# Patient Record
Sex: FEMALE | Race: WHITE | Employment: UNEMPLOYED | URBAN - METROPOLITAN AREA
[De-identification: names, ages, dates, MRNs, and addresses within clinical notes are randomized per-mention and may not be internally consistent; named-entity substitution may affect disease eponyms.]

---

## 2017-01-24 DIAGNOSIS — E11.65 TYPE 2 DIABETES MELLITUS WITH HYPERGLYCEMIA, WITH LONG-TERM CURRENT USE OF INSULIN (HCC): Primary | ICD-10-CM

## 2017-01-24 DIAGNOSIS — Z79.4 TYPE 2 DIABETES MELLITUS WITH HYPERGLYCEMIA, WITH LONG-TERM CURRENT USE OF INSULIN (HCC): Primary | ICD-10-CM

## 2017-01-24 RX ORDER — INSULIN GLARGINE 100 [IU]/ML
INJECTION, SOLUTION SUBCUTANEOUS
Qty: 60 ML | Refills: 3 | Status: SHIPPED | OUTPATIENT
Start: 2017-01-24 | End: 2017-05-15 | Stop reason: SDUPTHER

## 2017-01-24 NOTE — TELEPHONE ENCOUNTER
Informed pt that levemir is no longer covered and a covered alternative has been sent to pharmacy. Asked pt to call with any questions.

## 2017-02-20 ENCOUNTER — HOSPITAL ENCOUNTER (OUTPATIENT)
Dept: MRI IMAGING | Age: 62
Discharge: HOME | End: 2017-02-20
Attending: ORTHOPAEDIC SURGERY

## 2017-02-20 DIAGNOSIS — M51.36 DDD (DEGENERATIVE DISC DISEASE), LUMBAR: ICD-10-CM

## 2017-03-29 ENCOUNTER — OFFICE VISIT (OUTPATIENT)
Dept: FAMILY MEDICINE CLINIC | Age: 62
End: 2017-03-29

## 2017-03-29 VITALS
OXYGEN SATURATION: 97 % | HEIGHT: 63 IN | HEART RATE: 77 BPM | TEMPERATURE: 98 F | BODY MASS INDEX: 35.61 KG/M2 | RESPIRATION RATE: 18 BRPM | WEIGHT: 201 LBS | SYSTOLIC BLOOD PRESSURE: 133 MMHG | DIASTOLIC BLOOD PRESSURE: 73 MMHG

## 2017-03-29 DIAGNOSIS — E11.8 DIABETES MELLITUS WITH COMPLICATION (HCC): Primary | ICD-10-CM

## 2017-03-29 DIAGNOSIS — E78.2 MIXED HYPERLIPIDEMIA: ICD-10-CM

## 2017-03-29 DIAGNOSIS — Q74.2 TOE ANOMALY: ICD-10-CM

## 2017-03-29 LAB — GLUCOSE POC: 279 MG/DL

## 2017-03-29 NOTE — MR AVS SNAPSHOT
Visit Information Date & Time Provider Department Dept. Phone Encounter #  
 3/29/2017  4:00 PM Rita Luque, Karl Hayden [phone] [id number] Upcoming Health Maintenance Date Due Hepatitis C Screening 1955 Pneumococcal 19-64 Medium Risk (1 of 1 - PPSV23) 1/16/1976 DTaP/Tdap/Td series (1 - Tdap) 1/16/1978 PAP AKA CERVICAL CYTOLOGY 1/16/1978 LIPID PANEL Q1 9/22/2015 FOOT EXAM Q1 12/8/2015 INFLUENZA AGE 9 TO ADULT 8/1/2016 BREAST CANCER SCRN MAMMOGRAM 12/11/2016 ZOSTER VACCINE AGE 60> 1/16/2017 HEMOGLOBIN A1C Q6M 2/9/2017 MICROALBUMIN Q1 8/9/2017 EYE EXAM RETINAL OR DILATED Q1 11/14/2017 COLONOSCOPY 7/15/2020 Allergies as of 3/29/2017  Review Complete On: 3/29/2017 By: Marlena Acevedo LPN Severity Noted Reaction Type Reactions Pcn [Penicillins]  08/21/2014    Hives Tolerates cephalexin Tanzeum [Albiglutide]  04/12/2016    Nausea Only Vioxx [Rofecoxib]  08/23/2016    Nausea Only Current Immunizations  Never Reviewed No immunizations on file. Not reviewed this visit You Were Diagnosed With   
  
 Codes Comments Diabetes mellitus with complication (HCC)    -  Primary ICD-10-CM: E11.8 ICD-9-CM: 250.90 Mixed hyperlipidemia     ICD-10-CM: E78.2 ICD-9-CM: 272.2 Vitals BP Pulse Temp Resp Height(growth percentile) Weight(growth percentile) 133/73 (BP 1 Location: Left arm, BP Patient Position: Sitting) 77 98 °F (36.7 °C) (Oral) 18 5' 3\" (1.6 m) 201 lb (91.2 kg) SpO2 BMI OB Status Smoking Status 97% 35.61 kg/m2 Menopause Current Every Day Smoker Vitals History BMI and BSA Data Body Mass Index Body Surface Area  
 35.61 kg/m 2 2.01 m 2 Preferred Pharmacy Pharmacy Name Phone [id number] [place], [address] AT [place] [phone] Your Updated Medication List  
  
   
 This list is accurate as of: 3/29/17  4:49 PM.  Always use your most recent med list.  
  
  
  
  
 albuterol 90 mcg/actuation inhaler Commonly known as:  PROVENTIL HFA, VENTOLIN HFA, PROAIR HFA Take 2 Puffs by inhalation every four (4) hours as needed for Wheezing. APIDRA SOLOSTAR 100 unit/mL pen Generic drug:  insulin glulisine Use with SSI Max units daily: 60  
  
 aspirin delayed-release 81 mg tablet Take 81 mg by mouth nightly. atorvastatin 20 mg tablet Commonly known as:  LIPITOR Take 1 Tab by mouth nightly. busPIRone 7.5 mg tablet Commonly known as:  BUSPAR Take 1 Tab by mouth two (2) times a day. cetirizine 10 mg tablet Commonly known as:  ZyrTEC Take 1 Tab by mouth every morning. docusate sodium 100 mg capsule Commonly known as:  STOOL SOFTENER  
TAKE 1 CAPSULE BY MOUTH TWO (2) TIMES A DAY. Indications: CONSTIPATION  
  
 gabapentin 300 mg capsule Commonly known as:  NEURONTIN Take 1 Cap by mouth two (2) times a day. hydroCHLOROthiazide 12.5 mg tablet Commonly known as:  HYDRODIURIL Take 1 Tab by mouth daily. insulin glargine 100 unit/mL (3 mL) pen Commonly known as:  LANTUS SOLOSTAR Inject 60 units in AM Stop Levemir Insulin Needles (Disposable) 32 gauge x 5/32\" Ndle Commonly known as:  Odilia Pen Needle Use to inject Levemir and Novolog  Dx Code: E11.65 Lancets Misc Commonly known as:  Leeann Lava Use when checking blood sugar  
  
 lisinopril 10 mg tablet Commonly known as:  Tim Paradise Take 1 Tab by mouth nightly. metoprolol tartrate 25 mg tablet Commonly known as:  LOPRESSOR Take 1 Tab by mouth two (2) times a day. mupirocin 2 % ointment Commonly known as:  [employer] Apply  to affected area daily. raNITIdine 150 mg tablet Commonly known as:  ZANTAC Take 1 Tab by mouth two (2) times daily as needed for Indigestion. SITagliptin 100 mg tablet Commonly known as:  Thirza Calkin Take 1 Tab by mouth every morning. Stop Tanzeum  
  
 venlafaxine- mg capsule Commonly known as:  EFFEXOR XR Take 1 Cap by mouth nightly. We Performed the Following AMB POC GLUCOSE, QUANTITATIVE, BLOOD [00195 CPT(R)] HEMOGLOBIN A1C WITH EAG [89862 CPT(R)] LIPID PANEL [14438 CPT(R)] METABOLIC PANEL, COMPREHENSIVE [43050 CPT(R)] MICROALBUMIN, UR, RAND W/ MICROALBUMIN/CREA RATIO [id number] CPT(R)] Introducing [facility]! Dear Opal Sheridan: 
Thank you for requesting a [facility] account. Our records indicate that you already have an active [facility] account. You can access your account anytime at [email or web address] Did you know that you can access your hospital and ER discharge instructions at any time in [facility]? You can also review all of your test results from your hospital stay or ER visit. Additional Information If you have questions, please visit the Frequently Asked Questions section of the [facility] website at [email or web address] Remember, [facility] is NOT to be used for urgent needs. For medical emergencies, dial 911. Now available from your iPhone and Android! Please provide this summary of care documentation to your next provider. Your primary care clinician is listed as Dayton Kenyan. If you have any questions after today's visit, please call [phone].

## 2017-03-30 LAB
ALBUMIN SERPL-MCNC: 3.6 G/DL (ref 3.6–4.8)
ALBUMIN/CREAT UR: 1265.8 MG/G CREAT (ref 0–30)
ALBUMIN/GLOB SERPL: 1.1 {RATIO} (ref 1.2–2.2)
ALP SERPL-CCNC: 105 IU/L (ref 39–117)
ALT SERPL-CCNC: 13 IU/L (ref 0–32)
AST SERPL-CCNC: 10 IU/L (ref 0–40)
BILIRUB SERPL-MCNC: 0.3 MG/DL (ref 0–1.2)
BUN SERPL-MCNC: 19 MG/DL (ref 8–27)
BUN/CREAT SERPL: 20 (ref 11–26)
CALCIUM SERPL-MCNC: 9.3 MG/DL (ref 8.7–10.3)
CHLORIDE SERPL-SCNC: 99 MMOL/L (ref 96–106)
CHOLEST SERPL-MCNC: 127 MG/DL (ref 100–199)
CO2 SERPL-SCNC: 24 MMOL/L (ref 18–29)
CREAT SERPL-MCNC: 0.95 MG/DL (ref 0.57–1)
CREAT UR-MCNC: 101.4 MG/DL
EST. AVERAGE GLUCOSE BLD GHB EST-MCNC: 246 MG/DL
GLOBULIN SER CALC-MCNC: 3.2 G/DL (ref 1.5–4.5)
GLUCOSE SERPL-MCNC: 243 MG/DL (ref 65–99)
HBA1C MFR BLD: 10.2 % (ref 4.8–5.6)
HDLC SERPL-MCNC: 37 MG/DL
INTERPRETATION, 910389: NORMAL
LDLC SERPL CALC-MCNC: 55 MG/DL (ref 0–99)
Lab: NORMAL
MICROALBUMIN UR-MCNC: 1283.5 UG/ML
POTASSIUM SERPL-SCNC: 4.7 MMOL/L (ref 3.5–5.2)
PROT SERPL-MCNC: 6.8 G/DL (ref 6–8.5)
SODIUM SERPL-SCNC: 138 MMOL/L (ref 134–144)
TRIGL SERPL-MCNC: 177 MG/DL (ref 0–149)
VLDLC SERPL CALC-MCNC: 35 MG/DL (ref 5–40)
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
## 2017-04-04 ENCOUNTER — TELEPHONE (OUTPATIENT)
Dept: FAMILY MEDICINE CLINIC | Age: 62
End: 2017-04-04

## 2017-04-04 NOTE — TELEPHONE ENCOUNTER
Pt notified . [facility] sure she has endocrine f/u. Whitney Valenzuela DM is out of control as her HgbA1c was 10.2

## 2017-04-25 ENCOUNTER — TELEPHONE (OUTPATIENT)
Dept: ENDOCRINOLOGY | Age: 62
End: 2017-04-25

## 2017-04-25 NOTE — TELEPHONE ENCOUNTER
Patient called and stated she saw her PCP at the end of March and A1C is higher at 10.2. Microalb/creat is also high. This week patient started experiencing a yellowish discharge which looks creamy and notices urine is bubbly when she uses bathroom. Patient stated only thing PCP did at appointment was recommend f/u with . Only new medication is Zrytec which started 3 days ago. Blood sugars are as follows for the past couple of days:  04/24   04/24 Bbed 350  04/25   She has a f/u appointment on July 05, 2017.

## 2017-04-25 NOTE — TELEPHONE ENCOUNTER
Her A1C has increased from 8.2 to 10.2 , need to adjuts meds , missed follow up appointment in Feb   Looks like she has urine infection and fu with PCP to make sure there is no infection -     She has to decrease starches   ,check sugars before each meal and  take insulin as recommended   Fax the glucose log in 2 weeks with food log , have to adjust the insulin     Brenda David - her diet is very bad and it is a challenge to control due to noncompliance

## 2017-04-25 NOTE — TELEPHONE ENCOUNTER
Spoke with patient and gave her 's recommendations of checking blood sugars before every meal, keeping a log of BS and diet and faxing information to office. Patient given fax number. Instructed patient to decrease carb intake. Also f/u with PCP concerning infection. Patient verbalized understanding. No further questions noted at this time.

## 2017-04-26 ENCOUNTER — OFFICE VISIT (OUTPATIENT)
Dept: FAMILY MEDICINE CLINIC | Age: 62
End: 2017-04-26

## 2017-04-26 VITALS
OXYGEN SATURATION: 97 % | TEMPERATURE: 97.8 F | HEIGHT: 63 IN | SYSTOLIC BLOOD PRESSURE: 141 MMHG | BODY MASS INDEX: 35.08 KG/M2 | HEART RATE: 76 BPM | RESPIRATION RATE: 16 BRPM | WEIGHT: 198 LBS | DIASTOLIC BLOOD PRESSURE: 71 MMHG

## 2017-04-26 DIAGNOSIS — R82.90 ABNORMAL URINALYSIS: ICD-10-CM

## 2017-04-26 DIAGNOSIS — Z12.39 BREAST CANCER SCREENING: ICD-10-CM

## 2017-04-26 DIAGNOSIS — E11.8 TYPE 2 DIABETES MELLITUS WITH COMPLICATION, WITH LONG-TERM CURRENT USE OF INSULIN (HCC): ICD-10-CM

## 2017-04-26 DIAGNOSIS — R82.90 CLOUDY URINE: ICD-10-CM

## 2017-04-26 DIAGNOSIS — B37.31 VAGINAL CANDIDIASIS: ICD-10-CM

## 2017-04-26 DIAGNOSIS — N89.8 VAGINAL DISCHARGE: Primary | ICD-10-CM

## 2017-04-26 DIAGNOSIS — Z79.4 TYPE 2 DIABETES MELLITUS WITH COMPLICATION, WITH LONG-TERM CURRENT USE OF INSULIN (HCC): ICD-10-CM

## 2017-04-26 LAB
BILIRUB UR QL STRIP: NORMAL
GLUCOSE UR-MCNC: NEGATIVE MG/DL
KETONES P FAST UR STRIP-MCNC: NORMAL MG/DL
PH UR STRIP: 5 [PH] (ref 4.6–8)
PROT UR QL STRIP: NORMAL MG/DL
SP GR UR STRIP: 1.02 (ref 1–1.03)
UA UROBILINOGEN AMB POC: NORMAL (ref 0.2–1)
URINALYSIS CLARITY POC: NORMAL
URINALYSIS COLOR POC: YELLOW
URINE BLOOD POC: NORMAL
URINE LEUKOCYTES POC: NORMAL
URINE NITRITES POC: NEGATIVE
WET MOUNT POCT, WMPOCT: NORMAL

## 2017-04-26 RX ORDER — FLUCONAZOLE 150 MG/1
150 TABLET ORAL DAILY
Qty: 1 TAB | Refills: 0 | Status: SHIPPED | OUTPATIENT
Start: 2017-04-26 | End: 2017-04-27

## 2017-04-26 NOTE — PROGRESS NOTES
Chief Complaint   Patient presents with    Vaginal Discharge     with cloudy urine     1. Have you been to the ER, urgent care clinic since your last visit? Hospitalized since your last visit? No    2. Have you seen or consulted any other health care providers outside of [facility] [place] since your last visit? Include any pap smears or colon screening.  No
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
## 2017-04-26 NOTE — MR AVS SNAPSHOT
Visit Information Date & Time Provider Department Dept. Phone Encounter #  
 4/26/2017  8:20 AM Sonny Mckenna MD [address] [place] [address] [phone] [id number] Follow-up Instructions Return if symptoms worsen or fail to improve. Your Appointments 7/11/2017 10:30 AM  
ROUTINE CARE with Austin Maki MD  
[facility] [address] [facility]) Appt Note: f/u . .DM. Canary Malady lws  
 [address] [facility] [address]  
[phone]  
  
   
 [address] [place] [id number] Upcoming Health Maintenance Date Due Hepatitis C Screening 1955 Pneumococcal 19-64 Medium Risk (1 of 1 - PPSV23) 1/16/1976 DTaP/Tdap/Td series (1 - Tdap) 1/16/1978 PAP AKA CERVICAL CYTOLOGY 1/16/1978 FOOT EXAM Q1 12/8/2015 INFLUENZA AGE 9 TO ADULT 8/1/2016 BREAST CANCER SCRN MAMMOGRAM 12/11/2016 ZOSTER VACCINE AGE 60> 1/16/2017 HEMOGLOBIN A1C Q6M 9/29/2017 MICROALBUMIN Q1 3/29/2018 LIPID PANEL Q1 3/29/2018 EYE EXAM RETINAL OR DILATED Q1 4/12/2018 COLONOSCOPY 7/15/2020 Allergies as of 4/26/2017  Review Complete On: 4/26/2017 By: Patrick Torres LPN Severity Noted Reaction Type Reactions Pcn [Penicillins]  08/21/2014    Hives Tolerates cephalexin Tanzeum [Albiglutide]  04/12/2016    Nausea Only Vioxx [Rofecoxib]  08/23/2016    Nausea Only Current Immunizations  Never Reviewed No immunizations on file. Not reviewed this visit You Were Diagnosed With   
  
 Codes Comments Vaginal discharge    -  Primary ICD-10-CM: N89.8 ICD-9-CM: 623.5 Vaginal candidiasis     ICD-10-CM: B37.3 ICD-9-CM: 112.1 Cloudy urine     ICD-10-CM: R82.90 ICD-9-CM: 791.9 Type 2 diabetes mellitus with complication, with long-term current use of insulin (HCC)     ICD-10-CM: E11.8, Z79.4 ICD-9-CM: 250.90, V58.67 Abnormal urinalysis     ICD-10-CM: R82.90 ICD-9-CM: 791.9 Vitals BP Pulse Temp Resp Height(growth percentile) Weight(growth percentile) 141/71 76 97.8 °F (36.6 °C) (Oral) 16 5' 3\" (1.6 m) 198 lb (89.8 kg) SpO2 BMI OB Status Smoking Status 97% 35.07 kg/m2 Menopause Current Every Day Smoker BMI and BSA Data Body Mass Index Body Surface Area 35.07 kg/m 2 2 m 2 Preferred Pharmacy Pharmacy Name Phone [facility]Y #[id number]- [place], [address] AT [facility] [phone] Your Updated Medication List  
  
   
This list is accurate as of: 4/26/17  9:11 AM.  Always use your most recent med list.  
  
  
  
  
 albuterol 90 mcg/actuation inhaler Commonly known as:  PROVENTIL HFA, VENTOLIN HFA, PROAIR HFA Take 2 Puffs by inhalation every four (4) hours as needed for Wheezing. APIDRA SOLOSTAR 100 unit/mL pen Generic drug:  insulin glulisine Use with SSI Max units daily: 60  
  
 aspirin delayed-release 81 mg tablet Take 81 mg by mouth nightly. atorvastatin 20 mg tablet Commonly known as:  LIPITOR Take 1 Tab by mouth nightly. busPIRone 7.5 mg tablet Commonly known as:  BUSPAR Take 1 Tab by mouth two (2) times a day. cetirizine 10 mg tablet Commonly known as:  ZyrTEC Take 1 Tab by mouth every morning. docusate sodium 100 mg capsule Commonly known as:  STOOL SOFTENER  
TAKE 1 CAPSULE BY MOUTH TWO (2) TIMES A DAY. Indications: CONSTIPATION  
  
 fluconazole 150 mg tablet Commonly known as:  DIFLUCAN Take 1 Tab by mouth daily for 1 day. FDA advises cautious prescribing of oral fluconazole in pregnancy. gabapentin 300 mg capsule Commonly known as:  NEURONTIN Take 1 Cap by mouth two (2) times a day. hydroCHLOROthiazide 12.5 mg tablet Commonly known as:  HYDRODIURIL Take 1 Tab by mouth daily. insulin glargine 100 unit/mL (3 mL) pen Commonly known as:  LANTUS SOLOSTAR Inject 60 units in AM Stop Levemir Insulin Needles (Disposable) 32 gauge x 5/32\" Ndle Commonly known as:  Odilia Pen Needle Use to inject Levemir and Novolog  Dx Code: E11.65 Lancets Misc Commonly known as:  Sarai [place] Use when checking blood sugar LEVEMIR 100 unit/mL injection Generic drug:  insulin detemir  
by SubCUTAneous route nightly. 66 units at bedtime  
  
 lisinopril 10 mg tablet Commonly known as:  Dulcie Mcburney Take 1 Tab by mouth nightly. metoprolol tartrate 25 mg tablet Commonly known as:  LOPRESSOR Take 1 Tab by mouth two (2) times a day. mupirocin 2 % ointment Commonly known as:  [facility] Apply  to affected area daily. raNITIdine 150 mg tablet Commonly known as:  ZANTAC Take 1 Tab by mouth two (2) times daily as needed for Indigestion. SITagliptin 100 mg tablet Commonly known as:  Donnamarie Nader Take 1 Tab by mouth every morning. Stop Tanzeum  
  
 venlafaxine- mg capsule Commonly known as:  EFFEXOR XR Take 1 Cap by mouth nightly. Prescriptions Sent to Pharmacy Refills  
 fluconazole (DIFLUCAN) 150 mg tablet 0 Sig: Take 1 Tab by mouth daily for 1 day. FDA advises cautious prescribing of oral fluconazole in pregnancy. Class: Normal  
 Pharmacy: [address] Ph #: [phone] Route: Oral  
  
We Performed the Following AMB POC SMEAR, STAIN & Titi Liner MOUNT [id number] CPT(R)] AMB POC URINALYSIS DIP STICK AUTO W/O MICRO [87247 CPT(R)] CULTURE, URINE [id number] CPT(R)] Follow-up Instructions Return if symptoms worsen or fail to improve. To-Do List   
 05/02/2017 10:30 AM  
  Appointment with [address] [facility] at [facility] RAD [address] [facility] ([phone]) 1. Please bring a list or a bag of your current medications to your appointment 2.  Please be sure to remove ALL hair clips, pins, extensions, etc., prior to arriving for your MRI procedure. 3. Bring any non [facility] films or CDs pertaining to the area being imaged with you on the day of appointment. 4. A written order with a valid diagnosis and Physicians  signature is required for all scheduled tests. 5. Check in at registration 30min before your appointment time unless you were instructed to do otherwise. Patient Instructions Vaginal Yeast Infection: Care Instructions Your Care Instructions A vaginal yeast infection is caused by too many yeast cells in the vagina. This is common in women of all ages. Itching, vaginal discharge and irritation, and other symptoms can bother you. But yeast infections don't often cause other health problems. Some medicines can increase your risk of getting a yeast infection. These include antibiotics, birth control pills, hormones, and steroids. You may also be more likely to get a yeast infection if you are pregnant, have diabetes, douche, or wear tight clothes. With treatment, most yeast infections get better in 2 to 3 days. Follow-up care is a key part of your treatment and safety. Be sure to make and go to all appointments, and call your doctor if you are having problems. It's also a good idea to know your test results and keep a list of the medicines you take. How can you care for yourself at home? · Take your medicines exactly as prescribed. Call your doctor if you think you are having a problem with your medicine. · Ask your doctor about over-the-counter (OTC) medicines for yeast infections. They may cost less than prescription medicines. If you use an OTC treatment, read and follow all instructions on the label. · Do not use tampons while using a vaginal cream or suppository. The tampons can absorb the medicine. Use pads instead. · Wear loose cotton clothing. Do not wear nylon or other fabric that holds body heat and moisture close to the skin. · Try sleeping without underwear. · Do not scratch. Relieve itching with a cold pack or a cool bath. · Do not wash your vaginal area more than once a day. Use plain water or a mild, unscented soap. Air-dry the vaginal area. · Change out of wet swimsuits after swimming. · Do not have sex until you have finished your treatment. · Do not douche. When should you call for help? Call your doctor now or seek immediate medical care if: 
· You have unexpected vaginal bleeding. · You have new or increased pain in your vagina or pelvis. Watch closely for changes in your health, and be sure to contact your doctor if: 
· You have a fever. · You are not getting better after 2 days. · Your symptoms come back after you finish your medicines. Where can you learn more? Go to http://lety-radha.info/. Enter X410 in the search box to learn more about \"Vaginal Yeast Infection: Care Instructions. \" Current as of: October 13, 2016 Content Version: 11.2 © 0535-4776 [employer]. Care instructions adapted under license by [employer] (which disclaims liability or warranty for this information). If you have questions about a medical condition or this instruction, always ask your healthcare professional. [place] [address] any warranty or liability for your use of this information. Fluconazole (By mouth) Fluconazole (bjuz-IOA-f-zole) Prevents and treats fungal infections. Brand Name(s): Diflucan There may be other brand names for this medicine. When This Medicine Should Not Be Used: This medicine is not right for everyone. Do not use it if you had an allergic reaction to fluconazole, or if you are pregnant. How to Use This Medicine:  
Liquid, Tablet · Your doctor will tell you how much medicine to use. Do not use more than directed. · Oral liquid: Shake well just before each use. Measure the oral liquid medicine with a marked measuring spoon, oral syringe, or medicine cup. · Take all of the medicine in your prescription to clear up your infection, even if you feel better after the first few doses. · Read and follow the patient instructions that come with this medicine. Talk to your doctor or pharmacist if you have any questions. · Missed dose: Take a dose as soon as you remember. If it is almost time for your next dose, wait until then and take a regular dose. Do not take extra medicine to make up for a missed dose. · Store the medicine in a closed container at room temperature, away from heat, moisture, and direct light. Store the oral liquid in the refrigerator or at room temperature and use it within 14 days. Do not freeze. Drugs and Foods to Avoid: Ask your doctor or pharmacist before using any other medicine, including over-the-counter medicines, vitamins, and herbal products. · Do not use this medicine together with astemizole, cisapride, erythromycin, pimozide, quinidine, or terfenadine. · Some foods and medicines can affect how fluconazole works. Tell your doctor if you are using cimetidine, midazolam, prednisone, rifabutin, rifampin, theophylline, tofacitinib, triazolam, vitamin A supplements, or voriconazole. Also tell your doctor if you are using any of the following: ¨ A blood thinner (such as warfarin) ¨ A diuretic or \"water pill\" (such as hydrochlorothiazide), or blood pressure medicine (such as amlodipine, felodipine, isradipine, losartan, nifedipine) ¨ Birth control pills ¨ Cancer medicine (cyclophosphamide, vinblastine, vincristine) ¨ Diabetes medicine that you take by mouth (glipizide, glyburide, tolbutamide) ¨ Medicine to lower cholesterol (atorvastatin, fluvastatin, simvastatin) ¨ Medicine to treat depression (amitriptyline, nortriptyline) ¨ Medicine to treat HIV/AIDS (saquinavir, zidovudine) ¨ Medicine to treat malaria (halofantrine) ¨ Medicine to treat seizures (carbamazepine, phenytoin) ¨ Medicine that weakens the immune system (cyclosporine, sirolimus, tacrolimus) ¨ Narcotic pain medicine (alfentanil, fentanyl, methadone) ¨ Pain or arthritis medicine (aspirin, celecoxib, diclofenac, ibuprofen, naproxen) Warnings While Using This Medicine: · It is not safe to take this medicine during pregnancy. It could harm an unborn baby. Tell your doctor right away if you become pregnant. · Tell your doctor if you are breastfeeding, or if you have kidney disease, liver disease, heart disease, heart rhythm problems, cancer, or HIV/AIDS. · This medicine may cause the following problems:  
¨ Liver problems ¨ Serious skin reactions ¨ Changes in heart rhythm, such as a condition called QT prolongation · This medicine may make you dizzy or drowsy. Do not drive or do anything that could be dangerous until you know how this medicine affects you. · Call your doctor if your symptoms do not improve or if they get worse. · Keep all medicine out of the reach of children. Never share your medicine with anyone. Possible Side Effects While Using This Medicine:  
Call your doctor right away if you notice any of these side effects: · Allergic reaction: Itching or hives, swelling in your face or hands, swelling or tingling in your mouth or throat, chest tightness, trouble breathing · Blistering, peeling, or red skin rash · Dark urine or pale stools, nausea, vomiting, loss of appetite, stomach pain, yellow skin or eyes · Fast, pounding, or uneven heartbeat · Unusual bleeding, bruising, or weakness If you notice these less serious side effects, talk with your doctor:  
· Headache · Mild nausea, vomiting, stomach pain, or diarrhea If you notice other side effects that you think are caused by this medicine, tell your doctor. Call your doctor for medical advice about side effects. You may report side effects to FDA at 0-655-FDA-5093 © 2017 [address] Information is for End User's use only and may not be sold, redistributed or otherwise used for commercial purposes. The above information is an  only. It is not intended as medical advice for individual conditions or treatments. Talk to your doctor, nurse or pharmacist before following any medical regimen to see if it is safe and effective for you. Introducing [facility]! Dear Osmin Hayes: 
Thank you for requesting a SmartKickz account. Our records indicate that you already have an active SmartKickz account. You can access your account anytime at [email or web address] Did you know that you can access your hospital and ER discharge instructions at any time in SmartKickz? You can also review all of your test results from your hospital stay or ER visit. Additional Information If you have questions, please visit the Frequently Asked Questions section of the SmartKickz website at [email or web address] Remember, SmartKickz is NOT to be used for urgent needs. For medical emergencies, dial 911. Now available from your iPhone and Android! Please provide this summary of care documentation to your next provider. Your primary care clinician is listed as Baldwin Hodgkins. If you have any questions after today's visit, please call [phone].

## 2017-04-26 NOTE — PATIENT INSTRUCTIONS
Vaginal Yeast Infection: Care Instructions  Your Care Instructions  A vaginal yeast infection is caused by too many yeast cells in the vagina. This is common in women of all ages. Itching, vaginal discharge and irritation, and other symptoms can bother you. But yeast infections don't often cause other health problems. Some medicines can increase your risk of getting a yeast infection. These include antibiotics, birth control pills, hormones, and steroids. You may also be more likely to get a yeast infection if you are pregnant, have diabetes, douche, or wear tight clothes. With treatment, most yeast infections get better in 2 to 3 days. Follow-up care is a key part of your treatment and safety. Be sure to make and go to all appointments, and call your doctor if you are having problems. It's also a good idea to know your test results and keep a list of the medicines you take. How can you care for yourself at home? · Take your medicines exactly as prescribed. Call your doctor if you think you are having a problem with your medicine. · Ask your doctor about over-the-counter (OTC) medicines for yeast infections. They may cost less than prescription medicines. If you use an OTC treatment, read and follow all instructions on the label. · Do not use tampons while using a vaginal cream or suppository. The tampons can absorb the medicine. Use pads instead. · Wear loose cotton clothing. Do not wear nylon or other fabric that holds body heat and moisture close to the skin. · Try sleeping without underwear. · Do not scratch. Relieve itching with a cold pack or a cool bath. · Do not wash your vaginal area more than once a day. Use plain water or a mild, unscented soap. Air-dry the vaginal area. · Change out of wet swimsuits after swimming. · Do not have sex until you have finished your treatment. · Do not douche. When should you call for help?   Call your doctor now or seek immediate medical care if:  · You have unexpected vaginal bleeding. · You have new or increased pain in your vagina or pelvis. Watch closely for changes in your health, and be sure to contact your doctor if:  · You have a fever. · You are not getting better after 2 days. · Your symptoms come back after you finish your medicines. Where can you learn more? Go to http://lety-radha.info/. Enter Z318 in the search box to learn more about \"Vaginal Yeast Infection: Care Instructions. \"  Current as of: October 13, 2016  Content Version: 11.2  © 3242-0005 [employer]. Care instructions adapted under license by [employer] (which disclaims liability or warranty for this information). If you have questions about a medical condition or this instruction, always ask your healthcare professional. [address] any warranty or liability for your use of this information. Fluconazole (By mouth)   Fluconazole (gxsw-STJ-o-zole)  Prevents and treats fungal infections. Brand Name(s): Diflucan   There may be other brand names for this medicine. When This Medicine Should Not Be Used: This medicine is not right for everyone. Do not use it if you had an allergic reaction to fluconazole, or if you are pregnant. How to Use This Medicine:   Liquid, Tablet  · Your doctor will tell you how much medicine to use. Do not use more than directed. · Oral liquid: Shake well just before each use. Measure the oral liquid medicine with a marked measuring spoon, oral syringe, or medicine cup. · Take all of the medicine in your prescription to clear up your infection, even if you feel better after the first few doses. · Read and follow the patient instructions that come with this medicine. Talk to your doctor or pharmacist if you have any questions. · Missed dose: Take a dose as soon as you remember. If it is almost time for your next dose, wait until then and take a regular dose.  Do not take extra medicine to make up for a missed dose. · Store the medicine in a closed container at room temperature, away from heat, moisture, and direct light. Store the oral liquid in the refrigerator or at room temperature and use it within 14 days. Do not freeze. Drugs and Foods to Avoid:   Ask your doctor or pharmacist before using any other medicine, including over-the-counter medicines, vitamins, and herbal products. · Do not use this medicine together with astemizole, cisapride, erythromycin, pimozide, quinidine, or terfenadine. · Some foods and medicines can affect how fluconazole works. Tell your doctor if you are using cimetidine, midazolam, prednisone, rifabutin, rifampin, theophylline, tofacitinib, triazolam, vitamin A supplements, or voriconazole. Also tell your doctor if you are using any of the following:   ¨ A blood thinner (such as warfarin)  ¨ A diuretic or \"water pill\" (such as hydrochlorothiazide), or blood pressure medicine (such as amlodipine, felodipine, isradipine, losartan, nifedipine)  ¨ Birth control pills  ¨ Cancer medicine (cyclophosphamide, vinblastine, vincristine)  ¨ Diabetes medicine that you take by mouth (glipizide, glyburide, tolbutamide)  ¨ Medicine to lower cholesterol (atorvastatin, fluvastatin, simvastatin)  ¨ Medicine to treat depression (amitriptyline, nortriptyline)  ¨ Medicine to treat HIV/AIDS (saquinavir, zidovudine)  ¨ Medicine to treat malaria (halofantrine)  ¨ Medicine to treat seizures (carbamazepine, phenytoin)  ¨ Medicine that weakens the immune system (cyclosporine, sirolimus, tacrolimus)  ¨ Narcotic pain medicine (alfentanil, fentanyl, methadone)  ¨ Pain or arthritis medicine (aspirin, celecoxib, diclofenac, ibuprofen, naproxen)  Warnings While Using This Medicine:   · It is not safe to take this medicine during pregnancy. It could harm an unborn baby. Tell your doctor right away if you become pregnant.   · Tell your doctor if you are breastfeeding, or if you have kidney disease, liver disease, heart disease, heart rhythm problems, cancer, or HIV/AIDS. · This medicine may cause the following problems:   ¨ Liver problems  ¨ Serious skin reactions  ¨ Changes in heart rhythm, such as a condition called QT prolongation  · This medicine may make you dizzy or drowsy. Do not drive or do anything that could be dangerous until you know how this medicine affects you. · Call your doctor if your symptoms do not improve or if they get worse. · Keep all medicine out of the reach of children. Never share your medicine with anyone. Possible Side Effects While Using This Medicine:   Call your doctor right away if you notice any of these side effects:  · Allergic reaction: Itching or hives, swelling in your face or hands, swelling or tingling in your mouth or throat, chest tightness, trouble breathing  · Blistering, peeling, or red skin rash  · Dark urine or pale stools, nausea, vomiting, loss of appetite, stomach pain, yellow skin or eyes  · Fast, pounding, or uneven heartbeat  · Unusual bleeding, bruising, or weakness  If you notice these less serious side effects, talk with your doctor:   · Headache  · Mild nausea, vomiting, stomach pain, or diarrhea  If you notice other side effects that you think are caused by this medicine, tell your doctor. Call your doctor for medical advice about side effects. You may report side effects to FDA at 1-078-FDA-9182  © 2017 [address]enc[address] Information is for End User's use only and may not be sold, redistributed or otherwise used for commercial purposes. The above information is an  only. It is not intended as medical advice for individual conditions or treatments. Talk to your doctor, nurse or pharmacist before following any medical regimen to see if it is safe and effective for you.

## 2017-04-27 LAB
BACTERIA UR CULT: NORMAL
SPECIMEN STATUS REPORT, ROLRST: NORMAL

## 2017-05-02 ENCOUNTER — HOSPITAL ENCOUNTER (OUTPATIENT)
Dept: MRI IMAGING | Age: 62
Discharge: HOME | End: 2017-05-02
Attending: ORTHOPAEDIC SURGERY
Payer: MEDICAID

## 2017-05-02 DIAGNOSIS — M51.36 DDD (DEGENERATIVE DISC DISEASE), LUMBAR: ICD-10-CM

## 2017-05-02 PROCEDURE — 72148 MRI LUMBAR SPINE W/O DYE: CPT

## 2017-05-04 ENCOUNTER — HOSPITAL ENCOUNTER (OUTPATIENT)
Dept: MAMMOGRAPHY | Age: 62
Discharge: HOME | End: 2017-05-04
Attending: FAMILY MEDICINE
Payer: MEDICAID

## 2017-05-04 DIAGNOSIS — Z12.39 BREAST CANCER SCREENING: ICD-10-CM

## 2017-05-04 PROCEDURE — 77063 BREAST TOMOSYNTHESIS BI: CPT

## 2017-05-05 NOTE — PROGRESS NOTES
BI-RADS 1: Negative. No mammographic evidence of malignancy. Next screening mammogram is recommended in one year. Will notify pt via WillCallt.

## 2017-05-15 DIAGNOSIS — E11.65 TYPE 2 DIABETES MELLITUS WITH HYPERGLYCEMIA, WITH LONG-TERM CURRENT USE OF INSULIN (HCC): ICD-10-CM

## 2017-05-15 DIAGNOSIS — Z79.4 TYPE 2 DIABETES MELLITUS WITH HYPERGLYCEMIA, WITH LONG-TERM CURRENT USE OF INSULIN (HCC): ICD-10-CM

## 2017-05-15 RX ORDER — INSULIN GLARGINE 100 [IU]/ML
INJECTION, SOLUTION SUBCUTANEOUS
Qty: 60 ML | Refills: 2 | Status: SHIPPED | OUTPATIENT
Start: 2017-05-15 | End: 2017-05-16 | Stop reason: SDUPTHER

## 2017-05-16 DIAGNOSIS — E11.65 TYPE 2 DIABETES MELLITUS WITH HYPERGLYCEMIA, WITH LONG-TERM CURRENT USE OF INSULIN (HCC): Primary | ICD-10-CM

## 2017-05-16 DIAGNOSIS — Z79.4 TYPE 2 DIABETES MELLITUS WITH HYPERGLYCEMIA, WITH LONG-TERM CURRENT USE OF INSULIN (HCC): Primary | ICD-10-CM

## 2017-05-16 RX ORDER — INSULIN GLARGINE 100 [IU]/ML
INJECTION, SOLUTION SUBCUTANEOUS
Qty: 30 ML | Refills: 2 | Status: SHIPPED | OUTPATIENT
Start: 2017-05-16 | End: 2017-10-13 | Stop reason: SDUPTHER

## 2017-05-17 RX ORDER — METOPROLOL TARTRATE 25 MG/1
TABLET, FILM COATED ORAL
Qty: 60 TAB | Refills: 6 | Status: SHIPPED | OUTPATIENT
Start: 2017-05-17 | End: 2017-12-20 | Stop reason: SDUPTHER

## 2017-05-19 DIAGNOSIS — F43.10 PTSD (POST-TRAUMATIC STRESS DISORDER): ICD-10-CM

## 2017-05-19 DIAGNOSIS — I10 ESSENTIAL HYPERTENSION WITH GOAL BLOOD PRESSURE LESS THAN 140/90: ICD-10-CM

## 2017-05-19 DIAGNOSIS — E11.8 DIABETES MELLITUS WITH COMPLICATION (HCC): ICD-10-CM

## 2017-05-19 RX ORDER — VENLAFAXINE HYDROCHLORIDE 150 MG/1
150 CAPSULE, EXTENDED RELEASE ORAL
Qty: 30 CAP | Refills: 0 | Status: SHIPPED | OUTPATIENT
Start: 2017-05-19 | End: 2017-06-20 | Stop reason: SDUPTHER

## 2017-06-16 RX ORDER — METOPROLOL TARTRATE 25 MG/1
TABLET, FILM COATED ORAL
Qty: 60 TAB | Refills: 6 | Status: SHIPPED | OUTPATIENT
Start: 2017-06-16 | End: 2017-07-06 | Stop reason: SDUPTHER

## 2017-06-20 ENCOUNTER — PATIENT MESSAGE (OUTPATIENT)
Dept: FAMILY MEDICINE CLINIC | Age: 62
End: 2017-06-20

## 2017-06-20 DIAGNOSIS — I10 ESSENTIAL HYPERTENSION WITH GOAL BLOOD PRESSURE LESS THAN 140/90: ICD-10-CM

## 2017-06-20 DIAGNOSIS — E11.8 DIABETES MELLITUS WITH COMPLICATION (HCC): ICD-10-CM

## 2017-06-20 DIAGNOSIS — F43.10 PTSD (POST-TRAUMATIC STRESS DISORDER): ICD-10-CM

## 2017-06-21 RX ORDER — VENLAFAXINE HYDROCHLORIDE 150 MG/1
150 CAPSULE, EXTENDED RELEASE ORAL
Qty: 30 CAP | Refills: 0 | Status: SHIPPED | OUTPATIENT
Start: 2017-06-21 | End: 2017-08-16 | Stop reason: SDUPTHER

## 2017-07-06 ENCOUNTER — OFFICE VISIT (OUTPATIENT)
Dept: FAMILY MEDICINE CLINIC | Age: 62
End: 2017-07-06

## 2017-07-06 VITALS
SYSTOLIC BLOOD PRESSURE: 137 MMHG | WEIGHT: 202 LBS | TEMPERATURE: 98.4 F | RESPIRATION RATE: 16 BRPM | DIASTOLIC BLOOD PRESSURE: 50 MMHG | BODY MASS INDEX: 35.79 KG/M2 | HEIGHT: 63 IN | HEART RATE: 69 BPM | OXYGEN SATURATION: 97 %

## 2017-07-06 DIAGNOSIS — E78.5 HYPERLIPIDEMIA, UNSPECIFIED HYPERLIPIDEMIA TYPE: ICD-10-CM

## 2017-07-06 DIAGNOSIS — R29.6 MULTIPLE FALLS: ICD-10-CM

## 2017-07-06 DIAGNOSIS — Z13.31 SCREENING FOR DEPRESSION: ICD-10-CM

## 2017-07-06 DIAGNOSIS — F32.A DEPRESSION, UNSPECIFIED DEPRESSION TYPE: ICD-10-CM

## 2017-07-06 DIAGNOSIS — S69.92XA WRIST INJURY, LEFT, INITIAL ENCOUNTER: ICD-10-CM

## 2017-07-06 DIAGNOSIS — I10 ESSENTIAL HYPERTENSION: ICD-10-CM

## 2017-07-06 DIAGNOSIS — Z00.00 WELL WOMAN EXAM (NO GYNECOLOGICAL EXAM): Primary | ICD-10-CM

## 2017-07-06 NOTE — PROGRESS NOTES
Chief Complaint   Patient presents with    Well Woman    Wrist Injury     fell 6/29/17 and caught self with left arm     1. Have you been to the ER, urgent care clinic since your last visit? Hospitalized since your last visit? No    2. Have you seen or consulted any other health care providers outside of the [address] [place] [address] since your last visit? Include any pap smears or colon screening.  No
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
## 2017-07-06 NOTE — PATIENT INSTRUCTIONS
Well Visit, Women 48 to 72: Care Instructions  Your Care Instructions  Physical exams can help you stay healthy. Your doctor has checked your overall health and may have suggested ways to take good care of yourself. He or she also may have recommended tests. At home, you can help prevent illness with healthy eating, regular exercise, and other steps. Follow-up care is a key part of your treatment and safety. Be sure to make and go to all appointments, and call your doctor if you are having problems. It's also a good idea to know your test results and keep a list of the medicines you take. How can you care for yourself at home? · Reach and stay at a healthy weight. This will lower your risk for many problems, such as obesity, diabetes, heart disease, and high blood pressure. · Get at least 30 minutes of exercise on most days of the week. Walking is a good choice. You also may want to do other activities, such as running, swimming, cycling, or playing tennis or team sports. · Do not smoke. Smoking can make health problems worse. If you need help quitting, talk to your doctor about stop-smoking programs and medicines. These can increase your chances of quitting for good. · Protect your skin from too much sun. When you're outdoors from 10 a.m. to 4 p.m., stay in the shade or cover up with clothing and a hat with a wide brim. Wear sunglasses that block UV rays. Even when it's cloudy, put broad-spectrum sunscreen (SPF 30 or higher) on any exposed skin. · See a dentist one or two times a year for checkups and to have your teeth cleaned. · Wear a seat belt in the car. · Limit alcohol to 1 drink a day. Too much alcohol can cause health problems. Follow your doctor's advice about when to have certain tests. These tests can spot problems early. · Cholesterol.  Your doctor will tell you how often to have this done based on your age, family history, or other things that can increase your risk for heart attack and stroke. · Blood pressure. Have your blood pressure checked during a routine doctor visit. Your doctor will tell you how often to check your blood pressure based on your age, your blood pressure results, and other factors. · Mammogram. Ask your doctor how often you should have a mammogram, which is an X-ray of your breasts. A mammogram can spot breast cancer before it can be felt and when it is easiest to treat. · Pap test and pelvic exam. Ask your doctor how often you should have a Pap test. You may not need to have a Pap test as often as you used to. · Vision. Have your eyes checked every year or two or as often as your doctor suggests. Some experts recommend that you have yearly exams for glaucoma and other age-related eye problems starting at age 48. · Hearing. Tell your doctor if you notice any change in your hearing. You can have tests to find out how well you hear. · Diabetes. Ask your doctor whether you should have tests for diabetes. · Colon cancer. You should begin tests for colon cancer at age 48. You may have one of several tests. Your doctor will tell you how often to have tests based on your age and risk. Risks include whether you already had a precancerous polyp removed from your colon or whether your parents, sisters and brothers, or children have had colon cancer. · Thyroid disease. Talk to your doctor about whether to have your thyroid checked as part of a regular physical exam. Women have an increased chance of a thyroid problem. · Osteoporosis. You should begin tests for bone density at age 72. If you are younger than 72, ask your doctor whether you have factors that may increase your risk for this disease. You may want to have this test before age 72. · Heart attack and stroke risk. At least every 4 to 6 years, you should have your risk for heart attack and stroke assessed.  Your doctor uses factors such as your age, blood pressure, cholesterol, and whether you smoke or have diabetes to show what your risk for a heart attack or stroke is over the next 10 years. When should you call for help? Watch closely for changes in your health, and be sure to contact your doctor if you have any problems or symptoms that concern you. Where can you learn more? Go to http://lety-radha.info/. Enter A424 in the search box to learn more about \"Well Visit, Women 50 to 72: Care Instructions. \"  Current as of: July 19, 2016  Content Version: 11.3  © 4601-4692 [employer]. Care instructions adapted under license by [employer] (which disclaims liability or warranty for this information). If you have questions about a medical condition or this instruction, always ask your healthcare professional. [address] any warranty or liability for your use of this information. Preventing Falls: Care Instructions  Your Care Instructions  Getting around your home safely can be a challenge if you have injuries or health problems that make it easy for you to fall. Loose rugs and furniture in walkways are among the dangers for many older people who have problems walking or who have poor eyesight. People who have conditions such as arthritis, osteoporosis, or dementia also have to be careful not to fall. You can make your home safer with a few simple measures. Follow-up care is a key part of your treatment and safety. Be sure to make and go to all appointments, and call your doctor if you are having problems. It's also a good idea to know your test results and keep a list of the medicines you take. How can you care for yourself at home? Taking care of yourself  · You may get dizzy if you do not drink enough water. To prevent dehydration, drink plenty of fluids, enough so that your urine is light yellow or clear like water. Choose water and other caffeine-free clear liquids.  If you have kidney, heart, or liver disease and have to limit fluids, talk with your doctor before you increase the amount of fluids you drink. · Exercise regularly to improve your strength, muscle tone, and balance. Walk if you can. Swimming may be a good choice if you cannot walk easily. · Have your vision and hearing checked each year or any time you notice a change. If you have trouble seeing and hearing, you might not be able to avoid objects and could lose your balance. · Know the side effects of the medicines you take. Ask your doctor or pharmacist whether the medicines you take can affect your balance. Sleeping pills or sedatives can affect your balance. · Limit the amount of alcohol you drink. Alcohol can impair your balance and other senses. · Ask your doctor whether calluses or corns on your feet need to be removed. If you wear loose-fitting shoes because of calluses or corns, you can lose your balance and fall. · Talk to your doctor if you have numbness in your feet. Preventing falls at home  · Remove raised doorway thresholds, throw rugs, and clutter. Repair loose carpet or raised areas in the floor. · Move furniture and electrical cords to keep them out of walking paths. · Use nonskid floor wax, and wipe up spills right away, especially on ceramic tile floors. · If you use a walker or cane, put rubber tips on it. If you use crutches, clean the bottoms of them regularly with an abrasive pad, such as steel wool. · Keep your house well lit, especially [facility], and outside walkways. Use night-lights in areas such as hallways and bathrooms. Add extra light switches or use remote switches (such as switches that go on or off when you clap your hands) to make it easier to turn lights on if you have to get up during the night. · Install sturdy handrails on stairways. · Move items in your cabinets so that the things you use a lot are on the lower shelves (about waist level). · Keep a cordless phone and a flashlight with new batteries by your bed.  If possible, put a phone in each of the main rooms of your house, or carry a cell phone in case you fall and cannot reach a phone. Or, you can wear a device around your neck or wrist. You push a button that sends a signal for help. · Wear low-heeled shoes that fit well and give your feet good support. Use footwear with nonskid soles. Check the heels and soles of your shoes for wear. Repair or replace worn heels or soles. · Do not wear socks without shoes on wood floors. · Walk on the grass when the sidewalks are slippery. If you live in an area that gets snow and ice in the winter, sprinkle salt on slippery steps and sidewalks. Preventing falls in the bath  · Install grab bars and nonskid mats inside and outside your shower or tub and near the toilet and sinks. · Use shower chairs and bath benches. · Use a hand-held shower head that will allow you to sit while showering. · Get into a tub or shower by putting the weaker leg in first. Get out of a tub or shower with your strong side first.  · Repair loose toilet seats and consider installing a raised toilet seat to make getting on and off the toilet easier. · Keep your bathroom door unlocked while you are in the shower. Where can you learn more? Go to http://lety-radha.info/. Enter 0476 [id number] 69 [id number] in the search box to learn more about \"Preventing Falls: Care Instructions. \"  Current as of: August 4, 2016  Content Version: 11.3  © 9789-3665 [employer]. Care instructions adapted under license by [employer] (which disclaims liability or warranty for this information). If you have questions about a medical condition or this instruction, always ask your healthcare professional. [place] [address] any warranty or liability for your use of this information.       [facility] at . [address]  [place] [address] [facility] [address] [facility]  Phone: [phone]  Fax: [phone]  Hours: [facility] Thursday 7:00 am  7:00 pm, Friday 7:00 am  1:30 pm      [facility] at [facility]  [address], [address]  Phone: [phone]  Fax: [phone]  Hours: Urmila Dumont Thursday 7:00 am  7:00 pm, Friday 7:00 am  2:00 pm

## 2017-07-06 NOTE — MR AVS SNAPSHOT
Visit Information Date & Time Provider Department Dept. Phone Encounter #  
 7/6/2017  3:20 PM Siva Estrada, [address] [facility] [phone] [id number] Follow-up Instructions Return in about 1 year (around 7/6/2018) for Annual Physicals (return sooner for pap). Your Appointments 7/11/2017 10:30 AM  
ROUTINE CARE with Meenu Lima MD  
[facility]ology [address] [facility]) Appt Note: f/u . .DM. Gae Notch lws  
 [address] [place] [address] [facility] [address]  
[phone]  
  
   
 [address] [place] [address] [address] [facility] [address] Upcoming Health Maintenance Date Due Hepatitis C Screening 1955 Pneumococcal 19-64 Medium Risk (1 of 1 - PPSV23) 1/16/1976 DTaP/Tdap/Td series (1 - Tdap) 1/16/1978 PAP AKA CERVICAL CYTOLOGY 1/16/1978 FOOT EXAM Q1 12/8/2015 ZOSTER VACCINE AGE 60> 1/16/2017 INFLUENZA AGE 9 TO ADULT 8/1/2017 HEMOGLOBIN A1C Q6M 9/29/2017 MICROALBUMIN Q1 3/29/2018 LIPID PANEL Q1 3/29/2018 EYE EXAM RETINAL OR DILATED Q1 4/12/2018 BREAST CANCER SCRN MAMMOGRAM 5/4/2019 COLONOSCOPY 7/15/2020 Allergies as of 7/6/2017  Review Complete On: 7/6/2017 By: Casandra Leiva LPN Severity Noted Reaction Type Reactions Pcn [Penicillins]  08/21/2014    Hives Tolerates cephalexin Tanzeum [Albiglutide]  04/12/2016    Nausea Only Vioxx [Rofecoxib]  08/23/2016    Nausea Only Current Immunizations  Never Reviewed No immunizations on file. Not reviewed this visit You Were Diagnosed With   
  
 Codes Comments Well woman exam (no gynecological exam)    -  Primary ICD-10-CM: Z00.00 ICD-9-CM: V70.0 Uncontrolled type 2 diabetes mellitus with complication, with long-term current use of insulin (HCC)     ICD-10-CM: E11.8, E11.65, Z79.4 ICD-9-CM: 250.82, V58.67 Essential hypertension     ICD-10-CM: I10 
ICD-9-CM: 401.9 Hyperlipidemia, unspecified hyperlipidemia type     ICD-10-CM: E78.5 ICD-9-CM: 272.4 BMI 35.0-35.9,adult     ICD-10-CM: H50.89 ICD-9-CM: V85.35 Wrist injury, left, initial encounter     ICD-10-CM: W10.73GS ICD-9-CM: 959.3 Multiple falls     ICD-10-CM: R29.6 ICD-9-CM: V15.88 Screening for depression     ICD-10-CM: Z13.89 ICD-9-CM: V79.0 Vitals BP Pulse Temp Resp Height(growth percentile) Weight(growth percentile) 137/50 69 98.4 °F (36.9 °C) (Oral) 16 5' 3\" (1.6 m) 202 lb (91.6 kg) SpO2 BMI OB Status Smoking Status 97% 35.78 kg/m2 Menopause Current Every Day Smoker BMI and BSA Data Body Mass Index Body Surface Area 35.78 kg/m 2 2.02 m 2 Preferred Pharmacy Pharmacy Name Phone [id number]- [place], [address] AT [facility] [phone] Your Updated Medication List  
  
   
This list is accurate as of: 7/6/17  5:34 PM.  Always use your most recent med list.  
  
  
  
  
 albuterol 90 mcg/actuation inhaler Commonly known as:  PROVENTIL HFA, VENTOLIN HFA, PROAIR HFA Take 2 Puffs by inhalation every four (4) hours as needed for Wheezing. APIDRA SOLOSTAR 100 unit/mL pen Generic drug:  insulin glulisine Use with SSI Max units daily: 60  
  
 aspirin delayed-release 81 mg tablet Take 81 mg by mouth nightly. atorvastatin 20 mg tablet Commonly known as:  LIPITOR Take 1 Tab by mouth nightly. busPIRone 7.5 mg tablet Commonly known as:  BUSPAR Take 1 Tab by mouth two (2) times a day. cetirizine 10 mg tablet Commonly known as:  ZyrTEC Take 1 Tab by mouth every morning. docusate sodium 100 mg capsule Commonly known as:  STOOL SOFTENER  
TAKE 1 CAPSULE BY MOUTH TWO (2) TIMES A DAY. Indications: CONSTIPATION  
  
 gabapentin 300 mg capsule Commonly known as:  NEURONTIN Take 1 Cap by mouth two (2) times a day. hydroCHLOROthiazide 12.5 mg tablet Commonly known as:  HYDRODIURIL Take 1 Tab by mouth daily. insulin glargine 100 unit/mL (3 mL) Inpn Commonly known asViola Grit INJECT 60 UNITS IN AM STOP LEVEMIR Insulin Needles (Disposable) 32 gauge x 5/32\" Ndle Commonly known as:  Odilia Pen Needle Use to inject Levemir and Novolog  Dx Code: E11.65 Lancets Misc Commonly known as:  Corbin Kanreema Use when checking blood sugar  
  
 lisinopril 10 mg tablet Commonly known as:  Aureliano Human Take 1 Tab by mouth nightly. metoprolol tartrate 25 mg tablet Commonly known as:  LOPRESSOR  
TAKE 1 TAB BY MOUTH TWO (2) TIMES A DAY. mupirocin 2 % ointment Commonly known as:  [facility] Apply  to affected area daily. raNITIdine 150 mg tablet Commonly known as:  ZANTAC Take 1 Tab by mouth two (2) times daily as needed for Indigestion. SITagliptin 100 mg tablet Commonly known as:  Gwendalyn Smiley Take 1 Tab by mouth every morning. Stop Tanzeum  
  
 venlafaxine- mg capsule Commonly known as:  EFFEXOR XR Take 1 Cap by mouth nightly. We Performed the Following BEHAV ASSMT W/SCORE & DOCD/STAND INSTRUMENT [id number] CPT(R)] REFERRAL TO PHYSICAL THERAPY [KWQ66 Custom] Comments:  
 Please evaluate patient for fall prevention. Follow-up Instructions Return in about 1 year (around 7/6/2018) for Annual Physicals (return sooner for pap). To-Do List   
 07/06/2017 Imaging:  XR WRIST LT AP/LAT/OBL MIN 3V Referral Information Referral ID Referred By Referred To  
  
 [id number] Augusto GEIGER Not Available Visits Status Start Date End Date 1 New Request 7/6/17 7/6/18 If your referral has a status of pending review or denied, additional information will be sent to support the outcome of this decision. Patient Instructions Well Visit, Women 48 to 72: Care Instructions Your Care Instructions Physical exams can help you stay healthy. Your doctor has checked your overall health and may have suggested ways to take good care of yourself. He or she also may have recommended tests. At home, you can help prevent illness with healthy eating, regular exercise, and other steps. Follow-up care is a key part of your treatment and safety. Be sure to make and go to all appointments, and call your doctor if you are having problems. It's also a good idea to know your test results and keep a list of the medicines you take. How can you care for yourself at home? · Reach and stay at a healthy weight. This will lower your risk for many problems, such as obesity, diabetes, heart disease, and high blood pressure. · Get at least 30 minutes of exercise on most days of the week. Walking is a good choice. You also may want to do other activities, such as running, swimming, cycling, or playing tennis or team sports. · Do not smoke. Smoking can make health problems worse. If you need help quitting, talk to your doctor about stop-smoking programs and medicines. These can increase your chances of quitting for good. · Protect your skin from too much sun. When you're outdoors from 10 a.m. to 4 p.m., stay in the shade or cover up with clothing and a hat with a wide brim. Wear sunglasses that block UV rays. Even when it's cloudy, put broad-spectrum sunscreen (SPF 30 or higher) on any exposed skin. · See a dentist one or two times a year for checkups and to have your teeth cleaned. · Wear a seat belt in the car. · Limit alcohol to 1 drink a day. Too much alcohol can cause health problems. Follow your doctor's advice about when to have certain tests. These tests can spot problems early. · Cholesterol. Your doctor will tell you how often to have this done based on your age, family history, or other things that can increase your risk for heart attack and stroke. · Blood pressure. Have your blood pressure checked during a routine doctor visit. Your doctor will tell you how often to check your blood pressure based on your age, your blood pressure results, and other factors. · Mammogram. Ask your doctor how often you should have a mammogram, which is an X-ray of your breasts. A mammogram can spot breast cancer before it can be felt and when it is easiest to treat. · Pap test and pelvic exam. Ask your doctor how often you should have a Pap test. You may not need to have a Pap test as often as you used to. · Vision. Have your eyes checked every year or two or as often as your doctor suggests. Some experts recommend that you have yearly exams for glaucoma and other age-related eye problems starting at age 48. · Hearing. Tell your doctor if you notice any change in your hearing. You can have tests to find out how well you hear. · Diabetes. Ask your doctor whether you should have tests for diabetes. · Colon cancer. You should begin tests for colon cancer at age 48. You may have one of several tests. Your doctor will tell you how often to have tests based on your age and risk. Risks include whether you already had a precancerous polyp removed from your colon or whether your parents, sisters and brothers, or children have had colon cancer. · Thyroid disease. Talk to your doctor about whether to have your thyroid checked as part of a regular physical exam. Women have an increased chance of a thyroid problem. · Osteoporosis. You should begin tests for bone density at age 72. If you are younger than 72, ask your doctor whether you have factors that may increase your risk for this disease. You may want to have this test before age 72. · Heart attack and stroke risk. At least every 4 to 6 years, you should have your risk for heart attack and stroke assessed.  Your doctor uses factors such as your age, blood pressure, cholesterol, and whether you smoke or have diabetes to show what your risk for a heart attack or stroke is over the next 10 years. When should you call for help? Watch closely for changes in your health, and be sure to contact your doctor if you have any problems or symptoms that concern you. Where can you learn more? Go to http://lety-radha.info/. Enter R411 in the search box to learn more about \"Well Visit, Women 50 to 72: Care Instructions. \" Current as of: July 19, 2016 Content Version: 11.3 © 9865-9665 [employer]. Care instructions adapted under license by [employer] (which disclaims liability or warranty for this information). If you have questions about a medical condition or this instruction, always ask your healthcare professional. [address] any warranty or liability for your use of this information. Preventing Falls: Care Instructions Your Care Instructions Getting around your home safely can be a challenge if you have injuries or health problems that make it easy for you to fall. Loose rugs and furniture in walkways are among the dangers for many older people who have problems walking or who have poor eyesight. People who have conditions such as arthritis, osteoporosis, or dementia also have to be careful not to fall. You can make your home safer with a few simple measures. Follow-up care is a key part of your treatment and safety. Be sure to make and go to all appointments, and call your doctor if you are having problems. It's also a good idea to know your test results and keep a list of the medicines you take. How can you care for yourself at home? Taking care of yourself · You may get dizzy if you do not drink enough water. To prevent dehydration, drink plenty of fluids, enough so that your urine is light yellow or clear like water.  Choose water and other caffeine-free clear liquids. If you have kidney, heart, or liver disease and have to limit fluids, talk with your doctor before you increase the amount of fluids you drink. · Exercise regularly to improve your strength, muscle tone, and balance. Walk if you can. Swimming may be a good choice if you cannot walk easily. · Have your vision and hearing checked each year or any time you notice a change. If you have trouble seeing and hearing, you might not be able to avoid objects and could lose your balance. · Know the side effects of the medicines you take. Ask your doctor or pharmacist whether the medicines you take can affect your balance. Sleeping pills or sedatives can affect your balance. · Limit the amount of alcohol you drink. Alcohol can impair your balance and other senses. · Ask your doctor whether calluses or corns on your feet need to be removed. If you wear loose-fitting shoes because of calluses or corns, you can lose your balance and fall. · Talk to your doctor if you have numbness in your feet. Preventing falls at home · Remove raised doorway thresholds, throw rugs, and clutter. Repair loose carpet or raised areas in the floor. · Move furniture and electrical cords to keep them out of walking paths. · Use nonskid floor wax, and wipe up spills right away, especially on ceramic tile floors. · If you use a walker or cane, put rubber tips on it. If you use crutches, clean the bottoms of them regularly with an abrasive pad, such as steel wool. · Keep your house well lit, especially [facility], and outside walkways. Use night-lights in areas such as hallways and bathrooms. Add extra light switches or use remote switches (such as switches that go on or off when you clap your hands) to make it easier to turn lights on if you have to get up during the night. · Install sturdy handrails on stairways. · Move items in your cabinets so that the things you use a lot are on the lower shelves (about waist level). · Keep a cordless phone and a flashlight with new batteries by your bed. If possible, put a phone in each of the main rooms of your house, or carry a cell phone in case you fall and cannot reach a phone. Or, you can wear a device around your neck or wrist. You push a button that sends a signal for help. · Wear low-heeled shoes that fit well and give your feet good support. Use footwear with nonskid soles. Check the heels and soles of your shoes for wear. Repair or replace worn heels or soles. · Do not wear socks without shoes on wood floors. · Walk on the grass when the sidewalks are slippery. If you live in an area that gets snow and ice in the winter, sprinkle salt on slippery steps and sidewalks. Preventing falls in the bath · Install grab bars and nonskid mats inside and outside your shower or tub and near the toilet and sinks. · Use shower chairs and bath benches. · Use a hand-held shower head that will allow you to sit while showering. · Get into a tub or shower by putting the weaker leg in first. Get out of a tub or shower with your strong side first. 
· Repair loose toilet seats and consider installing a raised toilet seat to make getting on and off the toilet easier. · Keep your bathroom door unlocked while you are in the shower. Where can you learn more? Go to http://lety-radha.info/. Enter 0476 [id number] 69 [id number] in the search box to learn more about \"Preventing Falls: Care Instructions. \" Current as of: August 4, 2016 Content Version: 11.3 © 9088-4174 "[employer]. Care instructions adapted under license by [employer] (which disclaims liability or warranty for this information). If you have questions about a medical condition or this instruction, always ask your healthcare professional. Alley Swain any warranty or liability for your use of this information. [facility] at . [address] [place], [address] [facility] Phone: [phone] Fax: [phone] Hours: Monday Thursday 7:00 am  7:00 pm, Friday 7:00 am  1:30 pm 
 
 
[facility] at [facility] [address] [place], [address] Phone: [phone] Fax: [phone] Hours: Monday Thursday 7:00 am  7:00 pm, Friday 7:00 am  2:00 pm 
 
 
 
  
Introducing [facility]! Dear Jacque Blair: 
Thank you for requesting a [employer] account. Our records indicate that you already have an active E-[employer] account. You can access your account anytime at [email or web address] Did you know that you can access your hospital and ER discharge instructions at any time in E-[employer]? You can also review all of your test results from your hospital stay or ER visit. Additional Information If you have questions, please visit the Frequently Asked Questions section of the E-Buy website at [email or web address] Remember, [employer]-Buy is NOT to be used for urgent needs. For medical emergencies, dial 911. Now available from your iPhone and Android! Please provide this summary of care documentation to your next provider. Your primary care clinician is listed as Emily Guerra. If you have any questions after today's visit, please call [phone].

## 2017-07-11 ENCOUNTER — OFFICE VISIT (OUTPATIENT)
Dept: ENDOCRINOLOGY | Age: 62
End: 2017-07-11

## 2017-07-11 VITALS
BODY MASS INDEX: 36.32 KG/M2 | OXYGEN SATURATION: 98 % | WEIGHT: 205 LBS | RESPIRATION RATE: 16 BRPM | DIASTOLIC BLOOD PRESSURE: 56 MMHG | HEART RATE: 66 BPM | SYSTOLIC BLOOD PRESSURE: 143 MMHG | TEMPERATURE: 97.3 F | HEIGHT: 63 IN

## 2017-07-11 DIAGNOSIS — E78.2 MIXED HYPERLIPIDEMIA: ICD-10-CM

## 2017-07-11 DIAGNOSIS — Z79.4 UNCONTROLLED TYPE 2 DIABETES MELLITUS WITH HYPERGLYCEMIA, WITH LONG-TERM CURRENT USE OF INSULIN (HCC): ICD-10-CM

## 2017-07-11 DIAGNOSIS — Z79.4 TYPE 2 DIABETES MELLITUS WITH HYPERGLYCEMIA, WITH LONG-TERM CURRENT USE OF INSULIN (HCC): Primary | ICD-10-CM

## 2017-07-11 DIAGNOSIS — E11.65 TYPE 2 DIABETES MELLITUS WITH HYPERGLYCEMIA, WITH LONG-TERM CURRENT USE OF INSULIN (HCC): Primary | ICD-10-CM

## 2017-07-11 DIAGNOSIS — E11.65 UNCONTROLLED TYPE 2 DIABETES MELLITUS WITH HYPERGLYCEMIA, WITH LONG-TERM CURRENT USE OF INSULIN (HCC): ICD-10-CM

## 2017-07-11 DIAGNOSIS — I10 ESSENTIAL HYPERTENSION: ICD-10-CM

## 2017-07-11 LAB
GLUCOSE POC: 267 MG/DL
HBA1C MFR BLD HPLC: 9.3 %

## 2017-07-11 RX ORDER — INSULIN GLULISINE 100 [IU]/ML
INJECTION, SOLUTION SUBCUTANEOUS
Qty: 30 ML | Refills: 11 | Status: SHIPPED | OUTPATIENT
Start: 2017-07-11 | End: 2017-10-13 | Stop reason: SDUPTHER
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
## 2017-07-11 NOTE — MR AVS SNAPSHOT
Visit Information Date & Time Provider Department Dept. Phone Encounter #  
 7/11/2017 10:30 AM Toño Yang MD [facility] Diabetes & Endocrinology [phone] [id number] Follow-up Instructions Return in about 3 months (around 10/11/2017). Upcoming Health Maintenance Date Due Hepatitis C Screening 1955 Pneumococcal 19-64 Medium Risk (1 of 1 - PPSV23) 1/16/1976 DTaP/Tdap/Td series (1 - Tdap) 1/16/1978 PAP AKA CERVICAL CYTOLOGY 1/16/1978 FOOT EXAM Q1 12/8/2015 ZOSTER VACCINE AGE 60> 1/16/2017 INFLUENZA AGE 9 TO ADULT 8/1/2017 HEMOGLOBIN A1C Q6M 9/29/2017 MICROALBUMIN Q1 3/29/2018 LIPID PANEL Q1 3/29/2018 EYE EXAM RETINAL OR DILATED Q1 4/12/2018 BREAST CANCER SCRN MAMMOGRAM 5/4/2019 COLONOSCOPY 7/15/2020 Allergies as of 7/11/2017  Review Complete On: 7/11/2017 By: Toño Yang MD  
  
 Severity Noted Reaction Type Reactions Pcn [Penicillins]  08/21/2014    Hives Tolerates cephalexin Tanzeum [Albiglutide]  04/12/2016    Nausea Only Vioxx [Rofecoxib]  08/23/2016    Nausea Only Current Immunizations  Never Reviewed No immunizations on file. Not reviewed this visit You Were Diagnosed With   
  
 Codes Comments Type 2 diabetes mellitus with hyperglycemia, with long-term current use of insulin (HCC)    -  Primary ICD-10-CM: E11.65, Z79.4 ICD-9-CM: 250.00, 790.29, V58.67 Essential hypertension     ICD-10-CM: I10 
ICD-9-CM: 401.9 Vitals BP Pulse Temp Resp Height(growth percentile) Weight(growth percentile) 143/56 (BP 1 Location: Right arm, BP Patient Position: Sitting) 66 97.3 °F (36.3 °C) (Oral) 16 5' 3\" (1.6 m) 205 lb (93 kg) SpO2 BMI OB Status Smoking Status 98% 36.31 kg/m2 Menopause Current Every Day Smoker Vitals History BMI and BSA Data Body Mass Index Body Surface Area  
 36.31 kg/m 2 2.03 m 2 Preferred Pharmacy Pharmacy Name Phone [id number] [address] [facility] [phone] Your Updated Medication List  
  
   
This list is accurate as of: 7/11/17 11:14 AM.  Always use your most recent med list.  
  
  
  
  
 albuterol 90 mcg/actuation inhaler Commonly known as:  PROVENTIL HFA, VENTOLIN HFA, PROAIR HFA Take 2 Puffs by inhalation every four (4) hours as needed for Wheezing. APIDRA SOLOSTAR 100 unit/mL pen Generic drug:  insulin glulisine Use with SSI Max units daily: 60  
  
 aspirin delayed-release 81 mg tablet Take 81 mg by mouth nightly. atorvastatin 20 mg tablet Commonly known as:  LIPITOR Take 1 Tab by mouth nightly. busPIRone 7.5 mg tablet Commonly known as:  BUSPAR Take 1 Tab by mouth two (2) times a day. cetirizine 10 mg tablet Commonly known as:  ZyrTEC Take 1 Tab by mouth every morning. docusate sodium 100 mg capsule Commonly known as:  STOOL SOFTENER  
TAKE 1 CAPSULE BY MOUTH TWO (2) TIMES A DAY. Indications: CONSTIPATION  
  
 gabapentin 300 mg capsule Commonly known as:  NEURONTIN Take 1 Cap by mouth two (2) times a day. hydroCHLOROthiazide 12.5 mg tablet Commonly known as:  HYDRODIURIL Take 1 Tab by mouth daily. insulin glargine 100 unit/mL (3 mL) Inpn Commonly known asGrayce Batch INJECT 60 UNITS IN AM STOP LEVEMIR Insulin Needles (Disposable) 32 gauge x 5/32\" Ndle Commonly known as:  Odilia Pen Needle Use to inject Levemir and Novolog  Dx Code: E11.65 Lancets Misc Commonly known as:  Anisha Pott Use when checking blood sugar  
  
 lisinopril 10 mg tablet Commonly known as:  Felix Little Take 1 Tab by mouth nightly. metoprolol tartrate 25 mg tablet Commonly known as:  LOPRESSOR  
TAKE 1 TAB BY MOUTH TWO (2) TIMES A DAY. mupirocin 2 % ointment Commonly known as:  [employer] Apply  to affected area daily. raNITIdine 150 mg tablet Commonly known as:  ZANTAC Take 1 Tab by mouth two (2) times daily as needed for Indigestion. SITagliptin 100 mg tablet Commonly known as:  Katarzyna Chough Take 1 Tab by mouth every morning. Stop Tanzeum  
  
 venlafaxine- mg capsule Commonly known as:  EFFEXOR XR Take 1 Cap by mouth nightly. We Performed the Following AMB POC GLUCOSE BLOOD, BY GLUCOSE MONITORING DEVICE [05341 CPT(R)] AMB POC HEMOGLOBIN A1C [12960 CPT(R)] Follow-up Instructions Return in about 3 months (around 10/11/2017). Patient Instructions Januvia in AM  
 
Basaglar/Lantus 60  units at night Apidra 8 - 12  units before dinner Check sugars twice a day Apidra fast acting for high sugars Blood sugar  Breakfast/Lunch/Dinner 130-200  Add  4  Units 201-250  Add  8 Units 251-300  Add  12 Units 301-350  Add 16  Units 351-400  Add 20  Units Introducing [facility]! Dear Kaya Diaz: 
Thank you for requesting a [employer] account. Our records indicate that you already have an active Bit[employer] account. You can access your account anytime at [email or web address] Did you know that you can access your hospital and ER discharge instructions at any time in Bitbar? You can also review all of your test results from your hospital stay or ER visit. Additional Information If you have questions, please visit the Frequently Asked Questions section of the Bitbar website at [email or web address] Remember, Bitbar is NOT to be used for urgent needs. For medical emergencies, dial 911. Now available from your iPhone and Android! Please provide this summary of care documentation to your next provider. Your primary care clinician is listed as Toan Bruno. If you have any questions after today's visit, please call [phone].

## 2017-07-11 NOTE — PROGRESS NOTES
Eye exam: within last year  Foot exam: within last year    Lab Results   Component Value Date/Time    Hemoglobin A1c 10.2 03/29/2017 04:37 PM    Hemoglobin A1c (POC) 8.6 09/22/2014 11:00 AM     Wt Readings from Last 3 Encounters:   07/11/17 205 lb (93 kg)   07/06/17 202 lb (91.6 kg)   04/26/17 198 lb (89.8 kg)     Temp Readings from Last 3 Encounters:   07/11/17 97.3 °F (36.3 °C) (Oral)   07/06/17 98.4 °F (36.9 °C) (Oral)   04/26/17 97.8 °F (36.6 °C) (Oral)     BP Readings from Last 3 Encounters:   07/11/17 143/56   07/06/17 137/50   04/26/17 141/71     Pulse Readings from Last 3 Encounters:   07/11/17 66   07/06/17 69   04/26/17 76

## 2017-07-24 PROBLEM — F32.A DEPRESSION: Status: ACTIVE | Noted: 2017-07-24

## 2017-08-21 DIAGNOSIS — I10 ESSENTIAL HYPERTENSION WITH GOAL BLOOD PRESSURE LESS THAN 140/90: ICD-10-CM

## 2017-08-21 DIAGNOSIS — F43.10 PTSD (POST-TRAUMATIC STRESS DISORDER): ICD-10-CM

## 2017-08-21 DIAGNOSIS — E11.8 DIABETES MELLITUS WITH COMPLICATION (HCC): ICD-10-CM

## 2017-08-22 ENCOUNTER — TELEPHONE (OUTPATIENT)
Dept: FAMILY MEDICINE CLINIC | Age: 62
End: 2017-08-22

## 2017-08-27 RX ORDER — PEN NEEDLE, DIABETIC 31 GX3/16"
NEEDLE, DISPOSABLE MISCELLANEOUS
Qty: 100 PEN NEEDLE | Refills: 11 | Status: SHIPPED | OUTPATIENT
Start: 2017-08-27 | End: 2017-12-10

## 2017-10-13 ENCOUNTER — OFFICE VISIT (OUTPATIENT)
Dept: ENDOCRINOLOGY | Age: 62
End: 2017-10-13

## 2017-10-13 VITALS
HEIGHT: 63 IN | TEMPERATURE: 96 F | SYSTOLIC BLOOD PRESSURE: 143 MMHG | WEIGHT: 206.9 LBS | BODY MASS INDEX: 36.66 KG/M2 | OXYGEN SATURATION: 98 % | DIASTOLIC BLOOD PRESSURE: 47 MMHG | HEART RATE: 59 BPM | RESPIRATION RATE: 16 BRPM

## 2017-10-13 DIAGNOSIS — E11.65 UNCONTROLLED TYPE 2 DIABETES MELLITUS WITH HYPERGLYCEMIA, WITH LONG-TERM CURRENT USE OF INSULIN (HCC): ICD-10-CM

## 2017-10-13 DIAGNOSIS — E11.65 TYPE 2 DIABETES MELLITUS WITH HYPERGLYCEMIA, WITH LONG-TERM CURRENT USE OF INSULIN (HCC): Primary | ICD-10-CM

## 2017-10-13 DIAGNOSIS — E11.8 DIABETES MELLITUS WITH COMPLICATION (HCC): ICD-10-CM

## 2017-10-13 DIAGNOSIS — E11.65 TYPE 2 DIABETES MELLITUS WITH HYPERGLYCEMIA, WITH LONG-TERM CURRENT USE OF INSULIN (HCC): ICD-10-CM

## 2017-10-13 DIAGNOSIS — Z79.4 TYPE 2 DIABETES MELLITUS WITH HYPERGLYCEMIA, WITH LONG-TERM CURRENT USE OF INSULIN (HCC): Primary | ICD-10-CM

## 2017-10-13 DIAGNOSIS — I10 ESSENTIAL HYPERTENSION: ICD-10-CM

## 2017-10-13 DIAGNOSIS — I10 ESSENTIAL HYPERTENSION WITH GOAL BLOOD PRESSURE LESS THAN 140/90: ICD-10-CM

## 2017-10-13 DIAGNOSIS — Z79.4 UNCONTROLLED TYPE 2 DIABETES MELLITUS WITH HYPERGLYCEMIA, WITH LONG-TERM CURRENT USE OF INSULIN (HCC): ICD-10-CM

## 2017-10-13 DIAGNOSIS — Z79.4 TYPE 2 DIABETES MELLITUS WITH HYPERGLYCEMIA, WITH LONG-TERM CURRENT USE OF INSULIN (HCC): ICD-10-CM

## 2017-10-13 DIAGNOSIS — F43.10 PTSD (POST-TRAUMATIC STRESS DISORDER): ICD-10-CM

## 2017-10-13 DIAGNOSIS — E78.2 MIXED HYPERLIPIDEMIA: ICD-10-CM

## 2017-10-13 RX ORDER — INSULIN GLULISINE 100 [IU]/ML
INJECTION, SOLUTION SUBCUTANEOUS
Qty: 30 ML | Refills: 11 | Status: SHIPPED | OUTPATIENT
Start: 2017-10-13 | End: 2017-12-10

## 2017-10-13 RX ORDER — RANITIDINE 150 MG/1
150 TABLET, FILM COATED ORAL
Qty: 60 TAB | Refills: 11 | Status: SHIPPED | OUTPATIENT
Start: 2017-10-13 | End: 2018-02-02 | Stop reason: SDUPTHER

## 2017-10-13 RX ORDER — INSULIN GLARGINE 100 [IU]/ML
INJECTION, SOLUTION SUBCUTANEOUS
Qty: 30 ML | Refills: 2 | Status: SHIPPED | OUTPATIENT
Start: 2017-10-13 | End: 2017-12-01 | Stop reason: ALTCHOICE

## 2017-10-13 RX ORDER — HYDROCHLOROTHIAZIDE 25 MG/1
25 TABLET ORAL DAILY
Qty: 30 TAB | Refills: 11 | Status: SHIPPED | OUTPATIENT
Start: 2017-10-13 | End: 2017-12-10

## 2017-10-13 NOTE — PATIENT INSTRUCTIONS
Januvia in AM     Basaglar/Lantus 45  units at night     Apidra 12  units before dinner     Check sugars twice a day          Apidra fast acting for high sugars    Blood sugar  Breakfast/Lunch/Dinner       130-200  Add  4  Units       201-250  Add  8 Units       251-300  Add  12 Units       301-350  Add 16  Units        351-400  Add 20  Units
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
## 2017-10-13 NOTE — PROGRESS NOTES
Teena Hampton is a 63 y.o. female here for   Chief Complaint   Patient presents with    Diabetes       Functional glucose monitor and record keeping system? - yes  Eye exam within last year? - Sept 2017  Foot exam within last year? - Sept 2017    1. Have you been to the ER, urgent care clinic since your last visit? Hospitalized since your last visit? -no    2. Have you seen or consulted any other health care providers outside of the [address] [place] [address] since your last visit? Include any pap smears or colon screening. -PCP    Lab Results   Component Value Date/Time    Hemoglobin A1c 10.2 03/29/2017 04:37 PM    Hemoglobin A1c (POC) 9.3 07/11/2017 10:44 AM       Wt Readings from Last 3 Encounters:   07/11/17 205 lb (93 kg)   07/06/17 202 lb (91.6 kg)   04/26/17 198 lb (89.8 kg)     Temp Readings from Last 3 Encounters:   07/11/17 97.3 °F (36.3 °C) (Oral)   07/06/17 98.4 °F (36.9 °C) (Oral)   04/26/17 97.8 °F (36.6 °C) (Oral)     BP Readings from Last 3 Encounters:   07/11/17 143/56   07/06/17 137/50   04/26/17 141/71     Pulse Readings from Last 3 Encounters:   07/11/17 66   07/06/17 69   04/26/17 76

## 2017-10-13 NOTE — MR AVS SNAPSHOT
Visit Information Date & Time Provider Department Dept. Phone Encounter #  
 10/13/2017 11:30 AM Gissell Mac MD [facility] [phone] [id number] Follow-up Instructions Return in about 3 months (around 1/13/2018). Upcoming Health Maintenance Date Due Hepatitis C Screening 1955 Pneumococcal 19-64 Medium Risk (1 of 1 - PPSV23) 1/16/1976 DTaP/Tdap/Td series (1 - Tdap) 1/16/1978 PAP AKA CERVICAL CYTOLOGY 1/16/1978 FOOT EXAM Q1 12/8/2015 ZOSTER VACCINE AGE 60> 11/16/2016 INFLUENZA AGE 9 TO ADULT 8/1/2017 HEMOGLOBIN A1C Q6M 1/11/2018 MICROALBUMIN Q1 3/29/2018 LIPID PANEL Q1 3/29/2018 EYE EXAM RETINAL OR DILATED Q1 4/12/2018 BREAST CANCER SCRN MAMMOGRAM 5/4/2019 COLONOSCOPY 7/15/2020 Allergies as of 10/13/2017  Review Complete On: 10/13/2017 By: Gissell Mac MD  
  
 Severity Noted Reaction Type Reactions Pcn [Penicillins]  08/21/2014    Hives Tolerates cephalexin Tanzeum [Albiglutide]  04/12/2016    Nausea Only Vioxx [Rofecoxib]  08/23/2016    Nausea Only Current Immunizations  Never Reviewed No immunizations on file. Not reviewed this visit You Were Diagnosed With   
  
 Codes Comments Type 2 diabetes mellitus with hyperglycemia, with long-term current use of insulin (HCC)    -  Primary ICD-10-CM: E11.65, Z79.4 ICD-9-CM: 250.00, 790.29, V58.67 Essential hypertension     ICD-10-CM: I10 
ICD-9-CM: 401.9 Mixed hyperlipidemia     ICD-10-CM: E78.2 ICD-9-CM: 272.2 Vitals BP Pulse Temp Resp Height(growth percentile) Weight(growth percentile) 143/47 (BP 1 Location: Right arm, BP Patient Position: Sitting) (!) 59 96 °F (35.6 °C) (Oral) 16 5' 3\" (1.6 m) 206 lb 14.4 oz (93.8 kg) SpO2 BMI OB Status Smoking Status 98% 36.65 kg/m2 Menopause Current Every Day Smoker Vitals History BMI and BSA Data Body Mass Index Body Surface Area 36.65 kg/m 2 2.04 m 2 Preferred Pharmacy Pharmacy Name Phone [facility] [id number] [facility] [place] [phone] Your Updated Medication List  
  
   
This list is accurate as of: 10/13/17 12:19 PM.  Always use your most recent med list.  
  
  
  
  
 albuterol 90 mcg/actuation inhaler Commonly known as:  PROVENTIL HFA, VENTOLIN HFA, PROAIR HFA Take 2 Puffs by inhalation every four (4) hours as needed for Wheezing. APIDRA SOLOSTAR 100 unit/mL pen Generic drug:  insulin glulisine Use with SSI Max units daily: 72  
  
 aspirin delayed-release 81 mg tablet Take 81 mg by mouth nightly. atorvastatin 20 mg tablet Commonly known as:  LIPITOR Take 1 Tab by mouth nightly. busPIRone 7.5 mg tablet Commonly known as:  BUSPAR Take 1 Tab by mouth two (2) times a day. cetirizine 10 mg tablet Commonly known as:  ZyrTEC Take 1 Tab by mouth every morning. docusate sodium 100 mg capsule Commonly known as:  STOOL SOFTENER  
TAKE 1 CAPSULE BY MOUTH TWO (2) TIMES A DAY. Indications: CONSTIPATION  
  
 gabapentin 300 mg capsule Commonly known as:  NEURONTIN Take 1 Cap by mouth two (2) times a day. hydroCHLOROthiazide 12.5 mg tablet Commonly known as:  HYDRODIURIL Take 1 Tab by mouth daily. insulin glargine 100 unit/mL (3 mL) Inpn Commonly known asBenson Hoe INJECT 60 UNITS IN AM STOP LEVEMIR Insulin Needles (Disposable) 32 gauge x 5/32\" Ndle Commonly known as:  Odilia Pen Needle Use to inject Levemir and Novolog  Dx Code: E11.65 Lancets Misc Commonly known as:  Darus Ni Use when checking blood sugar  
  
 lisinopril 10 mg tablet Commonly known as:  Ora Bee Take 1 Tab by mouth nightly. metoprolol tartrate 25 mg tablet Commonly known as:  LOPRESSOR  
TAKE 1 TAB BY MOUTH TWO (2) TIMES A DAY. mupirocin 2 % ointment Commonly known as:  [facility] Apply  to affected area daily. raNITIdine 150 mg tablet Commonly known as:  ZANTAC Take 1 Tab by mouth two (2) times daily as needed for Indigestion. SITagliptin 100 mg tablet Commonly known as:  Thor Cherise Take 1 Tab by mouth every morning. Stop Tanzeum  
  
 venlafaxine- mg capsule Commonly known as:  EFFEXOR-XR  
TAKE ONE CAPSULE BY MOUTH NIGHTLY Follow-up Instructions Return in about 3 months (around 1/13/2018). To-Do List   
 01/01/2018 Lab:  HEMOGLOBIN A1C WITH EAG   
  
 01/01/2018 Lab:  LIPID PANEL   
  
 01/01/2018 Lab:  METABOLIC PANEL, COMPREHENSIVE   
  
 01/01/2018 Lab:  MICROALBUMIN, UR, RAND W/ MICROALBUMIN/CREA RATIO Patient Instructions Januvia in AM  
 
Basaglar/Lantus 45  units at night Apidra 12  units before dinner Check sugars twice a day Apidra fast acting for high sugars Blood sugar  Breakfast/Lunch/Dinner 130-200  Add  4  Units 201-250  Add  8 Units 251-300  Add  12 Units 301-350  Add 16  Units 351-400  Add 20  Units Introducing [facility]! Dear Anayeli Lieberman: 
Thank you for requesting a [email or web address] account. Our records indicate that you already have an active [email or web address] account. You can access your account anytime at [email or web address] Did you know that you can access your hospital and ER discharge instructions at any time in [email or web address]? You can also review all of your test results from your hospital stay or ER visit. Additional Information If you have questions, please visit the Frequently Asked Questions section of the [email or web address] website at [email or web address] Remember, [email or web address] is NOT to be used for urgent needs. For medical emergencies, dial 911. Now available from your iPhone and Android! Please provide this summary of care documentation to your next provider. Your primary care clinician is listed as Nicholas Velazquez. If you have any questions after today's visit, please call [phone].

## 2017-10-19 DIAGNOSIS — I10 ESSENTIAL HYPERTENSION WITH GOAL BLOOD PRESSURE LESS THAN 140/90: ICD-10-CM

## 2017-10-19 DIAGNOSIS — E11.8 DIABETES MELLITUS WITH COMPLICATION (HCC): ICD-10-CM

## 2017-10-19 DIAGNOSIS — F41.9 ANXIETY: ICD-10-CM

## 2017-10-19 DIAGNOSIS — F43.10 PTSD (POST-TRAUMATIC STRESS DISORDER): Primary | ICD-10-CM

## 2017-10-19 RX ORDER — BUSPIRONE HYDROCHLORIDE 7.5 MG/1
7.5 TABLET ORAL 2 TIMES DAILY
Qty: 60 TAB | Refills: 11 | OUTPATIENT
Start: 2017-10-19

## 2017-10-19 NOTE — TELEPHONE ENCOUNTER
From: Brionna Costa  To: Sherren Fischer, MD  Sent: 10/19/2017 9:15 AM EDT  Subject: Medication Renewal Request    Original authorizing provider: Sherren Fischer, MD Carmencita Rung would like a refill of the following medications:  busPIRone (BUSPAR) 7.5 mg tablet [Denisse Lr MD]    Preferred pharmacy: [facility]/PHARMACY #[id number] - [address]  AT [facility]    Comment:

## 2017-10-22 DIAGNOSIS — F43.10 PTSD (POST-TRAUMATIC STRESS DISORDER): ICD-10-CM

## 2017-10-22 RX ORDER — BUSPIRONE HYDROCHLORIDE 7.5 MG/1
7.5 TABLET ORAL 2 TIMES DAILY
Qty: 60 TAB | Refills: 5 | Status: SHIPPED | OUTPATIENT
Start: 2017-10-22 | End: 2017-12-10

## 2017-11-26 DIAGNOSIS — Z79.4 TYPE 2 DIABETES MELLITUS WITH HYPERGLYCEMIA, WITH LONG-TERM CURRENT USE OF INSULIN (HCC): ICD-10-CM

## 2017-11-26 DIAGNOSIS — E11.65 TYPE 2 DIABETES MELLITUS WITH HYPERGLYCEMIA, WITH LONG-TERM CURRENT USE OF INSULIN (HCC): ICD-10-CM

## 2017-11-26 DIAGNOSIS — E11.65 TYPE 2 DIABETES MELLITUS WITH HYPERGLYCEMIA, WITH LONG-TERM CURRENT USE OF INSULIN (HCC): Primary | ICD-10-CM

## 2017-11-26 DIAGNOSIS — I10 ESSENTIAL HYPERTENSION WITH GOAL BLOOD PRESSURE LESS THAN 140/90: ICD-10-CM

## 2017-11-26 DIAGNOSIS — E11.8 DIABETES MELLITUS WITH COMPLICATION (HCC): ICD-10-CM

## 2017-11-26 DIAGNOSIS — Z79.4 TYPE 2 DIABETES MELLITUS WITH HYPERGLYCEMIA, WITH LONG-TERM CURRENT USE OF INSULIN (HCC): Primary | ICD-10-CM

## 2017-11-26 DIAGNOSIS — F43.10 PTSD (POST-TRAUMATIC STRESS DISORDER): ICD-10-CM

## 2017-11-26 RX ORDER — BUSPIRONE HYDROCHLORIDE 7.5 MG/1
TABLET ORAL
Qty: 60 TAB | Refills: 8 | OUTPATIENT
Start: 2017-11-26

## 2017-11-26 RX ORDER — SITAGLIPTIN 100 MG/1
TABLET, FILM COATED ORAL
Qty: 30 TAB | Refills: 9 | OUTPATIENT
Start: 2017-11-26

## 2017-12-01 DIAGNOSIS — Z79.4 TYPE 2 DIABETES MELLITUS WITH HYPERGLYCEMIA, WITH LONG-TERM CURRENT USE OF INSULIN (HCC): ICD-10-CM

## 2017-12-01 DIAGNOSIS — E11.65 TYPE 2 DIABETES MELLITUS WITH HYPERGLYCEMIA, WITH LONG-TERM CURRENT USE OF INSULIN (HCC): ICD-10-CM

## 2017-12-01 RX ORDER — INSULIN GLARGINE 100 [IU]/ML
45 INJECTION, SOLUTION SUBCUTANEOUS
Qty: 15 ML | Refills: 11 | OUTPATIENT
Start: 2017-12-01

## 2017-12-01 RX ORDER — INSULIN GLARGINE 100 [IU]/ML
INJECTION, SOLUTION SUBCUTANEOUS
Qty: 45 ML | Refills: 11 | Status: SHIPPED | OUTPATIENT
Start: 2017-12-01 | End: 2017-12-10

## 2017-12-04 ENCOUNTER — TELEPHONE (OUTPATIENT)
Dept: ENDOCRINOLOGY | Age: 62
End: 2017-12-04

## 2017-12-04 NOTE — TELEPHONE ENCOUNTER
Informed pt that Lantus Solostar was sent to pharmacy on 12/01/2017 and the pharmacy was supposed to contact pt letting her know lantus is preferred. Pt states she will call pharmacy.

## 2017-12-05 DIAGNOSIS — E11.65 TYPE 2 DIABETES MELLITUS WITH HYPERGLYCEMIA, WITH LONG-TERM CURRENT USE OF INSULIN (HCC): ICD-10-CM

## 2017-12-05 DIAGNOSIS — Z79.4 TYPE 2 DIABETES MELLITUS WITH HYPERGLYCEMIA, WITH LONG-TERM CURRENT USE OF INSULIN (HCC): ICD-10-CM

## 2017-12-05 RX ORDER — INSULIN GLARGINE 100 [IU]/ML
INJECTION, SOLUTION SUBCUTANEOUS
Qty: 30 ML | Refills: 6 | Status: SHIPPED | OUTPATIENT
Start: 2017-12-05 | End: 2017-12-10

## 2017-12-10 ENCOUNTER — HOSPITAL ENCOUNTER (OUTPATIENT)
Age: 62
Setting detail: OBSERVATION
Discharge: HOME | End: 2017-12-13
Attending: EMERGENCY MEDICINE | Admitting: SURGERY
Payer: MEDICAID

## 2017-12-10 ENCOUNTER — APPOINTMENT (OUTPATIENT)
Dept: CT IMAGING | Age: 62
End: 2017-12-10
Attending: EMERGENCY MEDICINE
Payer: MEDICAID

## 2017-12-10 ENCOUNTER — APPOINTMENT (OUTPATIENT)
Dept: GENERAL RADIOLOGY | Age: 62
End: 2017-12-10
Attending: EMERGENCY MEDICINE
Payer: MEDICAID

## 2017-12-10 DIAGNOSIS — N17.9 AKI (ACUTE KIDNEY INJURY) (HCC): ICD-10-CM

## 2017-12-10 DIAGNOSIS — K35.20 ACUTE APPENDICITIS WITH GENERALIZED PERITONITIS: Primary | ICD-10-CM

## 2017-12-10 DIAGNOSIS — R73.9 HYPERGLYCEMIA: ICD-10-CM

## 2017-12-10 DIAGNOSIS — K57.12 DIVERTICULITIS OF SMALL INTESTINE WITHOUT PERFORATION OR ABSCESS WITHOUT BLEEDING: ICD-10-CM

## 2017-12-10 PROBLEM — K57.92 DIVERTICULITIS: Status: ACTIVE | Noted: 2017-12-10

## 2017-12-10 LAB
ALBUMIN SERPL-MCNC: 2.7 G/DL (ref 3.5–5)
ALBUMIN/GLOB SERPL: 0.7 {RATIO} (ref 1.1–2.2)
ALP SERPL-CCNC: 90 U/L (ref 45–117)
ALT SERPL-CCNC: 22 U/L (ref 12–78)
AMYLASE SERPL-CCNC: 19 U/L (ref 25–115)
ANION GAP SERPL CALC-SCNC: 11 MMOL/L (ref 5–15)
APPEARANCE UR: ABNORMAL
AST SERPL-CCNC: 13 U/L (ref 15–37)
BACTERIA URNS QL MICRO: ABNORMAL /HPF
BASOPHILS # BLD: 0 K/UL (ref 0–0.1)
BASOPHILS NFR BLD: 0 % (ref 0–1)
BILIRUB SERPL-MCNC: 0.6 MG/DL (ref 0.2–1)
BILIRUB UR QL CFM: NEGATIVE
BUN SERPL-MCNC: 38 MG/DL (ref 6–20)
BUN/CREAT SERPL: 21 (ref 12–20)
CALCIUM SERPL-MCNC: 8.4 MG/DL (ref 8.5–10.1)
CHLORIDE SERPL-SCNC: 103 MMOL/L (ref 97–108)
CO2 SERPL-SCNC: 23 MMOL/L (ref 21–32)
COLOR UR: ABNORMAL
CREAT SERPL-MCNC: 1.84 MG/DL (ref 0.55–1.02)
EOSINOPHIL # BLD: 0 K/UL (ref 0–0.4)
EOSINOPHIL NFR BLD: 0 % (ref 0–7)
EPITH CASTS URNS QL MICRO: ABNORMAL /LPF
ERYTHROCYTE [DISTWIDTH] IN BLOOD BY AUTOMATED COUNT: 12.5 % (ref 11.5–14.5)
GLOBULIN SER CALC-MCNC: 4.1 G/DL (ref 2–4)
GLUCOSE SERPL-MCNC: 351 MG/DL (ref 65–100)
GLUCOSE UR STRIP.AUTO-MCNC: 500 MG/DL
HCT VFR BLD AUTO: 42.1 % (ref 35–47)
HGB BLD-MCNC: 14.4 G/DL (ref 11.5–16)
HGB UR QL STRIP: NEGATIVE
KETONES UR QL STRIP.AUTO: NEGATIVE MG/DL
LEUKOCYTE ESTERASE UR QL STRIP.AUTO: NEGATIVE
LIPASE SERPL-CCNC: 63 U/L (ref 73–393)
LYMPHOCYTES # BLD: 1.2 K/UL (ref 0.8–3.5)
LYMPHOCYTES NFR BLD: 6 % (ref 12–49)
MCH RBC QN AUTO: 31.9 PG (ref 26–34)
MCHC RBC AUTO-ENTMCNC: 34.2 G/DL (ref 30–36.5)
MCV RBC AUTO: 93.3 FL (ref 80–99)
MONOCYTES # BLD: 1.1 K/UL (ref 0–1)
MONOCYTES NFR BLD: 5 % (ref 5–13)
NEUTS SEG # BLD: 17.3 K/UL (ref 1.8–8)
NEUTS SEG NFR BLD: 89 % (ref 32–75)
NITRITE UR QL STRIP.AUTO: NEGATIVE
PH UR STRIP: 5 [PH] (ref 5–8)
PLATELET # BLD AUTO: 196 K/UL (ref 150–400)
POTASSIUM SERPL-SCNC: 4.1 MMOL/L (ref 3.5–5.1)
PROT SERPL-MCNC: 6.8 G/DL (ref 6.4–8.2)
PROT UR STRIP-MCNC: 30 MG/DL
RBC # BLD AUTO: 4.51 M/UL (ref 3.8–5.2)
RBC #/AREA URNS HPF: ABNORMAL /HPF (ref 0–5)
SODIUM SERPL-SCNC: 137 MMOL/L (ref 136–145)
SP GR UR REFRACTOMETRY: 1.02 (ref 1–1.03)
UA: UC IF INDICATED,UAUC: ABNORMAL
UROBILINOGEN UR QL STRIP.AUTO: 1 EU/DL (ref 0.2–1)
WBC # BLD AUTO: 19.6 K/UL (ref 3.6–11)
WBC URNS QL MICRO: ABNORMAL /HPF (ref 0–4)

## 2017-12-10 PROCEDURE — 83690 ASSAY OF LIPASE: CPT | Performed by: EMERGENCY MEDICINE

## 2017-12-10 PROCEDURE — 74011250636 HC RX REV CODE- 250/636: Performed by: EMERGENCY MEDICINE

## 2017-12-10 PROCEDURE — 99218 HC RM OBSERVATION: CPT

## 2017-12-10 PROCEDURE — 93005 ELECTROCARDIOGRAM TRACING: CPT

## 2017-12-10 PROCEDURE — 96361 HYDRATE IV INFUSION ADD-ON: CPT

## 2017-12-10 PROCEDURE — 36415 COLL VENOUS BLD VENIPUNCTURE: CPT | Performed by: EMERGENCY MEDICINE

## 2017-12-10 PROCEDURE — 82150 ASSAY OF AMYLASE: CPT | Performed by: EMERGENCY MEDICINE

## 2017-12-10 PROCEDURE — 82010 KETONE BODYS QUAN: CPT

## 2017-12-10 PROCEDURE — 74176 CT ABD & PELVIS W/O CONTRAST: CPT

## 2017-12-10 PROCEDURE — 85025 COMPLETE CBC W/AUTO DIFF WBC: CPT | Performed by: EMERGENCY MEDICINE

## 2017-12-10 PROCEDURE — 96375 TX/PRO/DX INJ NEW DRUG ADDON: CPT

## 2017-12-10 PROCEDURE — 99285 EMERGENCY DEPT VISIT HI MDM: CPT

## 2017-12-10 PROCEDURE — 80053 COMPREHEN METABOLIC PANEL: CPT | Performed by: EMERGENCY MEDICINE

## 2017-12-10 PROCEDURE — 96367 TX/PROPH/DG ADDL SEQ IV INF: CPT

## 2017-12-10 PROCEDURE — 65270000029 HC RM PRIVATE

## 2017-12-10 PROCEDURE — 81001 URINALYSIS AUTO W/SCOPE: CPT | Performed by: EMERGENCY MEDICINE

## 2017-12-10 PROCEDURE — 71010 XR CHEST PORT: CPT

## 2017-12-10 PROCEDURE — 74011000250 HC RX REV CODE- 250: Performed by: EMERGENCY MEDICINE

## 2017-12-10 PROCEDURE — 74011250636 HC RX REV CODE- 250/636: Performed by: SURGERY

## 2017-12-10 PROCEDURE — 83036 HEMOGLOBIN GLYCOSYLATED A1C: CPT

## 2017-12-10 PROCEDURE — 96374 THER/PROPH/DIAG INJ IV PUSH: CPT

## 2017-12-10 PROCEDURE — 96365 THER/PROPH/DIAG IV INF INIT: CPT

## 2017-12-10 PROCEDURE — 74011636637 HC RX REV CODE- 636/637: Performed by: EMERGENCY MEDICINE

## 2017-12-10 PROCEDURE — 87086 URINE CULTURE/COLONY COUNT: CPT | Performed by: EMERGENCY MEDICINE

## 2017-12-10 RX ORDER — HYDROMORPHONE HYDROCHLORIDE 1 MG/ML
1 INJECTION, SOLUTION INTRAMUSCULAR; INTRAVENOUS; SUBCUTANEOUS
Status: DISCONTINUED | OUTPATIENT
Start: 2017-12-10 | End: 2017-12-12

## 2017-12-10 RX ORDER — BUSPIRONE HYDROCHLORIDE 7.5 MG/1
7.5 TABLET ORAL
COMMUNITY
End: 2017-12-18 | Stop reason: SDUPTHER

## 2017-12-10 RX ORDER — KETOROLAC TROMETHAMINE 30 MG/ML
30 INJECTION, SOLUTION INTRAMUSCULAR; INTRAVENOUS
Status: COMPLETED | OUTPATIENT
Start: 2017-12-10 | End: 2017-12-10

## 2017-12-10 RX ORDER — NALOXONE HYDROCHLORIDE 0.4 MG/ML
0.4 INJECTION, SOLUTION INTRAMUSCULAR; INTRAVENOUS; SUBCUTANEOUS AS NEEDED
Status: DISCONTINUED | OUTPATIENT
Start: 2017-12-10 | End: 2017-12-13 | Stop reason: HOSPADM

## 2017-12-10 RX ORDER — HYDROMORPHONE HYDROCHLORIDE 1 MG/ML
1 INJECTION, SOLUTION INTRAMUSCULAR; INTRAVENOUS; SUBCUTANEOUS
Status: COMPLETED | OUTPATIENT
Start: 2017-12-10 | End: 2017-12-10

## 2017-12-10 RX ORDER — BUSPIRONE HYDROCHLORIDE 7.5 MG/1
7.5 TABLET ORAL
COMMUNITY
End: 2018-11-30 | Stop reason: SDUPTHER

## 2017-12-10 RX ORDER — LEVOFLOXACIN 5 MG/ML
500 INJECTION, SOLUTION INTRAVENOUS
Status: DISCONTINUED | OUTPATIENT
Start: 2017-12-10 | End: 2017-12-11 | Stop reason: SDUPTHER

## 2017-12-10 RX ORDER — SODIUM CHLORIDE 0.9 % (FLUSH) 0.9 %
5-10 SYRINGE (ML) INJECTION AS NEEDED
Status: DISCONTINUED | OUTPATIENT
Start: 2017-12-10 | End: 2017-12-13 | Stop reason: HOSPADM

## 2017-12-10 RX ORDER — ACETAMINOPHEN 325 MG/1
650 TABLET ORAL
Status: DISCONTINUED | OUTPATIENT
Start: 2017-12-10 | End: 2017-12-13 | Stop reason: HOSPADM

## 2017-12-10 RX ORDER — HYDROCHLOROTHIAZIDE 25 MG/1
25 TABLET ORAL
COMMUNITY
End: 2019-01-14 | Stop reason: SDUPTHER

## 2017-12-10 RX ORDER — LEVOFLOXACIN 5 MG/ML
500 INJECTION, SOLUTION INTRAVENOUS ONCE
Status: COMPLETED | OUTPATIENT
Start: 2017-12-10 | End: 2017-12-11

## 2017-12-10 RX ORDER — DOCUSATE SODIUM 100 MG/1
100 CAPSULE, LIQUID FILLED ORAL
COMMUNITY
End: 2020-02-13

## 2017-12-10 RX ORDER — MUPIROCIN 20 MG/G
OINTMENT TOPICAL
COMMUNITY
End: 2020-02-13

## 2017-12-10 RX ORDER — INSULIN GLARGINE 100 [IU]/ML
50 INJECTION, SOLUTION SUBCUTANEOUS DAILY
COMMUNITY
End: 2018-02-02 | Stop reason: SDUPTHER

## 2017-12-10 RX ORDER — HYDROCODONE BITARTRATE AND ACETAMINOPHEN 5; 325 MG/1; MG/1
1 TABLET ORAL
Status: DISCONTINUED | OUTPATIENT
Start: 2017-12-10 | End: 2017-12-13 | Stop reason: HOSPADM

## 2017-12-10 RX ORDER — POTASSIUM CHLORIDE AND SODIUM CHLORIDE 450; 150 MG/100ML; MG/100ML
INJECTION, SOLUTION INTRAVENOUS CONTINUOUS
Status: DISCONTINUED | OUTPATIENT
Start: 2017-12-10 | End: 2017-12-12

## 2017-12-10 RX ORDER — METRONIDAZOLE 500 MG/100ML
500 INJECTION, SOLUTION INTRAVENOUS
Status: COMPLETED | OUTPATIENT
Start: 2017-12-10 | End: 2017-12-10

## 2017-12-10 RX ORDER — SODIUM CHLORIDE 0.9 % (FLUSH) 0.9 %
5-10 SYRINGE (ML) INJECTION EVERY 8 HOURS
Status: DISCONTINUED | OUTPATIENT
Start: 2017-12-10 | End: 2017-12-13 | Stop reason: HOSPADM

## 2017-12-10 RX ORDER — ONDANSETRON 2 MG/ML
8 INJECTION INTRAMUSCULAR; INTRAVENOUS
Status: COMPLETED | OUTPATIENT
Start: 2017-12-10 | End: 2017-12-10

## 2017-12-10 RX ORDER — MAGNESIUM SULFATE 100 %
4 CRYSTALS MISCELLANEOUS AS NEEDED
Status: DISCONTINUED | OUTPATIENT
Start: 2017-12-10 | End: 2017-12-13 | Stop reason: HOSPADM

## 2017-12-10 RX ORDER — METRONIDAZOLE 500 MG/100ML
500 INJECTION, SOLUTION INTRAVENOUS ONCE
Status: DISCONTINUED | OUTPATIENT
Start: 2017-12-10 | End: 2017-12-11 | Stop reason: SDUPTHER

## 2017-12-10 RX ORDER — INSULIN LISPRO 100 [IU]/ML
INJECTION, SOLUTION INTRAVENOUS; SUBCUTANEOUS
Status: DISCONTINUED | OUTPATIENT
Start: 2017-12-11 | End: 2017-12-11

## 2017-12-10 RX ORDER — DEXTROSE 50 % IN WATER (D50W) INTRAVENOUS SYRINGE
12.5-25 AS NEEDED
Status: DISCONTINUED | OUTPATIENT
Start: 2017-12-10 | End: 2017-12-13 | Stop reason: HOSPADM

## 2017-12-10 RX ADMIN — LEVOFLOXACIN 500 MG: 500 INJECTION, SOLUTION INTRAVENOUS at 23:43

## 2017-12-10 RX ADMIN — HUMAN INSULIN 10 UNITS: 100 INJECTION, SOLUTION SUBCUTANEOUS at 22:01

## 2017-12-10 RX ADMIN — KETOROLAC TROMETHAMINE 30 MG: 30 INJECTION, SOLUTION INTRAMUSCULAR at 20:03

## 2017-12-10 RX ADMIN — METRONIDAZOLE 500 MG: 500 INJECTION, SOLUTION INTRAVENOUS at 22:08

## 2017-12-10 RX ADMIN — ONDANSETRON 8 MG: 2 INJECTION INTRAMUSCULAR; INTRAVENOUS at 20:02

## 2017-12-10 RX ADMIN — HYDROMORPHONE HYDROCHLORIDE 1 MG: 1 INJECTION, SOLUTION INTRAMUSCULAR; INTRAVENOUS; SUBCUTANEOUS at 20:04

## 2017-12-10 RX ADMIN — SODIUM CHLORIDE 1000 ML: 900 INJECTION, SOLUTION INTRAVENOUS at 20:05

## 2017-12-10 RX ADMIN — Medication 10 ML: at 23:48

## 2017-12-10 RX ADMIN — SODIUM CHLORIDE AND POTASSIUM CHLORIDE: 4.5; 1.49 INJECTION, SOLUTION INTRAVENOUS at 23:36

## 2017-12-10 NOTE — IP AVS SNAPSHOT
[address] [facility] 
 
 
 [address] [address] [phone] Patient: Jamie Barahona MRN: [id number] BIQ:[birth year] My Medications TAKE these medications as instructed Instructions Each Dose to Equal  
 Morning Noon Evening Bedtime HYDROcodone-acetaminophen 5-325 mg per tablet Commonly known as:  Lenon Gauze Your last dose was: Your next dose is: Take 1 Tab by mouth every four (4) hours as needed for Pain. Max Daily Amount: 6 Tabs. 1 Tab * levoFLOXacin 500 mg tablet Commonly known as:  Alivia Climes Your last dose was: Your next dose is: Take 1 Tab by mouth daily. 500 mg  
    
   
   
   
  
 * levoFLOXacin 500 mg tablet Commonly known as:  Alivia Climes Your last dose was: Your next dose is: Take 1 Tab by mouth daily. 500 mg  
    
   
   
   
  
 * metroNIDAZOLE 500 mg tablet Commonly known as:  FLAGYL Your last dose was: Your next dose is: Take 1 Tab by mouth three (3) times daily. 500 mg  
    
   
   
   
  
 * metroNIDAZOLE 500 mg tablet Commonly known as:  FLAGYL Your last dose was: Your next dose is: Take 1 Tab by mouth three (3) times daily. 500 mg * Notice: This list has 4 medication(s) that are the same as other medications prescribed for you. Read the directions carefully, and ask your doctor or other care provider to review them with you. ASK your physician about these medications Instructions Each Dose to Equal  
 Morning Noon Evening Bedtime  
 albuterol 90 mcg/actuation inhaler Commonly known as:  PROVENTIL HFA, VENTOLIN HFA, PROAIR HFA Your last dose was: Your next dose is: Take 2 Puffs by inhalation every four (4) hours as needed for Wheezing. 2 Puff APIDRA 100 unit/mL injection Generic drug:  insulin glulisine Your last dose was: Your next dose is:    
   
   
 by SubCUTAneous route Before breakfast, lunch, and dinner. Sliding scale  
     
   
   
   
  
 aspirin delayed-release 81 mg tablet Your last dose was: Your next dose is: Take 81 mg by mouth nightly. 81 mg  
    
   
   
   
  
 atorvastatin 20 mg tablet Commonly known as:  LIPITOR Your last dose was: Your next dose is: Take 1 Tab by mouth nightly. 20 mg  
    
   
   
   
  
 BACTROBAN 2 % ointment Generic drug:  mupirocin Your last dose was: Your next dose is:    
   
   
 Apply  to affected area daily as needed (skin infection). * busPIRone 7.5 mg tablet Commonly known as:  BUSPAR Your last dose was: Your next dose is: Take 7.5 mg by mouth nightly. 7.5 mg  
    
   
   
   
  
 * busPIRone 7.5 mg tablet Commonly known as:  BUSPAR Your last dose was: Your next dose is: Take 7.5 mg by mouth daily as needed (anxiety). 7.5 mg  
    
   
   
   
  
 cetirizine 10 mg tablet Commonly known as:  ZyrTEC Your last dose was: Your next dose is: Take 1 Tab by mouth every morning. 10 mg  
    
   
   
   
  
 docusate sodium 100 mg capsule Commonly known as:  Graham Barefoot Your last dose was: Your next dose is: Take 100 mg by mouth two (2) times daily as needed for Constipation. 100 mg  
    
   
   
   
  
 gabapentin 300 mg capsule Commonly known as:  NEURONTIN Your last dose was: Your next dose is: Take 1 Cap by mouth two (2) times a day. 300 mg  
    
   
   
   
  
 hydroCHLOROthiazide 25 mg tablet Commonly known as:  HYDRODIURIL Your last dose was: Your next dose is: Take 25 mg by mouth nightly.   
 25 mg  
    
 insulin glargine 100 unit/mL (3 mL) Inpn Commonly known as:  Fito Jewish Your last dose was: Your next dose is:    
   
   
 50 Units by SubCUTAneous route daily. 50 Units JANUVIA 100 mg tablet Generic drug:  SITagliptin Your last dose was: Your next dose is: Take 100 mg by mouth nightly. 100 mg  
    
   
   
   
  
 lisinopril 10 mg tablet Commonly known as:  Yaima Loudon Your last dose was: Your next dose is: Take 1 Tab by mouth nightly. 10 mg  
    
   
   
   
  
 metoprolol tartrate 25 mg tablet Commonly known as:  LOPRESSOR Your last dose was: Your next dose is: TAKE 1 TAB BY MOUTH TWO (2) TIMES A DAY. raNITIdine 150 mg tablet Commonly known as:  ZANTAC Your last dose was: Your next dose is: Take 1 Tab by mouth two (2) times daily as needed for Indigestion. 150 mg  
    
   
   
   
  
 venlafaxine- mg capsule Commonly known as:  EFFEXOR-XR Your last dose was: Your next dose is: TAKE ONE CAPSULE BY MOUTH NIGHTLY * Notice: This list has 2 medication(s) that are the same as other medications prescribed for you. Read the directions carefully, and ask your doctor or other care provider to review them with you. Where to Get Your Medications These medications were sent to [facility], [address] [place] [address] Hours:  24-hours Phone:  [phone]  
  metroNIDAZOLE 500 mg tablet Information on where to get these meds will be given to you by the nurse or doctor. ! Ask your nurse or doctor about these medications HYDROcodone-acetaminophen 5-325 mg per tablet  
 levoFLOXacin 500 mg tablet  
 metroNIDAZOLE 500 mg tablet

## 2017-12-10 NOTE — IP AVS SNAPSHOT
[address] [facility] 104 100[address] [facility] 
[phone] Patient: Felicitas Bloch MRN: [id number] K:2/81/0221 About your hospitalization You were admitted on:  December 10, 2017 You last received care in the:  [facility] 4M POST SURG ORT 1 You were discharged on:  December 13, 2017 Why you were hospitalized Your primary diagnosis was:  Not on File Your diagnoses also included:  Diverticulitis Things You Need To Do (next 8 weeks) Monday Dec 18, 2017 TRANSITIONAL CARE MANAGEMENT with Jonnie Yañez MD at  2:45 PM  
Where:  [facility]) Friday Feb 02, 2018 ROUTINE CARE with Noemy Mcbride MD at  1:45 PM  
Where:  [facility]) Discharge Orders None A check sabrina indicates which time of day the medication should be taken. My Medications TAKE these medications as instructed Instructions Each Dose to Equal  
 Morning Noon Evening Bedtime HYDROcodone-acetaminophen 5-325 mg per tablet Commonly known as:  Katiuska Deutsch Your last dose was: Your next dose is: Take 1 Tab by mouth every four (4) hours as needed for Pain. Max Daily Amount: 6 Tabs. 1 Tab * levoFLOXacin 500 mg tablet Commonly known as:  Lonnell Claude Your last dose was: Your next dose is: Take 1 Tab by mouth daily. 500 mg  
    
   
   
   
  
 * levoFLOXacin 500 mg tablet Commonly known as:  Lonnell Claude Your last dose was: Your next dose is: Take 1 Tab by mouth daily. 500 mg  
    
   
   
   
  
 * metroNIDAZOLE 500 mg tablet Commonly known as:  FLAGYL Your last dose was: Your next dose is: Take 1 Tab by mouth three (3) times daily. 500 mg  
    
   
   
   
  
 * metroNIDAZOLE 500 mg tablet Commonly known as:  FLAGYL Your last dose was: Your next dose is: Take 1 Tab by mouth three (3) times daily. 500 mg * Notice: This list has 4 medication(s) that are the same as other medications prescribed for you. Read the directions carefully, and ask your doctor or other care provider to review them with you. ASK your physician about these medications Instructions Each Dose to Equal  
 Morning Noon Evening Bedtime  
 albuterol 90 mcg/actuation inhaler Commonly known as:  PROVENTIL HFA, VENTOLIN HFA, PROAIR HFA Your last dose was: Your next dose is: Take 2 Puffs by inhalation every four (4) hours as needed for Wheezing. 2 Puff APIDRA 100 unit/mL injection Generic drug:  insulin glulisine Your last dose was: Your next dose is:    
   
   
 by SubCUTAneous route Before breakfast, lunch, and dinner. Sliding scale  
     
   
   
   
  
 aspirin delayed-release 81 mg tablet Your last dose was: Your next dose is: Take 81 mg by mouth nightly. 81 mg  
    
   
   
   
  
 atorvastatin 20 mg tablet Commonly known as:  LIPITOR Your last dose was: Your next dose is: Take 1 Tab by mouth nightly. 20 mg  
    
   
   
   
  
 BACTROBAN 2 % ointment Generic drug:  mupirocin Your last dose was: Your next dose is:    
   
   
 Apply  to affected area daily as needed (skin infection). * busPIRone 7.5 mg tablet Commonly known as:  BUSPAR Your last dose was: Your next dose is: Take 7.5 mg by mouth nightly. 7.5 mg  
    
   
   
   
  
 * busPIRone 7.5 mg tablet Commonly known as:  BUSPAR Your last dose was: Your next dose is: Take 7.5 mg by mouth daily as needed (anxiety). 7.5 mg  
    
   
   
   
  
 cetirizine 10 mg tablet Commonly known as:  ZyrTEC Your last dose was: Your next dose is: Take 1 Tab by mouth every morning. 10 mg  
    
   
   
   
  
 docusate sodium 100 mg capsule Commonly known as:  Annice Macy Your last dose was: Your next dose is: Take 100 mg by mouth two (2) times daily as needed for Constipation. 100 mg  
    
   
   
   
  
 gabapentin 300 mg capsule Commonly known as:  NEURONTIN Your last dose was: Your next dose is: Take 1 Cap by mouth two (2) times a day. 300 mg  
    
   
   
   
  
 hydroCHLOROthiazide 25 mg tablet Commonly known as:  HYDRODIURIL Your last dose was: Your next dose is: Take 25 mg by mouth nightly. 25 mg  
    
   
   
   
  
 insulin glargine 100 unit/mL (3 mL) Inpn Commonly known as:  Tonio Pill Your last dose was: Your next dose is:    
   
   
 50 Units by SubCUTAneous route daily. 50 Units JANUVIA 100 mg tablet Generic drug:  SITagliptin Your last dose was: Your next dose is: Take 100 mg by mouth nightly. 100 mg  
    
   
   
   
  
 lisinopril 10 mg tablet Commonly known as:  Emerald Escort Your last dose was: Your next dose is: Take 1 Tab by mouth nightly. 10 mg  
    
   
   
   
  
 metoprolol tartrate 25 mg tablet Commonly known as:  LOPRESSOR Your last dose was: Your next dose is: TAKE 1 TAB BY MOUTH TWO (2) TIMES A DAY. raNITIdine 150 mg tablet Commonly known as:  ZANTAC Your last dose was: Your next dose is: Take 1 Tab by mouth two (2) times daily as needed for Indigestion. 150 mg  
    
   
   
   
  
 venlafaxine- mg capsule Commonly known as:  EFFEXOR-XR Your last dose was: Your next dose is: TAKE ONE CAPSULE BY MOUTH NIGHTLY * Notice: This list has 2 medication(s) that are the same as other medications prescribed for you. Read the directions carefully, and ask your doctor or other care provider to review them with you. Where to Get Your Medications These medications were sent to [address] [place] [address], Roney Freeman [id number] Hours:  24-hours Phone:  [phone]  
  metroNIDAZOLE 500 mg tablet Information on where to get these meds will be given to you by the nurse or doctor. ! Ask your nurse or doctor about these medications HYDROcodone-acetaminophen 5-325 mg per tablet  
 levoFLOXacin 500 mg tablet  
 metroNIDAZOLE 500 mg tablet Discharge Instructions Patient Discharge Instructions Natalie River / [id number] : 1955 Admitted 12/10/2017 Discharged: 2017 Take Home Medications · It is important that you take the medication exactly as they are prescribed. · Keep your medication in the bottles provided by the pharmacist and keep a list of the medication names, dosages, and times to be taken in your wallet. · Do not take other medications without consulting your doctor. What to do at [facility] Recommended diet: Diabetic Diet Recommended activity: Activity as tolerated If you experience any of the following symptoms severe pain or high fever, please follow up with Davie Hodge MD 
. Follow-up with Davie Hodge MD 
 in 10 day Information obtained by : 
I understand that if any problems occur once I am at home I am to contact my physician. I understand and acknowledge receipt of the instructions indicated above.   
 
                                                                                                                                     
Physician's or R.N.'s Signature Date/Time Patient or Representative Signature                                                          Date/Time [employer] Announcement We are excited to announce that we are making your provider's discharge notes available to you in [employer]. You will see these notes when they are completed and signed by the physician that discharged you from your recent hospital stay. If you have any questions or concerns about any information you see in [employer], please call the Health Information Department where you were seen or reach out to your Primary Care Provider for more information about your plan of care. Introducing [facility] & HEALTH SERVICES! Dear Jamie Carrasquillo: 
Thank you for requesting a [employer] account. Our records indicate that you already have an active [employer] account. You can access your account anytime at [email or web address] Did you know that you can access your hospital and ER discharge instructions at any time in [employer]? You can also review all of your test results from your hospital stay or ER visit. Additional Information If you have questions, please visit the Frequently Asked Questions section of the [employer] website at [email or web address] Remember, [employer] is NOT to be used for urgent needs. For medical emergencies, dial 911. Now available from your iPhone and Android! Providers Seen During Your Hospitalization Provider Specialty Primary office phone Scott Roy MD Emergency Medicine [phone] Megan Hummel MD General Surgery [phone] Your Primary Care Physician (PCP) Primary Care Physician Office Phone Office Fax Joseline Lucas [phone] You are allergic to the following Allergen Reactions Keflex (Cephalexin) Hives Pcn (Penicillins) Hives Tolerates cephalexin Tanzeum (Albiglutide) Nausea Only Vioxx (Rofecoxib) Nausea Only Recent Documentation Height Weight BMI OB Status Smoking Status 1.6 m 93.9 kg 36.67 kg/m2 Menopause Current Every Day Smoker Emergency Contacts Name Discharge Info Relation Home Work Mobile [address], CAREGIVER [3] Daughter [21] [phone] Pain,Cedrick DISCHARGE CAREGIVER [3] Other Relative [6] [phone] Patient Belongings The following personal items are in your possession at time of discharge: 
  Dental Appliances: At bedside  Visual Aid: At bedside, Glasses      Home Medications: None   Jewelry: Bracelet, Ring (ring at bedside. bracelet sent hiome)  Clothing: Reita Bream, At bedside    Other Valuables: Cell Phone Please provide this summary of care documentation to your next provider. Signatures-by signing, you are acknowledging that this After Visit Summary has been reviewed with you and you have received a copy. Patient Signature:  ____________________________________________________________ Date:  ____________________________________________________________  
  
Alisha Frey Provider Signature:  ____________________________________________________________ Date:  ____________________________________________________________

## 2017-12-11 LAB
ANION GAP SERPL CALC-SCNC: 10 MMOL/L (ref 5–15)
ANION GAP SERPL CALC-SCNC: 6 MMOL/L (ref 5–15)
ATRIAL RATE: 79 BPM
ATRIAL RATE: 85 BPM
B-OH-BUTYR SERPL-SCNC: 0.46 MMOL/L
BASOPHILS # BLD: 0 K/UL (ref 0–0.1)
BASOPHILS NFR BLD: 0 % (ref 0–1)
BUN SERPL-MCNC: 42 MG/DL (ref 6–20)
BUN SERPL-MCNC: 43 MG/DL (ref 6–20)
BUN/CREAT SERPL: 21 (ref 12–20)
BUN/CREAT SERPL: 22 (ref 12–20)
CALCIUM SERPL-MCNC: 7.9 MG/DL (ref 8.5–10.1)
CALCIUM SERPL-MCNC: 8.3 MG/DL (ref 8.5–10.1)
CALCULATED P AXIS, ECG09: 56 DEGREES
CALCULATED P AXIS, ECG09: 58 DEGREES
CALCULATED R AXIS, ECG10: -23 DEGREES
CALCULATED R AXIS, ECG10: -23 DEGREES
CALCULATED T AXIS, ECG11: 14 DEGREES
CALCULATED T AXIS, ECG11: 45 DEGREES
CHLORIDE SERPL-SCNC: 104 MMOL/L (ref 97–108)
CHLORIDE SERPL-SCNC: 104 MMOL/L (ref 97–108)
CO2 SERPL-SCNC: 24 MMOL/L (ref 21–32)
CO2 SERPL-SCNC: 26 MMOL/L (ref 21–32)
CREAT SERPL-MCNC: 1.94 MG/DL (ref 0.55–1.02)
CREAT SERPL-MCNC: 2.09 MG/DL (ref 0.55–1.02)
DIAGNOSIS, 93000: NORMAL
DIAGNOSIS, 93000: NORMAL
EOSINOPHIL # BLD: 0 K/UL (ref 0–0.4)
EOSINOPHIL NFR BLD: 0 % (ref 0–7)
ERYTHROCYTE [DISTWIDTH] IN BLOOD BY AUTOMATED COUNT: 12.4 % (ref 11.5–14.5)
EST. AVERAGE GLUCOSE BLD GHB EST-MCNC: 223 MG/DL
GLUCOSE BLD STRIP.AUTO-MCNC: 229 MG/DL (ref 65–100)
GLUCOSE BLD STRIP.AUTO-MCNC: 271 MG/DL (ref 65–100)
GLUCOSE BLD STRIP.AUTO-MCNC: 281 MG/DL (ref 65–100)
GLUCOSE SERPL-MCNC: 246 MG/DL (ref 65–100)
GLUCOSE SERPL-MCNC: 306 MG/DL (ref 65–100)
HBA1C MFR BLD: 9.4 % (ref 4.2–6.3)
HCT VFR BLD AUTO: 37.4 % (ref 35–47)
HGB BLD-MCNC: 12.4 G/DL (ref 11.5–16)
LACTATE SERPL-SCNC: 1.3 MMOL/L (ref 0.4–2)
LYMPHOCYTES # BLD: 1.1 K/UL (ref 0.8–3.5)
LYMPHOCYTES NFR BLD: 6 % (ref 12–49)
MAGNESIUM SERPL-MCNC: 1.7 MG/DL (ref 1.6–2.4)
MCH RBC QN AUTO: 31.2 PG (ref 26–34)
MCHC RBC AUTO-ENTMCNC: 33.2 G/DL (ref 30–36.5)
MCV RBC AUTO: 94.2 FL (ref 80–99)
MONOCYTES # BLD: 1 K/UL (ref 0–1)
MONOCYTES NFR BLD: 5 % (ref 5–13)
NEUTS SEG # BLD: 16.5 K/UL (ref 1.8–8)
NEUTS SEG NFR BLD: 89 % (ref 32–75)
P-R INTERVAL, ECG05: 154 MS
P-R INTERVAL, ECG05: 160 MS
PHOSPHATE SERPL-MCNC: 4.5 MG/DL (ref 2.6–4.7)
PLATELET # BLD AUTO: 170 K/UL (ref 150–400)
POTASSIUM SERPL-SCNC: 4.5 MMOL/L (ref 3.5–5.1)
POTASSIUM SERPL-SCNC: 4.7 MMOL/L (ref 3.5–5.1)
Q-T INTERVAL, ECG07: 364 MS
Q-T INTERVAL, ECG07: 402 MS
QRS DURATION, ECG06: 86 MS
QRS DURATION, ECG06: 90 MS
QTC CALCULATION (BEZET), ECG08: 433 MS
QTC CALCULATION (BEZET), ECG08: 460 MS
RBC # BLD AUTO: 3.97 M/UL (ref 3.8–5.2)
SERVICE CMNT-IMP: ABNORMAL
SODIUM SERPL-SCNC: 136 MMOL/L (ref 136–145)
SODIUM SERPL-SCNC: 138 MMOL/L (ref 136–145)
VENTRICULAR RATE, ECG03: 79 BPM
VENTRICULAR RATE, ECG03: 85 BPM
WBC # BLD AUTO: 18.6 K/UL (ref 3.6–11)

## 2017-12-11 PROCEDURE — 74011636637 HC RX REV CODE- 636/637: Performed by: FAMILY MEDICINE

## 2017-12-11 PROCEDURE — 80048 BASIC METABOLIC PNL TOTAL CA: CPT | Performed by: SURGERY

## 2017-12-11 PROCEDURE — 82962 GLUCOSE BLOOD TEST: CPT

## 2017-12-11 PROCEDURE — 74011250636 HC RX REV CODE- 250/636: Performed by: SURGERY

## 2017-12-11 PROCEDURE — 74011250636 HC RX REV CODE- 250/636: Performed by: FAMILY MEDICINE

## 2017-12-11 PROCEDURE — 77010033678 HC OXYGEN DAILY

## 2017-12-11 PROCEDURE — 84100 ASSAY OF PHOSPHORUS: CPT | Performed by: SURGERY

## 2017-12-11 PROCEDURE — 65270000029 HC RM PRIVATE

## 2017-12-11 PROCEDURE — 74011000250 HC RX REV CODE- 250: Performed by: FAMILY MEDICINE

## 2017-12-11 PROCEDURE — 99218 HC RM OBSERVATION: CPT

## 2017-12-11 PROCEDURE — 80048 BASIC METABOLIC PNL TOTAL CA: CPT

## 2017-12-11 PROCEDURE — 96372 THER/PROPH/DIAG INJ SC/IM: CPT

## 2017-12-11 PROCEDURE — 96361 HYDRATE IV INFUSION ADD-ON: CPT

## 2017-12-11 PROCEDURE — 74011250636 HC RX REV CODE- 250/636: Performed by: PHYSICIAN ASSISTANT

## 2017-12-11 PROCEDURE — 85025 COMPLETE CBC W/AUTO DIFF WBC: CPT | Performed by: SURGERY

## 2017-12-11 PROCEDURE — 83735 ASSAY OF MAGNESIUM: CPT | Performed by: SURGERY

## 2017-12-11 PROCEDURE — 83605 ASSAY OF LACTIC ACID: CPT

## 2017-12-11 PROCEDURE — 96366 THER/PROPH/DIAG IV INF ADDON: CPT

## 2017-12-11 PROCEDURE — 96376 TX/PRO/DX INJ SAME DRUG ADON: CPT

## 2017-12-11 PROCEDURE — 36415 COLL VENOUS BLD VENIPUNCTURE: CPT | Performed by: SURGERY

## 2017-12-11 PROCEDURE — 74011250637 HC RX REV CODE- 250/637: Performed by: SURGERY

## 2017-12-11 RX ORDER — LEVOFLOXACIN 5 MG/ML
250 INJECTION, SOLUTION INTRAVENOUS ONCE
Status: COMPLETED | OUTPATIENT
Start: 2017-12-11 | End: 2017-12-11

## 2017-12-11 RX ORDER — ONDANSETRON 2 MG/ML
4 INJECTION INTRAMUSCULAR; INTRAVENOUS
Status: DISCONTINUED | OUTPATIENT
Start: 2017-12-11 | End: 2017-12-13 | Stop reason: HOSPADM

## 2017-12-11 RX ORDER — INSULIN GLARGINE 100 [IU]/ML
25 INJECTION, SOLUTION SUBCUTANEOUS DAILY
Status: DISCONTINUED | OUTPATIENT
Start: 2017-12-11 | End: 2017-12-13 | Stop reason: HOSPADM

## 2017-12-11 RX ORDER — HEPARIN SODIUM 5000 [USP'U]/ML
5000 INJECTION, SOLUTION INTRAVENOUS; SUBCUTANEOUS EVERY 8 HOURS
Status: DISCONTINUED | OUTPATIENT
Start: 2017-12-11 | End: 2017-12-13 | Stop reason: HOSPADM

## 2017-12-11 RX ORDER — METRONIDAZOLE 500 MG/100ML
500 INJECTION, SOLUTION INTRAVENOUS EVERY 8 HOURS
Status: DISCONTINUED | OUTPATIENT
Start: 2017-12-11 | End: 2017-12-11

## 2017-12-11 RX ORDER — METRONIDAZOLE 500 MG/100ML
500 INJECTION, SOLUTION INTRAVENOUS EVERY 12 HOURS
Status: DISCONTINUED | OUTPATIENT
Start: 2017-12-11 | End: 2017-12-13 | Stop reason: HOSPADM

## 2017-12-11 RX ORDER — INSULIN LISPRO 100 [IU]/ML
INJECTION, SOLUTION INTRAVENOUS; SUBCUTANEOUS EVERY 6 HOURS
Status: DISCONTINUED | OUTPATIENT
Start: 2017-12-11 | End: 2017-12-13

## 2017-12-11 RX ORDER — LEVOFLOXACIN 5 MG/ML
750 INJECTION, SOLUTION INTRAVENOUS EVERY 24 HOURS
Status: DISCONTINUED | OUTPATIENT
Start: 2017-12-11 | End: 2017-12-11

## 2017-12-11 RX ORDER — LEVOFLOXACIN 5 MG/ML
750 INJECTION, SOLUTION INTRAVENOUS
Status: DISCONTINUED | OUTPATIENT
Start: 2017-12-12 | End: 2017-12-13 | Stop reason: HOSPADM

## 2017-12-11 RX ADMIN — LEVOFLOXACIN 250 MG: 5 INJECTION, SOLUTION INTRAVENOUS at 08:02

## 2017-12-11 RX ADMIN — ONDANSETRON 4 MG: 2 INJECTION INTRAMUSCULAR; INTRAVENOUS at 10:22

## 2017-12-11 RX ADMIN — HYDROMORPHONE HYDROCHLORIDE 1 MG: 1 INJECTION, SOLUTION INTRAMUSCULAR; INTRAVENOUS; SUBCUTANEOUS at 03:35

## 2017-12-11 RX ADMIN — HYDROCODONE BITARTRATE AND ACETAMINOPHEN 1 TABLET: 5; 325 TABLET ORAL at 20:45

## 2017-12-11 RX ADMIN — SODIUM CHLORIDE AND POTASSIUM CHLORIDE: 4.5; 1.49 INJECTION, SOLUTION INTRAVENOUS at 21:49

## 2017-12-11 RX ADMIN — HEPARIN SODIUM 5000 UNITS: 5000 INJECTION, SOLUTION INTRAVENOUS; SUBCUTANEOUS at 21:48

## 2017-12-11 RX ADMIN — SODIUM CHLORIDE AND POTASSIUM CHLORIDE: 4.5; 1.49 INJECTION, SOLUTION INTRAVENOUS at 14:05

## 2017-12-11 RX ADMIN — HYDROCODONE BITARTRATE AND ACETAMINOPHEN 1 TABLET: 5; 325 TABLET ORAL at 16:33

## 2017-12-11 RX ADMIN — Medication 10 ML: at 06:22

## 2017-12-11 RX ADMIN — HYDROMORPHONE HYDROCHLORIDE 1 MG: 1 INJECTION, SOLUTION INTRAMUSCULAR; INTRAVENOUS; SUBCUTANEOUS at 18:22

## 2017-12-11 RX ADMIN — INSULIN LISPRO 2 UNITS: 100 INJECTION, SOLUTION INTRAVENOUS; SUBCUTANEOUS at 18:00

## 2017-12-11 RX ADMIN — INSULIN LISPRO 3 UNITS: 100 INJECTION, SOLUTION INTRAVENOUS; SUBCUTANEOUS at 11:56

## 2017-12-11 RX ADMIN — METRONIDAZOLE 500 MG: 500 INJECTION, SOLUTION INTRAVENOUS at 21:48

## 2017-12-11 RX ADMIN — SODIUM CHLORIDE 1000 ML: 900 INJECTION, SOLUTION INTRAVENOUS at 06:20

## 2017-12-11 RX ADMIN — HYDROCODONE BITARTRATE AND ACETAMINOPHEN 1 TABLET: 5; 325 TABLET ORAL at 08:02

## 2017-12-11 RX ADMIN — HEPARIN SODIUM 5000 UNITS: 5000 INJECTION, SOLUTION INTRAVENOUS; SUBCUTANEOUS at 14:05

## 2017-12-11 RX ADMIN — Medication 10 ML: at 14:26

## 2017-12-11 RX ADMIN — INSULIN LISPRO 3 UNITS: 100 INJECTION, SOLUTION INTRAVENOUS; SUBCUTANEOUS at 06:18

## 2017-12-11 RX ADMIN — Medication 10 ML: at 21:53

## 2017-12-11 RX ADMIN — HYDROCODONE BITARTRATE AND ACETAMINOPHEN 1 TABLET: 5; 325 TABLET ORAL at 11:42

## 2017-12-11 RX ADMIN — METRONIDAZOLE 500 MG: 500 INJECTION, SOLUTION INTRAVENOUS at 10:22

## 2017-12-11 NOTE — ED NOTES
Bedside and Verbal shift change report given to Scot  (oncoming nurse) by Argentina Mehta  (offgoing nurse). Report included the following information SBAR, Kardex, ED Summary, Procedure Summary, MAR, Recent Results and Med Rec Status.

## 2017-12-11 NOTE — PROGRESS NOTES
Primary Nurse Agustin Duran RN and Ines Stacy RN performed a dual skin assessment on this patient No impairment noted.  Right great and 2nd toe amputation
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
## 2017-12-11 NOTE — PROGRESS NOTES
12/11/2017 7:58 AM Met with pt. Charted address and phone number confirmed. Pt lives with her daughter and son in law in [facility]. Pt uses a cane to assist with ambulation. Pt was independent with adls and driving prior to admission. Has rx coverage and fills her scripts at [facility]. Pt's daughter or son in law will transport pt home. No discharge needs identified at this time. CM will follow. SUELLEN Blackburn   Care Management Interventions  PCP Verified by CM:  Yes Enriqueta Kwan- nurse navigator notified of pt's admission)  Current Support Network: [place]
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
## 2017-12-11 NOTE — CONSULTS
[facility]   Follow-up Consult Note    Date: 12/11/2017    Assessment/Plan:   Steffany Mullins is a 63 y.o. female with known hypertension, DMT2, depression and anxiety who is hospitalized for acute diverticulitis/ appendicitis. 24 hours events: No acute events.        1. Acute diverticulitis/ appendicitis. -Management per primary team, medical management for now with antibiotics, IVF and pain management.     2. Diabetes mellitus. Insulin dependent. Not well controlled. Last HgA1C (3/2017) 10.2. S/p 10 units of Regular insulin yesterday.    -Will start Lantus 25 units in am, will not start at higher dose as the pt is NPO.   -SSI with POC BG checks Q6 h while NPO  -WIll monitor the BG during the day and considering increasing the dose tomorrow am.  -Follow up on HgA1C     3. JANEE. Creatinine on admission 1.8-->1.9 this morning ( BL 0.9-1.0). Suspect from the poor PO intake.   -Continue IVF  -Recheck BMP in the afternoon, if Cr remains elevated consider urine lytes testing      4. Hypertension. BP is stable, on the lower side. On home Metorolol 25mg, HCTZ 25 mg, Lisinopril 10 mg.  -Continue to hold antihypertensives for now   -Will monitor BP and readjust medications as needed     5. Hyperlipidemia. Last lipid panel ( 3/2017) , , HDL 37. LDl 55. On home Lipitor.  -Continue to hold Lipitor for now, can restart when tolerates PO     6. Depression. Stable. On Effexor and Buspar.   -Continue to hold Buspar and Effexor while NPO, will restart when tolerates PO        FEN/GI - NPO per primary team.  Activity - Ambulate as tolerated  DVT prophylaxis - Per primary team  GI prophylaxis - Not indicated at this time  Disposition - Plan to d/c to Per primary team.  Code Status - Full, discussed with patient / caregivers. CODE STATUS:  FULL    Thank you very much for allowing us to participate in the care of this pleasant patient.   The family medicine service will continue to follow the patient's medical progress along with you. Please do not hesitate to page with any questions or to discuss the case (pager# [phone]). Patient to be discussed  with Dr. Adelia Howard ( the attending physician)      Marisela aCmargo MD  Family Medicine Resident, PGY-2         CC: \" I am feeling a little better but the belly is still sore\"    Subjective  No acute events overnight. Patient resting comfortable in the bed. Denies chills, headaches, chest pain, shortness of breath, palpitations,  nausea and vomiting, and LE edema.   No bowel movements in the last       Inpatient Medications  Current Facility-Administered Medications   Medication Dose Route Frequency    insulin lispro (HUMALOG) injection   SubCUTAneous Q6H    metroNIDAZOLE (FLAGYL) IVPB premix 500 mg  500 mg IntraVENous Q12H    levoFLOXacin (LEVAQUIN) 250 mg in D5W IVPB  250 mg IntraVENous ONCE    [START ON 12/12/2017] levoFLOXacin (LEVAQUIN) 750 mg in D5W IVPB  750 mg IntraVENous Q48H    sodium chloride 0.9 % bolus infusion 1,000 mL  1,000 mL IntraVENous ONCE    insulin glargine (LANTUS) injection 25 Units  25 Units SubCUTAneous DAILY    sodium chloride (NS) flush 5-10 mL  5-10 mL IntraVENous Q8H    sodium chloride (NS) flush 5-10 mL  5-10 mL IntraVENous PRN    acetaminophen (TYLENOL) tablet 650 mg  650 mg Oral Q4H PRN    HYDROcodone-acetaminophen (NORCO) 5-325 mg per tablet 1 Tab  1 Tab Oral Q4H PRN    HYDROmorphone (PF) (DILAUDID) injection 1 mg  1 mg IntraVENous Q4H PRN    naloxone (NARCAN) injection 0.4 mg  0.4 mg IntraVENous PRN    0.45% sodium chloride with KCl 20 mEq/L infusion   IntraVENous CONTINUOUS    glucose chewable tablet 16 g  4 Tab Oral PRN    dextrose (D50W) injection syrg 12.5-25 g  12.5-25 g IntraVENous PRN    glucagon (GLUCAGEN) injection 1 mg  1 mg IntraMUSCular PRN         Allergies  Allergies   Allergen Reactions    Keflex [Cephalexin] Hives    Pcn [Penicillins] Hives     Tolerates cephalexin    Tanzeum [Albiglutide] Nausea Only    Vioxx [Rofecoxib] Nausea Only         Objective  Vitals:  Patient Vitals for the past 8 hrs:   Temp Pulse Resp BP SpO2   12/11/17 0303 98.2 °F (36.8 °C) 69 17 108/51 96 %   12/10/17 2323 97.9 °F (36.6 °C) 70 16 119/56 96 %   12/10/17 2304 - 69 12 (!) 113/37 92 %   12/10/17 2200 - 71 13 115/47 95 %         I/O:  No intake or output data in the 24 hours ending 12/11/17 0558  Last shift:       Last 3 shifts:         Physical Exam:  General: No acute distress. Alert. Cooperative. Head: Normocephalic. Atraumatic. Eyes:  Conjunctiva pink. Sclera white. PERRL. Throat: Mucosa pink. Moist mucous membranes. No tonsillar exudates or erythema. Palate movement equal bilaterally. Respiratory: CTAB. No w/r/r/c.   Cardiovascular: RRR. Normal S1,S2. No m/r/g. Pulses 2+ throughout. GI: + hypoactive bowel sounds. Mildly tender in the lower quadrants bilaterally. No rebound tenderness or guarding. Nondistended   Extremities: No edema. No palpable cord. No tenderness. Laboratory Data  Recent Results (from the past 12 hour(s))   CBC WITH AUTOMATED DIFF    Collection Time: 12/10/17  7:45 PM   Result Value Ref Range    WBC 19.6 (H) 3.6 - 11.0 K/uL    RBC 4.51 3.80 - 5.20 M/uL    HGB 14.4 11.5 - 16.0 g/dL    HCT 42.1 35.0 - 47.0 %    MCV 93.3 80.0 - 99.0 FL    MCH 31.9 26.0 - 34.0 PG    MCHC 34.2 30.0 - 36.5 g/dL    RDW 12.5 11.5 - 14.5 %    PLATELET 370 325 - 241 K/uL    NEUTROPHILS 89 (H) 32 - 75 %    LYMPHOCYTES 6 (L) 12 - 49 %    MONOCYTES 5 5 - 13 %    EOSINOPHILS 0 0 - 7 %    BASOPHILS 0 0 - 1 %    ABS. NEUTROPHILS 17.3 (H) 1.8 - 8.0 K/UL    ABS. LYMPHOCYTES 1.2 0.8 - 3.5 K/UL    ABS. MONOCYTES 1.1 (H) 0.0 - 1.0 K/UL    ABS. EOSINOPHILS 0.0 0.0 - 0.4 K/UL    ABS.  BASOPHILS 0.0 0.0 - 0.1 K/UL   URINALYSIS W/ REFLEX CULTURE    Collection Time: 12/10/17  8:01 PM   Result Value Ref Range    Color DARK YELLOW      Appearance CLOUDY (A) CLEAR      Specific gravity 1.021 1.003 - 1.030      pH (UA) 5.0 5.0 - 8.0      Protein 30 (A) NEG mg/dL    Glucose 500 (A) NEG mg/dL    Ketone NEGATIVE  NEG mg/dL    Blood NEGATIVE  NEG      Urobilinogen 1.0 0.2 - 1.0 EU/dL    Nitrites NEGATIVE  NEG      Leukocyte Esterase NEGATIVE  NEG      WBC 5-10 0 - 4 /hpf    RBC 0-5 0 - 5 /hpf    Epithelial cells MANY (A) FEW /lpf    Bacteria 4+ (A) NEG /hpf    UA:UC IF INDICATED URINE CULTURE ORDERED (A) CNI     BILIRUBIN, CONFIRM    Collection Time: 12/10/17  8:01 PM   Result Value Ref Range    Bilirubin UA, confirm NEGATIVE  NEG     EKG, 12 LEAD, INITIAL    Collection Time: 12/10/17  8:09 PM   Result Value Ref Range    Ventricular Rate 85 BPM    Atrial Rate 85 BPM    P-R Interval 154 ms    QRS Duration 86 ms    Q-T Interval 364 ms    QTC Calculation (Bezet) 433 ms    Calculated P Axis 56 degrees    Calculated R Axis -23 degrees    Calculated T Axis 45 degrees    Diagnosis       Normal sinus rhythm  Nonspecific ST abnormality  Abnormal ECG  When compared with ECG of 05-SEP-2014 13:13,  No significant change was found     AMYLASE    Collection Time: 12/10/17  8:39 PM   Result Value Ref Range    Amylase 19 (L) 25 - 115 U/L   LIPASE    Collection Time: 12/10/17  8:39 PM   Result Value Ref Range    Lipase 63 (L) 73 - 824 U/L   METABOLIC PANEL, COMPREHENSIVE    Collection Time: 12/10/17  8:39 PM   Result Value Ref Range    Sodium 137 136 - 145 mmol/L    Potassium 4.1 3.5 - 5.1 mmol/L    Chloride 103 97 - 108 mmol/L    CO2 23 21 - 32 mmol/L    Anion gap 11 5 - 15 mmol/L    Glucose 351 (H) 65 - 100 mg/dL    BUN 38 (H) 6 - 20 MG/DL    Creatinine 1.84 (H) 0.55 - 1.02 MG/DL    BUN/Creatinine ratio 21 (H) 12 - 20      GFR est AA 34 (L) >60 ml/min/1.73m2    GFR est non-AA 28 (L) >60 ml/min/1.73m2    Calcium 8.4 (L) 8.5 - 10.1 MG/DL    Bilirubin, total 0.6 0.2 - 1.0 MG/DL    ALT (SGPT) 22 12 - 78 U/L    AST (SGOT) 13 (L) 15 - 37 U/L    Alk.  phosphatase 90 45 - 117 U/L    Protein, total 6.8 6.4 - 8.2 g/dL    Albumin 2.7 (L) 3.5 - 5.0 g/dL    Globulin 4.1 (H) 2.0 - 4.0 g/dL    A-G Ratio 0.7 (L) 1.1 - 2.2     CBC WITH AUTOMATED DIFF    Collection Time: 12/11/17  3:44 AM   Result Value Ref Range    WBC 18.6 (H) 3.6 - 11.0 K/uL    RBC 3.97 3.80 - 5.20 M/uL    HGB 12.4 11.5 - 16.0 g/dL    HCT 37.4 35.0 - 47.0 %    MCV 94.2 80.0 - 99.0 FL    MCH 31.2 26.0 - 34.0 PG    MCHC 33.2 30.0 - 36.5 g/dL    RDW 12.4 11.5 - 14.5 %    PLATELET 667 639 - 344 K/uL    NEUTROPHILS 89 (H) 32 - 75 %    LYMPHOCYTES 6 (L) 12 - 49 %    MONOCYTES 5 5 - 13 %    EOSINOPHILS 0 0 - 7 %    BASOPHILS 0 0 - 1 %    ABS. NEUTROPHILS 16.5 (H) 1.8 - 8.0 K/UL    ABS. LYMPHOCYTES 1.1 0.8 - 3.5 K/UL    ABS. MONOCYTES 1.0 0.0 - 1.0 K/UL    ABS. EOSINOPHILS 0.0 0.0 - 0.4 K/UL    ABS.  BASOPHILS 0.0 0.0 - 0.1 K/UL   METABOLIC PANEL, BASIC    Collection Time: 12/11/17  3:44 AM   Result Value Ref Range    Sodium 138 136 - 145 mmol/L    Potassium 4.7 3.5 - 5.1 mmol/L    Chloride 104 97 - 108 mmol/L    CO2 24 21 - 32 mmol/L    Anion gap 10 5 - 15 mmol/L    Glucose 306 (H) 65 - 100 mg/dL    BUN 42 (H) 6 - 20 MG/DL    Creatinine 1.94 (H) 0.55 - 1.02 MG/DL    BUN/Creatinine ratio 22 (H) 12 - 20      GFR est AA 32 (L) >60 ml/min/1.73m2    GFR est non-AA 26 (L) >60 ml/min/1.73m2    Calcium 8.3 (L) 8.5 - 10.1 MG/DL   MAGNESIUM    Collection Time: 12/11/17  3:44 AM   Result Value Ref Range    Magnesium 1.7 1.6 - 2.4 mg/dL   PHOSPHORUS    Collection Time: 12/11/17  3:44 AM   Result Value Ref Range    Phosphorus 4.5 2.6 - 4.7 MG/DL   GLUCOSE, POC    Collection Time: 12/11/17  5:47 AM   Result Value Ref Range    Glucose (POC) 281 (H) 65 - 100 mg/dL    Performed by Keyur Adams (GEORGE)            [facility] Problems  Date Reviewed: 10/13/2017          Codes Class Noted POA    Diverticulitis ICD-10-CM: G26.21  ICD-9-CM: 562.11  12/10/2017 Unknown            [address] Medicine Residency Attending Addendum:  I saw and evaluated the patient, performing the key elements of the service. I discussed the findings, assessment and plan with the resident and agree with the resident's findings and plan as documented in the resident's note.     The patient was seen on 12/12/17    Vitals:    12/12/17 2204 12/13/17 0310 12/13/17 0706 12/13/17 1144   BP: 152/65 132/58 179/75 171/72   Pulse: 83 77 94 75   Resp: 16 16 16 16   Temp: 98 °F (36.7 °C) 98.2 °F (36.8 °C) 98.3 °F (36.8 °C) 98 °F (36.7 °C)   SpO2: 90% 92% 92% 93%   Weight:       Height:         NAD      Assessment/Plan:   Diverticulitis  DM-poorly controlled-follow A1C  insulin    Hospital Problems  Date Reviewed: 12/20/2017          Codes Class Noted POA    Diverticulitis ICD-10-CM: U98.50  ICD-9-CM: 562.11  12/10/2017 Unknown

## 2017-12-11 NOTE — PROGRESS NOTES
Pharmacy changed dosing of Metronidazole to 500 mg IV q12H, per automatic adjustment policy. Levaquin was changed to 750 mg IV q48H, for CrCl of 34 ml/min, per renal adjustment protocol. Levaquin 500 mg given earlier in ED, so 250 mg now dose was ordered to make 750 mg total dose today.

## 2017-12-11 NOTE — PROGRESS NOTES
[facility]: MED RECONCILIATION    Comments/Recommendations:   Patient is awake, alert, and knowledgeable about home medications. Pharmacist reviewed prescription refill history available with Rx Query. Verifies allergies  Blood glucose is monitored at home. At baseline the patient is usually 140 to 210. This morning the patient's blood glucose was 460. The patient has not taken her fast acting insulin since last week as she has been feeling unwell. The patient reports she is rarely hypoglycemic on her current regimen and is symptomatic when she has low blood sugars. Blood pressure is monitored at home ant he patient is usually 120/60 on her current regimen  Reports compliance to prescribed regimen    Medications added:     · None    Medications removed:    · Lantus    Medications adjusted:    · Apidra changed to sliding scale with meals  · Insulin glargine changed to 50 units in the morning  · Buspirone changed to 7.5 mg HS and 7.5 mg daily  · Mupirocin changed to daily prn    Allergies: Keflex [cephalexin]; Pcn [penicillins]; Tanzeum [albiglutide]; and Vioxx [rofecoxib]    Prior to Admission Medications:     Prior to Admission Medications   Prescriptions Last Dose Informant Patient Reported? Taking? SITagliptin (JANUVIA) 100 mg tablet 12/9/2017 at Unknown time  Yes Yes   Sig: Take 100 mg by mouth nightly. albuterol (PROVENTIL HFA, VENTOLIN HFA, PROAIR HFA) 90 mcg/actuation inhaler   No Yes   Sig: Take 2 Puffs by inhalation every four (4) hours as needed for Wheezing. aspirin delayed-release 81 mg tablet 12/9/2017 at Unknown time Self Yes Yes   Sig: Take 81 mg by mouth nightly. atorvastatin (LIPITOR) 20 mg tablet 12/9/2017 at Unknown time  No Yes   Sig: Take 1 Tab by mouth nightly. busPIRone (BUSPAR) 7.5 mg tablet 12/9/2017 at Unknown time  Yes Yes   Sig: Take 7.5 mg by mouth nightly. busPIRone (BUSPAR) 7.5 mg tablet   Yes Yes   Sig: Take 7.5 mg by mouth daily as needed (anxiety).    cetirizine (ZYRTEC) 10 mg tablet 12/10/2017 at Unknown time  No Yes   Sig: Take 1 Tab by mouth every morning. docusate sodium (COLACE) 100 mg capsule   Yes Yes   Sig: Take 100 mg by mouth two (2) times daily as needed for Constipation. gabapentin (NEURONTIN) 300 mg capsule 2017 at Unknown time  No Yes   Sig: Take 1 Cap by mouth two (2) times a day. hydroCHLOROthiazide (HYDRODIURIL) 25 mg tablet 2017 at Unknown time  Yes Yes   Sig: Take 25 mg by mouth nightly. insulin glargine (LANTUS,BASAGLAR) 100 unit/mL (3 mL) inpn 12/10/2017 at am  Yes Yes   Si Units by SubCUTAneous route daily. insulin glulisine (APIDRA) 100 unit/mL injection 12/3/2017 at Unknown time  Yes Yes   Sig: by SubCUTAneous route Before breakfast, lunch, and dinner. Sliding scale   lisinopril (PRINIVIL, ZESTRIL) 10 mg tablet 2017 at Unknown time  No Yes   Sig: Take 1 Tab by mouth nightly. metoprolol tartrate (LOPRESSOR) 25 mg tablet 2017 at Unknown time  No Yes   Sig: TAKE 1 TAB BY MOUTH TWO (2) TIMES A DAY. mupirocin (BACTROBAN) 2 % ointment   Yes Yes   Sig: Apply  to affected area daily as needed (skin infection). raNITIdine (ZANTAC) 150 mg tablet   No Yes   Sig: Take 1 Tab by mouth two (2) times daily as needed for Indigestion.    venlafaxine-SR [facility] 150 mg capsule 2017 at Unknown time  No Yes   Sig: TAKE ONE CAPSULE BY MOUTH NIGHTLY      Facility-Administered Medications: None     Thank you,    Ponce Gallegos, PharmD, BCPS

## 2017-12-12 LAB
ALBUMIN SERPL-MCNC: 2 G/DL (ref 3.5–5)
ALBUMIN/GLOB SERPL: 0.5 {RATIO} (ref 1.1–2.2)
ALP SERPL-CCNC: 81 U/L (ref 45–117)
ALT SERPL-CCNC: 19 U/L (ref 12–78)
ANION GAP SERPL CALC-SCNC: 8 MMOL/L (ref 5–15)
AST SERPL-CCNC: 15 U/L (ref 15–37)
BACTERIA SPEC CULT: NORMAL
BASOPHILS # BLD: 0 K/UL (ref 0–0.1)
BASOPHILS NFR BLD: 0 % (ref 0–1)
BILIRUB SERPL-MCNC: 0.3 MG/DL (ref 0.2–1)
BUN SERPL-MCNC: 47 MG/DL (ref 6–20)
BUN/CREAT SERPL: 23 (ref 12–20)
CALCIUM SERPL-MCNC: 8.1 MG/DL (ref 8.5–10.1)
CC UR VC: NORMAL
CHLORIDE SERPL-SCNC: 104 MMOL/L (ref 97–108)
CO2 SERPL-SCNC: 22 MMOL/L (ref 21–32)
CREAT SERPL-MCNC: 2.03 MG/DL (ref 0.55–1.02)
EOSINOPHIL # BLD: 0 K/UL (ref 0–0.4)
EOSINOPHIL NFR BLD: 0 % (ref 0–7)
ERYTHROCYTE [DISTWIDTH] IN BLOOD BY AUTOMATED COUNT: 12.5 % (ref 11.5–14.5)
GLOBULIN SER CALC-MCNC: 3.7 G/DL (ref 2–4)
GLUCOSE BLD STRIP.AUTO-MCNC: 209 MG/DL (ref 65–100)
GLUCOSE BLD STRIP.AUTO-MCNC: 234 MG/DL (ref 65–100)
GLUCOSE BLD STRIP.AUTO-MCNC: 272 MG/DL (ref 65–100)
GLUCOSE BLD STRIP.AUTO-MCNC: 278 MG/DL (ref 65–100)
GLUCOSE SERPL-MCNC: 248 MG/DL (ref 65–100)
HCT VFR BLD AUTO: 33.5 % (ref 35–47)
HGB BLD-MCNC: 10.8 G/DL (ref 11.5–16)
LYMPHOCYTES # BLD: 1.5 K/UL (ref 0.8–3.5)
LYMPHOCYTES NFR BLD: 12 % (ref 12–49)
MCH RBC QN AUTO: 30.4 PG (ref 26–34)
MCHC RBC AUTO-ENTMCNC: 32.2 G/DL (ref 30–36.5)
MCV RBC AUTO: 94.4 FL (ref 80–99)
MONOCYTES # BLD: 1 K/UL (ref 0–1)
MONOCYTES NFR BLD: 8 % (ref 5–13)
NEUTS SEG # BLD: 10.3 K/UL (ref 1.8–8)
NEUTS SEG NFR BLD: 80 % (ref 32–75)
PLATELET # BLD AUTO: 199 K/UL (ref 150–400)
POTASSIUM SERPL-SCNC: 5.1 MMOL/L (ref 3.5–5.1)
PROT SERPL-MCNC: 5.7 G/DL (ref 6.4–8.2)
RBC # BLD AUTO: 3.55 M/UL (ref 3.8–5.2)
SERVICE CMNT-IMP: ABNORMAL
SERVICE CMNT-IMP: NORMAL
SODIUM SERPL-SCNC: 134 MMOL/L (ref 136–145)
WBC # BLD AUTO: 12.8 K/UL (ref 3.6–11)

## 2017-12-12 PROCEDURE — 74011250636 HC RX REV CODE- 250/636: Performed by: SURGERY

## 2017-12-12 PROCEDURE — 74011250637 HC RX REV CODE- 250/637: Performed by: SURGERY

## 2017-12-12 PROCEDURE — 36415 COLL VENOUS BLD VENIPUNCTURE: CPT

## 2017-12-12 PROCEDURE — 74011250636 HC RX REV CODE- 250/636: Performed by: PHYSICIAN ASSISTANT

## 2017-12-12 PROCEDURE — 96361 HYDRATE IV INFUSION ADD-ON: CPT

## 2017-12-12 PROCEDURE — 65270000029 HC RM PRIVATE

## 2017-12-12 PROCEDURE — 74011636637 HC RX REV CODE- 636/637: Performed by: FAMILY MEDICINE

## 2017-12-12 PROCEDURE — 74011000250 HC RX REV CODE- 250: Performed by: FAMILY MEDICINE

## 2017-12-12 PROCEDURE — 74011250636 HC RX REV CODE- 250/636: Performed by: FAMILY MEDICINE

## 2017-12-12 PROCEDURE — 96366 THER/PROPH/DIAG IV INF ADDON: CPT

## 2017-12-12 PROCEDURE — 80053 COMPREHEN METABOLIC PANEL: CPT

## 2017-12-12 PROCEDURE — 77010033678 HC OXYGEN DAILY

## 2017-12-12 PROCEDURE — 96376 TX/PRO/DX INJ SAME DRUG ADON: CPT

## 2017-12-12 PROCEDURE — 96372 THER/PROPH/DIAG INJ SC/IM: CPT

## 2017-12-12 PROCEDURE — 85025 COMPLETE CBC W/AUTO DIFF WBC: CPT

## 2017-12-12 PROCEDURE — 82962 GLUCOSE BLOOD TEST: CPT

## 2017-12-12 PROCEDURE — 99218 HC RM OBSERVATION: CPT

## 2017-12-12 RX ORDER — SODIUM CHLORIDE 9 MG/ML
100 INJECTION, SOLUTION INTRAVENOUS CONTINUOUS
Status: DISCONTINUED | OUTPATIENT
Start: 2017-12-12 | End: 2017-12-13 | Stop reason: HOSPADM

## 2017-12-12 RX ADMIN — METRONIDAZOLE 500 MG: 500 INJECTION, SOLUTION INTRAVENOUS at 22:26

## 2017-12-12 RX ADMIN — HEPARIN SODIUM 5000 UNITS: 5000 INJECTION, SOLUTION INTRAVENOUS; SUBCUTANEOUS at 22:26

## 2017-12-12 RX ADMIN — HYDROCODONE BITARTRATE AND ACETAMINOPHEN 1 TABLET: 5; 325 TABLET ORAL at 17:32

## 2017-12-12 RX ADMIN — HEPARIN SODIUM 5000 UNITS: 5000 INJECTION, SOLUTION INTRAVENOUS; SUBCUTANEOUS at 05:41

## 2017-12-12 RX ADMIN — HYDROCODONE BITARTRATE AND ACETAMINOPHEN 1 TABLET: 5; 325 TABLET ORAL at 12:42

## 2017-12-12 RX ADMIN — INSULIN GLARGINE 25 UNITS: 100 INJECTION, SOLUTION SUBCUTANEOUS at 10:09

## 2017-12-12 RX ADMIN — HYDROMORPHONE HYDROCHLORIDE 1 MG: 1 INJECTION, SOLUTION INTRAMUSCULAR; INTRAVENOUS; SUBCUTANEOUS at 06:40

## 2017-12-12 RX ADMIN — METRONIDAZOLE 500 MG: 500 INJECTION, SOLUTION INTRAVENOUS at 12:41

## 2017-12-12 RX ADMIN — Medication 10 ML: at 05:42

## 2017-12-12 RX ADMIN — HYDROCODONE BITARTRATE AND ACETAMINOPHEN 1 TABLET: 5; 325 TABLET ORAL at 22:33

## 2017-12-12 RX ADMIN — HEPARIN SODIUM 5000 UNITS: 5000 INJECTION, SOLUTION INTRAVENOUS; SUBCUTANEOUS at 17:32

## 2017-12-12 RX ADMIN — HYDROCODONE BITARTRATE AND ACETAMINOPHEN 1 TABLET: 5; 325 TABLET ORAL at 07:29

## 2017-12-12 RX ADMIN — INSULIN LISPRO 3 UNITS: 100 INJECTION, SOLUTION INTRAVENOUS; SUBCUTANEOUS at 00:13

## 2017-12-12 RX ADMIN — Medication 10 ML: at 17:33

## 2017-12-12 RX ADMIN — LEVOFLOXACIN 750 MG: 5 INJECTION, SOLUTION INTRAVENOUS at 20:18

## 2017-12-12 RX ADMIN — Medication 10 ML: at 22:26

## 2017-12-12 RX ADMIN — INSULIN LISPRO 2 UNITS: 100 INJECTION, SOLUTION INTRAVENOUS; SUBCUTANEOUS at 12:41

## 2017-12-12 RX ADMIN — SODIUM CHLORIDE 100 ML/HR: 900 INJECTION, SOLUTION INTRAVENOUS at 12:41

## 2017-12-12 RX ADMIN — INSULIN LISPRO 2 UNITS: 100 INJECTION, SOLUTION INTRAVENOUS; SUBCUTANEOUS at 05:49

## 2017-12-12 NOTE — PROGRESS NOTES
[facility]   Follow-up Consult Note    Date: 12/12/2017    Assessment/Plan:   Kierra Diggs is a 63 y.o. female with known hypertension, DMT2, depression and anxiety who is hospitalized for acute diverticulitis/ appendicitis. 24 hours events: No acute events.    - Patient now on clear liquid diet.       1. Acute diverticulitis/ appendicitis. -Management per primary team, medical management for now with antibiotics, IVF and pain management. - Continue levaquin 750mg q48h and flaygl 500mg BID, currently day 3      2. Diabetes mellitus. Insulin dependent. Not well controlled. A1C 9.4 on 12/10.    - Lantus 25 units in am (half home dose) - patient refused yesterday morning. Received 8 units of correction yesterday.   - SSI with POC BG checks ACHS  -WIll monitor the BG during the day and considering increasing the dose tomorrow am.      3. JANEE. Creatinine on admission 1.8-->1.9 this morning ( BL 0.9-1.0). Suspect from the poor PO intake. Cr yesterday afternoon 2.09, this morning Cr 2.03.   -Continue IVF  -Consider testing urine lytes if Cr does not continue to improve  - UCx 12/10 pending      4. Hypertension. BP is stable, on the lower side. On home Metorolol 25mg, HCTZ 25 mg, Lisinopril 10 mg.  -Continue to hold antihypertensives for now   -Will monitor BP and readjust medications as needed      5. Hyperlipidemia. Last lipid panel ( 3/2017) , , HDL 37. LDl 55. On home Lipitor.  -Continue to hold Lipitor for now, can restart when tolerates PO      6. Depression. Stable. On Effexor and Buspar.   -Continue to hold Buspar and Effexor while NPO, will restart when tolerates PO    7. Hyperkalemia - K 5.1 today. - Consider changing IVF from 1/2NS w Kcl 20mEq to one without potassium as patient is now hyperkalemic.         FEN/GI - Per primary team. Clear liquid diet. Activity - Ambulate as tolerated  DVT prophylaxis - Per primary team. Heparin. GI prophylaxis - Not indicated at this time  Disposition - Plan to d/c to Per primary team.  Code Status - Full, discussed with patient / caregivers.        CODE STATUS:  FULL    Thank you very much for allowing us to participate in the care of this pleasant patient. The family medicine service will continue to follow the patient's medical progress along with you. Please do not hesitate to page with any questions or to discuss the case ([phone])      Patient discussed with Dr. Arnav Napoles MD  Family Medicine Resident         Subjective  No acute events overnight. Patient states she feels \"like a pincushion\". States she has no abdominal pain at rest, is 8/10 with movement. Denies any CP, SOB. Had N/V yesterday but none at this time. Denies and fever or chills. Denies dysuria. No other complaints at this time.      Inpatient Medications  Current Facility-Administered Medications   Medication Dose Route Frequency    insulin lispro (HUMALOG) injection   SubCUTAneous Q6H    metroNIDAZOLE (FLAGYL) IVPB premix 500 mg  500 mg IntraVENous Q12H    levoFLOXacin (LEVAQUIN) 750 mg in D5W IVPB  750 mg IntraVENous Q48H    insulin glargine (LANTUS) injection 25 Units  25 Units SubCUTAneous DAILY    ondansetron (ZOFRAN) injection 4 mg  4 mg IntraVENous Q4H PRN    heparin (porcine) injection 5,000 Units  5,000 Units SubCUTAneous Q8H    sodium chloride (NS) flush 5-10 mL  5-10 mL IntraVENous Q8H    sodium chloride (NS) flush 5-10 mL  5-10 mL IntraVENous PRN    acetaminophen (TYLENOL) tablet 650 mg  650 mg Oral Q4H PRN    HYDROcodone-acetaminophen (NORCO) 5-325 mg per tablet 1 Tab  1 Tab Oral Q4H PRN    HYDROmorphone (PF) (DILAUDID) injection 1 mg  1 mg IntraVENous Q4H PRN    naloxone (NARCAN) injection 0.4 mg  0.4 mg IntraVENous PRN    0.45% sodium chloride with KCl 20 mEq/L infusion   IntraVENous CONTINUOUS    glucose chewable tablet 16 g  4 Tab Oral PRN    dextrose (D50W) injection syrg 12.5-25 g  12.5-25 g IntraVENous PRN    glucagon (GLUCAGEN) injection 1 mg  1 mg IntraMUSCular PRN         Allergies  Allergies   Allergen Reactions    Keflex [Cephalexin] Hives    Pcn [Penicillins] Hives     Tolerates cephalexin    Tanzeum [Albiglutide] Nausea Only    Vioxx [Rofecoxib] Nausea Only         Objective  Vitals:  Patient Vitals for the past 8 hrs:   Temp Pulse Resp BP SpO2   12/12/17 0714 98.5 °F (36.9 °C) 85 16 117/53 (!) 87 %   12/12/17 0314 98.4 °F (36.9 °C) 77 15 116/58 (!) 89 %         I/O:    Intake/Output Summary (Last 24 hours) at 12/12/17 0848  Last data filed at 12/11/17 1715   Gross per 24 hour   Intake              700 ml   Output                0 ml   Net              700 ml     Last shift:       Last 3 shifts:    12/10 1901 - 12/12 0700  In: 700 [P.O.:700]  Out: -     Physical Exam:  General: No acute distress. Alert. Cooperative. Head: Normocephalic. Atraumatic. Eyes:  Conjunctiva pink. Sclera white. PERRL. Nose:  Septum midline. Mucosa pink. No drainage. Throat: Mucosa pink. Moist mucous membranes. No tonsillar exudates or erythema. Palate movement equal bilaterally. Respiratory: Mild intermittent expiratory wheezing throughout. Cardiovascular: RRR. Normal S1,S2. No m/r/g. Pulses 2+ throughout. GI: + bowel sounds. Tender to palpation in RLQ and epigastric area. No rebound, no guarding. Extremities: No edema. No palpable cord. No tenderness.      Laboratory Data  Recent Results (from the past 12 hour(s))   GLUCOSE, POC    Collection Time: 12/12/17 12:07 AM   Result Value Ref Range    Glucose (POC) 278 (H) 65 - 100 mg/dL    Performed by Ramón Jorge    CBC WITH AUTOMATED DIFF    Collection Time: 12/12/17  3:15 AM   Result Value Ref Range    WBC 12.8 (H) 3.6 - 11.0 K/uL    RBC 3.55 (L) 3.80 - 5.20 M/uL    HGB 10.8 (L) 11.5 - 16.0 g/dL    HCT 33.5 (L) 35.0 - 47.0 %    MCV 94.4 80.0 - 99.0 FL    MCH 30.4 26.0 - 34.0 PG    MCHC 32.2 30.0 - 36.5 g/dL    RDW 12.5 11.5 - 14.5 %    PLATELET 649 836 - 598 K/uL    NEUTROPHILS 80 (H) 32 - 75 %    LYMPHOCYTES 12 12 - 49 %    MONOCYTES 8 5 - 13 %    EOSINOPHILS 0 0 - 7 %    BASOPHILS 0 0 - 1 %    ABS. NEUTROPHILS 10.3 (H) 1.8 - 8.0 K/UL    ABS. LYMPHOCYTES 1.5 0.8 - 3.5 K/UL    ABS. MONOCYTES 1.0 0.0 - 1.0 K/UL    ABS. EOSINOPHILS 0.0 0.0 - 0.4 K/UL    ABS. BASOPHILS 0.0 0.0 - 0.1 K/UL   METABOLIC PANEL, COMPREHENSIVE    Collection Time: 12/12/17  3:15 AM   Result Value Ref Range    Sodium 134 (L) 136 - 145 mmol/L    Potassium 5.1 3.5 - 5.1 mmol/L    Chloride 104 97 - 108 mmol/L    CO2 22 21 - 32 mmol/L    Anion gap 8 5 - 15 mmol/L    Glucose 248 (H) 65 - 100 mg/dL    BUN 47 (H) 6 - 20 MG/DL    Creatinine 2.03 (H) 0.55 - 1.02 MG/DL    BUN/Creatinine ratio 23 (H) 12 - 20      GFR est AA 30 (L) >60 ml/min/1.73m2    GFR est non-AA 25 (L) >60 ml/min/1.73m2    Calcium 8.1 (L) 8.5 - 10.1 MG/DL    Bilirubin, total 0.3 0.2 - 1.0 MG/DL    ALT (SGPT) 19 12 - 78 U/L    AST (SGOT) 15 15 - 37 U/L    Alk. phosphatase 81 45 - 117 U/L    Protein, total 5.7 (L) 6.4 - 8.2 g/dL    Albumin 2.0 (L) 3.5 - 5.0 g/dL    Globulin 3.7 2.0 - 4.0 g/dL    A-G Ratio 0.5 (L) 1.1 - 2.2     GLUCOSE, POC    Collection Time: 12/12/17  5:40 AM   Result Value Ref Range    Glucose (POC) 209 (H) 65 - 100 mg/dL    Performed by [facility] Adalberto  Date Reviewed: 10/13/2017          Codes Class Noted POA    Diverticulitis ICD-10-CM: Y28.53  ICD-9-CM: 562.11  12/10/2017 Unknown            [address] Medicine Residency Attending Addendum:  I saw and evaluated the patient, performing the key elements of the service. I discussed the findings, assessment and plan with the resident and agree with the resident's findings and plan as documented in the resident's note.     The patient was seen on 12/12/17    Vitals:    12/12/17 2204 12/13/17 0310 12/13/17 0706 12/13/17 1144   BP: 152/65 132/58 179/75 171/72   Pulse: 83 77 94 75   Resp: 16 16 16 16   Temp: 98 °F (36.7 °C) 98.2 °F (36.8 °C) 98.3 °F (36.8 °C) 98 °F (36.7 °C)   SpO2: 90% 92% 92% 93%   Weight:       Height:         Abdomen-soft, nttp      Assessment/Plan:   Diverticulitis/DM-stable  Continue current care  Await surgery input    Hospital Problems  Date Reviewed: 12/20/2017          Codes Class Noted POA    Diverticulitis ICD-10-CM: W33.62  ICD-9-CM: 562.11  12/10/2017 Unknown

## 2017-12-12 NOTE — PROGRESS NOTES
General Surgery Daily Progress Note    Patient: Teena Hampton MRN: [id number]  SSN: [id number]    YOB: 1955  Age: 63 y.o.   Sex: female      Admit Date: 12/10/2017    Subjective:   Pain improved, + flatus, no BM, tolerating clear liquids    Current Facility-Administered Medications   Medication Dose Route Frequency    insulin lispro (HUMALOG) injection   SubCUTAneous Q6H    metroNIDAZOLE (FLAGYL) IVPB premix 500 mg  500 mg IntraVENous Q12H    levoFLOXacin (LEVAQUIN) 750 mg in D5W IVPB  750 mg IntraVENous Q48H    insulin glargine (LANTUS) injection 25 Units  25 Units SubCUTAneous DAILY    ondansetron (ZOFRAN) injection 4 mg  4 mg IntraVENous Q4H PRN    heparin (porcine) injection 5,000 Units  5,000 Units SubCUTAneous Q8H    sodium chloride (NS) flush 5-10 mL  5-10 mL IntraVENous Q8H    sodium chloride (NS) flush 5-10 mL  5-10 mL IntraVENous PRN    acetaminophen (TYLENOL) tablet 650 mg  650 mg Oral Q4H PRN    HYDROcodone-acetaminophen (NORCO) 5-325 mg per tablet 1 Tab  1 Tab Oral Q4H PRN    naloxone (NARCAN) injection 0.4 mg  0.4 mg IntraVENous PRN    0.45% sodium chloride with KCl 20 mEq/L infusion   IntraVENous CONTINUOUS    glucose chewable tablet 16 g  4 Tab Oral PRN    dextrose (D50W) injection syrg 12.5-25 g  12.5-25 g IntraVENous PRN    glucagon (GLUCAGEN) injection 1 mg  1 mg IntraMUSCular PRN        Objective:   12/12 0701 - 12/12 1900  In: 300 [P.O.:300]  Out: -   12/10 1901 - 12/12 0700  In: 700 [P.O.:700]  Out: -   Patient Vitals for the past 8 hrs:   BP Temp Pulse Resp SpO2   12/12/17 0714 117/53 98.5 °F (36.9 °C) 85 16 (!) 87 %   12/12/17 0314 116/58 98.4 °F (36.9 °C) 77 15 (!) 89 %       Physical Exam:  General: Alert, cooperative, NAD  Lungs: Unlabored  Heart:  Regular rate and  rhythm  Abdomen: Soft, non-tender, non-distended  Extremities: Warm, moves all, no edema  Skin:  Warm and dry, no rash    Labs: Recent Labs      12/12/17   0315   WBC  12.8*   HGB  10.8*   HCT 33.5*   PLT  199     Recent Labs      12/12/17   0315   12/11/17   0344   NA  134*   < >  138   K  5.1   < >  4.7   CL  104   < >  104   CO2  22   < >  24   GLU  248*   < >  306*   BUN  47*   < >  42*   CREA  2.03*   < >  1.94*   CA  8.1*   < >  8.3*   MG   --    --   1.7   PHOS   --    --   4.5   ALB  2.0*   --    --    TBILI  0.3   --    --    SGOT  15   --    --    ALT  19   --    --     < > = values in this interval not displayed. Assessment / Plan:   · Abdominal pain and RLQ inflammation- acute appendicitis vs sigmoid diverticulitis  · Improving with conservative management, advance to full liquids  · JANEE- Cr continues to rise. Not receiving nephrotoxic agents. Continue IVF, strict I/O. Nephrology consult if this continues to rise  · May consider re-imaging in 1-2 days but IV contrast would be preferable.    · OOB and ambulate

## 2017-12-12 NOTE — DIABETES MGMT
Progress Note     Chart reviewed for elevated blood glucose ( > 180 mg/dL x 2 in the past 24 hours) . Recommendations/ Comments: Lantus 25 units (1/2 home dose) was ordered 12/11/2017 - dose refused. Received 25 units this morning. If appropriate, please consider intensifying scale from high sensitivity (starting at >199) to normal sensitivity (starting at >139). Fasting glucose today: 209 mg/dL (per am POCT Glucose). Required 13 units of correction in the last 24 hours, though correction started when glucose was higher than 200. Inpatient medications for glucose management:  1. Correction Scale: Lispro (Humalog) High Sensitivity scale (thin, ESRD) to cover for glucose > 199 mg/dL     2. Lantus 25 units daily     POC Glucose last 24hrs:   Lab Results   Component Value Date/Time     (H) 12/12/2017 03:15 AM     (H) 12/11/2017 01:58 PM    GLUCPOC 209 (H) 12/12/2017 05:40 AM    GLUCPOC 278 (H) 12/12/2017 12:07 AM    GLUCPOC 229 (H) 12/11/2017 05:24 PM        Estimated Creatinine Clearance: 32.1 mL/min (based on Cr of 2.03). Diet order:   Active Orders   Diet    DIET CLEAR LIQUID No Conc. Sweets      PO intake: Patient Vitals for the past 72 hrs:   % Diet Eaten   12/12/17 0900 100 %   12/11/17 1715 80 %   12/11/17 1344 80 %       History of Diabetes: Eriberto Cool is a 63 y.o. female with a past medical history significant for DM per  ANJELICA Matson's H&P dated 12/11/2017. Prior to admission medications for glucose management: per past medical records  -Insulin glulisine (Apidra) - sliding scale before meals   -Lantus 50 units daily   -Januvia 100mg nightly     A1C: outside of target range   Lab Results   Component Value Date/Time    Hemoglobin A1c 9.4 12/10/2017 07:45 PM    Hemoglobin A1c 10.2 03/29/2017 04:37 PM       Reference range*:  Increased risk for diabetes: 5.7 - 6.4%  Diabetes: >6.4%  Glycemic control for adults with diabetes: <7.0 %    *MARQUISE CAO (2014). Diagnosis and classification of diabetes mellitus. Diabetes care, 37, S81. Thank you. Cherelle Olsen. Sri HAGAN, RN, BSN, [address]   [phone]    -For most hospitalized persons with hyperglycemia in the intensive care unit (ICU), a glucose range of 140 to 180 mg/dL is recommended, provided this target can be safely achieved. *  - For general medicine and surgery patients in non-ICU settings, a premeal glucose target <140 mg/dL and a random blood glucose <180 mg/dL are recommended. *    LACHELLE Alcazar, Zaira Zurita., Lamar Dancer., ... & Kylie Koch ([id number]). AMERICAN ASSOCIATION OF CLINICAL ENDOCRINOLOGISTS AND AMERICAN COLLEGE OF ENDOCRINOLOGY-CLINICAL PRACTICE GUIDELINES FOR DEVELOPING A DIABETES MELLITUS COMPREHENSIVE CARE PLAN-2015-EXECUTIVE SUMMARY: Complete guidelines are available at https://www. aace. com/publications/guidelines. Endocrine Practice, 21(4), [id number].

## 2017-12-13 VITALS
HEIGHT: 63 IN | RESPIRATION RATE: 16 BRPM | BODY MASS INDEX: 36.68 KG/M2 | TEMPERATURE: 98 F | OXYGEN SATURATION: 93 % | SYSTOLIC BLOOD PRESSURE: 171 MMHG | DIASTOLIC BLOOD PRESSURE: 72 MMHG | WEIGHT: 207 LBS | HEART RATE: 75 BPM

## 2017-12-13 LAB
ALBUMIN SERPL-MCNC: 2.1 G/DL (ref 3.5–5)
ALBUMIN/GLOB SERPL: 0.5 {RATIO} (ref 1.1–2.2)
ALP SERPL-CCNC: 86 U/L (ref 45–117)
ALT SERPL-CCNC: 23 U/L (ref 12–78)
ANION GAP SERPL CALC-SCNC: 11 MMOL/L (ref 5–15)
AST SERPL-CCNC: 11 U/L (ref 15–37)
BASOPHILS # BLD: 0 K/UL (ref 0–0.1)
BASOPHILS NFR BLD: 0 % (ref 0–1)
BILIRUB SERPL-MCNC: 0.3 MG/DL (ref 0.2–1)
BUN SERPL-MCNC: 38 MG/DL (ref 6–20)
BUN/CREAT SERPL: 29 (ref 12–20)
CALCIUM SERPL-MCNC: 8.3 MG/DL (ref 8.5–10.1)
CHLORIDE SERPL-SCNC: 105 MMOL/L (ref 97–108)
CO2 SERPL-SCNC: 20 MMOL/L (ref 21–32)
CREAT SERPL-MCNC: 1.31 MG/DL (ref 0.55–1.02)
DIFFERENTIAL METHOD BLD: ABNORMAL
EOSINOPHIL # BLD: 0 K/UL (ref 0–0.4)
EOSINOPHIL NFR BLD: 0 % (ref 0–7)
ERYTHROCYTE [DISTWIDTH] IN BLOOD BY AUTOMATED COUNT: 12.4 % (ref 11.5–14.5)
GLOBULIN SER CALC-MCNC: 4 G/DL (ref 2–4)
GLUCOSE BLD STRIP.AUTO-MCNC: 217 MG/DL (ref 65–100)
GLUCOSE BLD STRIP.AUTO-MCNC: 231 MG/DL (ref 65–100)
GLUCOSE SERPL-MCNC: 257 MG/DL (ref 65–100)
HCT VFR BLD AUTO: 34.1 % (ref 35–47)
HGB BLD-MCNC: 11.3 G/DL (ref 11.5–16)
LYMPHOCYTES # BLD: 1.2 K/UL (ref 0.8–3.5)
LYMPHOCYTES NFR BLD: 15 % (ref 12–49)
MCH RBC QN AUTO: 31 PG (ref 26–34)
MCHC RBC AUTO-ENTMCNC: 33.1 G/DL (ref 30–36.5)
MCV RBC AUTO: 93.4 FL (ref 80–99)
MONOCYTES # BLD: 0.7 K/UL (ref 0–1)
MONOCYTES NFR BLD: 9 % (ref 5–13)
NEUTS SEG # BLD: 6.1 K/UL (ref 1.8–8)
NEUTS SEG NFR BLD: 76 % (ref 32–75)
PLATELET # BLD AUTO: 192 K/UL (ref 150–400)
POTASSIUM SERPL-SCNC: 4.8 MMOL/L (ref 3.5–5.1)
PROT SERPL-MCNC: 6.1 G/DL (ref 6.4–8.2)
RBC # BLD AUTO: 3.65 M/UL (ref 3.8–5.2)
RBC MORPH BLD: ABNORMAL
SERVICE CMNT-IMP: ABNORMAL
SERVICE CMNT-IMP: ABNORMAL
SODIUM SERPL-SCNC: 136 MMOL/L (ref 136–145)
WBC # BLD AUTO: 8 K/UL (ref 3.6–11)

## 2017-12-13 PROCEDURE — 85025 COMPLETE CBC W/AUTO DIFF WBC: CPT

## 2017-12-13 PROCEDURE — 74011000250 HC RX REV CODE- 250: Performed by: FAMILY MEDICINE

## 2017-12-13 PROCEDURE — 74011636637 HC RX REV CODE- 636/637: Performed by: FAMILY MEDICINE

## 2017-12-13 PROCEDURE — 96376 TX/PRO/DX INJ SAME DRUG ADON: CPT

## 2017-12-13 PROCEDURE — 96361 HYDRATE IV INFUSION ADD-ON: CPT

## 2017-12-13 PROCEDURE — 99218 HC RM OBSERVATION: CPT

## 2017-12-13 PROCEDURE — 36415 COLL VENOUS BLD VENIPUNCTURE: CPT

## 2017-12-13 PROCEDURE — 74011250636 HC RX REV CODE- 250/636: Performed by: PHYSICIAN ASSISTANT

## 2017-12-13 PROCEDURE — 82962 GLUCOSE BLOOD TEST: CPT

## 2017-12-13 PROCEDURE — 96372 THER/PROPH/DIAG INJ SC/IM: CPT

## 2017-12-13 PROCEDURE — 80053 COMPREHEN METABOLIC PANEL: CPT

## 2017-12-13 PROCEDURE — 74011250637 HC RX REV CODE- 250/637: Performed by: FAMILY MEDICINE

## 2017-12-13 PROCEDURE — 74011250637 HC RX REV CODE- 250/637: Performed by: SURGERY

## 2017-12-13 PROCEDURE — 96366 THER/PROPH/DIAG IV INF ADDON: CPT

## 2017-12-13 RX ORDER — INSULIN LISPRO 100 [IU]/ML
INJECTION, SOLUTION INTRAVENOUS; SUBCUTANEOUS
Status: DISCONTINUED | OUTPATIENT
Start: 2017-12-13 | End: 2017-12-13 | Stop reason: HOSPADM

## 2017-12-13 RX ORDER — CETIRIZINE HCL 10 MG
10 TABLET ORAL
Status: DISCONTINUED | OUTPATIENT
Start: 2017-12-13 | End: 2017-12-13 | Stop reason: HOSPADM

## 2017-12-13 RX ORDER — METRONIDAZOLE 500 MG/1
500 TABLET ORAL 3 TIMES DAILY
Qty: 20 TAB | Refills: 0 | Status: SHIPPED | OUTPATIENT
Start: 2017-12-13 | End: 2020-02-13

## 2017-12-13 RX ORDER — LEVOFLOXACIN 500 MG/1
500 TABLET, FILM COATED ORAL DAILY
Qty: 10 TAB | Refills: 0 | Status: SHIPPED | OUTPATIENT
Start: 2017-12-13 | End: 2019-01-14 | Stop reason: ALTCHOICE

## 2017-12-13 RX ORDER — BUSPIRONE HYDROCHLORIDE 5 MG/1
7.5 TABLET ORAL
Status: DISCONTINUED | OUTPATIENT
Start: 2017-12-13 | End: 2017-12-13 | Stop reason: HOSPADM

## 2017-12-13 RX ORDER — METRONIDAZOLE 500 MG/1
500 TABLET ORAL 3 TIMES DAILY
Qty: 20 TAB | Refills: 0 | Status: SHIPPED | OUTPATIENT
Start: 2017-12-13 | End: 2017-12-18 | Stop reason: ALTCHOICE

## 2017-12-13 RX ORDER — INSULIN LISPRO 100 [IU]/ML
INJECTION, SOLUTION INTRAVENOUS; SUBCUTANEOUS EVERY 6 HOURS
Status: DISCONTINUED | OUTPATIENT
Start: 2017-12-13 | End: 2017-12-13

## 2017-12-13 RX ORDER — METOPROLOL TARTRATE 25 MG/1
25 TABLET, FILM COATED ORAL 2 TIMES DAILY
Status: DISCONTINUED | OUTPATIENT
Start: 2017-12-13 | End: 2017-12-13 | Stop reason: HOSPADM

## 2017-12-13 RX ORDER — VENLAFAXINE HYDROCHLORIDE 150 MG/1
150 CAPSULE, EXTENDED RELEASE ORAL
Status: DISCONTINUED | OUTPATIENT
Start: 2017-12-13 | End: 2017-12-13 | Stop reason: HOSPADM

## 2017-12-13 RX ORDER — LEVOFLOXACIN 500 MG/1
500 TABLET, FILM COATED ORAL DAILY
Qty: 10 TAB | Refills: 0 | Status: SHIPPED | COMMUNITY
End: 2017-12-18 | Stop reason: SDUPTHER

## 2017-12-13 RX ORDER — HYDROCODONE BITARTRATE AND ACETAMINOPHEN 5; 325 MG/1; MG/1
1 TABLET ORAL
Qty: 15 TAB | Refills: 0 | Status: SHIPPED | OUTPATIENT
Start: 2017-12-13 | End: 2020-02-13

## 2017-12-13 RX ORDER — GABAPENTIN 300 MG/1
300 CAPSULE ORAL 2 TIMES DAILY
Status: DISCONTINUED | OUTPATIENT
Start: 2017-12-13 | End: 2017-12-13 | Stop reason: HOSPADM

## 2017-12-13 RX ADMIN — HYDROCODONE BITARTRATE AND ACETAMINOPHEN 1 TABLET: 5; 325 TABLET ORAL at 08:03

## 2017-12-13 RX ADMIN — GABAPENTIN 300 MG: 300 CAPSULE ORAL at 12:18

## 2017-12-13 RX ADMIN — CETIRIZINE HYDROCHLORIDE 10 MG: 10 TABLET, FILM COATED ORAL at 12:18

## 2017-12-13 RX ADMIN — HEPARIN SODIUM 5000 UNITS: 5000 INJECTION, SOLUTION INTRAVENOUS; SUBCUTANEOUS at 06:13

## 2017-12-13 RX ADMIN — SODIUM CHLORIDE 100 ML/HR: 900 INJECTION, SOLUTION INTRAVENOUS at 06:13

## 2017-12-13 RX ADMIN — Medication 10 ML: at 06:13

## 2017-12-13 RX ADMIN — INSULIN LISPRO 3 UNITS: 100 INJECTION, SOLUTION INTRAVENOUS; SUBCUTANEOUS at 08:10

## 2017-12-13 RX ADMIN — METRONIDAZOLE 500 MG: 500 INJECTION, SOLUTION INTRAVENOUS at 12:18

## 2017-12-13 RX ADMIN — METOPROLOL TARTRATE 25 MG: 25 TABLET ORAL at 12:18

## 2017-12-13 RX ADMIN — ONDANSETRON 4 MG: 2 INJECTION INTRAMUSCULAR; INTRAVENOUS at 03:56

## 2017-12-13 RX ADMIN — INSULIN LISPRO 3 UNITS: 100 INJECTION, SOLUTION INTRAVENOUS; SUBCUTANEOUS at 12:19

## 2017-12-13 RX ADMIN — INSULIN LISPRO 3 UNITS: 100 INJECTION, SOLUTION INTRAVENOUS; SUBCUTANEOUS at 00:23

## 2017-12-13 RX ADMIN — HYDROCODONE BITARTRATE AND ACETAMINOPHEN 1 TABLET: 5; 325 TABLET ORAL at 15:30

## 2017-12-13 RX ADMIN — INSULIN GLARGINE 25 UNITS: 100 INJECTION, SOLUTION SUBCUTANEOUS at 08:10

## 2017-12-13 NOTE — PROGRESS NOTES
Patient handed 3 scripts including a script for Norco, Flagyl and Levaquin. Medications and instructions read and explained to her. Verbalized understanding of instructions.  Son-in-law will be picking  Her up later today

## 2017-12-13 NOTE — PROGRESS NOTES
Bedside and Verbal shift change report given to [facility] (oncoming nurse) by Iam Wilkinson RN (offgoing nurse). Report included the following information SBAR, Kardex, Procedure Summary, Intake/Output, MAR and Recent Results.

## 2017-12-13 NOTE — PROGRESS NOTES
Problem: Falls - Risk of  Goal: *Absence of Falls  Document Francisca Fall Risk and appropriate interventions in the flowsheet.    Outcome: Progressing Towards Goal  Fall Risk Interventions:  Mobility Interventions: Bed/chair exit alarm, Communicate number of staff needed for ambulation/transfer, Strengthening exercises (ROM-active/passive), PT Consult for mobility concerns    Mentation Interventions: More frequent rounding, Room close to nurse's station, Bed/chair exit alarm    Medication Interventions: Bed/chair exit alarm, Patient to call before getting OOB, Teach patient to arise slowly    Elimination Interventions: Call light in reach, Bed/chair exit alarm, Patient to call for help with toileting needs    History of Falls Interventions: Bed/chair exit alarm

## 2017-12-13 NOTE — DISCHARGE INSTRUCTIONS
Patient Discharge Instructions    Pennie Concepcion / [id number] : 1955    Admitted 12/10/2017 Discharged: 2017     Take Home Medications            · It is important that you take the medication exactly as they are prescribed. · Keep your medication in the bottles provided by the pharmacist and keep a list of the medication names, dosages, and times to be taken in your wallet. · Do not take other medications without consulting your doctor. What to do at Home    Recommended diet: Diabetic Diet    Recommended activity: Activity as tolerated    If you experience any of the following symptoms severe pain or high fever, please follow up with Hiram Bermudez MD  .    Follow-up with Hiram Bermudez MD   in 10 day        Information obtained by :  I understand that if any problems occur once I am at home I am to contact my physician. I understand and acknowledge receipt of the instructions indicated above.                                                                                                                                            Physician's or R.N.'s Signature                                                                  Date/Time                                                                                                                                              Patient or Representative Signature                                                          Date/Time

## 2017-12-13 NOTE — DISCHARGE SUMMARY
Surgery Discharge Summary     Patient ID:  Eriberto Cool  [id number]  63 y.o.  1955    Admit date: 12/10/2017    Discharge date and time: No discharge date for patient encounter. Admission Diagnoses:    Patient Active Problem List   Diagnosis Code    Hyperlipidemia E78.5    Posttraumatic stress disorder F43.10    Diabetic foot ulcer ([facility] E11.621, L97.509    Glaucoma, narrow-angle H40.20X0    Diabetes mellitus with complication ([address] H23.7    Microalbuminuria R80.9    Family hx of colon cancer Z80.0    Essential hypertension I10    Persistent proteinuria associated with type 2 diabetes mellitus ([address] E11.29, R80.9    Type II diabetes mellitus with ophthalmic manifestations, uncontrolled ([address] E11.39, E11.65    Encounter for long-term (current) use of insulin ([address] Z79.4    Type 2 diabetes mellitus with hyperglycemia, with long-term current use of insulin (HCC) E11.65, Z79.4    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   Patient Active Problem List   Diagnosis Code    Hyperlipidemia E78.5    Posttraumatic stress disorder F43.10    Diabetic foot ulcer ([address] E11.621, L97.509    Glaucoma, narrow-angle H40.20X0    Diabetes mellitus with complication ([address] U00.2    Microalbuminuria R80.9    Family hx of colon cancer Z80.0    Essential hypertension I10    Persistent proteinuria associated with type 2 diabetes mellitus ([address] E11.29, R80.9    Type II diabetes mellitus with ophthalmic manifestations, uncontrolled ([address] E11.39, E11.65    Encounter for long-term (current) use of insulin ([address] Z79.4    Type 2 diabetes mellitus with hyperglycemia, with long-term current use of insulin (HCC) E11.65, Z79.4    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               Signed:  Suzanne Garcia MD  12/13/2017  1:45 PM

## 2017-12-13 NOTE — PROGRESS NOTES
General Surgery Daily Progress Note    Patient: Talia Cox MRN: [id number]  SSN: [id number]    YOB: 1955  Age: 63 y.o. Sex: female      Admit Date: 12/10/2017    Subjective:   Patient denies any abd pain, nausea or vomiting  Passing flatus but no BM.     Current Facility-Administered Medications   Medication Dose Route Frequency    insulin lispro (HUMALOG) injection   SubCUTAneous AC&HS    busPIRone (BUSPAR) tablet 7.5 mg  7.5 mg Oral DAILY PRN    gabapentin (NEURONTIN) capsule 300 mg  300 mg Oral BID    metoprolol tartrate (LOPRESSOR) tablet 25 mg  25 mg Oral BID    venlafaxine-SR (EFFEXOR-XR) capsule 150 mg  150 mg Oral QHS    cetirizine (ZYRTEC) tablet 10 mg  10 mg Oral 7am    0.9% sodium chloride infusion  100 mL/hr IntraVENous CONTINUOUS    metroNIDAZOLE (FLAGYL) IVPB premix 500 mg  500 mg IntraVENous Q12H    levoFLOXacin (LEVAQUIN) 750 mg in D5W IVPB  750 mg IntraVENous Q48H    insulin glargine (LANTUS) injection 25 Units  25 Units SubCUTAneous DAILY    ondansetron (ZOFRAN) injection 4 mg  4 mg IntraVENous Q4H PRN    heparin (porcine) injection 5,000 Units  5,000 Units SubCUTAneous Q8H    sodium chloride (NS) flush 5-10 mL  5-10 mL IntraVENous Q8H    sodium chloride (NS) flush 5-10 mL  5-10 mL IntraVENous PRN    acetaminophen (TYLENOL) tablet 650 mg  650 mg Oral Q4H PRN    HYDROcodone-acetaminophen (NORCO) 5-325 mg per tablet 1 Tab  1 Tab Oral Q4H PRN    naloxone (NARCAN) injection 0.4 mg  0.4 mg IntraVENous PRN    glucose chewable tablet 16 g  4 Tab Oral PRN    dextrose (D50W) injection syrg 12.5-25 g  12.5-25 g IntraVENous PRN    glucagon (GLUCAGEN) injection 1 mg  1 mg IntraMUSCular PRN        Objective:   12/13 0701 - 12/13 1900  In: 300 [P.O.:300]  Out: -   12/11 1901 - 12/13 0700  In: 900 [P.O.:900]  Out: 400 [Urine:400]  Patient Vitals for the past 8 hrs:   BP Temp Pulse Resp SpO2   12/13/17 1144 171/72 98 °F (36.7 °C) 75 16 93 %   12/13/17 0706 179/75 98.3 °F (36.8 °C) 94 16 92 %       Physical Exam:  General: Alert, cooperative, no distress, appears stated age. Neck:  Supple, symmetrical, trachea midline, no adenopathy, thyroid: no                           enlargement/tenderness/nodules, no carotid bruit and no JVD. Lungs: Clear to auscultation bilaterally. Heart:  Regular rate and rhythm, S1, S2 normal, no murmur, click, rub or gallop. Abdomen: Soft, non-tender. Bowel sounds normal. No masses,  No organomegaly. Extremities: Extremities normal, atraumatic, no cyanosis or edema. Skin:  Skin color, texture, turgor normal. No rashes or lesions    Labs: Recent Labs      12/13/17   0212   WBC  8.0   HGB  11.3*   HCT  34.1*   PLT  192     Recent Labs      12/13/17 0212 12/11/17   0344   NA  136   < >  138   K  4.8   < >  4.7   CL  105   < >  104   CO2  20*   < >  24   GLU  257*   < >  306*   BUN  38*   < >  42*   CREA  1.31*   < >  1.94*   CA  8.3*   < >  8.3*   MG   --    --   1.7   PHOS   --    --   4.5   ALB  2.1*   < >   --    TBILI  0.3   < >   --    SGOT  11*   < >   --    ALT  23   < >   --     < > = values in this interval not displayed. X-ray   Assessment / Plan:   · Resolved intra abd infection  · Wants to go home  · Her sugar is poorly controlled  · She states that this is because she is not on her regular home meds  · I have allowed Dr Adalgisa Quevedo and his team to manage her diabetes  · Surgically she is OK to go home today with po antibiotics.    · Will check with Dr Adalgisa Quevedo    Active Problems:    Diverticulitis (12/10/2017)

## 2017-12-13 NOTE — PROGRESS NOTES
Spiritual Care Partner Volunteer visited patient in [facility] unit on 12/12/2017. This is a late note entered on 12/13/2017. Documented by:  Rev. Ashley Burris.  Denis Yuan MA, [facility]    Lead  Profession Development & Advancement

## 2017-12-13 NOTE — PROGRESS NOTES
[facility]   Follow-up Consult Note    Date: 12/13/2017    Assessment/Plan:   Lucero Man is a 63 y.o. female with known hypertension, DMT2, depression and anxiety who is hospitalized for acute diverticulitis/ appendicitis. 24 hours events: No acute events.       1. Acute diverticulitis/ appendicitis. -Management per primary team, medical management for now with antibiotics, IVF and pain management. - Continue levaquin 750mg q48h and flaygl 500mg BID, currently day 4  - Per Gen Surg, may reimage abdomen today or tomorrow, pending patient's kidney function   - Recommend transitioning patient from IV Abx to PO abx if she is tolerating PO well       2. Diabetes mellitus. Insulin dependent. Not well controlled. A1C 9.4 on 12/10.    - Lantus 25 units in am (half home dose). Received 7U correctional insulin yesterday. - Changed SSI from high sensitivity to normal sensitivity per Diabetes Educator recommendations  - SSI with POC BG checks ACHS  -WIll monitor the BG during the day and considering increasing the dose tomorrow am.      3. JANEE. Creatinine on admission 1.8-->1.9 this morning ( BL 0.9-1.0). Suspect from the poor PO intake. Cr yesterday afternoon 2.09, this morning Cr 1.66, improved. -Continue IVF, NS @ 100ml/hr, Encourage PO hydration  - UCx 12/10 mixed urogenital jack      4. Hypertension. BP is stable, on the lower side. Home regimen is Metorolol 25mg BID, HCTZ 25 mg qhs, Lisinopril 10 mg qhs.  - Was holding home medications as patient was NPO with BPs on the lower side. - Will add back on metoprolol 25mg given patient's kidney fn and recently elevated BPs  -Will monitor BP and readjust medications as needed      5. Hyperlipidemia. Last lipid panel ( 3/2017) , , HDL 37. LDl 55. On home Lipitor.  -Continue to hold Lipitor for now, can restart when tolerates PO      6. Depression. Stable.  On Effexor and Buspar.   - Restarted home effexor, gabapentin, and PRN buspar    7. Hyperkalemia - K 5.1 yesterday --> 4.8 today. - Improving. No K in IVF today    8. Increased O2 requirements - POA  - Patient on 2L O2 NC per chart review. Pt does not have any home O2. On exam patient was not wearing any oxygen. Wean as tolerated .        FEN/GI - Per primary team. Clear liquid diet. Activity - Ambulate as tolerated  DVT prophylaxis - Per primary team. Heparin. GI prophylaxis - Not indicated at this time  Disposition - Plan to d/c to Per primary team.  Code Status - Full, discussed with patient / caregivers.        CODE STATUS:  FULL    Thank you very much for allowing us to participate in the care of this pleasant patient. The family medicine service will continue to follow the patient's medical progress along with you. Please do not hesitate to page with any questions or to discuss the case ([phone])      Patient discussed with Dr. Jerilee Sicard, MD  Family Medicine Resident         Subjective  No acute events overnight. Patient states she feels \"like a pincushion\". States she has no abdominal pain at rest, is 8/10 with movement. Denies any CP, SOB. Had N/V yesterday but none at this time. Denies and fever or chills. Denies dysuria. No other complaints at this time.      Inpatient Medications  Current Facility-Administered Medications   Medication Dose Route Frequency    insulin lispro (HUMALOG) injection   SubCUTAneous AC&HS    0.9% sodium chloride infusion  100 mL/hr IntraVENous CONTINUOUS    metroNIDAZOLE (FLAGYL) IVPB premix 500 mg  500 mg IntraVENous Q12H    levoFLOXacin (LEVAQUIN) 750 mg in D5W IVPB  750 mg IntraVENous Q48H    insulin glargine (LANTUS) injection 25 Units  25 Units SubCUTAneous DAILY    ondansetron (ZOFRAN) injection 4 mg  4 mg IntraVENous Q4H PRN    heparin (porcine) injection 5,000 Units  5,000 Units SubCUTAneous Q8H    sodium chloride (NS) flush 5-10 mL  5-10 mL IntraVENous Q8H    sodium chloride (NS) flush 5-10 mL  5-10 mL IntraVENous PRN    acetaminophen (TYLENOL) tablet 650 mg  650 mg Oral Q4H PRN    HYDROcodone-acetaminophen (NORCO) 5-325 mg per tablet 1 Tab  1 Tab Oral Q4H PRN    naloxone (NARCAN) injection 0.4 mg  0.4 mg IntraVENous PRN    glucose chewable tablet 16 g  4 Tab Oral PRN    dextrose (D50W) injection syrg 12.5-25 g  12.5-25 g IntraVENous PRN    glucagon (GLUCAGEN) injection 1 mg  1 mg IntraMUSCular PRN         Allergies  Allergies   Allergen Reactions    Keflex [Cephalexin] Hives    Pcn [Penicillins] Hives     Tolerates cephalexin    Tanzeum [Albiglutide] Nausea Only    Vioxx [Rofecoxib] Nausea Only         Objective  Vitals:  Patient Vitals for the past 8 hrs:   Temp Pulse Resp BP SpO2   12/13/17 0706 98.3 °F (36.8 °C) 94 16 179/75 92 %   12/13/17 0310 98.2 °F (36.8 °C) 77 16 132/58 92 %         I/O:    Intake/Output Summary (Last 24 hours) at 12/13/17 0813  Last data filed at 12/13/17 0736   Gross per 24 hour   Intake             1200 ml   Output              400 ml   Net              800 ml     Last shift:    12/13 0701 - 12/13 1900  In: 300 [P.O.:300]  Out: -   Last 3 shifts:    12/11 1901 - 12/13 0700  In: 900 [P.O.:900]  Out: 400 [Urine:400]    Physical Exam:  General: No acute distress. Alert. Cooperative. Head: Normocephalic. Atraumatic. Eyes:  Conjunctiva pink. Sclera white. PERRL. Nose:  Septum midline. Mucosa pink. No drainage. Throat: Mucosa pink. Moist mucous membranes. No tonsillar exudates or erythema. Palate movement equal bilaterally. Respiratory: Mild intermittent expiratory wheezing throughout. Cardiovascular: RRR. Normal S1,S2. No m/r/g. Pulses 2+ throughout. GI: + bowel sounds. Tender to palpation in RLQ and epigastric area. No rebound, no guarding. Extremities: No edema. No palpable cord. No tenderness.      Laboratory Data  Recent Results (from the past 12 hour(s))   GLUCOSE, POC    Collection Time: 12/12/17 11:43 PM   Result Value Ref Range Glucose (POC) 272 (H) 65 - 100 mg/dL    Performed by Kendrick Reeves (PCT)    CBC WITH AUTOMATED DIFF    Collection Time: 12/13/17  2:12 AM   Result Value Ref Range    WBC 8.0 3.6 - 11.0 K/uL    RBC 3.65 (L) 3.80 - 5.20 M/uL    HGB 11.3 (L) 11.5 - 16.0 g/dL    HCT 34.1 (L) 35.0 - 47.0 %    MCV 93.4 80.0 - 99.0 FL    MCH 31.0 26.0 - 34.0 PG    MCHC 33.1 30.0 - 36.5 g/dL    RDW 12.4 11.5 - 14.5 %    PLATELET 646 757 - 755 K/uL    NEUTROPHILS 76 (H) 32 - 75 %    LYMPHOCYTES 15 12 - 49 %    MONOCYTES 9 5 - 13 %    EOSINOPHILS 0 0 - 7 %    BASOPHILS 0 0 - 1 %    ABS. NEUTROPHILS 6.1 1.8 - 8.0 K/UL    ABS. LYMPHOCYTES 1.2 0.8 - 3.5 K/UL    ABS. MONOCYTES 0.7 0.0 - 1.0 K/UL    ABS. EOSINOPHILS 0.0 0.0 - 0.4 K/UL    ABS. BASOPHILS 0.0 0.0 - 0.1 K/UL    DF SMEAR SCANNED      RBC COMMENTS NORMOCYTIC, NORMOCHROMIC     METABOLIC PANEL, COMPREHENSIVE    Collection Time: 12/13/17  2:12 AM   Result Value Ref Range    Sodium 136 136 - 145 mmol/L    Potassium 4.8 3.5 - 5.1 mmol/L    Chloride 105 97 - 108 mmol/L    CO2 20 (L) 21 - 32 mmol/L    Anion gap 11 5 - 15 mmol/L    Glucose 257 (H) 65 - 100 mg/dL    BUN 38 (H) 6 - 20 MG/DL    Creatinine 1.31 (H) 0.55 - 1.02 MG/DL    BUN/Creatinine ratio 29 (H) 12 - 20      GFR est AA 50 (L) >60 ml/min/1.73m2    GFR est non-AA 41 (L) >60 ml/min/1.73m2    Calcium 8.3 (L) 8.5 - 10.1 MG/DL    Bilirubin, total 0.3 0.2 - 1.0 MG/DL    ALT (SGPT) 23 12 - 78 U/L    AST (SGOT) 11 (L) 15 - 37 U/L    Alk.  phosphatase 86 45 - 117 U/L    Protein, total 6.1 (L) 6.4 - 8.2 g/dL    Albumin 2.1 (L) 3.5 - 5.0 g/dL    Globulin 4.0 2.0 - 4.0 g/dL    A-G Ratio 0.5 (L) 1.1 - 2.2     GLUCOSE, POC    Collection Time: 12/13/17  7:12 AM   Result Value Ref Range    Glucose (POC) 231 (H) 65 - 100 mg/dL    Performed by Anamaria Miller (PCT)            [facility] Problems  Date Reviewed: 10/13/2017          Codes Class Noted POA    Diverticulitis ICD-10-CM: L64.28  ICD-9-CM: 562.11  12/10/2017 Unknown [address] Medicine Residency Attending Addendum:  I saw and evaluated the patient, performing the key elements of the service. I discussed the findings, assessment and plan with the resident and agree with the resident's findings and plan as documented in the resident's note.     The patient was seen on 12/13/17    Vitals:    12/12/17 2204 12/13/17 0310 12/13/17 0706 12/13/17 1144   BP: 152/65 132/58 179/75 171/72   Pulse: 83 77 94 75   Resp: 16 16 16 16   Temp: 98 °F (36.7 °C) 98.2 °F (36.8 °C) 98.3 °F (36.8 °C) 98 °F (36.7 °C)   SpO2: 90% 92% 92% 93%   Weight:       Height:         Abdomen-soft, nttp      Assessment/Plan:   Diverticulitis/dm-stable  Continue current care    Hospital Problems  Date Reviewed: 12/20/2017          Codes Class Noted POA    Diverticulitis ICD-10-CM: A46.74  ICD-9-CM: 562.11  12/10/2017 Unknown

## 2017-12-13 NOTE — ROUTINE PROCESS
12/13/17   1:47PM  PCP EVERETTE appt scheduled with Anu Aponte on 12/18/17 at 2:45PM. Appt added to AVS. Laurie Ziegler CM Specialist

## 2017-12-18 ENCOUNTER — OFFICE VISIT (OUTPATIENT)
Dept: FAMILY MEDICINE CLINIC | Age: 62
End: 2017-12-18

## 2017-12-18 VITALS
RESPIRATION RATE: 16 BRPM | TEMPERATURE: 98.4 F | BODY MASS INDEX: 36.68 KG/M2 | HEIGHT: 63 IN | OXYGEN SATURATION: 96 % | HEART RATE: 72 BPM | DIASTOLIC BLOOD PRESSURE: 73 MMHG | WEIGHT: 207 LBS | SYSTOLIC BLOOD PRESSURE: 117 MMHG

## 2017-12-18 DIAGNOSIS — R29.6 FREQUENT FALLS: ICD-10-CM

## 2017-12-18 DIAGNOSIS — Z79.4 TYPE 2 DIABETES MELLITUS WITH HYPERGLYCEMIA, WITH LONG-TERM CURRENT USE OF INSULIN (HCC): Primary | ICD-10-CM

## 2017-12-18 DIAGNOSIS — E78.2 MIXED HYPERLIPIDEMIA: ICD-10-CM

## 2017-12-18 DIAGNOSIS — E11.65 TYPE 2 DIABETES MELLITUS WITH HYPERGLYCEMIA, WITH LONG-TERM CURRENT USE OF INSULIN (HCC): Primary | ICD-10-CM

## 2017-12-18 DIAGNOSIS — Z09 HOSPITAL DISCHARGE FOLLOW-UP: ICD-10-CM

## 2017-12-18 DIAGNOSIS — F32.A DEPRESSION, UNSPECIFIED DEPRESSION TYPE: ICD-10-CM

## 2017-12-18 DIAGNOSIS — I10 ESSENTIAL HYPERTENSION: ICD-10-CM

## 2017-12-18 DIAGNOSIS — N17.9 AKI (ACUTE KIDNEY INJURY) (HCC): ICD-10-CM

## 2017-12-18 NOTE — MR AVS SNAPSHOT
Visit Information Date & Time Provider Department Dept. Phone Encounter #  
 12/18/2017  2:45 PM Corey Marie, Karl Garza [place] [phone] [id number] Follow-up Instructions Return if symptoms worsen or fail to improve. Your Appointments 2/2/2018  1:45 PM  
ROUTINE CARE with Jorge Roque MD  
[facility]) Appt Note: f/u 3 month  
 [address] [facility] [address]  
[phone]  
  
   
 [address] [place] [address] Upcoming Health Maintenance Date Due Hepatitis C Screening 1955 Pneumococcal 19-64 Medium Risk (1 of 1 - PPSV23) 1/16/1976 DTaP/Tdap/Td series (1 - Tdap) 1/16/1978 PAP AKA CERVICAL CYTOLOGY 1/16/1978 FOOT EXAM Q1 12/8/2015 ZOSTER VACCINE AGE 60> 11/16/2016 Influenza Age 5 to Adult 8/1/2017 MICROALBUMIN Q1 3/29/2018 LIPID PANEL Q1 3/29/2018 EYE EXAM RETINAL OR DILATED Q1 4/12/2018 HEMOGLOBIN A1C Q6M 6/10/2018 COLONOSCOPY 7/15/2020 Allergies as of 12/18/2017  Review Complete On: 12/18/2017 By: Kavita Brooks LPN Severity Noted Reaction Type Reactions Keflex [Cephalexin] Medium 12/10/2017   Systemic Hives Pcn [Penicillins]  08/21/2014    Hives Tolerates cephalexin Tanzeum [Albiglutide]  04/12/2016    Nausea Only Vioxx [Rofecoxib]  08/23/2016    Nausea Only Current Immunizations  Reviewed on 12/12/2017 No immunizations on file. Not reviewed this visit You Were Diagnosed With   
  
 Codes Comments Type 2 diabetes mellitus with hyperglycemia, with long-term current use of insulin (HCC)    -  Primary ICD-10-CM: E11.65, Z79.4 ICD-9-CM: 250.00, 790.29, V58.67 Essential hypertension     ICD-10-CM: I10 
ICD-9-CM: 401.9 Mixed hyperlipidemia     ICD-10-CM: E78.2 ICD-9-CM: 272.2 Depression, unspecified depression type     ICD-10-CM: F32.9 ICD-9-CM: 495 JANEE (acute kidney injury) ([facility] [address]     ICD-10-CM: N17.9 ICD-9-CM: 584.9 Hospital discharge follow-up     ICD-10-CM: [facility] ICD-9-CM: V67.59 Frequent falls     ICD-10-CM: R29.6 ICD-9-CM: V15.88 Vitals BP Pulse Temp Resp Height(growth percentile) Weight(growth percentile) 117/73 72 98.4 °F (36.9 °C) (Oral) 16 5' 3\" (1.6 m) 207 lb (93.9 kg) SpO2 BMI OB Status Smoking Status 96% 36.67 kg/m2 Menopause Current Every Day Smoker Vitals History BMI and BSA Data Body Mass Index Body Surface Area  
 36.67 kg/m 2 2.04 m 2 Preferred Pharmacy Pharmacy Name Phone [id number] [address] [facility] [phone] Your Updated Medication List  
  
   
This list is accurate as of: 12/18/17  3:34 PM.  Always use your most recent med list.  
  
  
  
  
 albuterol 90 mcg/actuation inhaler Commonly known as:  PROVENTIL HFA, VENTOLIN HFA, PROAIR HFA Take 2 Puffs by inhalation every four (4) hours as needed for Wheezing. APIDRA 100 unit/mL injection Generic drug:  insulin glulisine  
by SubCUTAneous route Before breakfast, lunch, and dinner. Sliding scale  
  
 aspirin delayed-release 81 mg tablet Take 81 mg by mouth nightly. atorvastatin 20 mg tablet Commonly known as:  LIPITOR Take 1 Tab by mouth nightly. BACTROBAN 2 % ointment Generic drug:  mupirocin Apply  to affected area daily as needed (skin infection). busPIRone 7.5 mg tablet Commonly known as:  BUSPAR Take 7.5 mg by mouth nightly. cetirizine 10 mg tablet Commonly known as:  ZyrTEC Take 1 Tab by mouth every morning. docusate sodium 100 mg capsule Commonly known as:  Vermell Nones Take 100 mg by mouth two (2) times daily as needed for Constipation. gabapentin 300 mg capsule Commonly known as:  NEURONTIN Take 1 Cap by mouth two (2) times a day. hydroCHLOROthiazide 25 mg tablet Commonly known as:  HYDRODIURIL Take 25 mg by mouth nightly. HYDROcodone-acetaminophen 5-325 mg per tablet Commonly known as:  Graciela Zabala Take 1 Tab by mouth every four (4) hours as needed for Pain. Max Daily Amount: 6 Tabs. insulin glargine 100 unit/mL (3 mL) Inpn Commonly known as:  LANTUS,BASAGLAR  
50 Units by SubCUTAneous route daily. JANUVIA 100 mg tablet Generic drug:  SITagliptin Take 100 mg by mouth nightly. levoFLOXacin 500 mg tablet Commonly known as:  Ike Specking Take 1 Tab by mouth daily. lisinopril 10 mg tablet Commonly known as:  Kishor Rosemarie Take 1 Tab by mouth nightly. metoprolol tartrate 25 mg tablet Commonly known as:  LOPRESSOR  
TAKE 1 TAB BY MOUTH TWO (2) TIMES A DAY. metroNIDAZOLE 500 mg tablet Commonly known as:  FLAGYL Take 1 Tab by mouth three (3) times daily. raNITIdine 150 mg tablet Commonly known as:  ZANTAC Take 1 Tab by mouth two (2) times daily as needed for Indigestion. venlafaxine- mg capsule Commonly known as:  EFFEXOR-XR  
TAKE ONE CAPSULE BY MOUTH NIGHTLY We Performed the Following METABOLIC PANEL, BASIC [07607 CPT(R)] [address] Comments:  
 Home physical therapy for home safety evaluation and fall prevention in patient with h/o falls, uses cane, and recent hospitalization. Follow-up Instructions Return if symptoms worsen or fail to improve. Referral Information Referral ID Referred By Referred To  
  
 [id number] [place], [address]   
   [place] [address] [facility] [address], [address] Visits Status Start Date End Date 1 New Request 12/18/17 12/18/18 If your referral has a status of pending review or denied, additional information will be sent to support the outcome of this decision. Patient Instructions Nutrition Tips for Diabetes: After Your Visit Your Care Instructions A healthy diet is important to manage diabetes. It helps you lose weight (if you need to) and keep it off. It gives you the nutrition and energy your body needs and helps prevent heart disease. But a diet for diabetes does not mean that you have to eat special foods. You can eat what your family eats, including occasional sweets and other favorites. But you do have to pay attention to how often you eat and how much you eat of certain foods. The right plan for you will give you meals that help you keep your blood sugar at healthy levels. Try to eat a variety of foods and to spread carbohydrate throughout the day. Carbohydrate raises blood sugar higher and more quickly than any other nutrient does. Carbohydrate is found in sugar, breads and cereals, fruit, starchy vegetables such as potatoes and corn, and milk and yogurt. You may want to work with a dietitian or diabetes educator to help you plan meals and snacks. A dietitian or diabetes educator also can help you lose weight if that is one of your goals. The following tips can help you enjoy your meals and stay healthy. Follow-up care is a key part of your treatment and safety. Be sure to make and go to all appointments, and call your doctor if you are having problems. Its also a good idea to know your test results and keep a list of the medicines you take. How can you care for yourself at home? · Learn which foods have carbohydrate and how much carbohydrate to eat. A dietitian or diabetes educator can help you learn to keep track of how much carbohydrate you eat. · Spread carbohydrate throughout the day. Eat some carbohydrate at all meals, but do not eat too much at any one time. · Plan meals to include food from all the food groups. These are the food groups and some example portion sizes: ¨ Grains: 1 slice of bread (1 ounce), ½ cup of cooked cereal, and 1/3 cup of cooked pasta or rice.  These have about 15 grams of carbohydrate in a serving. Choose whole grains such as whole wheat bread or crackers, oatmeal, and brown rice more often than refined grains. ¨ Fruit: 1 small fresh fruit, such as an apple or orange; ½ of a banana; ½ cup of chopped, cooked, or canned fruit; ½ cup of fruit juice; 1 cup of melon or raspberries; and 2 tablespoons of dried fruit. These have about 15 grams of carbohydrate in a serving. ¨ Dairy: 1 cup of nonfat or low-fat milk and 2/3 cup of plain yogurt. These have about 15 grams of carbohydrate in a serving. ¨ Protein foods: Beef, chicken, turkey, fish, eggs, tofu, cheese, cottage cheese, and peanut butter. A serving size of meat is 3 ounces, which is about the size of a deck of cards. Examples of meat substitute serving sizes (equal to 1 ounce of meat) are 1/4 cup of cottage cheese, 1 egg, 1 tablespoon of peanut butter, and ½ cup of tofu. These have very little or no carbohydrate per serving. ¨ Vegetables: Starchy vegetables such as ½ cup of cooked dried beans, peas, potatoes, or corn have about 15 grams of carbohydrate. Nonstarchy vegetables have very little carbohydrate, such as 1 cup of raw leafy vegetables (such as spinach), ½ cup of other vegetables (cooked or chopped), and 3/4 cup of vegetable juice. · Use the plate format to plan meals. It is a good, quick way to make sure that you have a balanced meal. It also helps you spread carbohydrate throughout the day. You divide your plate by types of foods. Put vegetables on half the plate, meat or meat substitutes on one-quarter of the plate, and a grain or starchy vegetable (such as brown rice or a potato) in the final quarter of the plate. To this you can add a small piece of fruit and 1 cup of milk or yogurt, depending on how much carbohydrate you are supposed to eat at a meal. 
· Talk to your dietitian or diabetes educator about ways to add limited amounts of sweets into your meal plan.  You can eat these foods now and then, as long as you include the amount of carbohydrate they have in your daily carbohydrate allowance. · If you drink alcohol, limit it to no more than 1 drink a day for women and 2 drinks a day for men. If you are pregnant, no amount of alcohol is known to be safe. · Protein, fat, and fiber do not raise blood sugar as much as carbohydrate does. If you eat a lot of these nutrients in a meal, your blood sugar will rise more slowly than it would otherwise. · Limit saturated fats, such as those from meat and dairy products. Try to replace it with monounsaturated fat, such as olive oil. This is a healthier choice because people who have diabetes are at higher-than-average risk of heart disease. But use a modest amount of olive oil. A tablespoon of olive oil has 14 grams of fat and 120 calories. · Exercise lowers blood sugar. If you take insulin by shots or pump, you can use less than you would if you were not exercising. Keep in mind that timing matters. If you exercise within 1 hour after a meal, your body may need less insulin for that meal than it would if you exercised 3 hours after the meal. Test your blood sugar to find out how exercise affects your need for insulin. · Exercise on most days of the week. Aim for at least 30 minutes. Exercise helps you stay at a healthy weight and helps your body use insulin. Walking is an easy way to get exercise. Gradually increase the amount you walk every day. You also may want to swim, bike, or do other activities. When you eat out · Learn to estimate the serving sizes of foods that have carbohydrate. If you measure food at home, it will be easier to estimate the amount in a serving of restaurant food. · If the meal you order has too much carbohydrate (such as potatoes, corn, or baked beans), ask to have a low-carbohydrate food instead. Ask for a salad or green vegetables.  
· If you use insulin, check your blood sugar before and after eating out to help you plan how much to eat in the future. · If you eat more carbohydrate at a meal than you had planned, take a walk or do other exercise. This will help lower your blood sugar. Where can you learn more? Go to Emotive.be Enter X113 in the search box to learn more about \"Nutrition Tips for Diabetes: After Your Visit. \"  
© 3532-9042 Healthwise, Incorporated. Care instructions adapted under license by Brock Estrada (which disclaims liability or warranty for this information). This care instruction is for use with your licensed healthcare professional. If you have questions about a medical condition or this instruction, always ask your healthcare professional. [address] any warranty or liability for your use of this information. Content Version: 32.5.478395; Current as of: June 4, 2014 Introducing [facility]! Dear Severiano Shah: 
Thank you for requesting a Var[employer]ee account. Our records indicate that you already have an active VarVee account. You can access your account anytime at [email or web address] Did you know that you can access your hospital and ER discharge instructions at any time in VarVee? You can also review all of your test results from your hospital stay or ER visit. Additional Information If you have questions, please visit the Frequently Asked Questions section of the VarVee website at [email or web address] Remember, Var[employer]ee is NOT to be used for urgent needs. For medical emergencies, dial 911. Now available from your iPhone and Android! Please provide this summary of care documentation to your next provider. Your primary care clinician is listed as Дмитрий Shelton. If you have any questions after today's visit, please call [phone].

## 2017-12-18 NOTE — PATIENT INSTRUCTIONS

## 2017-12-18 NOTE — PROGRESS NOTES
Hospital follow-up visit:  Transition of Care documentation:  Date of hospital admission: 12/10/17  Date of hospital discharge: 12/13/17  Date of professional contact: None    FTF visit: today   Complexity of MDM: moderate    Was home health instituted? no  (If so document needs and indications)    Discharge summary personally reviewed:  Patient was admitted for diverticulitis by General Surgery: \"Hospital Course:  Was treated with IV antibiotics and bowel rest  She improved and her pain resolved  At time of discharge she was tolerating diet and she had no pain  Will follow in office in two weeks and repeat CT scan then if necessary\"    The [facility] FMS team was consulted to manage DM, HTN, HLD, and depression. She was restarted on home meds by time of discharge. Of note, pt developed JANEE during admission that improved with IVF and eventual oral hydration. HPI today:  Doing better. Sometimes pain after eating but overall much improved. Has only used 3 of the pain pills from Dr. Tonio Patel. She is also taking the levaquin and flagyl as prescribed. Passing gas. No BM since before hospital admission. Eating things like mashed potatoes and raisin bran. Has f/u with  tomorrow. DM with retinopathy, toe amputation, nephropathy: Taking januvia at night and lantus 50 units in the morning. Taking apidra SSI at dinner. Did not tolerate metformin. BG was 250 this morning. Follows with Endocrine. Plans to go back in February. HTN: Taking hctz 25 mg/d, lisinopril 10 mg/d, metoprolol 25 mg bid. BPs are 110s-130s/60s-70s. Depression: Feels like mood is stable. \"I'm doing pretty good right now. \" Denies SI.    ROS:   No fever  No cough or SOB  No chest pain  No N/V, diarrhea  No dysuria  No leg swelling or calf tenderness  No substance abuse    Allergies:    Allergies   Allergen Reactions    Keflex [Cephalexin] Hives    Pcn [Penicillins] Hives     Tolerates cephalexin    Tanzeum [Albiglutide] Nausea Only    Vioxx [Rofecoxib] Nausea Only       Meds:   Current Outpatient Prescriptions   Medication Sig Dispense Refill    HYDROcodone-acetaminophen (NORCO) 5-325 mg per tablet Take 1 Tab by mouth every four (4) hours as needed for Pain. Max Daily Amount: 6 Tabs. 15 Tab 0    metroNIDAZOLE (FLAGYL) 500 mg tablet Take 1 Tab by mouth three (3) times daily. 20 Tab 0    levoFLOXacin (LEVAQUIN) 500 mg tablet Take 1 Tab by mouth daily. 10 Tab 0    insulin glulisine (APIDRA) 100 unit/mL injection by SubCUTAneous route Before breakfast, lunch, and dinner. Sliding scale      insulin glargine (LANTUS,BASAGLAR) 100 unit/mL (3 mL) inpn 50 Units by SubCUTAneous route daily.  busPIRone (BUSPAR) 7.5 mg tablet Take 7.5 mg by mouth nightly.  docusate sodium (COLACE) 100 mg capsule Take 100 mg by mouth two (2) times daily as needed for Constipation.  hydroCHLOROthiazide (HYDRODIURIL) 25 mg tablet Take 25 mg by mouth nightly.  mupirocin (BACTROBAN) 2 % ointment Apply  to affected area daily as needed (skin infection).  SITagliptin (JANUVIA) 100 mg tablet Take 100 mg by mouth nightly.  raNITIdine (ZANTAC) 150 mg tablet Take 1 Tab by mouth two (2) times daily as needed for Indigestion. 60 Tab 11    lisinopril (PRINIVIL, ZESTRIL) 10 mg tablet Take 1 Tab by mouth nightly. 30 Tab 11    atorvastatin (LIPITOR) 20 mg tablet Take 1 Tab by mouth nightly. 30 Tab 11    gabapentin (NEURONTIN) 300 mg capsule Take 1 Cap by mouth two (2) times a day. 60 Cap 11    venlafaxine-SR (EFFEXOR-XR) 150 mg capsule TAKE ONE CAPSULE BY MOUTH NIGHTLY 30 Cap 5    metoprolol tartrate (LOPRESSOR) 25 mg tablet TAKE 1 TAB BY MOUTH TWO (2) TIMES A DAY. 60 Tab 6    cetirizine (ZYRTEC) 10 mg tablet Take 1 Tab by mouth every morning. 30 Tab 5    albuterol (PROVENTIL HFA, VENTOLIN HFA, PROAIR HFA) 90 mcg/actuation inhaler Take 2 Puffs by inhalation every four (4) hours as needed for Wheezing.  1 Inhaler 0    aspirin delayed-release 81 mg tablet Take 81 mg by mouth nightly. PMH:  Past Medical History:   Diagnosis Date    Amputated toe of right foot ([facility] [address]     Diabetes ([facility] [address]     Glaucoma     Bilateral    Hypertension     Peripheral autonomic neuropathy due to diabetes mellitus ([facility] [address]     Psychiatric disorder     PTSD; [facility]/[facility] (post-traumatic stress disorder)        SH:  Smoker:  History   Smoking Status    Current Every Day Smoker    Packs/day: 0.75    Years: 40.00   Smokeless Tobacco    Never Used       ETOH:   History   Alcohol Use No       FH:   Family History   Problem Relation Age of Onset    Heart Disease Mother     Hypertension Mother     Cancer Mother      cancer    Diabetes Father     Cancer Brother      cancer    Diabetes Brother     Diabetes Maternal Grandmother     Diabetes Paternal Grandmother     Hypertension Paternal Grandmother     Diabetes Paternal Grandfather        Physical Exam:  Visit Vitals    /73    Pulse 72    Temp 98.4 °F (36.9 °C) (Oral)    Resp 16    Ht 5' 3\" (1.6 m)    Wt 207 lb (93.9 kg)    SpO2 96%    BMI 36.67 kg/m2     Gen: No apparent distress. Pleasant. Lungs: Respirations unlabored, clear to auscultation bilaterally  Cardio: Regular, rate, and rhythm without murmurs, rubs, or gallops   Abdomen: Normoactive bowel sounds, soft, nontender, nondistended  Ext: No peripheral edema  Neuro: Alert and responds to all questions appropriately. Psych: Makes good eye contact. Smiles and laughs appropriately to conversation. Appearance, behavior, and conversation appropriate with normal speech rate, fluency, content. Denies SI. Assessment and Plan:     Encounter Diagnoses:    ICD-10-CM ICD-9-CM    1. Type 2 diabetes mellitus with hyperglycemia, with long-term current use of insulin (HCC) E11.65 250.00     Z79.4 790.29      V58.67    2. Essential hypertension I10 401.9    3. Mixed hyperlipidemia E78.2 272.2    4.  Depression, unspecified depression type F32.9 311    5. JANEE (acute kidney injury) ([facility] [address] P15.9 009.8 METABOLIC PANEL, BASIC   6. Hospital discharge follow-up Z09 V67.59      1. A1c not at goal. Continue current medications and regular Endo f/u. Encourage healthy lifestyle changes. 2. BP at goal. Continue current medications. 3. Continue lipitor. 4. Stable. Continue effexor and buspar. 5. Cr was downtrending by discharge. Check BMP today to ensure stable/improving. 6. Improving well. Continue abx course. F/u with Gen Sgy as planned. Discussed diagnoses in detail with patient. Patient expressed understanding of and agreement to above plan. All questions and concerns addressed. Patient is counseled to return to the office and/or ED if symptoms do not improve as expected. Patient discussed with Dr. Evelio Arroyo, Attending Physician.     Hawa Conde MD  Family Medicine Resident, PGY-3

## 2017-12-18 NOTE — PROGRESS NOTES
Chief Complaint   Patient presents with    Diverticulitis     [facility] f/u    Hypertension    Diabetes     1. Have you been to the ER, urgent care clinic since your last visit? Hospitalized since your last visit? Yes. Nadja Martin. 2. Have you seen or consulted any other health care providers outside of the [address] [place] [address] since your last visit? Include any pap smears or colon screening.  No

## 2017-12-19 LAB
BUN SERPL-MCNC: 16 MG/DL (ref 8–27)
BUN/CREAT SERPL: 17 (ref 12–28)
CALCIUM SERPL-MCNC: 8.6 MG/DL (ref 8.7–10.3)
CHLORIDE SERPL-SCNC: 100 MMOL/L (ref 96–106)
CO2 SERPL-SCNC: 25 MMOL/L (ref 18–29)
CREAT SERPL-MCNC: 0.92 MG/DL (ref 0.57–1)
GFR SERPLBLD CREATININE-BSD FMLA CKD-EPI: 68 ML/MIN/1.73
GFR SERPLBLD CREATININE-BSD FMLA CKD-EPI: 78 ML/MIN/1.73
GLUCOSE SERPL-MCNC: 308 MG/DL (ref 65–99)
POTASSIUM SERPL-SCNC: 4.6 MMOL/L (ref 3.5–5.2)
SODIUM SERPL-SCNC: 139 MMOL/L (ref 134–144)

## 2017-12-20 RX ORDER — METOPROLOL TARTRATE 25 MG/1
TABLET, FILM COATED ORAL
Qty: 60 TAB | Refills: 6 | Status: SHIPPED | OUTPATIENT
Start: 2017-12-20 | End: 2018-08-24 | Stop reason: SDUPTHER

## 2017-12-26 ENCOUNTER — HOSPITAL ENCOUNTER (OUTPATIENT)
Dept: CT IMAGING | Age: 62
Discharge: HOME | End: 2017-12-26
Attending: SURGERY
Payer: MEDICAID

## 2017-12-26 DIAGNOSIS — K57.92 DIVERTICULITIS: ICD-10-CM

## 2017-12-26 DIAGNOSIS — R10.9 ABDOMINAL PAIN, UNSPECIFIED ABDOMINAL LOCATION: ICD-10-CM

## 2017-12-26 PROCEDURE — 74011636320 HC RX REV CODE- 636/320: Performed by: RADIOLOGY

## 2017-12-26 PROCEDURE — 74177 CT ABD & PELVIS W/CONTRAST: CPT

## 2017-12-26 RX ADMIN — IOPAMIDOL 100 ML: 755 INJECTION, SOLUTION INTRAVENOUS at 09:11

## 2017-12-28 ENCOUNTER — TELEPHONE (OUTPATIENT)
Dept: FAMILY MEDICINE CLINIC | Age: 62
End: 2017-12-28

## 2017-12-28 DIAGNOSIS — Z91.81 HISTORY OF FALL: Primary | ICD-10-CM

## 2017-12-28 DIAGNOSIS — S69.92XS INJURY OF LEFT WRIST, SEQUELA: ICD-10-CM

## 2017-12-28 NOTE — TELEPHONE ENCOUNTER
Dr Miguel Mclean:    Can you put in another order for [facility] for this patient. ... Need an updated order. ..  Thanks

## 2018-01-05 NOTE — PROGRESS NOTES
Chief Complaint   Patient presents with    Toe Pain     left great toe, nail trimmed by podiatry on yesterday , noticed bruising today    Blood sugar problem Immediate Brief Procedure Note    Patient: Matt Wilder    Pre-op Diagnosis: Difficult venous access    Post-op Diagnosis: need for IV antibiotics    Procedure: Right IJV tunneled PICC placement    Proceduralist: Carlos Benjamin MD    Assistants:     Anesthesia Staff: Anesthesiologist: (Unknown)    Anesthesia Type: local    Findings: Tip in SVC    Estimated Blood Loss: < 10 cc    Complications: none    Specimens Removed: none

## 2018-01-12 NOTE — TELEPHONE ENCOUNTER
I am sorry, but I still do not understand. My understanding is you have to be specific and state what type of home health is needed, such as PT, OT, Nursing, etc. Would you please help me determine what order is needed?  Thanks, AH

## 2018-01-16 NOTE — TELEPHONE ENCOUNTER
What is needed is an updated order for home healthcare with specifics of whether its PT, OT of whatever patient needs. ... Order cannot be over 11days old. ... Please put order into [facility]. ...  Thanks

## 2018-01-19 ENCOUNTER — HOME HEALTH ADMISSION (OUTPATIENT)
Dept: HOME HEALTH SERVICES | Facility: HOME HEALTH | Age: 63
End: 2018-01-19
Payer: MEDICAID

## 2018-01-22 ENCOUNTER — HOME CARE VISIT (OUTPATIENT)
Dept: SCHEDULING | Facility: HOME HEALTH | Age: 63
End: 2018-01-22
Payer: MEDICAID

## 2018-01-22 VITALS
SYSTOLIC BLOOD PRESSURE: 132 MMHG | HEART RATE: 64 BPM | BODY MASS INDEX: 38.09 KG/M2 | OXYGEN SATURATION: 97 % | WEIGHT: 207 LBS | DIASTOLIC BLOOD PRESSURE: 58 MMHG | TEMPERATURE: 97.4 F | HEIGHT: 62 IN

## 2018-01-22 PROCEDURE — 400013 HH SOC

## 2018-01-22 PROCEDURE — G0151 HHCP-SERV OF PT,EA 15 MIN: HCPCS

## 2018-01-25 ENCOUNTER — HOME CARE VISIT (OUTPATIENT)
Dept: SCHEDULING | Facility: HOME HEALTH | Age: 63
End: 2018-01-25
Payer: MEDICAID

## 2018-01-25 VITALS
TEMPERATURE: 99.1 F | SYSTOLIC BLOOD PRESSURE: 124 MMHG | OXYGEN SATURATION: 98 % | DIASTOLIC BLOOD PRESSURE: 80 MMHG | HEART RATE: 71 BPM

## 2018-01-25 PROCEDURE — G0151 HHCP-SERV OF PT,EA 15 MIN: HCPCS

## 2018-01-26 ENCOUNTER — HOME CARE VISIT (OUTPATIENT)
Dept: SCHEDULING | Facility: HOME HEALTH | Age: 63
End: 2018-01-26
Payer: MEDICAID

## 2018-01-26 VITALS
HEART RATE: 69 BPM | OXYGEN SATURATION: 98 % | DIASTOLIC BLOOD PRESSURE: 70 MMHG | SYSTOLIC BLOOD PRESSURE: 128 MMHG | TEMPERATURE: 98.9 F

## 2018-01-26 PROCEDURE — G0151 HHCP-SERV OF PT,EA 15 MIN: HCPCS

## 2018-01-29 ENCOUNTER — HOME CARE VISIT (OUTPATIENT)
Dept: SCHEDULING | Facility: HOME HEALTH | Age: 63
End: 2018-01-29
Payer: MEDICAID

## 2018-01-29 VITALS
OXYGEN SATURATION: 98 % | DIASTOLIC BLOOD PRESSURE: 62 MMHG | SYSTOLIC BLOOD PRESSURE: 110 MMHG | TEMPERATURE: 98.7 F | HEART RATE: 78 BPM

## 2018-01-29 PROCEDURE — G0151 HHCP-SERV OF PT,EA 15 MIN: HCPCS

## 2018-01-31 ENCOUNTER — HOME CARE VISIT (OUTPATIENT)
Dept: SCHEDULING | Facility: HOME HEALTH | Age: 63
End: 2018-01-31
Payer: MEDICAID

## 2018-01-31 PROCEDURE — G0151 HHCP-SERV OF PT,EA 15 MIN: HCPCS

## 2018-02-01 VITALS
TEMPERATURE: 98.7 F | DIASTOLIC BLOOD PRESSURE: 62 MMHG | HEART RATE: 65 BPM | SYSTOLIC BLOOD PRESSURE: 112 MMHG | OXYGEN SATURATION: 98 %

## 2018-02-01 PROBLEM — E11.3553 STABLE PROLIFERATIVE DIABETIC RETINOPATHY OF BOTH EYES ASSOCIATED WITH TYPE 2 DIABETES MELLITUS (HCC): Status: ACTIVE | Noted: 2018-02-01

## 2018-02-01 PROBLEM — H35.033 HYPERTENSIVE RETINOPATHY OF BOTH EYES: Status: ACTIVE | Noted: 2018-02-01

## 2018-02-01 PROBLEM — H43.811 POSTERIOR VITREOUS DETACHMENT OF RIGHT EYE: Status: ACTIVE | Noted: 2018-02-01

## 2018-02-02 ENCOUNTER — OFFICE VISIT (OUTPATIENT)
Dept: ENDOCRINOLOGY | Age: 63
End: 2018-02-02

## 2018-02-02 ENCOUNTER — HOME CARE VISIT (OUTPATIENT)
Dept: SCHEDULING | Facility: HOME HEALTH | Age: 63
End: 2018-02-02
Payer: MEDICAID

## 2018-02-02 VITALS
RESPIRATION RATE: 16 BRPM | HEART RATE: 79 BPM | OXYGEN SATURATION: 97 % | DIASTOLIC BLOOD PRESSURE: 62 MMHG | HEIGHT: 62 IN | TEMPERATURE: 95.9 F | SYSTOLIC BLOOD PRESSURE: 136 MMHG | WEIGHT: 193.6 LBS | BODY MASS INDEX: 35.63 KG/M2

## 2018-02-02 VITALS
OXYGEN SATURATION: 98 % | TEMPERATURE: 97.7 F | HEART RATE: 76 BPM | DIASTOLIC BLOOD PRESSURE: 64 MMHG | SYSTOLIC BLOOD PRESSURE: 102 MMHG

## 2018-02-02 DIAGNOSIS — E78.2 MIXED HYPERLIPIDEMIA: ICD-10-CM

## 2018-02-02 DIAGNOSIS — Z79.4 TYPE 2 DIABETES MELLITUS WITH HYPERGLYCEMIA, WITH LONG-TERM CURRENT USE OF INSULIN (HCC): Primary | ICD-10-CM

## 2018-02-02 DIAGNOSIS — E11.3553 STABLE PROLIFERATIVE DIABETIC RETINOPATHY OF BOTH EYES ASSOCIATED WITH TYPE 2 DIABETES MELLITUS (HCC): ICD-10-CM

## 2018-02-02 DIAGNOSIS — Z79.4 ENCOUNTER FOR LONG-TERM (CURRENT) USE OF INSULIN (HCC): ICD-10-CM

## 2018-02-02 DIAGNOSIS — E11.65 TYPE 2 DIABETES MELLITUS WITH HYPERGLYCEMIA, WITH LONG-TERM CURRENT USE OF INSULIN (HCC): Primary | ICD-10-CM

## 2018-02-02 PROCEDURE — G0151 HHCP-SERV OF PT,EA 15 MIN: HCPCS

## 2018-02-02 RX ORDER — INSULIN GLARGINE 100 [IU]/ML
INJECTION, SOLUTION SUBCUTANEOUS
Qty: 15 ML | Refills: 5 | Status: SHIPPED | OUTPATIENT
Start: 2018-02-02 | End: 2018-03-02 | Stop reason: ALTCHOICE

## 2018-02-02 NOTE — PROGRESS NOTES
Chief Complaint   Patient presents with    Diabetes     1. Have you been to the ER, urgent care clinic since your last visit? Hospitalized since your last visit? No    2. Have you seen or consulted any other health care providers outside of the [address] [place] [address] since your last visit? Include any pap smears or colon screening.  No     Wt Readings from Last 3 Encounters:   02/02/18 193 lb 9.6 oz (87.8 kg)   01/22/18 207 lb (93.9 kg)   12/18/17 207 lb (93.9 kg)     Temp Readings from Last 3 Encounters:   02/02/18 95.9 °F (35.5 °C) (Oral)   01/31/18 98.7 °F (37.1 °C) (Temporal)   01/29/18 98.7 °F (37.1 °C) (Temporal)     BP Readings from Last 3 Encounters:   02/02/18 136/62   01/31/18 112/62   01/29/18 110/62     Pulse Readings from Last 3 Encounters:   02/02/18 79   01/31/18 65   01/29/18 78     Pt do not have meter

## 2018-02-02 NOTE — MR AVS SNAPSHOT
[address] [facility] [address] 
[phone] Patient: Fer Echeverria MRN: [id number] DGW:[birth year] Visit Information Date & Time Provider Department Dept. Phone Encounter #  
 2/2/2018  1:45 PM London Higuera MD [facility] [phone] [id number] Follow-up Instructions Return in about 4 months (around 6/2/2018). Upcoming Health Maintenance Date Due Hepatitis C Screening 1955 Pneumococcal 19-64 Medium Risk (1 of 1 - PPSV23) 1/16/1976 DTaP/Tdap/Td series (1 - Tdap) 1/16/1978 PAP AKA CERVICAL CYTOLOGY 1/16/1978 FOOT EXAM Q1 12/8/2015 ZOSTER VACCINE AGE 60> 11/16/2016 Influenza Age 5 to Adult 8/1/2017 MICROALBUMIN Q1 3/29/2018 LIPID PANEL Q1 3/29/2018 EYE EXAM RETINAL OR DILATED Q1 4/12/2018 HEMOGLOBIN A1C Q6M 6/10/2018 BREAST CANCER SCRN MAMMOGRAM 5/4/2019 COLONOSCOPY 7/15/2020 Allergies as of 2/2/2018  Review Complete On: 2/2/2018 By: London Higuera MD  
  
 Severity Noted Reaction Type Reactions Keflex [Cephalexin] Medium 12/10/2017   Systemic Hives Pcn [Penicillins]  08/21/2014    Hives Tolerates cephalexin Tanzeum [Albiglutide]  04/12/2016    Nausea Only Vioxx [Rofecoxib]  08/23/2016    Nausea Only Current Immunizations  Reviewed on 12/12/2017 No immunizations on file. Not reviewed this visit You Were Diagnosed With   
  
 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  
 35.41 kg/m 2 1.96 m 2 Preferred Pharmacy Pharmacy Name Phone [id number] [place], [address] AT [facility] [place] [phone] Your Updated Medication List  
  
   
This list is accurate as of: 2/2/18  2:16 PM.  Always use your most recent med list.  
  
  
  
  
 albuterol 90 mcg/actuation inhaler Commonly known as:  PROVENTIL HFA, VENTOLIN HFA, PROAIR HFA Take 2 Puffs by inhalation every four (4) hours as needed for Wheezing. APIDRA 100 unit/mL injection Generic drug:  insulin glulisine  
by SubCUTAneous route Before breakfast, lunch, and dinner. Sliding scale  
  
 aspirin delayed-release 81 mg tablet Take 81 mg by mouth nightly. atorvastatin 20 mg tablet Commonly known as:  LIPITOR Take 1 Tab by mouth nightly. BACTROBAN 2 % ointment Generic drug:  mupirocin Apply  to affected area daily as needed (skin infection). busPIRone 7.5 mg tablet Commonly known as:  BUSPAR Take 7.5 mg by mouth nightly. cetirizine 10 mg tablet Commonly known as:  ZyrTEC Take 1 Tab by mouth every morning. docusate sodium 100 mg capsule Commonly known as:  Adela Gowers Take 100 mg by mouth two (2) times daily as needed for Constipation. gabapentin 300 mg capsule Commonly known as:  NEURONTIN Take 1 Cap by mouth two (2) times a day. hydroCHLOROthiazide 25 mg tablet Commonly known as:  HYDRODIURIL Take 25 mg by mouth nightly. HYDROcodone-acetaminophen 5-325 mg per tablet Commonly known as:  Bliss Daring Take 1 Tab by mouth every four (4) hours as needed for Pain. Max Daily Amount: 6 Tabs. insulin glargine 100 unit/mL (3 mL) Inpn Commonly known as:  LANTUS,BASAGLAR  
50 Units by SubCUTAneous route daily. JANUVIA 100 mg tablet Generic drug:  SITagliptin Take 100 mg by mouth nightly. levoFLOXacin 500 mg tablet Commonly known as:  Stormmye Melter Take 1 Tab by mouth daily. lisinopril 10 mg tablet Commonly known as:  Cleotis Villatoro Take 1 Tab by mouth nightly. metoprolol tartrate 25 mg tablet Commonly known as:  LOPRESSOR  
TAKE 1 TAB BY MOUTH TWO (2) TIMES A DAY. metroNIDAZOLE 500 mg tablet Commonly known as:  FLAGYL Take 1 Tab by mouth three (3) times daily. raNITIdine 150 mg tablet Commonly known as:  ZANTAC TAKE 1 TAB BY MOUTH TWO (2) TIMES DAILY AS NEEDED FOR INDIGESTION. venlafaxine- mg capsule Commonly known as:  EFFEXOR-XR  
TAKE ONE CAPSULE BY MOUTH NIGHTLY Follow-up Instructions Return in about 4 months (around 6/2/2018). To-Do List   
 02/05/2018 To Be Determined Appointment with Wilman Vicente PT at [place] [address]  
  
 02/07/2018 To Be Determined Appointment with Wilman Vicente PT at [place] [address]  
  
 02/09/2018 To Be Determined Appointment with Wilman Vicente PT at [place] [address]  
  
 02/12/2018 To Be Determined Appointment with Wilman Vicente PT at [place] [address]  
  
 02/14/2018 To Be Determ[facility]  
  
 02/16/2018 To Be Determined Appointment with Wilman Vicente PT at [place] [address] Patient Instructions Januvia in AM  
 
Basaglar/Lantus 45  units at night Apidra 15  units before breakfast and 15 units dinner Check sugars twice a day If you start ketogenic diet , do not take Apidra If you have low sugars then we have to reduce Basaglar insulin Apidra fast acting for high sugars Blood sugar  Breakfast/Lunch/Dinner 130-200  Add  4  Units 201-250  Add  8 Units 251-300  Add  12 Units 301-350  Add 16  Units 351-400  Add 20  Units Small portions In short, any food that is high in carbs should be limited. Here is a list of foods that need to be reduced or eliminated on a  diet:  
Sugary foods: Soda, fruit juice, smoothies, cake, ice cream, candy, etc.  
Grains or starches: Wheat-based products, rice, pasta, cereal, etc.  
Fruit: All fruit, except small portions of berries like strawberries. Beans or legumes: Peas, kidney beans, lentils, chickpeas, etc.  
Root vegetables and tubers: Potatoes, sweet potatoes, carrots, parsnips, etc.  
Low-fat or diet products: These are highly processed and often high in carbs. Some condiments or sauces: These often contain sugar and unhealthy fat. Unhealthy fat: Limit your intake of processed vegetable oils, mayonnaise, etc.  
Alcohol: Due to its carb content, many alcoholic beverages can throw you out of ketosis. Sugar-free diet foods: These are often high in sugar alcohols, which can affect ketone levels in some cases. These foods also tend to be highly processed. Foods to Eat You should base the majority of your meals around these foods:  
Meat: Red meat, steak, ham, sausage, merritt, chicken and turkey. Fatty fish: Such as salmon, trout, tuna and mackerel. Eggs: Look for pastured or omega-3 whole eggs. Butter and cream: Look for grass-fed when possible. Cheese: Unprocessed cheese (cheddar, goat, cream, blue or mozzarella). Nuts and seeds: Almonds, walnuts, flaxseeds, pumpkin seeds, guilhemre seeds, etc.  
Healthy oils: Primarily extra virgin olive oil, coconut oil and avocado oil. Avocados: Whole avocados or freshly made guacamole. Low-carb veggies: Most green veggies, tomatoes, onions, peppers, etc.  
Condiments: You can use salt, pepper and various healthy herbs and spices. A Sample Meal Plan For 1 Week Monday Breakfast: Maria Teresa Edman, eggs and tomatoes. Lunch: Chicken salad with olive oil and feta cheese. Dinner: Viva Darlyn with asparagus cooked in butter. Tuesday Breakfast: Egg, tomato, basil and goat cheese omelet. Lunch: Temple milk, peanut butter, cocoa powder and stevia milkshake. Dinner: Meatballs, cheddar cheese and vegetables. Wednesday Breakfast: A ketogenic milkshake (try this or this). Lunch: Shrimp salad with olive oil and avocado. Dinner: Favorite Words with Parmesan cheese, broccoli and salad. Thursday Breakfast: Omelet with avocado, salsa, peppers, onion and spices. Lunch: A handful of nuts and celery sticks with guacamole and salsa. Dinner: Chicken stuffed with pesto and cream cheese, along with vegetables. Friday Breakfast: Sugar-free yogurt with peanut butter, cocoa powder and stevia. Lunch: Beef stir-oliver cooked in coconut oil with vegetables. Dinner: [employer] with merritt, egg and cheese. Saturday Breakfast: Ham and cheese omelet with vegetables. Lunch: Ham and cheese slices with nuts. Dinner: White fish, egg and spinach cooked in coconut oil. Sunday Breakfast: Fried eggs with merritt and mushrooms. Lunch: Gypsy Layo with salsa, cheese and guacamole. Dinner: Steak and eggs with a side salad. Introducing [facility]! Dear Martina Barakat: 
Thank you for requesting a [employer] account. Our records indicate that you already have an active [employer] account. You can access your account anytime at [email or web address] Did you know that you can access your hospital and ER discharge instructions at any time in [employer]? You can also review all of your test results from your hospital stay or ER visit. Additional Information If you have questions, please visit the Frequently Asked Questions section of the [employer] website at [email or web address] Remember, [employer] is NOT to be used for urgent needs. For medical emergencies, dial 911. Now available from your iPhone and Android! Please provide this summary of care documentation to your next provider. Your primary care clinician is listed as Soumya Iraheta. If you have any questions after today's visit, please call [phone].

## 2018-02-02 NOTE — PATIENT INSTRUCTIONS
Januvia in AM     Basaglar/Lantus 45  units at night     Apidra 15  units before breakfast and 15 units dinner     Check sugars twice a day      If you start ketogenic diet , do not take Apidra     If you have low sugars then we have to reduce Basaglar insulin     Apidra fast acting for high sugars    Blood sugar  Breakfast/Lunch/Dinner       130-200  Add  4  Units       201-250  Add  8 Units       251-300  Add  12 Units       301-350  Add 16  Units        351-400  Add 20  Units       Small portions     In short, any food that is high in carbs should be limited. Here is a list of foods that need to be reduced or eliminated on a  diet:   Sugary foods: Soda, fruit juice, smoothies, cake, ice cream, candy, etc.   Grains or starches: Wheat-based products, rice, pasta, cereal, etc.   Fruit: All fruit, except small portions of berries like strawberries. Beans or legumes: Peas, kidney beans, lentils, chickpeas, etc.   Root vegetables and tubers: Potatoes, sweet potatoes, carrots, parsnips, etc.   Low-fat or diet products: These are highly processed and often high in carbs. Some condiments or sauces: These often contain sugar and unhealthy fat. Unhealthy fat: Limit your intake of processed vegetable oils, mayonnaise, etc.   Alcohol: Due to its carb content, many alcoholic beverages can throw you out of ketosis. Sugar-free diet foods: These are often high in sugar alcohols, which can affect ketone levels in some cases. These foods also tend to be highly processed. Foods to Eat  You should base the majority of your meals around these foods:   Meat: Red meat, steak, ham, sausage, merritt, chicken and turkey. Fatty fish: Such as salmon, trout, tuna and mackerel. Eggs: Look for pastured or omega-3 whole eggs. Butter and cream: Look for grass-fed when possible. Cheese: Unprocessed cheese (cheddar, goat, cream, blue or mozzarella).    Nuts and seeds: Almonds, walnuts, flaxseeds, pumpkin seeds, guilherme seeds, etc. Healthy oils: Primarily extra virgin olive oil, coconut oil and avocado oil. Avocados: Whole avocados or freshly made guacamole. Low-carb veggies: Most green veggies, tomatoes, onions, peppers, etc.   Condiments: You can use salt, pepper and various healthy herbs and spices. A Sample Meal Plan For 1 Week    Monday  Breakfast: Conchetta Rodriguez, eggs and tomatoes. Lunch: Chicken salad with olive oil and feta cheese. Dinner: Jessica Nelson with asparagus cooked in butter. Tuesday  Breakfast: Egg, tomato, basil and goat cheese omelet. Lunch: [employer]bler milk, peanut butter, cocoa powder and stevia milkshake. Dinner: Meatballs, cheddar cheese and vegetables. Wednesday  Breakfast: A ketogenic milkshake (try this or this). Lunch: Shrimp salad with olive oil and avocado. Dinner: Amitree with Parmesan cheese, broccoli and salad. Thursday  Breakfast: Omelet with avocado, salsa, peppers, onion and spices. Lunch: A handful of nuts and celery sticks with guacamole and salsa. Dinner: Chicken stuffed with pesto and cream cheese, along with vegetables. Friday  Breakfast: Sugar-free yogurt with peanut butter, cocoa powder and stevia. Lunch: Beef stir-oliver cooked in coconut oil with vegetables. Dinner: [employer] with merritt, egg and cheese. Saturday  Breakfast: Ham and cheese omelet with vegetables. Lunch: Ham and cheese slices with nuts. Dinner: White fish, egg and spinach cooked in coconut oil. Sunday  Breakfast: Fried eggs with merritt and mushrooms. Lunch: Patrica Claude with salsa, cheese and guacamole. Dinner: Steak and eggs with a side salad.
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
## 2018-02-05 ENCOUNTER — HOME CARE VISIT (OUTPATIENT)
Dept: SCHEDULING | Facility: HOME HEALTH | Age: 63
End: 2018-02-05
Payer: MEDICAID

## 2018-02-05 VITALS
OXYGEN SATURATION: 96 % | TEMPERATURE: 98.8 F | HEART RATE: 80 BPM | SYSTOLIC BLOOD PRESSURE: 100 MMHG | DIASTOLIC BLOOD PRESSURE: 68 MMHG

## 2018-02-05 PROCEDURE — G0151 HHCP-SERV OF PT,EA 15 MIN: HCPCS

## 2018-02-07 ENCOUNTER — HOME CARE VISIT (OUTPATIENT)
Dept: SCHEDULING | Facility: HOME HEALTH | Age: 63
End: 2018-02-07
Payer: MEDICAID

## 2018-02-07 VITALS
TEMPERATURE: 98.2 F | HEART RATE: 67 BPM | OXYGEN SATURATION: 98 % | DIASTOLIC BLOOD PRESSURE: 64 MMHG | SYSTOLIC BLOOD PRESSURE: 112 MMHG

## 2018-02-07 PROCEDURE — G0151 HHCP-SERV OF PT,EA 15 MIN: HCPCS

## 2018-02-09 ENCOUNTER — TELEPHONE (OUTPATIENT)
Dept: ENDOCRINOLOGY | Age: 63
End: 2018-02-09

## 2018-02-09 ENCOUNTER — HOME CARE VISIT (OUTPATIENT)
Dept: SCHEDULING | Facility: HOME HEALTH | Age: 63
End: 2018-02-09
Payer: MEDICAID

## 2018-02-09 VITALS
RESPIRATION RATE: 18 BRPM | TEMPERATURE: 98.7 F | DIASTOLIC BLOOD PRESSURE: 68 MMHG | HEART RATE: 76 BPM | SYSTOLIC BLOOD PRESSURE: 110 MMHG | OXYGEN SATURATION: 99 %

## 2018-02-09 DIAGNOSIS — Z79.4 TYPE 2 DIABETES MELLITUS WITH HYPERGLYCEMIA, WITH LONG-TERM CURRENT USE OF INSULIN (HCC): Primary | ICD-10-CM

## 2018-02-09 DIAGNOSIS — E11.65 TYPE 2 DIABETES MELLITUS WITH HYPERGLYCEMIA, WITH LONG-TERM CURRENT USE OF INSULIN (HCC): Primary | ICD-10-CM

## 2018-02-09 PROCEDURE — G0151 HHCP-SERV OF PT,EA 15 MIN: HCPCS

## 2018-02-09 NOTE — TELEPHONE ENCOUNTER
Per Dr. Neeraj Rubio, informed pt of the following \"patient to call her insurance as all insulins I am prescribing they are not covering. Or get a list of all insulins covered for insurance\" pt stated they will cover meds if we do pre certification as pt cannot use syringe dur to not being able to read syringe.

## 2018-02-12 ENCOUNTER — HOME CARE VISIT (OUTPATIENT)
Dept: SCHEDULING | Facility: HOME HEALTH | Age: 63
End: 2018-02-12
Payer: MEDICAID

## 2018-02-12 VITALS
SYSTOLIC BLOOD PRESSURE: 90 MMHG | BODY MASS INDEX: 36.07 KG/M2 | TEMPERATURE: 98.2 F | HEIGHT: 62 IN | WEIGHT: 196 LBS | DIASTOLIC BLOOD PRESSURE: 62 MMHG | OXYGEN SATURATION: 99 % | HEART RATE: 67 BPM

## 2018-03-02 RX ORDER — INSULIN ASPART 100 [IU]/ML
INJECTION, SOLUTION INTRAVENOUS; SUBCUTANEOUS
Qty: 30 ML | Refills: 5 | Status: SHIPPED | OUTPATIENT
Start: 2018-03-02 | End: 2020-01-01 | Stop reason: SDUPTHER

## 2018-04-12 DIAGNOSIS — I10 ESSENTIAL HYPERTENSION WITH GOAL BLOOD PRESSURE LESS THAN 140/90: ICD-10-CM

## 2018-04-12 DIAGNOSIS — E11.8 DIABETES MELLITUS WITH COMPLICATION (HCC): ICD-10-CM

## 2018-04-12 DIAGNOSIS — F43.10 PTSD (POST-TRAUMATIC STRESS DISORDER): ICD-10-CM

## 2018-04-12 RX ORDER — VENLAFAXINE HYDROCHLORIDE 150 MG/1
CAPSULE, EXTENDED RELEASE ORAL
Qty: 30 CAP | Refills: 5 | Status: SHIPPED | OUTPATIENT
Start: 2018-04-12 | End: 2018-10-31 | Stop reason: SDUPTHER

## 2018-04-23 ENCOUNTER — OFFICE VISIT (OUTPATIENT)
Dept: FAMILY MEDICINE CLINIC | Age: 63
End: 2018-04-23

## 2018-04-23 VITALS
RESPIRATION RATE: 16 BRPM | TEMPERATURE: 98.1 F | HEIGHT: 62 IN | BODY MASS INDEX: 34.04 KG/M2 | WEIGHT: 185 LBS | DIASTOLIC BLOOD PRESSURE: 75 MMHG | OXYGEN SATURATION: 97 % | HEART RATE: 63 BPM | SYSTOLIC BLOOD PRESSURE: 141 MMHG

## 2018-04-23 DIAGNOSIS — B96.89 BACTERIAL SINUSITIS: Primary | ICD-10-CM

## 2018-04-23 DIAGNOSIS — J02.9 SORE THROAT: ICD-10-CM

## 2018-04-23 DIAGNOSIS — J32.9 BACTERIAL SINUSITIS: Primary | ICD-10-CM

## 2018-04-23 LAB
S PYO AG THROAT QL: NEGATIVE
VALID INTERNAL CONTROL?: YES

## 2018-04-23 RX ORDER — DOXYCYCLINE 100 MG/1
100 TABLET ORAL 2 TIMES DAILY
Qty: 14 TAB | Refills: 0 | Status: SHIPPED | OUTPATIENT
Start: 2018-04-23 | End: 2018-04-30

## 2018-04-23 NOTE — PATIENT INSTRUCTIONS
Upper Respiratory Infection (Cold): Care Instructions  Your Care Instructions    An upper respiratory infection, or URI, is an infection of the nose, sinuses, or throat. URIs are spread by coughs, sneezes, and direct contact. The common cold is the most frequent kind of URI. The flu and sinus infections are other kinds of URIs. Almost all URIs are caused by viruses. Antibiotics won't cure them. But you can treat most infections with home care. This may include drinking lots of fluids and taking over-the-counter pain medicine. You will probably feel better in 4 to 10 days. The doctor has checked you carefully, but problems can develop later. If you notice any problems or new symptoms, get medical treatment right away. Follow-up care is a key part of your treatment and safety. Be sure to make and go to all appointments, and call your doctor if you are having problems. It's also a good idea to know your test results and keep a list of the medicines you take. How can you care for yourself at home? · To prevent dehydration, drink plenty of fluids, enough so that your urine is light yellow or clear like water. Choose water and other caffeine-free clear liquids until you feel better. If you have kidney, heart, or liver disease and have to limit fluids, talk with your doctor before you increase the amount of fluids you drink. · Take an over-the-counter pain medicine, such as acetaminophen (Tylenol), ibuprofen (Advil, Motrin), or naproxen (Aleve). Read and follow all instructions on the label. · Before you use cough and cold medicines, check the label. These medicines may not be safe for young children or for people with certain health problems. · Be careful when taking over-the-counter cold or flu medicines and Tylenol at the same time. Many of these medicines have acetaminophen, which is Tylenol. Read the labels to make sure that you are not taking more than the recommended dose.  Too much acetaminophen (Tylenol) can be harmful. · Get plenty of rest.  · Do not smoke or allow others to smoke around you. If you need help quitting, talk to your doctor about stop-smoking programs and medicines. These can increase your chances of quitting for good. When should you call for help? Call 911 anytime you think you may need emergency care. For example, call if:  ? · You have severe trouble breathing. ?Call your doctor now or seek immediate medical care if:  ? · You seem to be getting much sicker. ? · You have new or worse trouble breathing. ? · You have a new or higher fever. ? · You have a new rash. ? Watch closely for changes in your health, and be sure to contact your doctor if:  ? · You have a new symptom, such as a sore throat, an earache, or sinus pain. ? · You cough more deeply or more often, especially if you notice more mucus or a change in the color of your mucus. ? · You do not get better as expected. Where can you learn more? Go to http://lety-radha.info/. Enter S350 in the search box to learn more about \"Upper Respiratory Infection (Cold): Care Instructions. \"  Current as of: May 12, 2017  Content Version: 11.4  © 9495-2104 Healthwise, Incorporated. Care instructions adapted under license by [employer] (which disclaims liability or warranty for this information). If you have questions about a medical condition or this instruction, always ask your healthcare professional. [place] [address] any warranty or liability for your use of this information.

## 2018-04-23 NOTE — PROGRESS NOTES
Chief Complaint   Patient presents with    Sinus Infection     Congestion; headaches and chills X 2 wks.  Pt feels a bit better today
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
## 2018-04-23 NOTE — MR AVS SNAPSHOT
[address] 
[phone] Patient: Vladislav Orozco MRN: [id number] QSI:[birth year] Visit Information Date & Time Provider Department Dept. Phone Encounter #  
 4/23/2018 10:30 AM Mika Burger  [address] [facility] [phone] [id number] Your Appointments 6/1/2018  9:15 AM  
LAB with CDE NURSE [facility] [address] [place] [address]) Appt Note: lab only [address] [place] [address] [address] [facility] [address]  
[phone]  
  
   
 315 [facility] [address]  
  
    
 6/8/2018  1:30 PM  
ROUTINE CARE with Toño Yang MD  
[facility] [address] [place] [address]) Appt Note: f/u 4 month  
 [address] [facility] [address]  
[phone]  
  
   
 315 [facility] [address] Upcoming Health Maintenance Date Due Hepatitis C Screening 1955 Pneumococcal 19-64 Medium Risk (1 of 1 - PPSV23) 1/16/1976 DTaP/Tdap/Td series (1 - Tdap) 1/16/1978 PAP AKA CERVICAL CYTOLOGY 1/16/1978 ZOSTER VACCINE AGE 60> 11/16/2016 Influenza Age 5 to Adult 8/1/2017 MICROALBUMIN Q1 3/29/2018 LIPID PANEL Q1 3/29/2018 HEMOGLOBIN A1C Q6M 6/10/2018 EYE EXAM RETINAL OR DILATED Q1 1/24/2019 FOOT EXAM Q1 2/3/2019 BREAST CANCER SCRN MAMMOGRAM 5/4/2019 COLONOSCOPY 7/15/2020 Allergies as of 4/23/2018  Review Complete On: 4/23/2018 By: Maggie Rondon Severity Noted Reaction Type Reactions Keflex [Cephalexin] Medium 12/10/2017   Systemic Hives Pcn [Penicillins]  08/21/2014    Hives Tolerates cephalexin Tanzeum [Albiglutide]  04/12/2016    Nausea Only Vioxx [Rofecoxib]  08/23/2016    Nausea Only Current Immunizations  Reviewed on 12/12/2017 No immunizations on file. Not reviewed this visit You Were Diagnosed With   
  
 Codes Comments Sore throat    -  Primary ICD-10-CM: J02.9 ICD-9-CM: 568 Bacterial sinusitis     ICD-10-CM: J32.9, B96.89 
ICD-9-CM: 473.9, 041.9 Vitals BP Pulse Temp Resp Height(growth percentile) Weight(growth percentile) 141/75 63 98.1 °F (36.7 °C) (Oral) 16 5' 2\" (1.575 m) 185 lb (83.9 kg) SpO2 BMI OB Status Smoking Status 97% 33.84 kg/m2 Menopause Current Every Day Smoker Vitals History BMI and BSA Data Body Mass Index Body Surface Area  
 33.84 kg/m 2 1.92 m 2 Preferred Pharmacy Pharmacy Name Phone [id number]- [address] [facility] [phone] Your Updated Medication List  
  
   
This list is accurate as of 4/23/18 11:20 AM.  Always use your most recent med list.  
  
  
  
  
 albuterol 90 mcg/actuation inhaler Commonly known as:  PROVENTIL HFA, VENTOLIN HFA, PROAIR HFA Take 2 Puffs by inhalation every four (4) hours as needed for Wheezing. aspirin delayed-release 81 mg tablet Take 81 mg by mouth nightly. atorvastatin 20 mg tablet Commonly known as:  LIPITOR Take 1 Tab by mouth nightly. BACTROBAN 2 % ointment Generic drug:  mupirocin Apply  to affected area daily as needed (skin infection). busPIRone 7.5 mg tablet Commonly known as:  BUSPAR Take 7.5 mg by mouth nightly. cetirizine 10 mg tablet Commonly known as:  ZyrTEC Take 1 Tab by mouth every morning. docusate sodium 100 mg capsule Commonly known as:  Gwen Silk Take 100 mg by mouth two (2) times daily as needed for Constipation. doxycycline 100 mg tablet Commonly known as:  ADOXA Take 1 Tab by mouth two (2) times a day for 7 days. gabapentin 300 mg capsule Commonly known as:  NEURONTIN Take 1 Cap by mouth two (2) times a day. hydroCHLOROthiazide 25 mg tablet Commonly known as:  HYDRODIURIL Take 25 mg by mouth nightly. HYDROcodone-acetaminophen 5-325 mg per tablet Commonly known as:  Sergio Koch Take 1 Tab by mouth every four (4) hours as needed for Pain. Max Daily Amount: 6 Tabs. insulin aspart U-100 100 unit/mL Inpn Commonly known as:  NovoLOG Flexpen U-100 Insulin Inject 15 units before breakfast and 15 units dinner w/ SSI Max units daily: 90. Stop Apidra  
  
 insulin detemir U-100 100 unit/mL (3 mL) Inpn Commonly known as:  LEVEMIR FLEXTOUCH U-100 INSULN  
45 Units by SubCUTAneous route nightly. Stop Basaglar JANUVIA 100 mg tablet Generic drug:  SITagliptin Take 100 mg by mouth nightly. levoFLOXacin 500 mg tablet Commonly known as:  Devorah Boeck Take 1 Tab by mouth daily. lisinopril 10 mg tablet Commonly known as:  Chika Roscoe Take 1 Tab by mouth nightly. metoprolol tartrate 25 mg tablet Commonly known as:  LOPRESSOR  
TAKE 1 TAB BY MOUTH TWO (2) TIMES A DAY. metroNIDAZOLE 500 mg tablet Commonly known as:  FLAGYL Take 1 Tab by mouth three (3) times daily. raNITIdine 150 mg tablet Commonly known as:  ZANTAC TAKE 1 TAB BY MOUTH TWO (2) TIMES DAILY AS NEEDED FOR INDIGESTION. venlafaxine- mg capsule Commonly known as:  EFFEXOR-XR  
TAKE ONE CAPSULE BY MOUTH NIGHTLY Prescriptions Sent to Pharmacy Refills  
 doxycycline (ADOXA) 100 mg tablet 0 Sig: Take 1 Tab by mouth two (2) times a day for 7 days. Class: Normal  
 Pharmacy: [address] [place] [address] Ph #: [phone] Route: Oral  
  
We Performed the Following AMB POC RAPID STREP A [59763 CPT(R)] Patient Instructions Upper Respiratory Infection (Cold): Care Instructions Your Care Instructions An upper respiratory infection, or URI, is an infection of the nose, sinuses, or throat. URIs are spread by coughs, sneezes, and direct contact. The common cold is the most frequent kind of URI. The flu and sinus infections are other kinds of URIs. Almost all URIs are caused by viruses. Antibiotics won't cure them. But you can treat most infections with home care. This may include drinking lots of fluids and taking over-the-counter pain medicine. You will probably feel better in 4 to 10 days. The doctor has checked you carefully, but problems can develop later. If you notice any problems or new symptoms, get medical treatment right away. Follow-up care is a key part of your treatment and safety. Be sure to make and go to all appointments, and call your doctor if you are having problems. It's also a good idea to know your test results and keep a list of the medicines you take. How can you care for yourself at home? · To prevent dehydration, drink plenty of fluids, enough so that your urine is light yellow or clear like water. Choose water and other caffeine-free clear liquids until you feel better. If you have kidney, heart, or liver disease and have to limit fluids, talk with your doctor before you increase the amount of fluids you drink. · Take an over-the-counter pain medicine, such as acetaminophen (Tylenol), ibuprofen (Advil, Motrin), or naproxen (Aleve). Read and follow all instructions on the label. · Before you use cough and cold medicines, check the label. These medicines may not be safe for young children or for people with certain health problems. · Be careful when taking over-the-counter cold or flu medicines and Tylenol at the same time. Many of these medicines have acetaminophen, which is Tylenol. Read the labels to make sure that you are not taking more than the recommended dose. Too much acetaminophen (Tylenol) can be harmful. · Get plenty of rest. 
· Do not smoke or allow others to smoke around you. If you need help quitting, talk to your doctor about stop-smoking programs and medicines. These can increase your chances of quitting for good. When should you call for help? Call 911 anytime you think you may need emergency care. For example, call if: 
? · You have severe trouble breathing. ?Call your doctor now or seek immediate medical care if: 
? · You seem to be getting much sicker. ? · You have new or worse trouble breathing. ? · You have a new or higher fever. ? · You have a new rash. ? Watch closely for changes in your health, and be sure to contact your doctor if: 
? · You have a new symptom, such as a sore throat, an earache, or sinus pain. ? · You cough more deeply or more often, especially if you notice more mucus or a change in the color of your mucus. ? · You do not get better as expected. Where can you learn more? Go to http://lety-radha.info/. Enter M465 in the search box to learn more about \"Upper Respiratory Infection (Cold): Care Instructions. \" Current as of: May 12, 2017 Content Version: 11.4 © 9976-3733 [employer]. Care instructions adapted under license by [employer] (which disclaims liability or warranty for this information). If you have questions about a medical condition or this instruction, always ask your healthcare professional. [address] any warranty or liability for your use of this information. Introducing hospitals & HEALTH SERVICES! Dear Mikhail Cartagena: 
Thank you for requesting a [employer] account. Our records indicate that you already have an active [employer] account. You can access your account anytime at [email or web address] Did you know that you can access your hospital and ER discharge instructions at any time in Text[employer]Me? You can also review all of your test results from your hospital stay or ER visit. Additional Information If you have questions, please visit the Frequently Asked Questions section of the [employer] website at [email or web address] Remember, Text[employer]Me is NOT to be used for urgent needs.  For medical emergencies, dial 911. Now available from your iPhone and Android! Please provide this summary of care documentation to your next provider. Your primary care clinician is listed as Brandan Saul. If you have any questions after today's visit, please call [phone].

## 2018-05-11 RX ORDER — BUSPIRONE HYDROCHLORIDE 7.5 MG/1
TABLET ORAL
Qty: 60 TAB | Refills: 5 | Status: SHIPPED | OUTPATIENT
Start: 2018-05-11 | End: 2018-11-30 | Stop reason: SDUPTHER

## 2018-06-08 ENCOUNTER — OFFICE VISIT (OUTPATIENT)
Dept: ENDOCRINOLOGY | Age: 63
End: 2018-06-08

## 2018-06-08 VITALS
RESPIRATION RATE: 16 BRPM | HEIGHT: 62 IN | TEMPERATURE: 96.4 F | HEART RATE: 65 BPM | DIASTOLIC BLOOD PRESSURE: 62 MMHG | OXYGEN SATURATION: 97 % | SYSTOLIC BLOOD PRESSURE: 135 MMHG | BODY MASS INDEX: 33.33 KG/M2 | WEIGHT: 181.1 LBS

## 2018-06-08 DIAGNOSIS — I10 ESSENTIAL HYPERTENSION: ICD-10-CM

## 2018-06-08 DIAGNOSIS — E11.65 TYPE 2 DIABETES MELLITUS WITH HYPERGLYCEMIA, WITH LONG-TERM CURRENT USE OF INSULIN (HCC): Primary | ICD-10-CM

## 2018-06-08 DIAGNOSIS — E78.2 MIXED HYPERLIPIDEMIA: ICD-10-CM

## 2018-06-08 DIAGNOSIS — Z79.4 TYPE 2 DIABETES MELLITUS WITH HYPERGLYCEMIA, WITH LONG-TERM CURRENT USE OF INSULIN (HCC): Primary | ICD-10-CM

## 2018-06-08 NOTE — MR AVS SNAPSHOT
[address] [id number] 
[phone] Patient: Paulina Soto MRN: [id number] [id number] Visit Information Date & Time Provider Department Dept. Phone Encounter #  
 6/8/2018  1:30 PM Darrell Muro MD [facility] [phone] [id number] Follow-up Instructions Return in about 3 months (around 9/8/2018). Upcoming Health Maintenance Date Due Hepatitis C Screening 1955 Pneumococcal 19-64 Medium Risk (1 of 1 - PPSV23) 1/16/1976 DTaP/Tdap/Td series (1 - Tdap) 1/16/1978 PAP AKA CERVICAL CYTOLOGY 1/16/1978 ZOSTER VACCINE AGE 60> 11/16/2016 Influenza Age 5 to Adult 8/1/2018 HEMOGLOBIN A1C Q6M 12/1/2018 FOOT EXAM Q1 2/3/2019 EYE EXAM RETINAL OR DILATED Q1 3/28/2019 BREAST CANCER SCRN MAMMOGRAM 5/4/2019 MICROALBUMIN Q1 6/1/2019 LIPID PANEL Q1 6/1/2019 COLONOSCOPY 7/15/2020 Allergies as of 6/8/2018  Review Complete On: 6/8/2018 By: Darrell Muro MD  
  
 Severity Noted Reaction Type Reactions Keflex [Cephalexin] Medium 12/10/2017   Systemic Hives Pcn [Penicillins]  08/21/2014    Hives Tolerates cephalexin Tanzeum [Albiglutide]  04/12/2016    Nausea Only Vioxx [Rofecoxib]  08/23/2016    Nausea Only Current Immunizations  Reviewed on 12/12/2017 No immunizations on file. Not reviewed this visit You Were Diagnosed With   
  
 Codes Comments Type 2 diabetes mellitus with hyperglycemia, with long-term current use of insulin (HCC)    -  Primary ICD-10-CM: E11.65, Z79.4 ICD-9-CM: 250.00, 790.29, V58.67 Vitals BP Pulse Temp Resp Height(growth percentile) Weight(growth percentile) 135/62 (BP 1 Location: Left arm, BP Patient Position: Sitting) 65 96.4 °F (35.8 °C) (Oral) 16 5' 2\" (1.575 m) 181 lb 1.6 oz (82.1 kg) SpO2 BMI OB Status Smoking Status 97% 33.12 kg/m2 Menopause Current Every Day Smoker Vitals History BMI and BSA Data Body Mass Index Body Surface Area  
 33.12 kg/m 2 1.9 m 2 Preferred Pharmacy Pharmacy Name Phone [facility]ACY [id number] AT [facility] [phone] Your Updated Medication List  
  
   
This list is accurate as of 6/8/18  2:24 PM.  Always use your most recent med list.  
  
  
  
  
 albuterol 90 mcg/actuation inhaler Commonly known as:  PROVENTIL HFA, VENTOLIN HFA, PROAIR HFA Take 2 Puffs by inhalation every four (4) hours as needed for Wheezing. aspirin delayed-release 81 mg tablet Take 81 mg by mouth nightly. atorvastatin 20 mg tablet Commonly known as:  LIPITOR Take 1 Tab by mouth nightly. BACTROBAN 2 % ointment Generic drug:  mupirocin Apply  to affected area daily as needed (skin infection). * busPIRone 7.5 mg tablet Commonly known as:  BUSPAR Take 7.5 mg by mouth nightly. * busPIRone 7.5 mg tablet Commonly known as:  BUSPAR  
TAKE 1 TAB BY MOUTH TWO (2) TIMES A DAY. cetirizine 10 mg tablet Commonly known as:  ZyrTEC Take 1 Tab by mouth every morning. docusate sodium 100 mg capsule Commonly known as:  [place] Pian Take 100 mg by mouth two (2) times daily as needed for Constipation. gabapentin 300 mg capsule Commonly known as:  NEURONTIN Take 1 Cap by mouth two (2) times a day. hydroCHLOROthiazide 25 mg tablet Commonly known as:  HYDRODIURIL Take 25 mg by mouth nightly. HYDROcodone-acetaminophen 5-325 mg per tablet Commonly known as:  Ruffus Homme Take 1 Tab by mouth every four (4) hours as needed for Pain. Max Daily Amount: 6 Tabs. insulin aspart U-100 100 unit/mL Inpn Commonly known as:  NovoLOG Flexpen U-100 Insulin Inject 15 units before breakfast and 15 units dinner w/ SSI Max units daily: 90. Stop Apidra insulin detemir U-100 100 unit/mL (3 mL) Inpn Commonly known as:  LEVEMIR FLEXTOUCH U-100 INSULN  
45 Units by SubCUTAneous route nightly. Stop Basaglar JANUVIA 100 mg tablet Generic drug:  SITagliptin Take 100 mg by mouth nightly. levoFLOXacin 500 mg tablet Commonly known as:  Kaley Ledesma Take 1 Tab by mouth daily. lisinopril 10 mg tablet Commonly known as:  Tildon Chioma Take 1 Tab by mouth nightly. metoprolol tartrate 25 mg tablet Commonly known as:  LOPRESSOR  
TAKE 1 TAB BY MOUTH TWO (2) TIMES A DAY. metroNIDAZOLE 500 mg tablet Commonly known as:  FLAGYL Take 1 Tab by mouth three (3) times daily. raNITIdine 150 mg tablet Commonly known as:  ZANTAC TAKE 1 TAB BY MOUTH TWO (2) TIMES DAILY AS NEEDED FOR INDIGESTION. venlafaxine- mg capsule Commonly known as:  EFFEXOR-XR  
TAKE ONE CAPSULE BY MOUTH NIGHTLY * Notice: This list has 2 medication(s) that are the same as other medications prescribed for you. Read the directions carefully, and ask your doctor or other care provider to review them with you. Follow-up Instructions Return in about 3 months (around 9/8/2018). Introducing [facility]! Dear Veronica Pace: 
Thank you for requesting a [employer] account. Our records indicate that you already have an active [employer] account. You can access your account anytime at [email or web address] Did you know that you can access your hospital and ER discharge instructions at any time in [employer]? You can also review all of your test results from your hospital stay or ER visit. Additional Information If you have questions, please visit the Frequently Asked Questions section of the [employer] website at [email or web address] Remember, [employer] is NOT to be used for urgent needs. For medical emergencies, dial 911. Now available from your iPhone and Android! Please provide this summary of care documentation to your next provider. Your primary care clinician is listed as [place] [facility]. If you have any questions after today's visit, please call [phone].
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
## 2018-06-08 NOTE — PROGRESS NOTES
Trent Shook is a 66 y.o. female here for   Chief Complaint   Patient presents with    Diabetes       Functional glucose monitor and record keeping system? - yes  Eye exam within last year? - on file  Foot exam within last year? - on file    1. Have you been to the ER, urgent care clinic since your last visit? Hospitalized since your last visit? - no    2. Have you seen or consulted any other health care providers outside of the [facility] since your last visit?   Include any pap smears or colon screening.- PCP

## 2018-06-14 ENCOUNTER — HOSPITAL ENCOUNTER (OUTPATIENT)
Dept: MAMMOGRAPHY | Age: 63
Discharge: HOME | End: 2018-06-14
Attending: FAMILY MEDICINE
Payer: MEDICAID

## 2018-06-14 DIAGNOSIS — Z12.31 VISIT FOR SCREENING MAMMOGRAM: ICD-10-CM

## 2018-06-14 PROCEDURE — 77067 SCR MAMMO BI INCL CAD: CPT

## 2018-06-18 NOTE — PROGRESS NOTES
BI-RADS 1: Negative. No mammographic evidence of malignancy. Next screening mammogram is recommended in one year. Will send [employer] message.

## 2018-08-24 NOTE — TELEPHONE ENCOUNTER
Patient needs a refill of the following  Requested Prescriptions     Pending Prescriptions Disp Refills    metoprolol tartrate (LOPRESSOR) 25 mg tablet 60 Tab 6     Sig: TAKE 1 TAB BY MOUTH TWO (2) TIMES A DAY.

## 2018-08-28 RX ORDER — METOPROLOL TARTRATE 25 MG/1
TABLET, FILM COATED ORAL
Qty: 60 TAB | Refills: 6 | Status: SHIPPED | OUTPATIENT
Start: 2018-08-28 | End: 2018-11-26 | Stop reason: SDUPTHER

## 2018-08-30 ENCOUNTER — PATIENT OUTREACH (OUTPATIENT)
Dept: FAMILY MEDICINE CLINIC | Age: 63
End: 2018-08-30

## 2018-09-22 DIAGNOSIS — E11.65 TYPE 2 DIABETES MELLITUS WITH HYPERGLYCEMIA, WITH LONG-TERM CURRENT USE OF INSULIN (HCC): ICD-10-CM

## 2018-09-22 DIAGNOSIS — Z79.4 UNCONTROLLED TYPE 2 DIABETES MELLITUS WITH HYPERGLYCEMIA, WITH LONG-TERM CURRENT USE OF INSULIN (HCC): ICD-10-CM

## 2018-09-22 DIAGNOSIS — I10 ESSENTIAL HYPERTENSION WITH GOAL BLOOD PRESSURE LESS THAN 140/90: ICD-10-CM

## 2018-09-22 DIAGNOSIS — Z79.4 TYPE 2 DIABETES MELLITUS WITH HYPERGLYCEMIA, WITH LONG-TERM CURRENT USE OF INSULIN (HCC): ICD-10-CM

## 2018-09-22 DIAGNOSIS — E11.65 UNCONTROLLED TYPE 2 DIABETES MELLITUS WITH HYPERGLYCEMIA, WITH LONG-TERM CURRENT USE OF INSULIN (HCC): ICD-10-CM

## 2018-09-24 RX ORDER — GABAPENTIN 300 MG/1
300 CAPSULE ORAL 2 TIMES DAILY
Qty: 60 CAP | Refills: 11 | Status: SHIPPED | OUTPATIENT
Start: 2018-09-24 | End: 2018-11-26 | Stop reason: SDUPTHER

## 2018-09-24 RX ORDER — CETIRIZINE HCL 10 MG
10 TABLET ORAL
Qty: 30 TAB | Refills: 5 | Status: SHIPPED | OUTPATIENT
Start: 2018-09-24 | End: 2018-11-26 | Stop reason: SDUPTHER

## 2018-09-24 RX ORDER — ATORVASTATIN CALCIUM 20 MG/1
20 TABLET, FILM COATED ORAL
Qty: 30 TAB | Refills: 11 | Status: SHIPPED | OUTPATIENT
Start: 2018-09-24 | End: 2018-11-26 | Stop reason: SDUPTHER

## 2018-09-24 RX ORDER — LISINOPRIL 10 MG/1
10 TABLET ORAL
Qty: 30 TAB | Refills: 11 | Status: SHIPPED | OUTPATIENT
Start: 2018-09-24 | End: 2018-11-26 | Stop reason: SDUPTHER

## 2018-09-24 NOTE — TELEPHONE ENCOUNTER
From: Marisa Veliz  To: Elise Banks MD  Sent: 9/22/2018 10:23 AM EDT  Subject: Medication Renewal Request    Original authorizing provider: MD Marisa Pride would like a refill of the following medications:  cetirizine (ZYRTEC) 10 mg tablet [Denisse Armstrong MD]  lisinopril (PRINIVIL, ZESTRIL) 10 mg tablet [Denisse Armstrong MD]  atorvastatin (LIPITOR) 20 mg tablet [Denisse Armstrong MD]  gabapentin (NEURONTIN) 300 mg capsule [Denisse Armstrong MD]  insulin detemir U-100 (LEVEMIR FLEXTOUCH U-100 INSULN) 100 unit/mL (3 mL) inpn [Denisse Armstrong MD]    Preferred pharmacy: [facility]/PHARMACY #[id number]- [address]  AT [facility]    Comment:

## 2018-10-25 ENCOUNTER — OFFICE VISIT (OUTPATIENT)
Dept: FAMILY MEDICINE CLINIC | Age: 63
End: 2018-10-25

## 2018-10-25 VITALS
TEMPERATURE: 97.7 F | SYSTOLIC BLOOD PRESSURE: 120 MMHG | WEIGHT: 180.2 LBS | HEIGHT: 62 IN | BODY MASS INDEX: 33.16 KG/M2 | DIASTOLIC BLOOD PRESSURE: 74 MMHG | HEART RATE: 74 BPM | OXYGEN SATURATION: 96 % | RESPIRATION RATE: 18 BRPM

## 2018-10-25 DIAGNOSIS — J34.89 RHINORRHEA: ICD-10-CM

## 2018-10-25 DIAGNOSIS — Z28.21 INFLUENZA VACCINE REFUSED: ICD-10-CM

## 2018-10-25 DIAGNOSIS — J32.4 CHRONIC PANSINUSITIS: Primary | ICD-10-CM

## 2018-10-25 DIAGNOSIS — Z12.2 ENCOUNTER FOR SCREENING FOR LUNG CANCER: ICD-10-CM

## 2018-10-25 RX ORDER — MONTELUKAST SODIUM 5 MG/1
10 TABLET, CHEWABLE ORAL
Qty: 60 TAB | Refills: 3 | Status: SHIPPED | OUTPATIENT
Start: 2018-10-25 | End: 2018-10-31 | Stop reason: ALTCHOICE

## 2018-10-25 RX ORDER — IPRATROPIUM BROMIDE 21 UG/1
2 SPRAY, METERED NASAL EVERY 12 HOURS
Qty: 30 ML | Refills: 3 | Status: SHIPPED | OUTPATIENT
Start: 2018-10-25 | End: 2018-11-26 | Stop reason: SDUPTHER

## 2018-10-25 NOTE — PROGRESS NOTES
67 yo female here with nasal drainage (yellow green in am, clearer later in the day), productive cough    Improved after 7-day course of antibiotics    Currently on zyrtec    States sxs started when she moved here from [place] 4 years ago    Current smoker -- smoking since age 18    Afebrile    Decline influenza vaccine today

## 2018-10-25 NOTE — PROGRESS NOTES
Wade Vizcaino is an 66 y.o. female who presents for: Sinus congestion   Pt reports sxs have been going on for months. Sxs include hacking cough, runny nose, drainage. Pt reports that sxs start with pressure in ear that resolves in few days but drainage continues. Drainage yellow/green in the morning then becomes clear. Pt currently using Zyrtec daily and reports that she has tried Allergy and Claritin with little improvement. Pt has had on and off sxs since she moved 4 years ago from [place]. Pt hasn't noticed anything that makes it better or worse. From [id number] had similar problem when she lived in [facility] with sxs resolving following move and mold was found in that house per pt. Pt doesn't think there is any mold in area where she lives now. Pt reports house she lives in now had home inspection roughly 5 years ago and it did not show any mold. Pt does report that sxs improved following 7 day abx course a few months ago, but sxs returned with a few weeks. Pt Continues to smoke 1PPD and started when she was 18years old. Pt has no desire to quit. Pt has never had allergy testing. Pt does not want flu shot. Pt feels like she has had 3 pneumonia shots in her history but could not find record of immunization. Allergies - reviewed: Allergies   Allergen Reactions    Keflex [Cephalexin] Hives    Pcn [Penicillins] Hives     Tolerates cephalexin    Tanzeum [Albiglutide] Nausea Only    Vioxx [Rofecoxib] Nausea Only         Medications - reviewed:   Current Outpatient Medications   Medication Sig    ipratropium (ATROVENT) 0.03 % nasal spray 2 Sprays by Both Nostrils route every twelve (12) hours.  montelukast (SINGULAIR) 5 mg chewable tablet Take 2 Tabs by mouth nightly.  cetirizine (ZYRTEC) 10 mg tablet Take 1 Tab by mouth every morning.  lisinopril (PRINIVIL, ZESTRIL) 10 mg tablet Take 1 Tab by mouth nightly.  atorvastatin (LIPITOR) 20 mg tablet Take 1 Tab by mouth nightly.     gabapentin (NEURONTIN) 300 mg capsule Take 1 Cap by mouth two (2) times a day.  insulin detemir U-100 (LEVEMIR FLEXTOUCH U-100 INSULN) 100 unit/mL (3 mL) inpn 45 Units by SubCUTAneous route nightly. Stop Basaglar    metoprolol tartrate (LOPRESSOR) 25 mg tablet TAKE 1 TAB BY MOUTH TWO (2) TIMES A DAY.  busPIRone (BUSPAR) 7.5 mg tablet TAKE 1 TAB BY MOUTH TWO (2) TIMES A DAY.  venlafaxine-SR (EFFEXOR-XR) 150 mg capsule TAKE ONE CAPSULE BY MOUTH NIGHTLY    raNITIdine (ZANTAC) 150 mg tablet TAKE 1 TAB BY MOUTH TWO (2) TIMES DAILY AS NEEDED FOR INDIGESTION.  busPIRone (BUSPAR) 7.5 mg tablet Take 7.5 mg by mouth nightly.  hydroCHLOROthiazide (HYDRODIURIL) 25 mg tablet Take 25 mg by mouth nightly.  mupirocin (BACTROBAN) 2 % ointment Apply  to affected area daily as needed (skin infection).  SITagliptin (JANUVIA) 100 mg tablet Take 100 mg by mouth nightly.  aspirin delayed-release 81 mg tablet Take 81 mg by mouth nightly.  insulin aspart U-100 (NOVOLOG FLEXPEN U-100 INSULIN) 100 unit/mL inpn Inject 15 units before breakfast and 15 units dinner w/ SSI Max units daily: 90. Stop Apidra    HYDROcodone-acetaminophen (NORCO) 5-325 mg per tablet Take 1 Tab by mouth every four (4) hours as needed for Pain. Max Daily Amount: 6 Tabs.  metroNIDAZOLE (FLAGYL) 500 mg tablet Take 1 Tab by mouth three (3) times daily.  levoFLOXacin (LEVAQUIN) 500 mg tablet Take 1 Tab by mouth daily.  docusate sodium (COLACE) 100 mg capsule Take 100 mg by mouth two (2) times daily as needed for Constipation.  albuterol (PROVENTIL HFA, VENTOLIN HFA, PROAIR HFA) 90 mcg/actuation inhaler Take 2 Puffs by inhalation every four (4) hours as needed for Wheezing. No current facility-administered medications for this visit.           Past Medical History - reviewed:  Past Medical History:   Diagnosis Date    [facility] right foot [facility]     Diabetes ([facility]     Glaucoma     Bilateral    Hypertension     Peripheral autonomic neuropathy due to diabetes mellitus ([facility]     Psychiatric disorder     PTSD; 2003 [address] PTSD (post-traumatic stress disorder)          Past Surgical History - reviewed:   Past Surgical History:   Procedure Laterality Date    HX AMPUTATION Right     Right  big toe  and right second toe amputated     HX CARPAL TUNNEL RELEASE Right     HX CATARACT REMOVAL Right     HX  SECTION      HX CHOLECYSTECTOMY      HX KNEE ARTHROSCOPY Right     right knee 2003    HX OTHER SURGICAL      right groin cysts removed     HX TRABECULECTOMY Bilateral          Social History - reviewed:  Social History     Socioeconomic History    Marital status:      Spouse name: Not on file    Number of children: Not on file    Years of education: Not on file    Highest education level: Not on file   Social Needs    Financial resource strain: Not on file    Food insecurity - worry: Not on file    Food insecurity - inability: Not on file   [employer] needs - medical: Not on file   [employer] needs - non-medical: Not on file   Occupational History    Not on file   Tobacco Use    Smoking status: Current Every Day Smoker     Packs/day: 0.75     Years: 40.00     Pack years: 30.00    Smokeless tobacco: Never Used   Substance and Sexual Activity    Alcohol use: No    Drug use: No    Sexual activity: No   Other Topics Concern    Not on file   Social History Narrative    Not on file         Family History - reviewed:  Family History   Problem Relation Age of Onset    Heart Disease Mother     Hypertension Mother     Cancer Mother         cancer    Diabetes Father     Cancer Brother         cancer    Diabetes Brother     Diabetes Maternal Grandmother     Diabetes Paternal Grandmother     Hypertension Paternal Grandmother     Diabetes Paternal Grandfather          Review of Systems   Constitutional: Negative for chills, fever and weight loss.    HENT: Positive for congestion, ear pain and sinus pain. Negative for ear discharge. Eyes: Negative for blurred vision and photophobia. Respiratory: Positive for cough and sputum production. Negative for shortness of breath and wheezing. Cardiovascular: Negative for chest pain and palpitations. Gastrointestinal: Negative for diarrhea, nausea and vomiting. Skin: Negative for itching and rash. Neurological: Positive for weakness. Negative for dizziness and headaches. Psychiatric/Behavioral: Negative for depression. Physical Exam  Visit Vitals  /74   Pulse 74   Temp 97.7 °F (36.5 °C) (Oral)   Resp 18   Ht 5' 2\" (1.575 m)   Wt 180 lb 3.2 oz (81.7 kg)   SpO2 96%   BMI 32.96 kg/m²       General appearance - alert, well appearing, and in no distress  Eyes - pupils equal and reactive, extraocular eye movements intact  Ears - non obstructing cerumen present   Nose - mucosal erythema, clear rhinorrhea and maxillary sinus tenderness noted  Mouth - pharyngeal erythema present   Neck - supple, no significant adenopathy  Chest - clear to auscultation, no wheezes, rales or rhonchi, symmetric air entry  Heart - distant heart sounds normal rate, regular rhythm, normal S1, S2, no murmurs, rubs, clicks or gallops  Abdomen - soft, nontender, nondistended, no masses or organomegaly  Neurological - alert, oriented, normal speech, no focal findings or movement disorder noted  Musculoskeletal - Weakness and decreased ROM in b/l LEs  Extremities - peripheral pulses normal  Skin - normal coloration and turgor, no rashes, no suspicious skin lesions noted  Psych - normal mood and affect       Assessment/Plan    ICD-10-CM ICD-9-CM    1. Chronic pansinusitis J32.4 473.8    2. Encounter for screening for lung cancer Z12.2 V76.0 CT LOW DOSE LUNG CANCER SCREENING   3. Rhinorrhea J34.89 478.19    4. Influenza vaccine refused Z28.21 V64.06        1. Chronic pansinusitis w/ Rhinorrhea- pt with sxs for past 4 years.  Pt with significant smoking hx and sxs likely partially due to chronic bronchitis    - Continue Zyrtec 10mg Daily   - Start Atrovent . 03% 2 sprays each nostril BID   - Start Singular 10mg Daily   - If sxs do not improve will f/u in 2 weeks to pursue allergy testing and possible CT sinus     2. Screening for lung cancer    - CT LOW DOSE LUNG CANCER SCREENING; Future  - Encouraged Pt to stop smoking, Pt continues to be resistant to idea       I have discussed the diagnosis with the patient and the intended plan as seen in the above orders. The patient has received an after-visit summary and questions were answered concerning future plans. I have discussed medication side effects and warnings with the patient as well.       Viry Correa MD

## 2018-10-25 NOTE — PATIENT INSTRUCTIONS
Sinusitis: Care Instructions  Your Care Instructions    Sinusitis is an infection of the lining of the sinus cavities in your head. Sinusitis often follows a cold. It causes pain and pressure in your head and face. In most cases, sinusitis gets better on its own in 1 to 2 weeks. But some mild symptoms may last for several weeks. Sometimes antibiotics are needed. Follow-up care is a key part of your treatment and safety. Be sure to make and go to all appointments, and call your doctor if you are having problems. It's also a good idea to know your test results and keep a list of the medicines you take. How can you care for yourself at home? · Take an over-the-counter pain medicine, such as acetaminophen (Tylenol), ibuprofen (Advil, Motrin), or naproxen (Aleve). Read and follow all instructions on the label. · If the doctor prescribed antibiotics, take them as directed. Do not stop taking them just because you feel better. You need to take the full course of antibiotics. · Be careful when taking over-the-counter cold or flu medicines and Tylenol at the same time. Many of these medicines have acetaminophen, which is Tylenol. Read the labels to make sure that you are not taking more than the recommended dose. Too much acetaminophen (Tylenol) can be harmful. · Breathe warm, moist air from a steamy shower, a hot bath, or a sink filled with hot water. Avoid cold, dry air. Using a humidifier in your home may help. Follow the directions for cleaning the machine. · Use saline (saltwater) nasal washes to help keep your nasal passages open and wash out mucus and bacteria. You can buy saline nose drops at a grocery store or drugstore. Or you can make your own at home by adding 1 teaspoon of salt and 1 teaspoon of baking soda to 2 cups of distilled water. If you make your own, fill a bulb syringe with the solution, insert the tip into your nostril, and squeeze gently. Yessenia Menchaca your nose.   · Put a hot, wet towel or a warm gel pack on your face 3 or 4 times a day for 5 to 10 minutes each time. · Try a decongestant nasal spray like oxymetazoline (Afrin). Do not use it for more than 3 days in a row. Using it for more than 3 days can make your congestion worse. When should you call for help? Call your doctor now or seek immediate medical care if:    · You have new or worse swelling or redness in your face or around your eyes.     · You have a new or higher fever.    Watch closely for changes in your health, and be sure to contact your doctor if:    · You have new or worse facial pain.     · The mucus from your nose becomes thicker (like pus) or has new blood in it.     · You are not getting better as expected. Where can you learn more? Go to http://lety-radha.info/. Enter M832 in the search box to learn more about \"Sinusitis: Care Instructions. \"  Current as of: March 28, 2018  Content Version: 11.8  © 9277-1560 [employer]. Care instructions adapted under license by [facility] (which disclaims liability or warranty for this information). If you have questions about a medical condition or this instruction, always ask your healthcare professional. [place] [address] any warranty or liability for your use of this information.

## 2018-10-30 DIAGNOSIS — E11.8 DIABETES MELLITUS WITH COMPLICATION (HCC): ICD-10-CM

## 2018-10-30 DIAGNOSIS — F43.10 PTSD (POST-TRAUMATIC STRESS DISORDER): ICD-10-CM

## 2018-10-30 DIAGNOSIS — E11.65 TYPE 2 DIABETES MELLITUS WITH HYPERGLYCEMIA, WITH LONG-TERM CURRENT USE OF INSULIN (HCC): ICD-10-CM

## 2018-10-30 DIAGNOSIS — Z79.4 UNCONTROLLED TYPE 2 DIABETES MELLITUS WITH HYPERGLYCEMIA, WITH LONG-TERM CURRENT USE OF INSULIN (HCC): ICD-10-CM

## 2018-10-30 DIAGNOSIS — I10 ESSENTIAL HYPERTENSION WITH GOAL BLOOD PRESSURE LESS THAN 140/90: ICD-10-CM

## 2018-10-30 DIAGNOSIS — E11.65 UNCONTROLLED TYPE 2 DIABETES MELLITUS WITH HYPERGLYCEMIA, WITH LONG-TERM CURRENT USE OF INSULIN (HCC): ICD-10-CM

## 2018-10-30 DIAGNOSIS — Z79.4 TYPE 2 DIABETES MELLITUS WITH HYPERGLYCEMIA, WITH LONG-TERM CURRENT USE OF INSULIN (HCC): ICD-10-CM

## 2018-10-30 RX ORDER — HYDROCHLOROTHIAZIDE 25 MG/1
TABLET ORAL
Qty: 30 TAB | Refills: 10 | Status: SHIPPED | OUTPATIENT
Start: 2018-10-30 | End: 2018-11-26 | Stop reason: SDUPTHER

## 2018-10-30 RX ORDER — VENLAFAXINE HYDROCHLORIDE 150 MG/1
CAPSULE, EXTENDED RELEASE ORAL
Qty: 30 CAP | Refills: 5 | OUTPATIENT
Start: 2018-10-30

## 2018-10-30 RX ORDER — RANITIDINE 150 MG/1
TABLET, FILM COATED ORAL
Qty: 60 TAB | Refills: 10 | Status: SHIPPED | OUTPATIENT
Start: 2018-10-30 | End: 2018-11-26 | Stop reason: SDUPTHER

## 2018-10-31 DIAGNOSIS — E11.8 DIABETES MELLITUS WITH COMPLICATION (HCC): ICD-10-CM

## 2018-10-31 DIAGNOSIS — F43.10 PTSD (POST-TRAUMATIC STRESS DISORDER): ICD-10-CM

## 2018-10-31 DIAGNOSIS — I10 ESSENTIAL HYPERTENSION WITH GOAL BLOOD PRESSURE LESS THAN 140/90: ICD-10-CM

## 2018-10-31 RX ORDER — VENLAFAXINE HYDROCHLORIDE 150 MG/1
CAPSULE, EXTENDED RELEASE ORAL
Qty: 30 CAP | Refills: 5 | Status: SHIPPED | OUTPATIENT
Start: 2018-10-31 | End: 2019-01-07 | Stop reason: SDUPTHER

## 2018-10-31 RX ORDER — MONTELUKAST SODIUM 10 MG/1
10 TABLET ORAL DAILY
Qty: 30 TAB | Refills: 3 | Status: SHIPPED | OUTPATIENT
Start: 2018-10-31 | End: 2018-11-26 | Stop reason: SDUPTHER

## 2018-11-14 ENCOUNTER — TELEPHONE (OUTPATIENT)
Dept: OBGYN | Age: 63
End: 2018-11-14

## 2018-11-14 DIAGNOSIS — F40.240 CLAUSTROPHOBIA: Primary | ICD-10-CM

## 2018-11-14 RX ORDER — LORAZEPAM 1 MG/1
1 TABLET ORAL AS NEEDED
Qty: 2 TAB | Refills: 0 | OUTPATIENT
Start: 2018-11-14 | End: 2018-11-14 | Stop reason: SDUPTHER

## 2018-11-14 RX ORDER — LORAZEPAM 1 MG/1
1 TABLET ORAL AS NEEDED
Qty: 2 TAB | Refills: 0 | OUTPATIENT
Start: 2018-11-14 | End: 2018-11-14

## 2018-11-14 RX ORDER — LORAZEPAM 1 MG/1
1 TABLET ORAL AS NEEDED
Qty: 2 TAB | Refills: 0 | Status: SHIPPED | OUTPATIENT
Start: 2018-11-14 | End: 2020-02-13

## 2018-11-14 NOTE — TELEPHONE ENCOUNTER
Patient reports hx of claustrophobia and and anxiety. Pt is to take 1 ativan 30 minutes prior to CT study. Pt instructed to not take prior to driving to facility or to find alternative transportation.

## 2018-11-20 ENCOUNTER — HOSPITAL ENCOUNTER (OUTPATIENT)
Dept: CT IMAGING | Age: 63
Discharge: HOME | End: 2018-11-20
Attending: FAMILY MEDICINE
Payer: MEDICAID

## 2018-11-20 DIAGNOSIS — Z12.2 ENCOUNTER FOR SCREENING FOR LUNG CANCER: ICD-10-CM

## 2018-11-20 PROCEDURE — G0297 LDCT FOR LUNG CA SCREEN: HCPCS

## 2018-11-26 RX ORDER — METOPROLOL TARTRATE 25 MG/1
TABLET, FILM COATED ORAL
Qty: 60 TAB | Refills: 2 | Status: SHIPPED | OUTPATIENT
Start: 2018-11-26 | End: 2019-03-05 | Stop reason: SDUPTHER

## 2018-11-30 ENCOUNTER — OFFICE VISIT (OUTPATIENT)
Dept: FAMILY MEDICINE CLINIC | Age: 63
End: 2018-11-30

## 2018-11-30 VITALS
SYSTOLIC BLOOD PRESSURE: 102 MMHG | HEIGHT: 62 IN | HEART RATE: 68 BPM | WEIGHT: 182 LBS | TEMPERATURE: 95.9 F | RESPIRATION RATE: 20 BRPM | DIASTOLIC BLOOD PRESSURE: 42 MMHG | OXYGEN SATURATION: 97 % | BODY MASS INDEX: 33.49 KG/M2

## 2018-11-30 DIAGNOSIS — E11.65 TYPE 2 DIABETES MELLITUS WITH HYPERGLYCEMIA, WITH LONG-TERM CURRENT USE OF INSULIN (HCC): ICD-10-CM

## 2018-11-30 DIAGNOSIS — M48.061 SPINAL STENOSIS OF LUMBAR REGION, UNSPECIFIED WHETHER NEUROGENIC CLAUDICATION PRESENT: ICD-10-CM

## 2018-11-30 DIAGNOSIS — I10 ESSENTIAL HYPERTENSION WITH GOAL BLOOD PRESSURE LESS THAN 140/90: ICD-10-CM

## 2018-11-30 DIAGNOSIS — E04.1 THYROID NODULE: ICD-10-CM

## 2018-11-30 DIAGNOSIS — Z79.4 UNCONTROLLED TYPE 2 DIABETES MELLITUS WITH HYPERGLYCEMIA, WITH LONG-TERM CURRENT USE OF INSULIN (HCC): ICD-10-CM

## 2018-11-30 DIAGNOSIS — Z79.4 TYPE 2 DIABETES MELLITUS WITH HYPERGLYCEMIA, WITH LONG-TERM CURRENT USE OF INSULIN (HCC): ICD-10-CM

## 2018-11-30 DIAGNOSIS — E11.65 UNCONTROLLED TYPE 2 DIABETES MELLITUS WITH HYPERGLYCEMIA, WITH LONG-TERM CURRENT USE OF INSULIN (HCC): ICD-10-CM

## 2018-11-30 DIAGNOSIS — E04.9 ENLARGED THYROID: Primary | ICD-10-CM

## 2018-11-30 RX ORDER — CETIRIZINE HCL 10 MG
TABLET ORAL
Qty: 30 TAB | Refills: 3 | Status: SHIPPED | OUTPATIENT
Start: 2018-11-30 | End: 2019-06-11 | Stop reason: SDUPTHER

## 2018-11-30 RX ORDER — BUSPIRONE HYDROCHLORIDE 7.5 MG/1
7.5 TABLET ORAL 2 TIMES DAILY
Qty: 60 TAB | Refills: 5 | Status: SHIPPED | OUTPATIENT
Start: 2018-11-30 | End: 2019-03-05 | Stop reason: SDUPTHER

## 2018-11-30 RX ORDER — GABAPENTIN 300 MG/1
CAPSULE ORAL
Qty: 60 CAP | Refills: 3 | Status: SHIPPED | OUTPATIENT
Start: 2018-11-30 | End: 2019-01-14 | Stop reason: SDUPTHER

## 2018-11-30 RX ORDER — DOXYCYCLINE 100 MG/1
100 TABLET ORAL 2 TIMES DAILY
Qty: 14 TAB | Refills: 0 | Status: SHIPPED | OUTPATIENT
Start: 2018-11-30 | End: 2019-05-21 | Stop reason: ALTCHOICE

## 2018-11-30 RX ORDER — LISINOPRIL 10 MG/1
TABLET ORAL
Qty: 30 TAB | Refills: 5 | Status: SHIPPED | OUTPATIENT
Start: 2018-11-30 | End: 2019-01-14 | Stop reason: SDUPTHER

## 2018-11-30 RX ORDER — ATORVASTATIN CALCIUM 20 MG/1
TABLET, FILM COATED ORAL
Qty: 30 TAB | Refills: 5 | Status: SHIPPED | OUTPATIENT
Start: 2018-11-30 | End: 2018-12-05 | Stop reason: SDUPTHER

## 2018-11-30 NOTE — PROGRESS NOTES
Here for followup of her CT scan done for tobacco use history    Medication refill    Continues to complain of     Left hip pain

## 2018-11-30 NOTE — PROGRESS NOTES
Chief Complaint   Patient presents with    Hypertension    Other     CT Scan results    Medication Refill     1. Have you been to the ER, urgent care clinic since your last visit? Hospitalized since your last visit? No    2. Have you seen or consulted any other health care providers outside of the [address] [place] [address] since your last visit? Include any pap smears or colon screening.  No    Visit Vitals  /42 (BP 1 Location: Left arm, BP Patient Position: Sitting)   Pulse 68   Temp 95.9 °F (35.5 °C) (Oral)   Resp 20   Ht 5' 2\" (1.575 m)   Wt 182 lb (82.6 kg)   SpO2 97%   BMI 33.29 kg/m²
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
## 2018-12-01 LAB — TSH SERPL DL<=0.005 MIU/L-ACNC: 0.3 UIU/ML (ref 0.45–4.5)

## 2018-12-01 NOTE — PROGRESS NOTES
Multiple issues discussed    Interested in seeing ortho for her back and hip discomfort    I saw and evaluated the patient, performing the key elements of the service. I discussed the findings, assessment and plan with the resident and agree with the resident's findings and plan as documented in the resident's note.

## 2018-12-04 NOTE — PROGRESS NOTES
Outreach made to this patient to schedule a follow up appointment to complete the [facility]. Patient states that she will schedule an appointment for her diabetes follow up.

## 2018-12-05 ENCOUNTER — TELEPHONE (OUTPATIENT)
Dept: FAMILY MEDICINE CLINIC | Age: 63
End: 2018-12-05

## 2018-12-05 RX ORDER — ATORVASTATIN CALCIUM 40 MG/1
40 TABLET, FILM COATED ORAL DAILY
Qty: 30 TAB | Refills: 5 | Status: SHIPPED | OUTPATIENT
Start: 2018-12-05 | End: 2019-02-02 | Stop reason: SDUPTHER

## 2018-12-05 NOTE — TELEPHONE ENCOUNTER
Spoke with Pt over the phone do discuss low TSH level. Pt has thyroid US scheduled for 12/13/18 and has f/u with her endocrinologist on 1/14/19. I was able to speek with Dr. Mayito Henry (Vascular Surgeon) about athroscrotic disease in left subclavian artery who reported that she would need to been seen only if symptomatic.  Will increase Pt's Lipitor to 40mg daily to medically optimize

## 2018-12-07 ENCOUNTER — TELEPHONE (OUTPATIENT)
Dept: FAMILY MEDICINE CLINIC | Age: 63
End: 2018-12-07

## 2018-12-07 NOTE — TELEPHONE ENCOUNTER
Itzel Mccoy  RIKKI---[phone]    Please call for prior authorization for medication order of doxycycline.

## 2018-12-12 NOTE — TELEPHONE ENCOUNTER
2nd message of Louis Jed of request of 12/7. Dr. Blayne García telephone   Received: Today   Message Contents   Zachary, [address] from [place] is requesting a call back in regards to prior auth Doxycycline 100mg also ultrasound for thyroid.  Best contact [phone] Fax [phone]

## 2018-12-13 ENCOUNTER — HOSPITAL ENCOUNTER (OUTPATIENT)
Dept: ULTRASOUND IMAGING | Age: 63
Discharge: HOME | End: 2018-12-13
Attending: FAMILY MEDICINE
Payer: MEDICAID

## 2018-12-13 DIAGNOSIS — E04.1 THYROID NODULE: ICD-10-CM

## 2018-12-13 DIAGNOSIS — E04.9 ENLARGED THYROID: ICD-10-CM

## 2018-12-13 PROCEDURE — 76536 US EXAM OF HEAD AND NECK: CPT

## 2018-12-13 NOTE — TELEPHONE ENCOUNTER
This writer contacted Sandra Major with Nurse Management with [facility]. Per Sandra Mjaor, patient is currently awaiting for outcome of prior authorization for Doxycycline 100mg tablets. [facility] prescription was never filled by patient. No further questions or concerns noted in regards to thyroid ultrasound. [facility] appointment was scheduled for today December 13, 2018 and completed. Prior Authorization for Doxycycline 100mg tablets submitted via [email or web address]. Determination will be sent to the office within 48 to 72 hours.

## 2018-12-14 ENCOUNTER — TELEPHONE (OUTPATIENT)
Dept: FAMILY MEDICINE CLINIC | Age: 63
End: 2018-12-14

## 2018-12-14 NOTE — TELEPHONE ENCOUNTER
Prior auth initiated ref# [id number] Medicaid [phone]. Roland Guerrero states she will ordaz the prior auth. Pt leaving town in 2 hours and needs medication Per Dr. Trevor Meyer call in [facility] to [facility] if authorization not ready in time.  Pt notified to  zpack

## 2018-12-14 NOTE — TELEPHONE ENCOUNTER
Guillermo Olivares of [facility] called to say the the insurance will not cover any of this medication of doxycycline.     [phone]

## 2018-12-14 NOTE — TELEPHONE ENCOUNTER
Incoming call from [employer] . Per Mateo Dunn, observed prior authorization was sent to [facility] on 12/13/2018, however patient is going out of town and determination of prior authorization will not be available. Requesting the physician change Doxycycline to formulary capsules instead of tablets. This writer verbalized understanding. This writer contacted Dr. Sallie Sung in reference to medication changes. Verbal authorization received to change Doxycycline to capsules. VORB. Understanding verbalized. This writer contacted patient's preferred pharmacy on file. [facility] at [place]. This writer spoke with Mikhail Bullock, Pharmacist. Doxycycline changes from tablet to capsules per verbal authorization per Dr. Sallie Sung. Changes repeating for verification. clarification of order. Understanding verbalized. No further action required. Patient notified.

## 2018-12-15 NOTE — PROGRESS NOTES
Thyroid concerning for multinodular goiter, Pt has f/u appointment with her endocrinologist Dr. Arabella Ferguson  on 1/14/18 and will need Free T4 and possible further work up

## 2018-12-26 DIAGNOSIS — F43.10 PTSD (POST-TRAUMATIC STRESS DISORDER): ICD-10-CM

## 2018-12-26 DIAGNOSIS — Z79.4 UNCONTROLLED TYPE 2 DIABETES MELLITUS WITH HYPERGLYCEMIA, WITH LONG-TERM CURRENT USE OF INSULIN (HCC): ICD-10-CM

## 2018-12-26 DIAGNOSIS — E11.65 UNCONTROLLED TYPE 2 DIABETES MELLITUS WITH HYPERGLYCEMIA, WITH LONG-TERM CURRENT USE OF INSULIN (HCC): ICD-10-CM

## 2018-12-26 DIAGNOSIS — I10 ESSENTIAL HYPERTENSION WITH GOAL BLOOD PRESSURE LESS THAN 140/90: ICD-10-CM

## 2018-12-26 DIAGNOSIS — E11.8 DIABETES MELLITUS WITH COMPLICATION (HCC): ICD-10-CM

## 2018-12-26 DIAGNOSIS — Z79.4 TYPE 2 DIABETES MELLITUS WITH HYPERGLYCEMIA, WITH LONG-TERM CURRENT USE OF INSULIN (HCC): ICD-10-CM

## 2018-12-26 DIAGNOSIS — E11.65 TYPE 2 DIABETES MELLITUS WITH HYPERGLYCEMIA, WITH LONG-TERM CURRENT USE OF INSULIN (HCC): ICD-10-CM

## 2018-12-26 RX ORDER — MONTELUKAST SODIUM 10 MG/1
TABLET ORAL
Qty: 30 TAB | Refills: 0 | Status: SHIPPED | OUTPATIENT
Start: 2018-12-26 | End: 2019-06-11 | Stop reason: SDUPTHER

## 2018-12-26 RX ORDER — IPRATROPIUM BROMIDE 21 UG/1
SPRAY, METERED NASAL
Qty: 30 ML | Refills: 2 | Status: SHIPPED | OUTPATIENT
Start: 2018-12-26 | End: 2021-01-01

## 2018-12-26 RX ORDER — HYDROCHLOROTHIAZIDE 25 MG/1
25 TABLET ORAL DAILY
Qty: 30 TAB | Refills: 11 | Status: SHIPPED | OUTPATIENT
Start: 2018-12-26 | End: 2019-02-02 | Stop reason: SDUPTHER

## 2018-12-26 RX ORDER — SITAGLIPTIN 100 MG/1
TABLET, FILM COATED ORAL
Qty: 30 TAB | Refills: 0 | Status: SHIPPED | OUTPATIENT
Start: 2018-12-26 | End: 2020-02-13

## 2018-12-26 RX ORDER — RANITIDINE 150 MG/1
150 TABLET, FILM COATED ORAL 2 TIMES DAILY
Qty: 60 TAB | Refills: 11 | Status: SHIPPED | OUTPATIENT
Start: 2018-12-26 | End: 2019-02-02 | Stop reason: SDUPTHER

## 2018-12-26 RX ORDER — INSULIN DETEMIR 100 [IU]/ML
INJECTION, SOLUTION SUBCUTANEOUS
Qty: 15 ML | Refills: 3 | Status: SHIPPED | OUTPATIENT
Start: 2018-12-26 | End: 2019-09-30 | Stop reason: SDUPTHER

## 2019-01-05 ENCOUNTER — PATIENT MESSAGE (OUTPATIENT)
Dept: FAMILY MEDICINE CLINIC | Age: 64
End: 2019-01-05

## 2019-01-05 DIAGNOSIS — E11.8 DIABETES MELLITUS WITH COMPLICATION (HCC): ICD-10-CM

## 2019-01-05 DIAGNOSIS — I10 ESSENTIAL HYPERTENSION WITH GOAL BLOOD PRESSURE LESS THAN 140/90: ICD-10-CM

## 2019-01-05 DIAGNOSIS — F43.10 PTSD (POST-TRAUMATIC STRESS DISORDER): ICD-10-CM

## 2019-01-07 NOTE — TELEPHONE ENCOUNTER
From: Paulina Soto  Sent: 1/5/2019 11:22 AM EST  Subject: Prescription Question    Dr. Flora Moreno    I hope you've had a awesome holiday season! So sorry to hear about Dr. Lilia Saleem death. He was a very sweet, kind and considerate man. My insurance care coordinator contacted me yesterday in reference to a new glucose meter kit that's covered by my insurance plan. She said for me to contact you with this information. I need a RX sent over to [facility] for it and the supplies. The meter is called TRUE METRIX, I also need a RX for the Test Strips (4 x's a day) and a RX for Lancets (4 x's a day). Also, I have been on a RX for many years called [facility]  mg (1 capsule a day)   I need refill approval at [facility] for it also. I have been out for awhile because it keeps being denied. Is it possible to get this taken care of also.      Warm Regards,   BJ

## 2019-01-10 RX ORDER — BLOOD-GLUCOSE METER
1 EACH MISCELLANEOUS AS NEEDED
Qty: 1 EACH | Refills: 0 | Status: ON HOLD | OUTPATIENT
Start: 2019-01-10 | End: 2021-01-01

## 2019-01-10 RX ORDER — VENLAFAXINE HYDROCHLORIDE 150 MG/1
CAPSULE, EXTENDED RELEASE ORAL
Qty: 30 CAP | Refills: 5 | Status: SHIPPED | OUTPATIENT
Start: 2019-01-10 | End: 2020-02-13

## 2019-01-14 ENCOUNTER — OFFICE VISIT (OUTPATIENT)
Dept: ENDOCRINOLOGY | Age: 64
End: 2019-01-14

## 2019-01-14 VITALS
HEIGHT: 62 IN | DIASTOLIC BLOOD PRESSURE: 49 MMHG | WEIGHT: 180 LBS | RESPIRATION RATE: 16 BRPM | BODY MASS INDEX: 33.13 KG/M2 | HEART RATE: 64 BPM | OXYGEN SATURATION: 98 % | TEMPERATURE: 96.2 F | SYSTOLIC BLOOD PRESSURE: 114 MMHG

## 2019-01-14 DIAGNOSIS — Z79.4 TYPE 2 DIABETES MELLITUS WITH HYPERGLYCEMIA, WITH LONG-TERM CURRENT USE OF INSULIN (HCC): Primary | ICD-10-CM

## 2019-01-14 DIAGNOSIS — E78.2 MIXED HYPERLIPIDEMIA: ICD-10-CM

## 2019-01-14 DIAGNOSIS — E11.65 TYPE 2 DIABETES MELLITUS WITH HYPERGLYCEMIA, WITH LONG-TERM CURRENT USE OF INSULIN (HCC): Primary | ICD-10-CM

## 2019-01-14 DIAGNOSIS — Z79.4 TYPE 2 DIABETES MELLITUS WITH HYPERGLYCEMIA, WITH LONG-TERM CURRENT USE OF INSULIN (HCC): ICD-10-CM

## 2019-01-14 DIAGNOSIS — E11.65 TYPE 2 DIABETES MELLITUS WITH HYPERGLYCEMIA, WITH LONG-TERM CURRENT USE OF INSULIN (HCC): ICD-10-CM

## 2019-01-14 DIAGNOSIS — E04.9 GOITER: ICD-10-CM

## 2019-01-14 DIAGNOSIS — I10 ESSENTIAL HYPERTENSION WITH GOAL BLOOD PRESSURE LESS THAN 140/90: ICD-10-CM

## 2019-01-14 DIAGNOSIS — I10 ESSENTIAL HYPERTENSION: ICD-10-CM

## 2019-01-14 RX ORDER — GABAPENTIN 300 MG/1
CAPSULE ORAL
Qty: 60 CAP | Refills: 11 | Status: SHIPPED | OUTPATIENT
Start: 2019-01-14 | End: 2019-07-09 | Stop reason: SDUPTHER

## 2019-01-14 RX ORDER — LISINOPRIL 10 MG/1
TABLET ORAL
Qty: 30 TAB | Refills: 11 | Status: SHIPPED | OUTPATIENT
Start: 2019-01-14 | End: 2019-11-29 | Stop reason: SDUPTHER

## 2019-01-14 RX ORDER — PEN NEEDLE, DIABETIC 31 GX3/16"
NEEDLE, DISPOSABLE MISCELLANEOUS
Qty: 150 PEN NEEDLE | Refills: 11 | Status: SHIPPED | OUTPATIENT
Start: 2019-01-14 | End: 2019-11-29 | Stop reason: SDUPTHER

## 2019-01-14 NOTE — PATIENT INSTRUCTIONS
Januvia in AM     Basaglar or Lantus or Levemir 38 units in AM     Apidra or Novolog  10 units before dinner     Check sugars twice a day      Apidra or Novolog  fast acting for high sugars    Blood sugar  Breakfast/Lunch/Dinner       130-200  Add  4  Units       201-250  Add  8 Units       251-300  Add  12 Units       301-350  Add 16  Units        351-400  Add 20  Units       Small portions     In short, any food that is high in carbs should be limited. Here is a list of foods that need to be reduced or eliminated on a  diet:   Sugary foods: Soda, fruit juice, smoothies, cake, ice cream, candy, etc.   Grains or starches: Wheat-based products, rice, pasta, cereal, etc.   Fruit: All fruit, except small portions of berries like strawberries. Beans or legumes: Peas, kidney beans, lentils, chickpeas, etc.   Root vegetables and tubers: Potatoes, sweet potatoes, carrots, parsnips, etc.   Low-fat or diet products: These are highly processed and often high in carbs. Some condiments or sauces: These often contain sugar and unhealthy fat. Unhealthy fat: Limit your intake of processed vegetable oils, mayonnaise, etc.   Alcohol: Due to its carb content, many alcoholic beverages can throw you out of ketosis. Sugar-free diet foods: These are often high in sugar alcohols, which can affect ketone levels in some cases. These foods also tend to be highly processed. Foods to Eat  You should base the majority of your meals around these foods:   Meat: Red meat, steak, ham, sausage, merritt, chicken and turkey. Fatty fish: Such as salmon, trout, tuna and mackerel. Eggs: Look for pastured or omega-3 whole eggs. Butter and cream: Look for grass-fed when possible. Cheese: Unprocessed cheese (cheddar, goat, cream, blue or mozzarella). Nuts and seeds: Almonds, walnuts, flaxseeds, pumpkin seeds, guilherme seeds, etc.   Healthy oils: Primarily extra virgin olive oil, coconut oil and avocado oil.    Avocados: Whole avocados or freshly made guacamole. Low-carb veggies: Most green veggies, tomatoes, onions, peppers, etc.   Condiments: You can use salt, pepper and various healthy herbs and spices. A Sample Meal Plan For 1 Week    Monday  Breakfast: Yuridia Poplar, eggs and tomatoes. Lunch: Chicken salad with olive oil and feta cheese. Dinner: Khang Ethan with asparagus cooked in butter. Tuesday  Breakfast: Egg, tomato, basil and goat cheese omelet. Lunch: [place] milk, peanut butter, cocoa powder and stevia milkshake. Dinner: Meatballs, cheddar cheese and vegetables. Wednesday  Breakfast: A ketogenic milkshake (try this or this). Lunch: Shrimp salad with olive oil and avocado. Dinner: kooaba with Parmesan cheese, broccoli and salad. Thursday  Breakfast: Omelet with avocado, salsa, peppers, onion and spices. Lunch: A handful of nuts and celery sticks with guacamole and salsa. Dinner: Chicken stuffed with pesto and cream cheese, along with vegetables. Friday  Breakfast: Sugar-free yogurt with peanut butter, cocoa powder and stevia. Lunch: Beef stir-oliver cooked in coconut oil with vegetables. Dinner: [employer] with merritt, egg and cheese. Saturday  Breakfast: Ham and cheese omelet with vegetables. Lunch: Ham and cheese slices with nuts. Dinner: White fish, egg and spinach cooked in coconut oil. Sunday  Breakfast: Fried eggs with merritt and mushrooms. Lunch: Damion Montano with salsa, cheese and guacamole. Dinner: Steak and eggs with a side salad.

## 2019-01-14 NOTE — PROGRESS NOTES
Marcella Bowser is a 66 y.o. female here for   Chief Complaint   Patient presents with    Diabetes       Functional glucose monitor and record keeping system? -yes   Eye exam within last year? - on file  Foot exam within last year? - due    1. Have you been to the ER, urgent care clinic since your last visit? Hospitalized since your last visit? -no    2. Have you seen or consulted any other health care providers outside of the [address] [place] [address] since your last visit? Include any pap smears or colon screening. -PCP
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
## 2019-01-15 LAB
ALBUMIN SERPL-MCNC: 4 G/DL (ref 3.6–4.8)
ALBUMIN/CREAT UR: 86.2 MG/G CREAT (ref 0–30)
ALBUMIN/GLOB SERPL: 1.5 {RATIO} (ref 1.2–2.2)
ALP SERPL-CCNC: 76 IU/L (ref 39–117)
ALT SERPL-CCNC: 12 IU/L (ref 0–32)
AST SERPL-CCNC: 8 IU/L (ref 0–40)
BILIRUB SERPL-MCNC: 0.3 MG/DL (ref 0–1.2)
BUN SERPL-MCNC: 32 MG/DL (ref 8–27)
BUN/CREAT SERPL: 29 (ref 12–28)
CALCIUM SERPL-MCNC: 9.4 MG/DL (ref 8.7–10.3)
CHLORIDE SERPL-SCNC: 104 MMOL/L (ref 96–106)
CHOLEST SERPL-MCNC: 115 MG/DL (ref 100–199)
CO2 SERPL-SCNC: 20 MMOL/L (ref 20–29)
CREAT SERPL-MCNC: 1.11 MG/DL (ref 0.57–1)
CREAT UR-MCNC: 201.3 MG/DL
EST. AVERAGE GLUCOSE BLD GHB EST-MCNC: 171 MG/DL
GLOBULIN SER CALC-MCNC: 2.7 G/DL (ref 1.5–4.5)
GLUCOSE SERPL-MCNC: 264 MG/DL (ref 65–99)
HBA1C MFR BLD: 7.6 % (ref 4.8–5.6)
HDLC SERPL-MCNC: 36 MG/DL
INTERPRETATION, 910389: NORMAL
INTERPRETATION: NORMAL
LDLC SERPL CALC-MCNC: 43 MG/DL (ref 0–99)
Lab: NORMAL
MICROALBUMIN UR-MCNC: 173.5 UG/ML
PDF IMAGE, 910387: NORMAL
POTASSIUM SERPL-SCNC: 4.9 MMOL/L (ref 3.5–5.2)
PROT SERPL-MCNC: 6.7 G/DL (ref 6–8.5)
SODIUM SERPL-SCNC: 138 MMOL/L (ref 134–144)
T4 FREE SERPL-MCNC: 1.53 NG/DL (ref 0.82–1.77)
TRIGL SERPL-MCNC: 180 MG/DL (ref 0–149)
TSH SERPL DL<=0.005 MIU/L-ACNC: 0.31 UIU/ML (ref 0.45–4.5)
VLDLC SERPL CALC-MCNC: 36 MG/DL (ref 5–40)

## 2019-01-16 DIAGNOSIS — E05.90 HYPERTHYROIDISM: Primary | ICD-10-CM

## 2019-01-16 DIAGNOSIS — E04.2 MULTINODULAR GOITER: ICD-10-CM

## 2019-01-17 NOTE — PROGRESS NOTES
She needs thyroid uptake and scan which will be ordered, the scan is different from ultrasound which will give more information as her thyroid is still slightly fast.

## 2019-01-18 ENCOUNTER — TELEPHONE (OUTPATIENT)
Dept: ENDOCRINOLOGY | Age: 64
End: 2019-01-18

## 2019-01-18 NOTE — TELEPHONE ENCOUNTER
Informed pt of results below. Explained test will be ordered and  will call next week to schedule.  Pt verbalized understanding.     ----- Message from Fran Brown MD sent at 1/16/2019 10:54 PM EST -----  She needs thyroid uptake and scan which will be ordered, the scan is different from ultrasound which will give more information as her thyroid is still slightly fast.

## 2019-02-02 DIAGNOSIS — E11.8 DIABETES MELLITUS WITH COMPLICATION (HCC): ICD-10-CM

## 2019-02-02 DIAGNOSIS — E11.65 UNCONTROLLED TYPE 2 DIABETES MELLITUS WITH HYPERGLYCEMIA, WITH LONG-TERM CURRENT USE OF INSULIN (HCC): ICD-10-CM

## 2019-02-02 DIAGNOSIS — F43.10 PTSD (POST-TRAUMATIC STRESS DISORDER): ICD-10-CM

## 2019-02-02 DIAGNOSIS — E11.65 TYPE 2 DIABETES MELLITUS WITH HYPERGLYCEMIA, WITH LONG-TERM CURRENT USE OF INSULIN (HCC): ICD-10-CM

## 2019-02-02 DIAGNOSIS — Z79.4 TYPE 2 DIABETES MELLITUS WITH HYPERGLYCEMIA, WITH LONG-TERM CURRENT USE OF INSULIN (HCC): ICD-10-CM

## 2019-02-02 DIAGNOSIS — Z79.4 UNCONTROLLED TYPE 2 DIABETES MELLITUS WITH HYPERGLYCEMIA, WITH LONG-TERM CURRENT USE OF INSULIN (HCC): ICD-10-CM

## 2019-02-02 DIAGNOSIS — I10 ESSENTIAL HYPERTENSION WITH GOAL BLOOD PRESSURE LESS THAN 140/90: ICD-10-CM

## 2019-02-02 RX ORDER — INSULIN DETEMIR 100 [IU]/ML
INJECTION, SOLUTION SUBCUTANEOUS
Qty: 15 ML | Refills: 2 | Status: SHIPPED | OUTPATIENT
Start: 2019-02-02 | End: 2019-03-14 | Stop reason: SDUPTHER

## 2019-02-04 RX ORDER — HYDROCHLOROTHIAZIDE 25 MG/1
TABLET ORAL
Qty: 30 TAB | Refills: 0 | Status: SHIPPED | OUTPATIENT
Start: 2019-02-04 | End: 2019-11-29 | Stop reason: SDUPTHER

## 2019-02-04 RX ORDER — RANITIDINE 150 MG/1
TABLET, FILM COATED ORAL
Qty: 60 TAB | Refills: 0 | Status: SHIPPED | OUTPATIENT
Start: 2019-02-04 | End: 2020-02-13

## 2019-02-07 DIAGNOSIS — J40 BRONCHITIS: ICD-10-CM

## 2019-02-07 DIAGNOSIS — R06.2 WHEEZING: ICD-10-CM

## 2019-02-11 ENCOUNTER — HOSPITAL ENCOUNTER (OUTPATIENT)
Dept: NUCLEAR MEDICINE | Age: 64
Discharge: HOME | End: 2019-02-11
Attending: INTERNAL MEDICINE
Payer: MEDICAID

## 2019-02-11 DIAGNOSIS — E04.2 MULTINODULAR GOITER: ICD-10-CM

## 2019-02-11 DIAGNOSIS — E05.90 HYPERTHYROIDISM: ICD-10-CM

## 2019-02-11 PROCEDURE — 78014 THYROID IMAGING W/BLOOD FLOW: CPT

## 2019-02-11 RX ORDER — ALBUTEROL SULFATE 90 UG/1
2 AEROSOL, METERED RESPIRATORY (INHALATION)
Qty: 1 INHALER | Refills: 0 | Status: SHIPPED | OUTPATIENT
Start: 2019-02-11 | End: 2019-03-04 | Stop reason: SDUPTHER

## 2019-02-12 ENCOUNTER — HOSPITAL ENCOUNTER (OUTPATIENT)
Dept: NUCLEAR MEDICINE | Age: 64
Discharge: HOME | End: 2019-02-12
Attending: INTERNAL MEDICINE
Payer: MEDICAID

## 2019-02-13 ENCOUNTER — PATIENT MESSAGE (OUTPATIENT)
Dept: FAMILY MEDICINE CLINIC | Age: 64
End: 2019-02-13

## 2019-02-13 DIAGNOSIS — M48.061 SPINAL STENOSIS OF LUMBAR REGION, UNSPECIFIED WHETHER NEUROGENIC CLAUDICATION PRESENT: Primary | ICD-10-CM

## 2019-02-13 RX ORDER — OSELTAMIVIR PHOSPHATE 75 MG/1
75 CAPSULE ORAL DAILY
Qty: 14 CAP | Refills: 0 | Status: SHIPPED | OUTPATIENT
Start: 2019-02-13 | End: 2019-02-27

## 2019-02-14 NOTE — TELEPHONE ENCOUNTER
Isent the referral to [facility] and [address] [place] [address] for a 2 week course of influenza prophylaxis given your close contact exposure and risk factors

## 2019-02-14 NOTE — TELEPHONE ENCOUNTER
From: Aneta Lopez  Sent: 2/13/2019 1:49 PM EST  Subject: Prescription Question    Dr. Lenard Quinn but I'm not able to send this to you directly because your not listed in [employer] as a choice. Dr. Nya Lewis suggested I contact you and request a rx for Tamiflu as a preventative. My 11 yr old grandson,  Shama Chavarria son tested positive for Strep and the Flu this morning. Also would you email me a referral for the [facility]. I was evaluated yesterday and scheduled for PT with Dr. Josette Davis, however they will only see me for 30 days without it.      Thank you   Aneta Lopez

## 2019-02-21 ENCOUNTER — TELEPHONE (OUTPATIENT)
Dept: FAMILY MEDICINE CLINIC | Age: 64
End: 2019-02-21

## 2019-02-21 NOTE — TELEPHONE ENCOUNTER
[phone]  Bruno Blount of [facility]    Called for referral for physical therapy. She said the patient told them that she had sent an e-mail to the doctor for this referral.  She said the patient has already been seeing them.

## 2019-03-04 DIAGNOSIS — R06.2 WHEEZING: ICD-10-CM

## 2019-03-04 DIAGNOSIS — J40 BRONCHITIS: ICD-10-CM

## 2019-03-04 RX ORDER — ALBUTEROL SULFATE 90 UG/1
AEROSOL, METERED RESPIRATORY (INHALATION)
Qty: 8.5 INHALER | Refills: 0 | Status: SHIPPED | OUTPATIENT
Start: 2019-03-04 | End: 2019-06-11 | Stop reason: SDUPTHER

## 2019-03-05 NOTE — TELEPHONE ENCOUNTER
----- Message from Shay Mendez sent at 3/5/2019  9:18 AM EST -----  Regarding: DR Art Alfonso / REFILL  Pt is requesting that  \"Metoprolol\" 25 mg and \"Busprione\" 7.5 mg  called into [facility] on file.          Best contact:  [phone]

## 2019-03-08 RX ORDER — METOPROLOL TARTRATE 25 MG/1
TABLET, FILM COATED ORAL
Qty: 60 TAB | Refills: 2 | Status: SHIPPED | OUTPATIENT
Start: 2019-03-08 | End: 2019-06-11 | Stop reason: SDUPTHER

## 2019-03-08 RX ORDER — BUSPIRONE HYDROCHLORIDE 7.5 MG/1
7.5 TABLET ORAL 2 TIMES DAILY
Qty: 60 TAB | Refills: 5 | Status: SHIPPED | OUTPATIENT
Start: 2019-03-08 | End: 2020-02-13

## 2019-03-14 ENCOUNTER — OFFICE VISIT (OUTPATIENT)
Dept: FAMILY MEDICINE CLINIC | Age: 64
End: 2019-03-14

## 2019-03-14 VITALS
WEIGHT: 187 LBS | RESPIRATION RATE: 16 BRPM | SYSTOLIC BLOOD PRESSURE: 113 MMHG | HEIGHT: 62 IN | DIASTOLIC BLOOD PRESSURE: 49 MMHG | BODY MASS INDEX: 34.41 KG/M2

## 2019-03-14 DIAGNOSIS — S91.312A LACERATION OF LEFT HEEL WITHOUT COMPLICATION, INITIAL ENCOUNTER: Primary | ICD-10-CM

## 2019-03-14 DIAGNOSIS — Z51.89 ENCOUNTER FOR WOUND CARE: ICD-10-CM

## 2019-03-14 RX ORDER — DOXYCYCLINE 100 MG/1
100 TABLET ORAL 2 TIMES DAILY
Qty: 20 TAB | Refills: 0 | Status: SHIPPED | OUTPATIENT
Start: 2019-03-14 | End: 2019-03-24

## 2019-03-14 NOTE — PROGRESS NOTES
[address] [facility] [address] [address]   Office [phone], Fax [phone]    Subjective:     Chief Complaint   Patient presents with    Blister     bottom of left foot     History provided by patient     HPI:  Adelia Freeman is a 60 y.o. WHITE [place]  female presents for skin break on L heel. Significant history pertinent to presentation includes DM2 (A1c 7.6 on 1/2019), R toe amputation. L heel skin break  - Pt woke up this morning and found a skin break on her L heel. Not sure how it happened. Pt mentions that she has been attending PT session for her sciatica for the past [facility]). +Pain. Denies fever/chills. - Sees Podiatrist (Nima Fernandez - q6-8wks) - has appointment on Monday. Medication reviewed. Allergy reviewed. ROS (bolded are positive):   General Negative for fever, chills, changes in weight, changes in appetite   CV Negative for chest pain, palpitations, edema   Respiratory Negative for cough, shortness of breath, wheezing   Skin Negative for itching, rash, hives, skin break   Neuro Negative for dizziness, headache, confusion, weakness   Psych Negative for anxiety, depression, change in mood     Objective:   Vitals - reviewed  Visit Vitals  /49   Resp 16   Ht 5' 2\" (1.575 m)   Wt 187 lb (84.8 kg)   BMI 34.20 kg/m²        Physical exam:   GEN: NAD. Alert. Well nourished. LUNGS: Respirations unlabored; CTAB. no wheeze, rales, rhonchi   CARDIOVASCULAR: Regular, rate, and rhythm without murmurs, gallops or rubs. Good peripheral pulse. NEUROLOGIC:  No focal neurologic deficits. Strength and sensation grossly intact. MSK: FROM in all extremities (both passive and active). Good tone. EXT: Well perfused. No edema. No erythema. PSYCH: appropriate mood and affect. Good insight and judgement. Cooperative.    SKIN: L heel skin break with clear discharge, no pus    Pertinent Labs/Studies:   - A1c 7.6       Procedure Note    Performed By:  Rl Olmedo MD     Assistant:  Dr Jessica Mcdonald (Nurse Practioner student)    Anesthesia: None     Procedure Details: The risks,benefits and alternatives  were explained and consent was obtained for the procedure. Time out performed, cross checking patient ID and procedure. The area was cleansed with Betadine and sterile techniques used. Extra skin peel from laceration removed. Bacitracin cream placed. Wrapped appropriately. Estimated Blood Loss:  None           Complications:  None; patient tolerated the procedure well. Condition: stable           Signed By: Rl Olmedo MD    Attending physician present during the procedure: Dr Yossi Ackerman and orders:       ICD-10-CM ICD-9-CM    1. Laceration of left heel without complication, initial encounter S91.312A 892.0 doxycycline (ADOXA) 100 mg tablet      NH DEBRIDEMENT, SKIN, SUB-Q TISSUE,=<20 SQ CM   2. Encounter for wound care Z51.89 V58.89 doxycycline (ADOXA) 100 mg tablet     Diagnoses and all orders for this visit:    1. Laceration of left heel without complication, initial encounter. No clear signs of infection. Cleaned and wrapped with bacitracin. Precautions given to return. Proper skin care informed. Given doxycycline to hold on to, to start if it looks infected given hx of previous amputation from skin laceration. Pt to also follow up with her podiatrist on Monday.   -     doxycycline (ADOXA) 100 mg tablet; Take 1 Tab by mouth two (2) times a day for 10 days.  -     NH DEBRIDEMENT, SKIN, SUB-Q TISSUE,=<20 SQ CM    2. Encounter for wound care  -     doxycycline (ADOXA) 100 mg tablet; Take 1 Tab by mouth two (2) times a day for 10 days. Follow-up Disposition:  Return for 2-4 days for wound check. Pt was discussed with Dr Padmini Costa (attending physician). I have reviewed patient medical and social history and medications. I have reviewed pertinent labs results and other data.  I have discussed the diagnosis with the patient and the intended plan as seen in the above orders. The patient has received an after-visit summary and questions were answered concerning future plans. I have discussed medication side effects and warnings with the patient as well.     Abhishek Montoya MD  Resident [facility]  03/15/19

## 2019-03-14 NOTE — PROGRESS NOTES
Chief Complaint   Patient presents with    Blister     bottom of left foot     1. Have you been to the ER, urgent care clinic since your last visit? Hospitalized since your last visit? No    2. Have you seen or consulted any other health care providers outside of the [address] [place] [address] since your last visit? Include any pap smears or colon screening.  No

## 2019-03-15 ENCOUNTER — TELEPHONE (OUTPATIENT)
Dept: FAMILY MEDICINE CLINIC | Age: 64
End: 2019-03-15

## 2019-03-15 NOTE — PATIENT INSTRUCTIONS
Cuts Left Open: Care Instructions  Your Care Instructions    A cut can happen anywhere on your body. Sometimes a cut can injure the tendons, blood vessels, or nerves. A cut may be left open instead of being closed with stitches, staples, or adhesive. A cut may be left open when it is likely to become infected, because closing it can make infection even more likely. You will probably have a bandage. The doctor may want the cut to stay open the whole time it heals. This happens with some cuts when too much time has gone by since the cut happened. Or the doctor may tell you to come back to have the cut closed in 4 to 5 days, when there is less chance of infection. If the cut stays open while healing, your scar may be larger than if the cut was closed. But you can get treatment later to make the scar smaller. The doctor has checked you carefully, but problems can develop later. If you notice any problems or new symptoms, get medical treatment right away. Follow-up care is a key part of your treatment and safety. Be sure to make and go to all appointments, and call your doctor if you are having problems. It's also a good idea to know your test results and keep a list of the medicines you take. How can you care for yourself at home? · Keep the cut dry for the first 24 to 48 hours. After this, you can shower if your doctor okays it. Pat the cut dry. · Don't soak the cut, such as in a bathtub. Your doctor will tell you when it's safe to get the cut wet. · If your doctor told you how to care for your cut, follow your doctor's instructions. If you did not get instructions, follow this general advice:  ? After the first 24 to 48 hours, wash the cut with clean water 2 times a day. Don't use hydrogen peroxide or alcohol, which can slow healing. ? You may cover the cut with a thin layer of petroleum jelly, such as Vaseline, and a nonstick bandage. ?  Apply more petroleum jelly and replace the bandage as needed. · Prop up the injured area on a pillow anytime you sit or lie down during the next 3 days. Try to keep it above the level of your heart. This will help reduce swelling. · Avoid any activity that could cause your cut to get worse. · Take pain medicines exactly as directed. ? If the doctor gave you a prescription medicine for pain, take it as prescribed. ? If you are not taking a prescription pain medicine, ask your doctor if you can take an over-the-counter medicine. When should you call for help? Call your doctor now or seek immediate medical care if:    · You have new pain, or your pain gets worse.     · The cut starts to bleed, and blood soaks through the bandage. Oozing small amounts of blood is normal.     · The skin near the cut is cold or pale or changes color.     · You have tingling, weakness, or numbness near the cut.     · You have trouble moving the area near the cut.     · You have symptoms of infection, such as:  ? Increased pain, swelling, warmth, or redness around the cut.  ? Red streaks leading from the cut.  ? Pus draining from the cut.  ? A fever.    Watch closely for changes in your health, and be sure to contact your doctor if:    · The cut is not closing (getting smaller).     · You do not get better as expected. Where can you learn more? Go to http://lety-radha.info/. Enter [id number] in the search box to learn more about \"Cuts Left Open: Care Instructions. \"  Current as of: September 23, 2018  Content Version: 11.9  © 4638-4741 [employer], Incorporated. Care instructions adapted under license by [employer] (which disclaims liability or warranty for this information). If you have questions about a medical condition or this instruction, always ask your healthcare professional. [place] [address] any warranty or liability for your use of this information.

## 2019-03-15 NOTE — PROGRESS NOTES
[facility] Residency Attending Addendum:  I saw and evaluated the patient on the day of the encounter with Vladimir Bronson MD  , performing the key elements of the service. I discussed the findings, assessment and plan with the resident and agree with the resident's findings and plan as documented in the resident's note.       Ramon Concepcion MD, CAQSM, RMSK

## 2019-03-15 NOTE — TELEPHONE ENCOUNTER
----- Message from Nery Nugent sent at 3/15/2019 10:04 AM EDT -----  Regarding:  / refill   Patient is requesting office to call [facility] ([phone]) and have them switch her perscriptions from Tablets to  Name brand Capsules of \"Doxycline\" 100 mg Best contact [phone]

## 2019-03-16 NOTE — TELEPHONE ENCOUNTER
Called pharmacy who ran brand capsule and was notified that insurance did not cover it. Per pharmacy record, pt has filled the generic version previously and tolerated it well. Asked to get the generic ready. Pt informed.

## 2019-04-02 ENCOUNTER — TELEPHONE (OUTPATIENT)
Dept: FAMILY MEDICINE CLINIC | Age: 64
End: 2019-04-02

## 2019-04-02 NOTE — TELEPHONE ENCOUNTER
Per call from patient, notes she was seen for blister on left foot. She is needing more of the sterile solution to clean site.     Please call [phone]

## 2019-04-02 NOTE — TELEPHONE ENCOUNTER
Spoke with pt and she needs saline solution. Instructed her to go to pharmacy and purchase. She will ask the Pharmacist for help.

## 2019-05-15 ENCOUNTER — APPOINTMENT (OUTPATIENT)
Dept: ENDOCRINOLOGY | Age: 64
End: 2019-05-15

## 2019-05-15 DIAGNOSIS — E78.2 MIXED HYPERLIPIDEMIA: ICD-10-CM

## 2019-05-15 DIAGNOSIS — Z79.4 TYPE 2 DIABETES MELLITUS WITH HYPERGLYCEMIA, WITH LONG-TERM CURRENT USE OF INSULIN (HCC): ICD-10-CM

## 2019-05-15 DIAGNOSIS — I10 ESSENTIAL HYPERTENSION: ICD-10-CM

## 2019-05-15 DIAGNOSIS — E11.65 TYPE 2 DIABETES MELLITUS WITH HYPERGLYCEMIA, WITH LONG-TERM CURRENT USE OF INSULIN (HCC): ICD-10-CM

## 2019-05-16 LAB
ALBUMIN SERPL-MCNC: 3.7 G/DL (ref 3.6–4.8)
ALBUMIN/CREAT UR: 101 MG/G CREAT (ref 0–30)
ALBUMIN/GLOB SERPL: 1.2 {RATIO} (ref 1.2–2.2)
ALP SERPL-CCNC: 82 IU/L (ref 39–117)
ALT SERPL-CCNC: 12 IU/L (ref 0–32)
AST SERPL-CCNC: 13 IU/L (ref 0–40)
BILIRUB SERPL-MCNC: 0.2 MG/DL (ref 0–1.2)
BUN SERPL-MCNC: 26 MG/DL (ref 8–27)
BUN/CREAT SERPL: 27 (ref 12–28)
CALCIUM SERPL-MCNC: 9.5 MG/DL (ref 8.7–10.3)
CHLORIDE SERPL-SCNC: 103 MMOL/L (ref 96–106)
CHOLEST SERPL-MCNC: 119 MG/DL (ref 100–199)
CO2 SERPL-SCNC: 22 MMOL/L (ref 20–29)
CREAT SERPL-MCNC: 0.98 MG/DL (ref 0.57–1)
CREAT UR-MCNC: 69.4 MG/DL
EST. AVERAGE GLUCOSE BLD GHB EST-MCNC: 183 MG/DL
GLOBULIN SER CALC-MCNC: 3 G/DL (ref 1.5–4.5)
GLUCOSE SERPL-MCNC: 108 MG/DL (ref 65–99)
HBA1C MFR BLD: 8 % (ref 4.8–5.6)
HDLC SERPL-MCNC: 37 MG/DL
INTERPRETATION, 910389: NORMAL
LDLC SERPL CALC-MCNC: 57 MG/DL (ref 0–99)
Lab: NORMAL
MICROALBUMIN UR-MCNC: 70.1 UG/ML
POTASSIUM SERPL-SCNC: 5.6 MMOL/L (ref 3.5–5.2)
PROT SERPL-MCNC: 6.7 G/DL (ref 6–8.5)
SODIUM SERPL-SCNC: 139 MMOL/L (ref 134–144)
TRIGL SERPL-MCNC: 127 MG/DL (ref 0–149)
VLDLC SERPL CALC-MCNC: 25 MG/DL (ref 5–40)

## 2019-05-20 NOTE — PROGRESS NOTES
Pennie Concepcion is a 60 y.o. female here for   Chief Complaint   Patient presents with    Diabetes     wants to decrease BP meds    Functional glucose monitor and record keeping system? - yes  Eye exam within last year? - [facility]AR[facility] Dr. Shi Gave exam within last year? - on file    1. Have you been to the ER, urgent care clinic since your last visit? Hospitalized since your last visit? -no    2. Have you seen or consulted any other health care providers outside of the [address] [place] [address] since your last visit?   Include any pap smears or colon screening.-no

## 2019-05-21 ENCOUNTER — OFFICE VISIT (OUTPATIENT)
Dept: ENDOCRINOLOGY | Age: 64
End: 2019-05-21

## 2019-05-21 VITALS
OXYGEN SATURATION: 97 % | SYSTOLIC BLOOD PRESSURE: 137 MMHG | TEMPERATURE: 96.3 F | DIASTOLIC BLOOD PRESSURE: 43 MMHG | RESPIRATION RATE: 16 BRPM | BODY MASS INDEX: 33.86 KG/M2 | HEART RATE: 62 BPM | WEIGHT: 184 LBS | HEIGHT: 62 IN

## 2019-05-21 DIAGNOSIS — E16.2 HYPOGLYCEMIA: ICD-10-CM

## 2019-05-21 DIAGNOSIS — I10 ESSENTIAL HYPERTENSION: ICD-10-CM

## 2019-05-21 DIAGNOSIS — E11.65 TYPE 2 DIABETES MELLITUS WITH HYPERGLYCEMIA, WITH LONG-TERM CURRENT USE OF INSULIN (HCC): Primary | ICD-10-CM

## 2019-05-21 DIAGNOSIS — E78.2 MIXED HYPERLIPIDEMIA: ICD-10-CM

## 2019-05-21 DIAGNOSIS — Z79.4 TYPE 2 DIABETES MELLITUS WITH HYPERGLYCEMIA, WITH LONG-TERM CURRENT USE OF INSULIN (HCC): Primary | ICD-10-CM
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
## 2019-05-21 NOTE — PATIENT INSTRUCTIONS
Januvia in AM     Basaglar or Lantus or Levemir 38 units in AM     Apidra or Novolog  10 units before dinner     Check sugars twice a day      Apidra or Novolog  fast acting for high sugars    Blood sugar  Breakfast/Lunch/Dinner       130-200  Add  4  Units       201-250  Add  8 Units       251-300  Add  12 Units       301-350  Add 16  Units        351-400  Add 20  Units       Small portions     In short, any food that is high in carbs should be limited. Here is a list of foods that need to be reduced or eliminated on a  diet:   Sugary foods: Soda, fruit juice, smoothies, cake, ice cream, candy, etc.   Grains or starches: Wheat-based products, rice, pasta, cereal, etc.   Fruit: All fruit, except small portions of berries like strawberries. Beans or legumes: Peas, kidney beans, lentils, chickpeas, etc.   Root vegetables and tubers: Potatoes, sweet potatoes, carrots, parsnips, etc.   Low-fat or diet products: These are highly processed and often high in carbs. Some condiments or sauces: These often contain sugar and unhealthy fat. Unhealthy fat: Limit your intake of processed vegetable oils, mayonnaise, etc.   Alcohol: Due to its carb content, many alcoholic beverages can throw you out of ketosis. Sugar-free diet foods: These are often high in sugar alcohols, which can affect ketone levels in some cases. These foods also tend to be highly processed. Foods to Eat  You should base the majority of your meals around these foods:   Meat: Red meat, steak, ham, sausage, merritt, chicken and turkey. Fatty fish: Such as salmon, trout, tuna and mackerel. Eggs: Look for pastured or omega-3 whole eggs. Butter and cream: Look for grass-fed when possible. Cheese: Unprocessed cheese (cheddar, goat, cream, blue or mozzarella). Nuts and seeds: Almonds, walnuts, flaxseeds, pumpkin seeds, guilherme seeds, etc.   Healthy oils: Primarily extra virgin olive oil, coconut oil and avocado oil.    Avocados: Whole avocados or freshly made guacamole. Low-carb veggies: Most green veggies, tomatoes, onions, peppers, etc.   Condiments: You can use salt, pepper and various healthy herbs and spices. A Sample Meal Plan For 1 Week    Monday  Breakfast: Manju Hanover, eggs and tomatoes. Lunch: Chicken salad with olive oil and feta cheese. Dinner: Awanda Luc with asparagus cooked in butter. Tuesday  Breakfast: Egg, tomato, basil and goat cheese omelet. Lunch: [place] milk, peanut butter, cocoa powder and stevia milkshake. Dinner: Meatballs, cheddar cheese and vegetables. Wednesday  Breakfast: A ketogenic milkshake (try this or this). Lunch: Shrimp salad with olive oil and avocado. Dinner: [place] with Parmesan cheese, broccoli and salad. Thursday  Breakfast: Omelet with avocado, salsa, peppers, onion and spices. Lunch: A handful of nuts and celery sticks with guacamole and salsa. Dinner: Chicken stuffed with pesto and cream cheese, along with vegetables. Friday  Breakfast: Sugar-free yogurt with peanut butter, cocoa powder and stevia. Lunch: Beef stir-oliver cooked in coconut oil with vegetables. Dinner: [employer] with merritt, egg and cheese. Saturday  Breakfast: Ham and cheese omelet with vegetables. Lunch: Ham and cheese slices with nuts. Dinner: White fish, egg and spinach cooked in coconut oil. Sunday  Breakfast: Fried eggs with merritt and mushrooms. Lunch: Juana Burrs with salsa, cheese and guacamole. Dinner: Steak and eggs with a side salad.

## 2019-06-04 ENCOUNTER — OFFICE VISIT (OUTPATIENT)
Dept: FAMILY MEDICINE CLINIC | Age: 64
End: 2019-06-04

## 2019-06-04 VITALS
TEMPERATURE: 98.5 F | DIASTOLIC BLOOD PRESSURE: 75 MMHG | SYSTOLIC BLOOD PRESSURE: 126 MMHG | RESPIRATION RATE: 20 BRPM | HEART RATE: 64 BPM | HEIGHT: 62 IN | OXYGEN SATURATION: 98 % | WEIGHT: 184 LBS | BODY MASS INDEX: 33.86 KG/M2

## 2019-06-04 DIAGNOSIS — R07.81 RIB PAIN ON RIGHT SIDE: Primary | ICD-10-CM

## 2019-06-04 RX ORDER — LIDOCAINE 50 MG/G
PATCH TOPICAL
Qty: 10 EACH | Refills: 0 | Status: SHIPPED | OUTPATIENT
Start: 2019-06-04 | End: 2020-02-13

## 2019-06-04 NOTE — PATIENT INSTRUCTIONS
Follow up in 1 week   Use one lidocaine patch every day  Make sure to take deep breaths! Bruised Rib: Care Instructions  Overview    You can get a bruised rib if you fall or get hit, such as in an accident or while playing sports. The medical term for a bruise is \"contusion. \" Small blood vessels get torn and leak blood under the skin. Most people think of a bruise as a black-and-blue area. But bones and muscles can also get bruised. An injury may damage the rib but not cause a bruise that you can see. Sometimes it can be hard to tell if a rib is bruised or broken. The symptoms may be the same. And a broken bone can't always be seen on an X-ray. But the treatment for a bruised rib is often the same as treatment for a broken one. An injury to the ribs can cause pain. The pain may be worse when you breathe deeply, cough, or sneeze. In most cases, a bruised rib will heal on its own. You can take pain medicine while the rib mends. Pain relief allows you to take deep breaths. Follow-up care is a key part of your treatment and safety. Be sure to make and go to all appointments, and call your doctor if you are having problems. It's also a good idea to know your test results and keep a list of the medicines you take. How can you care for yourself at home? · Rest and protect the injured or sore area. Stop, change, or take a break from any activity that causes pain. · Put ice or a cold pack on the area for 10 to 20 minutes at a time. Put a thin cloth between the ice and your skin. · After 2 or 3 days, if your swelling is gone, put a heating pad set on low or a warm cloth on your chest. Some doctors suggest that you go back and forth between hot and cold. Put a thin cloth between the heating pad and your skin. · Ask your doctor if you can take an over-the-counter pain medicine, such as acetaminophen (Tylenol), ibuprofen (Advil, Motrin), or naproxen (Aleve). Be safe with medicines.  Read and follow all instructions on the label. · As your pain gets better, slowly return to your normal activities. Be patient. Rib bruises can take weeks or months to heal. If the pain gets worse, it may be a sign that you need to rest a while longer. When should you call for help? [id number] anytime you think you may need emergency care. For example, call if:    · You have severe trouble breathing.     Call your doctor now or seek immediate medical care if:    · You have trouble breathing.     · You have a fever.     · You have a new or worse cough.     · You have new or worse pain.    Watch closely for changes in your health, and be sure to contact your doctor if:    · You do not get better as expected. Where can you learn more? Go to http://lety-radha.info/. Enter R125 in the search box to learn more about \"Bruised Rib: Care Instructions. \"  Current as of: September 23, 2018  Content Version: 11.9  © 4820-0794 [employer]. Care instructions adapted under license by [employer] (which disclaims liability or warranty for this information). If you have questions about a medical condition or this instruction, always ask your healthcare professional. [address] any warranty or liability for your use of this information.

## 2019-06-04 NOTE — PROGRESS NOTES
Chief Complaint   Patient presents with    Fall     Patient present with pain to the right side and groin area after fall with occurred on 5/31/19, Pain increases movement as well as when she takes in a deep breath     1. Have you been to the ER, urgent care clinic since your last visit? Hospitalized since your last visit? No      2. Have you seen or consulted any other health care providers outside of the [address] [place] [address] since your last visit? Include any pap smears or colon screening.  no    Visit Vitals  /75 (BP 1 Location: Right arm, BP Patient Position: Sitting)   Pulse 64   Temp 98.5 °F (36.9 °C) (Oral)   Resp 20   Ht 5' 2\" (1.575 m)   Wt 184 lb (83.5 kg)   SpO2 98%   BMI 33.65 kg/m²
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
## 2019-06-11 ENCOUNTER — PATIENT MESSAGE (OUTPATIENT)
Dept: FAMILY MEDICINE CLINIC | Age: 64
End: 2019-06-11

## 2019-06-11 DIAGNOSIS — E11.65 UNCONTROLLED TYPE 2 DIABETES MELLITUS WITH HYPERGLYCEMIA, WITH LONG-TERM CURRENT USE OF INSULIN (HCC): ICD-10-CM

## 2019-06-11 DIAGNOSIS — Z79.4 UNCONTROLLED TYPE 2 DIABETES MELLITUS WITH HYPERGLYCEMIA, WITH LONG-TERM CURRENT USE OF INSULIN (HCC): ICD-10-CM

## 2019-06-11 DIAGNOSIS — J40 BRONCHITIS: ICD-10-CM

## 2019-06-11 DIAGNOSIS — R06.2 WHEEZING: ICD-10-CM

## 2019-06-11 RX ORDER — ALBUTEROL SULFATE 90 UG/1
AEROSOL, METERED RESPIRATORY (INHALATION)
Qty: 8.5 INHALER | Refills: 0 | Status: SHIPPED | OUTPATIENT
Start: 2019-06-11 | End: 2019-09-16 | Stop reason: SDUPTHER

## 2019-06-11 RX ORDER — CETIRIZINE HCL 10 MG
TABLET ORAL
Qty: 30 TAB | Refills: 3 | Status: SHIPPED | OUTPATIENT
Start: 2019-06-11 | End: 2019-12-26

## 2019-06-11 RX ORDER — ATORVASTATIN CALCIUM 40 MG/1
TABLET, FILM COATED ORAL
Qty: 30 TAB | Refills: 3 | Status: SHIPPED | OUTPATIENT
Start: 2019-06-11 | End: 2020-01-01 | Stop reason: SDUPTHER

## 2019-06-11 RX ORDER — MONTELUKAST SODIUM 10 MG/1
TABLET ORAL
Qty: 30 TAB | Refills: 0 | Status: SHIPPED | OUTPATIENT
Start: 2019-06-11 | End: 2021-01-01

## 2019-06-11 RX ORDER — METOPROLOL TARTRATE 25 MG/1
TABLET, FILM COATED ORAL
Qty: 60 TAB | Refills: 2 | Status: SHIPPED | OUTPATIENT
Start: 2019-06-11 | End: 2019-10-17 | Stop reason: SDUPTHER

## 2019-06-11 NOTE — TELEPHONE ENCOUNTER
Received MyChart message from patient requesting refill of chronic meds  I have only seen patient for one acute visit  Message forwarded to PCP

## 2019-06-11 NOTE — TELEPHONE ENCOUNTER
Srinath Rosa LPN 0/49/1677 9:01 PM EDT        ----- Message -----  From: Talia Cox  Sent: 6/11/2019 9:35 AM  To: МАРИНА Nurse  Subject: Prescription Question     ----- Message from [address] [place] [address] [address], Generic sent at 6/11/2019 9:35 AM EDT -----    I have several RX's that need refills. I have attached a picture that list the ones with X's that are out. Would please send refills to my preferred pharmacy [facility] on [address]. I would very much like to pick these up today with my other medications to save time and another trip.      Warm regards,   Wayne John   [phone]

## 2019-07-09 DIAGNOSIS — E11.65 TYPE 2 DIABETES MELLITUS WITH HYPERGLYCEMIA, WITH LONG-TERM CURRENT USE OF INSULIN (HCC): ICD-10-CM

## 2019-07-09 DIAGNOSIS — I10 ESSENTIAL HYPERTENSION WITH GOAL BLOOD PRESSURE LESS THAN 140/90: ICD-10-CM

## 2019-07-09 DIAGNOSIS — Z79.4 TYPE 2 DIABETES MELLITUS WITH HYPERGLYCEMIA, WITH LONG-TERM CURRENT USE OF INSULIN (HCC): ICD-10-CM

## 2019-07-09 RX ORDER — GABAPENTIN 300 MG/1
CAPSULE ORAL
Qty: 60 CAP | Refills: 2 | Status: SHIPPED | OUTPATIENT
Start: 2019-07-09 | End: 2019-09-30 | Stop reason: SDUPTHER

## 2019-09-16 ENCOUNTER — OFFICE VISIT (OUTPATIENT)
Dept: FAMILY MEDICINE CLINIC | Age: 64
End: 2019-09-16

## 2019-09-16 VITALS
HEART RATE: 61 BPM | BODY MASS INDEX: 34.6 KG/M2 | DIASTOLIC BLOOD PRESSURE: 52 MMHG | SYSTOLIC BLOOD PRESSURE: 95 MMHG | WEIGHT: 188 LBS | HEIGHT: 62 IN | TEMPERATURE: 97.7 F | OXYGEN SATURATION: 97 % | RESPIRATION RATE: 18 BRPM

## 2019-09-16 DIAGNOSIS — R06.2 WHEEZING: ICD-10-CM

## 2019-09-16 DIAGNOSIS — J40 BRONCHITIS: ICD-10-CM

## 2019-09-16 DIAGNOSIS — M21.372 FOOT DROP, LEFT: Primary | ICD-10-CM

## 2019-09-16 RX ORDER — ALBUTEROL SULFATE 90 UG/1
AEROSOL, METERED RESPIRATORY (INHALATION)
Qty: 8.5 INHALER | Refills: 0 | Status: SHIPPED | OUTPATIENT
Start: 2019-09-16 | End: 2020-01-01 | Stop reason: SDUPTHER

## 2019-09-16 NOTE — PROGRESS NOTES
Grady Jerome is a 60 y.o. female who is here for follow from podiatry. On L foot pt is unable to lift big toe. When pt is bare foot she has to make it a point to lift foot high because the big toe will get cuaght under her foot and cause her to trip. Has been going on for past 2-3 weeks. Pt denies any injury or falls. Pt has numbness b/l LEs but worse on L side and Podiatrist was concerned for possible TIA? Pt has chronic hx of neuropathy from DM.      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, uncontrolled ([address] E11.39, E11.65    Encounter for long-term (current) use of insulin ([address] Z79.4    Type 2 diabetes mellitus with hyperglycemia, with long-term current use of insulin (HCC) E11.65, Z79.4    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 ([address] M09.80, Z79.4    Depression F32.9    Diverticulitis K57.92    Hypertensive retinopathy of both eyes H35.033    Posterior vitreous detachment of right eye H43.811    Stable proliferative diabetic retinopathy of both eyes associated with type 2 diabetes mellitus ([address] B74.2138    Diabetic peripheral neuropathy (HCC) E11.42    Hypoglycemia E16.2       Medications:  Current Outpatient Medications   Medication Sig Dispense Refill    albuterol (PROAIR HFA) 90 mcg/actuation inhaler INHALE 2 PUFFS BY MOUTH EVERY 4 HOURS AS NEEDED FOR WHEEZE 8.5 Inhaler 0    gabapentin (NEURONTIN) 300 mg capsule TAKE 1 CAPSULE BY MOUTH TWICE A DAY 60 Cap 2  atorvastatin (LIPITOR) 40 mg tablet TAKE 1 TABLET BY MOUTH EVERY DAY 30 Tab 3    cetirizine (ZYRTEC) 10 mg tablet TAKE 1 TABLET BY MOUTH EVERY DAY IN THE MORNING 30 Tab 3    metoprolol tartrate (LOPRESSOR) 25 mg tablet TAKE 1 TABLET BY MOUTH TWICE A DAY 60 Tab 2    montelukast (SINGULAIR) 10 mg tablet TAKE 1 TABLET BY MOUTH EVERY DAY 30 Tab 0    glucose blood VI test strips (TRUE METRIX GLUCOSE TEST STRIP) strip Use as needed for blood glucose checks 100 Strip 3    busPIRone (BUSPAR) 7.5 mg tablet Take 1 Tab by mouth two (2) times a day. 60 Tab 5    hydroCHLOROthiazide (HYDRODIURIL) 25 mg tablet TAKE 1 TABLET BY MOUTH EVERY DAY 30 Tab 0    raNITIdine (ZANTAC) 150 mg tablet TAKE 1 TABLET BY MOUTH TWICE A DAY 60 Tab 0    lancets (TRUEPLUS LANCETS) 28 gauge misc AS NEEDED FOR BLOOD GLUCOSE CHECKS 100 Lancet 6    lisinopril (PRINIVIL, ZESTRIL) 10 mg tablet TAKE 1 TABLET BY MOUTH EVERY DAY AT NIGHT 30 Tab 11    Insulin Needles, Disposable, (MARIAH PEN NEEDLE) 32 gauge x 5/32\" ndle Use to inject insulins 4 times daily. DX Code: E11.65 150 Pen Needle 11    Blood-Glucose Meter (TRUE METRIX GLUCOSE METER) misc 1 Units by Does Not Apply route as needed. 1 Each 0    venlafaxine-SR (EFFEXOR-XR) 150 mg capsule TAKE ONE CAPSULE BY MOUTH NIGHTLY 30 Cap 5    JANUVIA 100 mg tablet TAKE 1 TABLET BY MOUTH EVERY MORNING STOP TANZEUM 30 Tab 0    LEVEMIR FLEXTOUCH U-100 INSULN 100 unit/mL (3 mL) inpn INJECT 45 UNITS SUBCUTANEOUSLY EVERY EVENING *STOP BASAGLAR (Patient taking differently: INJECT 46 UNITS SUBCUTANEOUSLY EVERY AM *STOP BASAGLAR) 15 mL 3    LORazepam (ATIVAN) 1 mg tablet Take 1 Tab by mouth as needed for Anxiety (take 30 minutes before CT study). Max Daily Amount: 96 mg. 2 Tab 0    aspirin delayed-release 81 mg tablet Take 81 mg by mouth nightly.  lidocaine (LIDODERM) 5 % Apply patch to the affected area for 12 hours a day and remove for 12 hours a day.  10 Each 0    ipratropium (ATROVENT) 0.03 % nasal spray TAKE 2 SPRAYS BY BOTH NOSTRILS ROUTE EVERY TWELVE (12) HOURS. 30 mL 2    insulin aspart U-100 (NOVOLOG FLEXPEN U-100 INSULIN) 100 unit/mL inpn Inject 15 units before breakfast and 15 units dinner w/ SSI Max units daily: 90. Stop Apidra (Patient not taking: Reported on 11/30/2018) 30 mL 5    HYDROcodone-acetaminophen (NORCO) 5-325 mg per tablet Take 1 Tab by mouth every four (4) hours as needed for Pain. Max Daily Amount: 6 Tabs. (Patient not taking: Reported on 11/30/2018) 15 Tab 0    metroNIDAZOLE (FLAGYL) 500 mg tablet Take 1 Tab by mouth three (3) times daily. (Patient not taking: Reported on 11/30/2018) 20 Tab 0    docusate sodium (COLACE) 100 mg capsule Take 100 mg by mouth two (2) times daily as needed for Constipation.  mupirocin (BACTROBAN) 2 % ointment Apply  to affected area daily as needed (skin infection). Allergies: Allergies   Allergen Reactions    Keflex [Cephalexin] Hives    Pcn [Penicillins] Hives     Tolerates cephalexin    Tanzeum [Albiglutide] Nausea Only    Vioxx [Rofecoxib] Nausea Only       LMP:  No LMP recorded. (Menstrual status: Menopause).     Social History     Socioeconomic History    Marital status:      Spouse name: Not on file    Number of children: Not on file    Years of education: Not on file    Highest education level: Not on file   Occupational History    Not on file   Social Needs    Financial resource strain: Not on file    Food insecurity:     Worry: Not on file     Inability: Not on file    Transportation needs:     Medical: Not on file     Non-medical: Not on file   Tobacco Use    Smoking status: Current Every Day Smoker     Packs/day: 1.00     Years: 47.00     Pack years: 47.00     Types: Cigarettes    Smokeless tobacco: Never Used   Substance and Sexual Activity    Alcohol use: No    Drug use: No    Sexual activity: Not Currently   Lifestyle    Physical activity:     Days per week: Not on file     Minutes per session: Not on file    Stress: Not on file   Relationships    Social connections:     Talks on phone: Not on file     Gets together: Not on file     Attends Restoration service: Not on file     Active member of club or organization: Not on file     Attends meetings of clubs or organizations: Not on file     Relationship status: Not on file    Intimate partner violence:     Fear of current or ex partner: Not on file     Emotionally abused: Not on file     Physically abused: Not on file     Forced sexual activity: Not on file   Other Topics Concern    Not on file   Social History Narrative    Not on file       Family History   Problem Relation Age of Onset    Heart Disease Mother     Hypertension Mother     Cancer Mother         cancer    Diabetes Father     Cancer Brother         cancer    Diabetes Brother     Diabetes Maternal Grandmother     Diabetes Paternal Grandmother     Hypertension Paternal Grandmother     Diabetes Paternal Grandfather            ROS:  Fever: no  Weight loss: no  Headaches:  no  Chest Pain:  no  SOB:  no  Cough:  no  Other significant ROS:        Physical Exam  Visit Vitals  BP 95/52 (BP 1 Location: Right arm, BP Patient Position: Sitting)   Pulse 61   Temp 97.7 °F (36.5 °C) (Oral)   Resp 18   Ht 5' 2\" (1.575 m)   Wt 188 lb (85.3 kg)   SpO2 97%   BMI 34.39 kg/m²     Constitutional:  Appears well,  No acute distress, Vitals noted  Psychiatric:   Affect normal, Alert and Oriented to person/place/time  Heart:   Normal HR, Normal S1 and S2,  Regular rhythm. Extremities: L great toe plantar flexion at rest, 0/5 strength, normal sensation, hallux dragging on gait exam, 5/5 strength on plantar and dorsiflexion. No anatomical abnormality palpated    Skin:   Warm to palpation, without rashes, bruising, or suspicious lesions       BP Readings from Last 3 Encounters:   09/16/19 95/52   06/04/19 126/75   05/21/19 137/43       Assessment/Plan:      ICD-10-CM ICD-9-CM    1.  Foot drop, left M21.372 736.79 XR GREAT TOE LT MIN 2 V   2. Wheezing R06.2 786.07 albuterol (PROAIR HFA) 90 mcg/actuation inhaler   3. Bronchitis J40 490 albuterol (PROAIR HFA) 90 mcg/actuation inhaler       Orders Placed This Encounter    XR GREAT TOE LT MIN 2 V     Standing Status:   Future     Number of Occurrences:   1     Standing Expiration Date:   10/16/2020     Order Specific Question:   Reason for Exam     Answer:   hallux drop    albuterol (PROAIR HFA) 90 mcg/actuation inhaler     Sig: INHALE 2 PUFFS BY MOUTH EVERY 4 HOURS AS NEEDED FOR WHEEZE     Dispense:  8.5 Inhaler     Refill:  0     DX Code Needed  . 1.  Foot drop, left- mainly hallux drop, likely from diabatic neuropathy, but can not r/o other etiologies    - XR GREAT TOE LT MIN 2 V; Future- I personally reviewed and showed no acute fracture or deformity   - continue strict glycemic control   - hallux brace  - follow up in 4 weeks, if no improvement will need further w/u with possible MRI  - will hold off on stroke w/u given low likelihood causing sxs and pt is medically optimized right now, if concerning sxs pt instructed to go to ER       - follow up with podiatry     Sue Delcid MD  [facility] Medicine Res[facility]

## 2019-09-23 DIAGNOSIS — E11.65 TYPE 2 DIABETES MELLITUS WITH HYPERGLYCEMIA, WITH LONG-TERM CURRENT USE OF INSULIN (HCC): ICD-10-CM

## 2019-09-23 DIAGNOSIS — E78.2 MIXED HYPERLIPIDEMIA: ICD-10-CM

## 2019-09-23 DIAGNOSIS — I10 ESSENTIAL HYPERTENSION: ICD-10-CM

## 2019-09-23 DIAGNOSIS — Z79.4 TYPE 2 DIABETES MELLITUS WITH HYPERGLYCEMIA, WITH LONG-TERM CURRENT USE OF INSULIN (HCC): ICD-10-CM

## 2019-09-24 LAB
ALBUMIN SERPL-MCNC: 3.7 G/DL (ref 3.6–4.8)
ALBUMIN/CREAT UR: 34.8 MG/G CREAT (ref 0–30)
ALBUMIN/GLOB SERPL: 1.4 {RATIO} (ref 1.2–2.2)
ALP SERPL-CCNC: 66 IU/L (ref 39–117)
ALT SERPL-CCNC: 11 IU/L (ref 0–32)
AST SERPL-CCNC: 13 IU/L (ref 0–40)
BILIRUB SERPL-MCNC: 0.2 MG/DL (ref 0–1.2)
BUN SERPL-MCNC: 33 MG/DL (ref 8–27)
BUN/CREAT SERPL: 31 (ref 12–28)
CALCIUM SERPL-MCNC: 9.1 MG/DL (ref 8.7–10.3)
CHLORIDE SERPL-SCNC: 103 MMOL/L (ref 96–106)
CO2 SERPL-SCNC: 22 MMOL/L (ref 20–29)
CREAT SERPL-MCNC: 1.05 MG/DL (ref 0.57–1)
CREAT UR-MCNC: 159.9 MG/DL
EST. AVERAGE GLUCOSE BLD GHB EST-MCNC: 169 MG/DL
GLOBULIN SER CALC-MCNC: 2.7 G/DL (ref 1.5–4.5)
GLUCOSE SERPL-MCNC: 119 MG/DL (ref 65–99)
HBA1C MFR BLD: 7.5 % (ref 4.8–5.6)
INTERPRETATION: NORMAL
Lab: NORMAL
MICROALBUMIN UR-MCNC: 55.6 UG/ML
POTASSIUM SERPL-SCNC: 5.1 MMOL/L (ref 3.5–5.2)
PROT SERPL-MCNC: 6.4 G/DL (ref 6–8.5)
SODIUM SERPL-SCNC: 138 MMOL/L (ref 134–144)
T4 FREE SERPL-MCNC: 1.45 NG/DL (ref 0.82–1.77)
TSH SERPL DL<=0.005 MIU/L-ACNC: 0.44 UIU/ML (ref 0.45–4.5)

## 2019-09-27 NOTE — PROGRESS NOTES
Medardo Rios is a 60 y.o. female here for   Chief Complaint   Patient presents with    Diabetes       Functional glucose monitor and record keeping system? -yes   Eye exam within last year? - on file  Foot exam within last year? - on file    1. Have you been to the ER, urgent care clinic since your last visit? Hospitalized since your last visit? -no    2. Have you seen or consulted any other health care providers outside of the [address] [place] [address] since your last visit? Include any pap smears or colon screening. -PCP and Podiatry

## 2019-09-30 ENCOUNTER — OFFICE VISIT (OUTPATIENT)
Dept: ENDOCRINOLOGY | Age: 64
End: 2019-09-30

## 2019-09-30 VITALS
WEIGHT: 183 LBS | OXYGEN SATURATION: 95 % | BODY MASS INDEX: 33.68 KG/M2 | DIASTOLIC BLOOD PRESSURE: 44 MMHG | HEART RATE: 66 BPM | HEIGHT: 62 IN | RESPIRATION RATE: 16 BRPM | SYSTOLIC BLOOD PRESSURE: 106 MMHG | TEMPERATURE: 96 F

## 2019-09-30 DIAGNOSIS — E04.9 GOITER, NODULAR: ICD-10-CM

## 2019-09-30 DIAGNOSIS — Z79.4 TYPE 2 DIABETES MELLITUS WITH HYPERGLYCEMIA, WITH LONG-TERM CURRENT USE OF INSULIN (HCC): Primary | ICD-10-CM

## 2019-09-30 DIAGNOSIS — I10 ESSENTIAL HYPERTENSION WITH GOAL BLOOD PRESSURE LESS THAN 140/90: ICD-10-CM

## 2019-09-30 DIAGNOSIS — I10 ESSENTIAL HYPERTENSION: ICD-10-CM

## 2019-09-30 DIAGNOSIS — E11.65 TYPE 2 DIABETES MELLITUS WITH HYPERGLYCEMIA, WITH LONG-TERM CURRENT USE OF INSULIN (HCC): Primary | ICD-10-CM

## 2019-09-30 DIAGNOSIS — E78.2 MIXED HYPERLIPIDEMIA: ICD-10-CM

## 2019-09-30 DIAGNOSIS — Z79.4 TYPE 2 DIABETES MELLITUS WITH HYPERGLYCEMIA, WITH LONG-TERM CURRENT USE OF INSULIN (HCC): ICD-10-CM

## 2019-09-30 DIAGNOSIS — E11.65 TYPE 2 DIABETES MELLITUS WITH HYPERGLYCEMIA, WITH LONG-TERM CURRENT USE OF INSULIN (HCC): ICD-10-CM

## 2019-09-30 RX ORDER — GABAPENTIN 300 MG/1
CAPSULE ORAL
Qty: 60 CAP | Refills: 5 | Status: SHIPPED | OUTPATIENT
Start: 2019-09-30 | End: 2019-11-29 | Stop reason: SDUPTHER

## 2019-09-30 NOTE — LETTER
9/30/19 Patient: Jeraldine Hodgkins YOB: 1955 Date of Visit: 9/30/2019 MD [facility] [address] VIA In Basket Dear Temitope Morgan MD, Thank you for referring Ms. Tuan Carcamo to [address] [place] [address] for evaluation. My notes for this consultation are attached. If you have questions, please do not hesitate to call me. I look forward to following your patient along with you. Sincerely, Hardik Francisco MD
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
## 2019-10-02 ENCOUNTER — TELEPHONE (OUTPATIENT)
Dept: ENDOCRINOLOGY | Age: 64
End: 2019-10-02

## 2019-10-02 NOTE — TELEPHONE ENCOUNTER
----- Message from Francois Meng sent at 10/2/2019 11:58 AM EDT -----  Regarding: Dr. Pelon Roland  Pt returning a missed call. Best contact [phone].

## 2019-11-29 DIAGNOSIS — E11.65 TYPE 2 DIABETES MELLITUS WITH HYPERGLYCEMIA, WITH LONG-TERM CURRENT USE OF INSULIN (HCC): ICD-10-CM

## 2019-11-29 DIAGNOSIS — Z79.4 TYPE 2 DIABETES MELLITUS WITH HYPERGLYCEMIA, WITH LONG-TERM CURRENT USE OF INSULIN (HCC): ICD-10-CM

## 2019-11-29 DIAGNOSIS — F43.10 PTSD (POST-TRAUMATIC STRESS DISORDER): ICD-10-CM

## 2019-11-29 DIAGNOSIS — I10 ESSENTIAL HYPERTENSION WITH GOAL BLOOD PRESSURE LESS THAN 140/90: ICD-10-CM

## 2019-11-29 DIAGNOSIS — Z79.4 UNCONTROLLED TYPE 2 DIABETES MELLITUS WITH HYPERGLYCEMIA, WITH LONG-TERM CURRENT USE OF INSULIN (HCC): ICD-10-CM

## 2019-11-29 DIAGNOSIS — E11.65 UNCONTROLLED TYPE 2 DIABETES MELLITUS WITH HYPERGLYCEMIA, WITH LONG-TERM CURRENT USE OF INSULIN (HCC): ICD-10-CM

## 2019-11-29 DIAGNOSIS — E11.8 DIABETES MELLITUS WITH COMPLICATION (HCC): ICD-10-CM

## 2019-11-29 RX ORDER — PEN NEEDLE, DIABETIC 31 GX3/16"
NEEDLE, DISPOSABLE MISCELLANEOUS
Qty: 150 PEN NEEDLE | Refills: 11 | Status: CANCELLED | OUTPATIENT
Start: 2019-11-29

## 2019-11-29 RX ORDER — GABAPENTIN 300 MG/1
CAPSULE ORAL
Qty: 60 CAP | Refills: 5 | Status: CANCELLED | OUTPATIENT
Start: 2019-11-29

## 2019-11-29 RX ORDER — HYDROCHLOROTHIAZIDE 25 MG/1
TABLET ORAL
Qty: 30 TAB | Refills: 0 | Status: CANCELLED | OUTPATIENT
Start: 2019-11-29

## 2019-11-29 RX ORDER — LISINOPRIL 10 MG/1
TABLET ORAL
Qty: 30 TAB | Refills: 11 | Status: CANCELLED | OUTPATIENT
Start: 2019-11-29

## 2019-12-01 RX ORDER — SITAGLIPTIN 100 MG/1
TABLET, FILM COATED ORAL
Qty: 30 TAB | Refills: 10 | Status: SHIPPED | OUTPATIENT
Start: 2019-12-01 | End: 2020-01-01 | Stop reason: SDUPTHER

## 2019-12-02 RX ORDER — GABAPENTIN 300 MG/1
CAPSULE ORAL
Qty: 180 CAP | Refills: 1 | Status: SHIPPED | OUTPATIENT
Start: 2019-12-02 | End: 2020-01-01

## 2019-12-02 RX ORDER — PEN NEEDLE, DIABETIC 31 GX3/16"
NEEDLE, DISPOSABLE MISCELLANEOUS
Qty: 150 PEN NEEDLE | Refills: 11 | Status: SHIPPED | OUTPATIENT
Start: 2019-12-02 | End: 2020-02-07 | Stop reason: SDUPTHER

## 2019-12-02 RX ORDER — HYDROCHLOROTHIAZIDE 25 MG/1
25 TABLET ORAL DAILY
Qty: 90 TAB | Refills: 3 | Status: SHIPPED | OUTPATIENT
Start: 2019-12-02 | End: 2020-02-13

## 2019-12-02 RX ORDER — LISINOPRIL 10 MG/1
TABLET ORAL
Qty: 90 TAB | Refills: 3 | Status: SHIPPED | OUTPATIENT
Start: 2019-12-02 | End: 2020-02-07 | Stop reason: SDUPTHER

## 2019-12-26 DIAGNOSIS — Z79.4 UNCONTROLLED TYPE 2 DIABETES MELLITUS WITH HYPERGLYCEMIA, WITH LONG-TERM CURRENT USE OF INSULIN (HCC): ICD-10-CM

## 2019-12-26 DIAGNOSIS — E11.65 UNCONTROLLED TYPE 2 DIABETES MELLITUS WITH HYPERGLYCEMIA, WITH LONG-TERM CURRENT USE OF INSULIN (HCC): ICD-10-CM

## 2019-12-26 RX ORDER — CETIRIZINE HCL 10 MG
TABLET ORAL
Qty: 30 TAB | Refills: 3 | Status: SHIPPED | OUTPATIENT
Start: 2019-12-26 | End: 2020-01-01 | Stop reason: SDUPTHER

## 2020-01-01 ENCOUNTER — VIRTUAL VISIT (OUTPATIENT)
Dept: FAMILY MEDICINE CLINIC | Age: 65
End: 2020-01-01
Payer: MEDICAID

## 2020-01-01 ENCOUNTER — OFFICE VISIT (OUTPATIENT)
Dept: ENDOCRINOLOGY | Age: 65
End: 2020-01-01
Payer: MEDICAID

## 2020-01-01 ENCOUNTER — LAB ONLY (OUTPATIENT)
Dept: FAMILY MEDICINE CLINIC | Age: 65
End: 2020-01-01

## 2020-01-01 VITALS
HEIGHT: 62 IN | SYSTOLIC BLOOD PRESSURE: 160 MMHG | TEMPERATURE: 96.6 F | DIASTOLIC BLOOD PRESSURE: 63 MMHG | WEIGHT: 160.5 LBS | HEART RATE: 66 BPM | BODY MASS INDEX: 29.53 KG/M2 | RESPIRATION RATE: 16 BRPM | OXYGEN SATURATION: 98 %

## 2020-01-01 DIAGNOSIS — F41.9 ANXIETY: Primary | ICD-10-CM

## 2020-01-01 DIAGNOSIS — E11.65 TYPE 2 DIABETES MELLITUS WITH HYPERGLYCEMIA, WITH LONG-TERM CURRENT USE OF INSULIN (HCC): ICD-10-CM

## 2020-01-01 DIAGNOSIS — Z79.4 UNCONTROLLED TYPE 2 DIABETES MELLITUS WITH HYPERGLYCEMIA, WITH LONG-TERM CURRENT USE OF INSULIN (HCC): ICD-10-CM

## 2020-01-01 DIAGNOSIS — E04.9 NODULAR GOITER: Primary | ICD-10-CM

## 2020-01-01 DIAGNOSIS — E11.65 UNCONTROLLED TYPE 2 DIABETES MELLITUS WITH HYPERGLYCEMIA, WITH LONG-TERM CURRENT USE OF INSULIN (HCC): ICD-10-CM

## 2020-01-01 DIAGNOSIS — E78.2 MIXED HYPERLIPIDEMIA: ICD-10-CM

## 2020-01-01 DIAGNOSIS — R05.8 COUGH WITH SPUTUM: ICD-10-CM

## 2020-01-01 DIAGNOSIS — J40 BRONCHITIS: ICD-10-CM

## 2020-01-01 DIAGNOSIS — M48.061 SPINAL STENOSIS OF LUMBAR REGION, UNSPECIFIED WHETHER NEUROGENIC CLAUDICATION PRESENT: ICD-10-CM

## 2020-01-01 DIAGNOSIS — Z79.4 TYPE 2 DIABETES MELLITUS WITH HYPERGLYCEMIA, WITH LONG-TERM CURRENT USE OF INSULIN (HCC): ICD-10-CM

## 2020-01-01 DIAGNOSIS — R06.2 WHEEZING: ICD-10-CM

## 2020-01-01 DIAGNOSIS — I10 ESSENTIAL HYPERTENSION WITH GOAL BLOOD PRESSURE LESS THAN 140/90: ICD-10-CM

## 2020-01-01 DIAGNOSIS — I10 ESSENTIAL HYPERTENSION: ICD-10-CM

## 2020-01-01 LAB
BUN SERPL-MCNC: 18 MG/DL (ref 8–27)
BUN/CREAT SERPL: 16 (ref 12–28)
CALCIUM SERPL-MCNC: 9.2 MG/DL (ref 8.7–10.3)
CHLORIDE SERPL-SCNC: 100 MMOL/L (ref 96–106)
CHOLEST SERPL-MCNC: 192 MG/DL (ref 100–199)
CO2 SERPL-SCNC: 23 MMOL/L (ref 20–29)
CREAT SERPL-MCNC: 1.13 MG/DL (ref 0.57–1)
EST. AVERAGE GLUCOSE BLD GHB EST-MCNC: 180 MG/DL
GLUCOSE SERPL-MCNC: 193 MG/DL (ref 65–99)
HBA1C MFR BLD: 7.9 % (ref 4.8–5.6)
HDLC SERPL-MCNC: 37 MG/DL
INTERPRETATION, 910389: NORMAL
INTERPRETATION: NORMAL
LDLC SERPL CALC-MCNC: 125 MG/DL (ref 0–99)
Lab: NORMAL
PDF IMAGE, 910387: NORMAL
POTASSIUM SERPL-SCNC: 4.7 MMOL/L (ref 3.5–5.2)
SODIUM SERPL-SCNC: 136 MMOL/L (ref 134–144)
TRIGL SERPL-MCNC: 170 MG/DL (ref 0–149)
VLDLC SERPL CALC-MCNC: 30 MG/DL (ref 5–40)

## 2020-01-01 PROCEDURE — 3051F HG A1C>EQUAL 7.0%<8.0%: CPT | Performed by: INTERNAL MEDICINE

## 2020-01-01 PROCEDURE — 99214 OFFICE O/P EST MOD 30 MIN: CPT | Performed by: FAMILY MEDICINE

## 2020-01-01 PROCEDURE — 99214 OFFICE O/P EST MOD 30 MIN: CPT | Performed by: INTERNAL MEDICINE

## 2020-01-01 PROCEDURE — 3051F HG A1C>EQUAL 7.0%<8.0%: CPT | Performed by: STUDENT IN AN ORGANIZED HEALTH CARE EDUCATION/TRAINING PROGRAM

## 2020-01-01 PROCEDURE — 99214 OFFICE O/P EST MOD 30 MIN: CPT | Performed by: STUDENT IN AN ORGANIZED HEALTH CARE EDUCATION/TRAINING PROGRAM

## 2020-01-01 RX ORDER — INSULIN DETEMIR 100 [IU]/ML
30 INJECTION, SOLUTION SUBCUTANEOUS
Qty: 30 ML | Refills: 3 | Status: SHIPPED | OUTPATIENT
Start: 2020-01-01 | End: 2020-01-01 | Stop reason: SDUPTHER

## 2020-01-01 RX ORDER — INSULIN ASPART 100 [IU]/ML
INJECTION, SOLUTION INTRAVENOUS; SUBCUTANEOUS
Qty: 30 ML | Refills: 5 | Status: SHIPPED | OUTPATIENT
Start: 2020-01-01 | End: 2021-01-01

## 2020-01-01 RX ORDER — INSULIN DETEMIR 100 [IU]/ML
30 INJECTION, SOLUTION SUBCUTANEOUS
Qty: 30 ML | Refills: 3 | Status: ON HOLD | OUTPATIENT
Start: 2020-01-01 | End: 2021-01-01

## 2020-01-01 RX ORDER — BUSPIRONE HYDROCHLORIDE 10 MG/1
10 TABLET ORAL 2 TIMES DAILY
Qty: 60 TAB | Refills: 0 | Status: SHIPPED | OUTPATIENT
Start: 2020-01-01 | End: 2021-01-01

## 2020-01-01 RX ORDER — CETIRIZINE HCL 10 MG
TABLET ORAL
Qty: 30 TAB | Refills: 3 | Status: SHIPPED | OUTPATIENT
Start: 2020-01-01 | End: 2020-01-01 | Stop reason: SDUPTHER

## 2020-01-01 RX ORDER — ATORVASTATIN CALCIUM 40 MG/1
TABLET, FILM COATED ORAL
Qty: 30 TAB | Refills: 6 | Status: ON HOLD | OUTPATIENT
Start: 2020-01-01 | End: 2021-01-01

## 2020-01-01 RX ORDER — ALBUTEROL SULFATE 90 UG/1
AEROSOL, METERED RESPIRATORY (INHALATION)
Qty: 8.5 INHALER | Refills: 0 | Status: SHIPPED | OUTPATIENT
Start: 2020-01-01 | End: 2021-01-01 | Stop reason: SDUPTHER

## 2020-01-01 RX ORDER — BUSPIRONE HYDROCHLORIDE 5 MG/1
5 TABLET ORAL 2 TIMES DAILY
Qty: 60 TAB | Refills: 0 | Status: SHIPPED | OUTPATIENT
Start: 2020-01-01 | End: 2020-01-01 | Stop reason: SDUPTHER

## 2020-01-01 RX ORDER — CETIRIZINE HCL 10 MG
10 TABLET ORAL DAILY
Qty: 30 TAB | Refills: 3 | Status: SHIPPED | OUTPATIENT
Start: 2020-01-01 | End: 2021-01-01

## 2020-02-07 DIAGNOSIS — Z79.4 TYPE 2 DIABETES MELLITUS WITH HYPERGLYCEMIA, WITH LONG-TERM CURRENT USE OF INSULIN (HCC): ICD-10-CM

## 2020-02-07 DIAGNOSIS — E11.65 TYPE 2 DIABETES MELLITUS WITH HYPERGLYCEMIA, WITH LONG-TERM CURRENT USE OF INSULIN (HCC): ICD-10-CM

## 2020-02-07 DIAGNOSIS — I10 ESSENTIAL HYPERTENSION WITH GOAL BLOOD PRESSURE LESS THAN 140/90: ICD-10-CM

## 2020-02-07 RX ORDER — PEN NEEDLE, DIABETIC 31 GX3/16"
NEEDLE, DISPOSABLE MISCELLANEOUS
Qty: 150 PEN NEEDLE | Refills: 11 | Status: ON HOLD | OUTPATIENT
Start: 2020-02-07 | End: 2021-01-01

## 2020-02-07 RX ORDER — LISINOPRIL 10 MG/1
TABLET ORAL
Qty: 90 TAB | Refills: 3 | Status: SHIPPED | OUTPATIENT
Start: 2020-02-07 | End: 2020-02-13 | Stop reason: SDUPTHER

## 2020-02-07 RX ORDER — LISINOPRIL 10 MG/1
TABLET ORAL
Qty: 90 TAB | Refills: 3 | Status: SHIPPED | OUTPATIENT
Start: 2020-02-07 | End: 2021-01-01 | Stop reason: SDUPTHER

## 2020-02-07 RX ORDER — PEN NEEDLE, DIABETIC 31 GX3/16"
NEEDLE, DISPOSABLE MISCELLANEOUS
Qty: 150 PEN NEEDLE | Refills: 11 | Status: SHIPPED | OUTPATIENT
Start: 2020-02-07 | End: 2020-02-13 | Stop reason: SDUPTHER

## 2020-02-13 ENCOUNTER — OFFICE VISIT (OUTPATIENT)
Dept: ENDOCRINOLOGY | Age: 65
End: 2020-02-13

## 2020-02-13 VITALS
TEMPERATURE: 95.9 F | SYSTOLIC BLOOD PRESSURE: 122 MMHG | RESPIRATION RATE: 16 BRPM | HEART RATE: 64 BPM | DIASTOLIC BLOOD PRESSURE: 45 MMHG | BODY MASS INDEX: 32.57 KG/M2 | WEIGHT: 177 LBS | OXYGEN SATURATION: 97 % | HEIGHT: 62 IN

## 2020-02-13 DIAGNOSIS — E04.9 GOITER, NODULAR: ICD-10-CM

## 2020-02-13 DIAGNOSIS — Z79.4 TYPE 2 DIABETES MELLITUS WITH HYPERGLYCEMIA, WITH LONG-TERM CURRENT USE OF INSULIN (HCC): Primary | ICD-10-CM

## 2020-02-13 DIAGNOSIS — I10 ESSENTIAL HYPERTENSION: ICD-10-CM

## 2020-02-13 DIAGNOSIS — E11.65 TYPE 2 DIABETES MELLITUS WITH HYPERGLYCEMIA, WITH LONG-TERM CURRENT USE OF INSULIN (HCC): Primary | ICD-10-CM

## 2020-02-13 DIAGNOSIS — E11.42 DIABETIC PERIPHERAL NEUROPATHY (HCC): ICD-10-CM

## 2020-02-13 DIAGNOSIS — E78.2 MIXED HYPERLIPIDEMIA: ICD-10-CM

## 2020-02-13 NOTE — LETTER
2/15/20 Patient: Guillermo Trevino YOB: 1955 Date of Visit: 2/13/2020 MD Roney Mcdaniel Jesus [address] [facility] [phone] [id number] VIA In Basket Dear Alexandra Wright MD, Thank you for referring Ms. Jake Mortensen to [address] [place] [address] for evaluation. My notes for this consultation are attached. If you have questions, please do not hesitate to call me. I look forward to following your patient along with you. Sincerely, Cindi Rodriguez MD

## 2020-02-13 NOTE — PROGRESS NOTES
David Umana is a 63 y.o. female here for   Chief Complaint   Patient presents with    Diabetes       1. Have you been to the ER, urgent care clinic since your last visit? Hospitalized since your last visit? -no    2. Have you seen or consulted any other health care providers outside of the [address] [place] [address] since your last visit?   Include any pap smears or colon screening.-no
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
## 2020-02-20 ENCOUNTER — HOSPITAL ENCOUNTER (OUTPATIENT)
Dept: ULTRASOUND IMAGING | Age: 65
Discharge: HOME | End: 2020-02-20
Attending: INTERNAL MEDICINE
Payer: MEDICAID

## 2020-02-20 DIAGNOSIS — E04.9 GOITER, NODULAR: ICD-10-CM

## 2020-02-20 PROCEDURE — 76536 US EXAM OF HEAD AND NECK: CPT

## 2020-02-21 ENCOUNTER — TELEPHONE (OUTPATIENT)
Dept: ENDOCRINOLOGY | Age: 65
End: 2020-02-21

## 2020-02-21 NOTE — TELEPHONE ENCOUNTER
----- Message from Elise Banks MD sent at 2/21/2020 12:16 PM EST -----  Right thyroid nodule - stable     Left thyroid nodule has increased - need needle biopsy

## 2020-02-21 NOTE — TELEPHONE ENCOUNTER
Per Dr. Deangelo Menendez, informed pt of result note, as noted above. Pt verbalized understanding with no further questions or concerns at this time. Pt transferred to  to schedule bx.

## 2020-02-26 NOTE — ADDENDUM NOTE
Addended by: Dolores EDEN on: 11/14/2018 04:08 PM     Modules accepted: Orders get better /  go home

## 2020-02-27 ENCOUNTER — OFFICE VISIT (OUTPATIENT)
Dept: FAMILY MEDICINE CLINIC | Age: 65
End: 2020-02-27

## 2020-02-27 VITALS
TEMPERATURE: 97.8 F | RESPIRATION RATE: 18 BRPM | OXYGEN SATURATION: 94 % | HEART RATE: 79 BPM | SYSTOLIC BLOOD PRESSURE: 98 MMHG | BODY MASS INDEX: 31.83 KG/M2 | WEIGHT: 173 LBS | HEIGHT: 62 IN | DIASTOLIC BLOOD PRESSURE: 60 MMHG

## 2020-02-27 DIAGNOSIS — R05.9 COUGH: Primary | ICD-10-CM

## 2020-02-27 DIAGNOSIS — I10 ESSENTIAL HYPERTENSION: ICD-10-CM

## 2020-02-27 DIAGNOSIS — J11.1 INFLUENZA: ICD-10-CM

## 2020-02-27 DIAGNOSIS — R68.89 FLU-LIKE SYMPTOMS: ICD-10-CM

## 2020-02-27 LAB
FLUAV+FLUBV AG NOSE QL IA.RAPID: NEGATIVE POS/NEG
FLUAV+FLUBV AG NOSE QL IA.RAPID: POSITIVE POS/NEG
S PYO AG THROAT QL: NEGATIVE
VALID INTERNAL CONTROL?: YES
VALID INTERNAL CONTROL?: YES

## 2020-02-27 RX ORDER — OSELTAMIVIR PHOSPHATE 75 MG/1
75 CAPSULE ORAL 2 TIMES DAILY
Qty: 10 CAP | Refills: 0 | Status: SHIPPED | OUTPATIENT
Start: 2020-02-27 | End: 2020-03-03

## 2020-03-12 ENCOUNTER — OFFICE VISIT (OUTPATIENT)
Dept: FAMILY MEDICINE CLINIC | Age: 65
End: 2020-03-12

## 2020-03-12 VITALS
BODY MASS INDEX: 32.57 KG/M2 | DIASTOLIC BLOOD PRESSURE: 50 MMHG | HEIGHT: 62 IN | RESPIRATION RATE: 16 BRPM | SYSTOLIC BLOOD PRESSURE: 120 MMHG | WEIGHT: 177 LBS | HEART RATE: 57 BPM | TEMPERATURE: 98.5 F | OXYGEN SATURATION: 97 %

## 2020-03-12 DIAGNOSIS — I10 ESSENTIAL HYPERTENSION: Primary | ICD-10-CM

## 2020-03-12 NOTE — PROGRESS NOTES
Chief Complaint   Patient presents with    Flu     f/u     1. Have you been to the ER, urgent care clinic since your last visit? Hospitalized since your last visit? No    2. Have you seen or consulted any other health care providers outside of the [address] [place] [address] since your last visit? Include any pap smears or colon screening.  No
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
## 2020-11-05 NOTE — PATIENT INSTRUCTIONS
Buspirone (By mouth) Buspirone ([id number]) Treats anxiety. Brand Name(s):  
There may be other brand names for this medicine. When This Medicine Should Not Be Used: You should not use this medicine if you have had an allergic reaction to buspirone. How to Use This Medicine:  
Tablet · Your doctor will tell you how much of this medicine to take and how often. Do not take more medicine or take it more often than your doctor tells you to. · You may take this medicine with or without food, but take it the same way each time. · You may need to take this medicine for 1 or 2 weeks before you begin to feel better. If a dose is missed: · If you miss a dose or forget to take your medicine, take it as soon as you can. If it is almost time for your next dose, wait until then to take the medicine and skip the missed dose. · Do not use extra medicine to make up for a missed dose. How to Store and Dispose of This Medicine: · Store the medicine at room temperature, away from heat, moisture, and direct light. · Keep all medicine out of the reach of children and never share your medicine with anyone. Drugs and Foods to Avoid: Ask your doctor or pharmacist before using any other medicine, including over-the-counter medicines, vitamins, and herbal products. · You should not use buspirone when you are also using an MAO inhibitor (such as Eldepryl®, Marplan®, Nardil®, Parnate®). · Do not eat grapefruit, drink grapefruit juice, or drink alcohol while you are using buspirone. · Make sure your doctor knows if you are also using cimetidine (Jeralene Cecilia), dexamethasone (Decadron®), diltiazem (Royer Asa), erythromycin (Erythro-Tab®), itraconazole (Sporanox®), nefazodone (Serzone®), rifampin (Rifadin®, Rifamate®, Rifater®), verapamil (Brandie [place]), or medicine for seizures (such as Dilantin®, Luminal®, Tegretol®).  
· Make sure your doctor knows if you are using any medicines that make you sleepy (such as sleeping pills, cold and allergy medicine, narcotic pain relievers, or sedatives). Warnings While Using This Medicine: · Make sure your doctor knows if you are pregnant or breastfeeding, or if you have kidney or liver disease. · Do not stop using this medicine suddenly without asking your doctor. You may need to slowly decrease your dose before stopping it completely. · This medicine may make you dizzy or drowsy. Avoid driving, using machines, or doing anything else that could be dangerous if you are not alert. Possible Side Effects While Using This Medicine:  
Call your doctor right away if you notice any of these side effects: 
· Fast or pounding heartbeat · Numbness or tingling feeling · Tremors or shaking If you notice these less serious side effects, talk with your doctor: · Drowsiness or weakness · Dry mouth · Feeling restless or nervous, trouble sleeping · Headache · Nausea, constipation, upset stomach If you notice other side effects that you think are caused by this medicine, tell your doctor. Call your doctor for medical advice about side effects. You may report side effects to FDA at 7-450-FDA-0511 © 2017 [facility] Information is for End User's use only and may not be sold, redistributed or otherwise used for commercial purposes. The above information is an  only. It is not intended as medical advice for individual conditions or treatments. Talk to your doctor, nurse or pharmacist before following any medical regimen to see if it is safe and effective for you. Anxiety Disorder: Care Instructions Your Care Instructions Anxiety is a normal reaction to stress. Difficult situations can cause you to have symptoms such as sweaty palms and a nervous feeling. In an anxiety disorder, the symptoms are far more severe.  Constant worry, muscle tension, trouble sleeping, nausea and diarrhea, and other symptoms can make normal daily activities difficult or impossible. These symptoms may occur for no reason, and they can affect your work, school, or social life. Medicines, counseling, and self-care can all help. Follow-up care is a key part of your treatment and safety. Be sure to make and go to all appointments, and call your doctor if you are having problems. It's also a good idea to know your test results and keep a list of the medicines you take. How can you care for yourself at home? · Take medicines exactly as directed. Call your doctor if you think you are having a problem with your medicine. · Go to your counseling sessions and follow-up appointments. · Recognize and accept your anxiety. Then, when you are in a situation that makes you anxious, say to yourself, \"This is not an emergency. I feel uncomfortable, but I am not in danger. I can keep going even if I feel anxious. \" · Be kind to your body: 
? Relieve tension with exercise or a massage. ? Get enough rest. 
? Avoid alcohol, caffeine, nicotine, and illegal drugs. They can increase your anxiety level and cause sleep problems. ? Learn and do relaxation techniques. See below for more about these techniques. · Engage your mind. Get out and do something you enjoy. Go to a funny movie, or take a walk or hike. Plan your day. Having too much or too little to do can make you anxious. · Keep a record of your symptoms. Discuss your fears with a good friend or family member, or join a support group for people with similar problems. Talking to others sometimes relieves stress. · Get involved in social groups, or volunteer to help others. Being alone sometimes makes things seem worse than they are. · Get at least 30 minutes of exercise on most days of the week to relieve stress. Walking is a good choice. You also may want to do other activities, such as running, swimming, cycling, or playing tennis or team sports. Relaxation techniques Do relaxation exercises 10 to 20 minutes a day. You can play soothing, relaxing music while you do them, if you wish. · Tell others in your house that you are going to do your relaxation exercises. Ask them not to disturb you. · Find a comfortable place, away from all distractions and noise. · Lie down on your back, or sit with your back straight. · Focus on your breathing. Make it slow and steady. · Breathe in through your nose. Breathe out through either your nose or mouth. · Breathe deeply, filling up the area between your navel and your rib cage. Breathe so that your belly goes up and down. · Do not hold your breath. · Breathe like this for 5 to 10 minutes. Notice the feeling of calmness throughout your whole body. As you continue to breathe slowly and deeply, relax by doing the following for another 5 to 10 minutes: · Tighten and relax each muscle group in your body. You can begin at your toes and work your way up to your head. · Imagine your muscle groups relaxing and becoming heavy. · Empty your mind of all thoughts. · Let yourself relax more and more deeply. · Become aware of the state of calmness that surrounds you. · When your relaxation time is over, you can bring yourself back to alertness by moving your fingers and toes and then your hands and feet and then stretching and moving your entire body. Sometimes people fall asleep during relaxation, but they usually wake up shortly afterward. · Always give yourself time to return to full alertness before you drive a car or do anything that might cause an accident if you are not fully alert. Never play a relaxation tape while you drive a car. When should you call for help? Call 911 anytime you think you may need emergency care. For example, call if: 
  · You feel you cannot stop from hurting yourself or someone else.   
Keep the numbers for these national suicide hotlines: 8-219-880-TALK (1-379.936.7075) and 5-945-YRJCBBM (8-790.552.7845). If you or someone you know talks about suicide or feeling hopeless, get help right away. Watch closely for changes in your health, and be sure to contact your doctor if: 
  · You have anxiety or fear that affects your life.  
  · You have symptoms of anxiety that are new or different from those you had before. Where can you learn more? Go to http://www.montes.com/ Enter P754 in the search box to learn more about \"Anxiety Disorder: Care Instructions. \" Current as of: January 31, 2020               Content Version: 12.6 © 8791-5583 [employer]. Care instructions adapted under license by [employer] (which disclaims liability or warranty for this information). If you have questions about a medical condition or this instruction, always ask your healthcare professional. [address] any warranty or liability for your use of this information.

## 2020-11-05 NOTE — Clinical Note
Please make a lab only visit on 11/6 at [facility].  Please also make a virtual follow up for anxiety in 2 week. Thanks!
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
## 2020-11-08 NOTE — PROGRESS NOTES
[address] Medicine Residency Attending Addendum: 
Dr. Maryann Castillo, DO,  the patient and I were not physically present during this encounter. The resident and I are concurrently monitoring the patient care through appropriate telecommunication technology. I discussed the findings, assessment and plan with the resident and agree with the resident's findings and plan as documented in the resident's note.    
 
Libia Hutchinson MD

## 2020-11-13 NOTE — PROGRESS NOTES
A1c improved. Cr at around BL. LDL elevated, continue lipitor. Patient notified via [address]
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
## 2020-11-17 NOTE — LETTER
11/17/20 Patient: Kierra Diggs YOB: 1955 Date of Visit: 11/17/2020 MD Roney Bustillos [facility] [address] [facility] [address] [id number] VIA In Basket Dear Henry Billings MD, Thank you for referring Ms. Katherine Perez to [address] [place] [address] for evaluation. My notes for this consultation are attached. If you have questions, please do not hesitate to call me. I look forward to following your patient along with you. Sincerely, Minh Zuniga MD

## 2020-11-17 NOTE — PROGRESS NOTES
Jay Suárez is a 63 y.o. female here for Chief Complaint Patient presents with  Thyroid Problem  
  biopsy 1. Have you been to the ER, urgent care clinic since your last visit? Hospitalized since your last visit? -no 
 
2. Have you seen or consulted any other health care providers outside of the [address] [place] [address] since your last visit? Include any pap smears or colon screening.-[facility] telemed

## 2020-11-20 NOTE — PROGRESS NOTES
[address] Medicine Residency Attending Addendum: 
Dr. Damari Henry MD,  the patient and I were not physically present during this encounter. The resident and I are concurrently monitoring the patient care through appropriate telecommunication technology. I discussed the findings, assessment and plan with the resident and agree with the resident's findings and plan as documented in the resident's note.    
 
Steven Blake MD
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
## 2021-01-01 ENCOUNTER — OFFICE VISIT (OUTPATIENT)
Dept: FAMILY MEDICINE CLINIC | Age: 66
End: 2021-01-01
Payer: MEDICAID

## 2021-01-01 ENCOUNTER — ANESTHESIA (OUTPATIENT)
Dept: ENDOSCOPY | Age: 66
End: 2021-01-01
Payer: MEDICAID

## 2021-01-01 ENCOUNTER — APPOINTMENT (OUTPATIENT)
Dept: CT IMAGING | Age: 66
DRG: 721 | End: 2021-01-01
Attending: STUDENT IN AN ORGANIZED HEALTH CARE EDUCATION/TRAINING PROGRAM
Payer: MEDICAID

## 2021-01-01 ENCOUNTER — ANESTHESIA (OUTPATIENT)
Dept: ENDOSCOPY | Age: 66
DRG: 240 | End: 2021-01-01
Payer: MEDICAID

## 2021-01-01 ENCOUNTER — HOSPITAL ENCOUNTER (OUTPATIENT)
Dept: LAB | Age: 66
Discharge: HOME | End: 2021-01-17
Payer: MEDICAID

## 2021-01-01 ENCOUNTER — APPOINTMENT (OUTPATIENT)
Dept: CT IMAGING | Age: 66
DRG: 240 | End: 2021-01-01
Attending: STUDENT IN AN ORGANIZED HEALTH CARE EDUCATION/TRAINING PROGRAM
Payer: MEDICAID

## 2021-01-01 ENCOUNTER — TELEPHONE (OUTPATIENT)
Dept: FAMILY MEDICINE CLINIC | Age: 66
End: 2021-01-01

## 2021-01-01 ENCOUNTER — APPOINTMENT (OUTPATIENT)
Dept: GENERAL RADIOLOGY | Age: 66
DRG: 240 | End: 2021-01-01
Attending: FAMILY MEDICINE
Payer: MEDICAID

## 2021-01-01 ENCOUNTER — HOSPITAL ENCOUNTER (OUTPATIENT)
Age: 66
Setting detail: OUTPATIENT SURGERY
Discharge: HOME | End: 2021-01-21
Attending: INTERNAL MEDICINE | Admitting: INTERNAL MEDICINE
Payer: MEDICAID

## 2021-01-01 ENCOUNTER — VIRTUAL VISIT (OUTPATIENT)
Dept: FAMILY MEDICINE CLINIC | Age: 66
End: 2021-01-01
Payer: MEDICAID

## 2021-01-01 ENCOUNTER — OFFICE VISIT (OUTPATIENT)
Dept: FAMILY MEDICINE CLINIC | Age: 66
End: 2021-01-01
Attending: FAMILY MEDICINE
Payer: MEDICAID

## 2021-01-01 ENCOUNTER — TRANSCRIBE ORDER (OUTPATIENT)
Dept: SCHEDULING | Age: 66
End: 2021-01-01

## 2021-01-01 ENCOUNTER — APPOINTMENT (OUTPATIENT)
Dept: ULTRASOUND IMAGING | Age: 66
End: 2021-01-01
Attending: INTERNAL MEDICINE
Payer: MEDICAID

## 2021-01-01 ENCOUNTER — HOSPITAL ENCOUNTER (INPATIENT)
Age: 66
LOS: 6 days | End: 2021-04-29
Attending: FAMILY MEDICINE | Admitting: FAMILY MEDICINE
Payer: MEDICAID

## 2021-01-01 ENCOUNTER — APPOINTMENT (OUTPATIENT)
Dept: VASCULAR SURGERY | Age: 66
DRG: 240 | End: 2021-01-01
Attending: STUDENT IN AN ORGANIZED HEALTH CARE EDUCATION/TRAINING PROGRAM
Payer: MEDICAID

## 2021-01-01 ENCOUNTER — PATIENT OUTREACH (OUTPATIENT)
Dept: CASE MANAGEMENT | Age: 66
End: 2021-01-01

## 2021-01-01 ENCOUNTER — HOSPITAL ENCOUNTER (OUTPATIENT)
Dept: ULTRASOUND IMAGING | Age: 66
Discharge: HOME | DRG: 240 | End: 2021-02-01
Attending: FAMILY MEDICINE
Payer: MEDICAID

## 2021-01-01 ENCOUNTER — HOSPICE ADMISSION (OUTPATIENT)
Dept: HOSPICE | Facility: HOSPICE | Age: 66
End: 2021-01-01
Payer: MEDICAID

## 2021-01-01 ENCOUNTER — TRANSCRIBE ORDER (OUTPATIENT)
Dept: EMERGENCY DEPT | Age: 66
End: 2021-01-01

## 2021-01-01 ENCOUNTER — APPOINTMENT (OUTPATIENT)
Dept: ULTRASOUND IMAGING | Age: 66
DRG: 240 | End: 2021-01-01
Attending: NURSE PRACTITIONER
Payer: MEDICAID

## 2021-01-01 ENCOUNTER — APPOINTMENT (OUTPATIENT)
Dept: NON INVASIVE DIAGNOSTICS | Age: 66
DRG: 240 | End: 2021-01-01
Attending: STUDENT IN AN ORGANIZED HEALTH CARE EDUCATION/TRAINING PROGRAM
Payer: MEDICAID

## 2021-01-01 ENCOUNTER — HOSPITAL ENCOUNTER (INPATIENT)
Age: 66
LOS: 5 days | Discharge: HOME HEALTH | DRG: 721 | End: 2021-03-28
Attending: STUDENT IN AN ORGANIZED HEALTH CARE EDUCATION/TRAINING PROGRAM | Admitting: FAMILY MEDICINE
Payer: MEDICAID

## 2021-01-01 ENCOUNTER — ANESTHESIA EVENT (OUTPATIENT)
Dept: ENDOSCOPY | Age: 66
End: 2021-01-01
Payer: MEDICAID

## 2021-01-01 ENCOUNTER — APPOINTMENT (OUTPATIENT)
Dept: GENERAL RADIOLOGY | Age: 66
DRG: 721 | End: 2021-01-01
Attending: STUDENT IN AN ORGANIZED HEALTH CARE EDUCATION/TRAINING PROGRAM
Payer: MEDICAID

## 2021-01-01 ENCOUNTER — HOSPITAL ENCOUNTER (INPATIENT)
Age: 66
LOS: 1 days | Discharge: HOME | DRG: 240 | End: 2021-02-05
Attending: STUDENT IN AN ORGANIZED HEALTH CARE EDUCATION/TRAINING PROGRAM | Admitting: FAMILY MEDICINE
Payer: MEDICAID

## 2021-01-01 ENCOUNTER — HOME CARE VISIT (OUTPATIENT)
Dept: HOSPICE | Facility: HOSPICE | Age: 66
End: 2021-01-01
Payer: MEDICAID

## 2021-01-01 ENCOUNTER — ANESTHESIA EVENT (OUTPATIENT)
Dept: ENDOSCOPY | Age: 66
DRG: 240 | End: 2021-01-01
Payer: MEDICAID

## 2021-01-01 ENCOUNTER — LAB ONLY (OUTPATIENT)
Dept: FAMILY MEDICINE CLINIC | Age: 66
End: 2021-01-01

## 2021-01-01 ENCOUNTER — HOSPITAL ENCOUNTER (OUTPATIENT)
Dept: ULTRASOUND IMAGING | Age: 66
End: 2021-01-01
Attending: FAMILY MEDICINE

## 2021-01-01 ENCOUNTER — PATIENT MESSAGE (OUTPATIENT)
Dept: FAMILY MEDICINE CLINIC | Age: 66
End: 2021-01-01

## 2021-01-01 ENCOUNTER — APPOINTMENT (OUTPATIENT)
Dept: CT IMAGING | Age: 66
End: 2021-01-01
Attending: INTERNAL MEDICINE
Payer: MEDICAID

## 2021-01-01 VITALS
BODY MASS INDEX: 31.47 KG/M2 | TEMPERATURE: 97.9 F | HEIGHT: 62 IN | HEART RATE: 87 BPM | WEIGHT: 171 LBS | DIASTOLIC BLOOD PRESSURE: 51 MMHG | OXYGEN SATURATION: 96 % | SYSTOLIC BLOOD PRESSURE: 96 MMHG | RESPIRATION RATE: 17 BRPM

## 2021-01-01 VITALS
DIASTOLIC BLOOD PRESSURE: 69 MMHG | HEART RATE: 86 BPM | OXYGEN SATURATION: 88 % | SYSTOLIC BLOOD PRESSURE: 103 MMHG | TEMPERATURE: 99.3 F | RESPIRATION RATE: 5 BRPM

## 2021-01-01 VITALS
RESPIRATION RATE: 16 BRPM | SYSTOLIC BLOOD PRESSURE: 117 MMHG | OXYGEN SATURATION: 96 % | TEMPERATURE: 97.2 F | WEIGHT: 160.8 LBS | DIASTOLIC BLOOD PRESSURE: 68 MMHG | HEIGHT: 62 IN | BODY MASS INDEX: 29.59 KG/M2 | HEART RATE: 90 BPM

## 2021-01-01 VITALS
WEIGHT: 171 LBS | DIASTOLIC BLOOD PRESSURE: 65 MMHG | HEIGHT: 62 IN | SYSTOLIC BLOOD PRESSURE: 106 MMHG | OXYGEN SATURATION: 97 % | TEMPERATURE: 97.1 F | HEART RATE: 82 BPM | RESPIRATION RATE: 20 BRPM | BODY MASS INDEX: 31.47 KG/M2

## 2021-01-01 VITALS
TEMPERATURE: 97.7 F | RESPIRATION RATE: 23 BRPM | OXYGEN SATURATION: 96 % | DIASTOLIC BLOOD PRESSURE: 48 MMHG | SYSTOLIC BLOOD PRESSURE: 119 MMHG | HEART RATE: 80 BPM | WEIGHT: 165.34 LBS | BODY MASS INDEX: 30.43 KG/M2 | HEIGHT: 62 IN

## 2021-01-01 VITALS
RESPIRATION RATE: 16 BRPM | TEMPERATURE: 98.2 F | BODY MASS INDEX: 29.44 KG/M2 | HEART RATE: 82 BPM | WEIGHT: 160 LBS | SYSTOLIC BLOOD PRESSURE: 156 MMHG | HEIGHT: 62 IN | DIASTOLIC BLOOD PRESSURE: 72 MMHG | OXYGEN SATURATION: 96 %

## 2021-01-01 DIAGNOSIS — N18.32 STAGE 3B CHRONIC KIDNEY DISEASE (HCC): ICD-10-CM

## 2021-01-01 DIAGNOSIS — K59.00 CONSTIPATION, UNSPECIFIED CONSTIPATION TYPE: ICD-10-CM

## 2021-01-01 DIAGNOSIS — E78.2 MIXED HYPERLIPIDEMIA: ICD-10-CM

## 2021-01-01 DIAGNOSIS — R14.0 ABDOMINAL DISTENSION: ICD-10-CM

## 2021-01-01 DIAGNOSIS — Z79.4 TYPE 2 DIABETES MELLITUS WITH HYPERGLYCEMIA, WITH LONG-TERM CURRENT USE OF INSULIN (HCC): ICD-10-CM

## 2021-01-01 DIAGNOSIS — C18.9 METASTATIC COLON CANCER IN FEMALE (HCC): ICD-10-CM

## 2021-01-01 DIAGNOSIS — E87.6 HYPOKALEMIA: ICD-10-CM

## 2021-01-01 DIAGNOSIS — Z76.89 ENCOUNTER FOR SUPPORT AND COORDINATION OF TRANSITION OF CARE: Primary | ICD-10-CM

## 2021-01-01 DIAGNOSIS — R10.32 LEFT LOWER QUADRANT ABDOMINAL PAIN: ICD-10-CM

## 2021-01-01 DIAGNOSIS — C78.6 PERITONEAL CARCINOMATOSIS (HCC): ICD-10-CM

## 2021-01-01 DIAGNOSIS — R65.21 SEPTIC SHOCK (HCC): ICD-10-CM

## 2021-01-01 DIAGNOSIS — E11.65 TYPE 2 DIABETES MELLITUS WITH HYPERGLYCEMIA, WITH LONG-TERM CURRENT USE OF INSULIN (HCC): ICD-10-CM

## 2021-01-01 DIAGNOSIS — E11.8 DIABETES MELLITUS WITH COMPLICATION (HCC): ICD-10-CM

## 2021-01-01 DIAGNOSIS — Q25.1 STENOSIS OF ABDOMINAL AORTA: ICD-10-CM

## 2021-01-01 DIAGNOSIS — R10.84 GENERALIZED ABDOMINAL PAIN: ICD-10-CM

## 2021-01-01 DIAGNOSIS — K56.699 SIGMOID STRICTURE (HCC): ICD-10-CM

## 2021-01-01 DIAGNOSIS — K52.9 COLITIS: ICD-10-CM

## 2021-01-01 DIAGNOSIS — Z51.5 HOSPICE CARE PATIENT: ICD-10-CM

## 2021-01-01 DIAGNOSIS — R11.14 BILIOUS VOMITING WITH NAUSEA: ICD-10-CM

## 2021-01-01 DIAGNOSIS — R45.1 RESTLESSNESS AND AGITATION: ICD-10-CM

## 2021-01-01 DIAGNOSIS — K92.0 HEMATEMESIS WITH NAUSEA: ICD-10-CM

## 2021-01-01 DIAGNOSIS — F32.A DEPRESSION, UNSPECIFIED DEPRESSION TYPE: ICD-10-CM

## 2021-01-01 DIAGNOSIS — R94.31 PROLONGED Q-T INTERVAL ON ECG: ICD-10-CM

## 2021-01-01 DIAGNOSIS — A41.9 SEPTIC SHOCK (HCC): ICD-10-CM

## 2021-01-01 DIAGNOSIS — I10 ESSENTIAL HYPERTENSION: ICD-10-CM

## 2021-01-01 DIAGNOSIS — D72.829 LEUKOCYTOSIS, UNSPECIFIED TYPE: ICD-10-CM

## 2021-01-01 DIAGNOSIS — R65.20 SEPSIS WITH ENCEPHALOPATHY WITHOUT SEPTIC SHOCK, DUE TO UNSPECIFIED ORGANISM (HCC): Primary | ICD-10-CM

## 2021-01-01 DIAGNOSIS — C16.9 GASTRIC ADENOCARCINOMA (HCC): ICD-10-CM

## 2021-01-01 DIAGNOSIS — I70.0 AORTIC ATHEROSCLEROSIS (HCC): ICD-10-CM

## 2021-01-01 DIAGNOSIS — E11.29 PERSISTENT PROTEINURIA ASSOCIATED WITH TYPE 2 DIABETES MELLITUS (HCC): ICD-10-CM

## 2021-01-01 DIAGNOSIS — R10.32 LEFT LOWER QUADRANT ABDOMINAL PAIN: Primary | ICD-10-CM

## 2021-01-01 DIAGNOSIS — R50.9 FEVER, UNSPECIFIED FEVER CAUSE: ICD-10-CM

## 2021-01-01 DIAGNOSIS — E11.3553 STABLE PROLIFERATIVE DIABETIC RETINOPATHY OF BOTH EYES ASSOCIATED WITH TYPE 2 DIABETES MELLITUS (HCC): ICD-10-CM

## 2021-01-01 DIAGNOSIS — K56.609 SBO (SMALL BOWEL OBSTRUCTION) (HCC): ICD-10-CM

## 2021-01-01 DIAGNOSIS — S31.000A WOUND OF SACRAL REGION, INITIAL ENCOUNTER: ICD-10-CM

## 2021-01-01 DIAGNOSIS — I26.99 BILATERAL PULMONARY EMBOLISM (HCC): ICD-10-CM

## 2021-01-01 DIAGNOSIS — R06.02 SHORTNESS OF BREATH: ICD-10-CM

## 2021-01-01 DIAGNOSIS — A41.9 SEPSIS WITH ENCEPHALOPATHY WITHOUT SEPTIC SHOCK, DUE TO UNSPECIFIED ORGANISM (HCC): Primary | ICD-10-CM

## 2021-01-01 DIAGNOSIS — R41.0 DISORIENTATION: ICD-10-CM

## 2021-01-01 DIAGNOSIS — R60.9 PITTING EDEMA: ICD-10-CM

## 2021-01-01 DIAGNOSIS — E11.42 DIABETIC PERIPHERAL NEUROPATHY (HCC): ICD-10-CM

## 2021-01-01 DIAGNOSIS — Z09 HOSPITAL DISCHARGE FOLLOW-UP: ICD-10-CM

## 2021-01-01 DIAGNOSIS — C16.9 GASTRIC ADENOCARCINOMA (HCC): Primary | ICD-10-CM

## 2021-01-01 DIAGNOSIS — F41.9 ANXIETY: ICD-10-CM

## 2021-01-01 DIAGNOSIS — Z01.812 PRE-PROCEDURAL LABORATORY EXAMINATIONS: ICD-10-CM

## 2021-01-01 DIAGNOSIS — G89.3 NEOPLASM RELATED PAIN: ICD-10-CM

## 2021-01-01 DIAGNOSIS — T81.49XA POSTOPERATIVE WOUND INFECTION: ICD-10-CM

## 2021-01-01 DIAGNOSIS — R06.02 SOB (SHORTNESS OF BREATH): ICD-10-CM

## 2021-01-01 DIAGNOSIS — R53.0 NEOPLASTIC (MALIGNANT) RELATED FATIGUE: ICD-10-CM

## 2021-01-01 DIAGNOSIS — Z01.812 PRE-PROCEDURAL LABORATORY EXAMINATIONS: Primary | ICD-10-CM

## 2021-01-01 DIAGNOSIS — C78.6 PERITONEAL CARCINOMATOSIS (HCC): Primary | ICD-10-CM

## 2021-01-01 DIAGNOSIS — I95.9 HYPOTENSION, UNSPECIFIED HYPOTENSION TYPE: Primary | ICD-10-CM

## 2021-01-01 DIAGNOSIS — R52 NONVERBAL SIGNS OF PAIN: ICD-10-CM

## 2021-01-01 DIAGNOSIS — R06.2 WHEEZING: ICD-10-CM

## 2021-01-01 DIAGNOSIS — I10 ESSENTIAL HYPERTENSION WITH GOAL BLOOD PRESSURE LESS THAN 140/90: ICD-10-CM

## 2021-01-01 DIAGNOSIS — Z12.31 SCREENING MAMMOGRAM FOR HIGH-RISK PATIENT: Primary | ICD-10-CM

## 2021-01-01 DIAGNOSIS — H43.811 POSTERIOR VITREOUS DETACHMENT OF RIGHT EYE: ICD-10-CM

## 2021-01-01 DIAGNOSIS — J40 BRONCHITIS: ICD-10-CM

## 2021-01-01 DIAGNOSIS — R80.9 PERSISTENT PROTEINURIA ASSOCIATED WITH TYPE 2 DIABETES MELLITUS (HCC): ICD-10-CM

## 2021-01-01 DIAGNOSIS — K56.609 SMALL BOWEL OBSTRUCTION (HCC): Primary | ICD-10-CM

## 2021-01-01 DIAGNOSIS — E11.65 TYPE 2 DIABETES MELLITUS WITH HYPERGLYCEMIA, WITH LONG-TERM CURRENT USE OF INSULIN (HCC): Primary | ICD-10-CM

## 2021-01-01 DIAGNOSIS — R53.83 LETHARGY: ICD-10-CM

## 2021-01-01 DIAGNOSIS — Z80.0 FAMILY HX OF COLON CANCER: ICD-10-CM

## 2021-01-01 DIAGNOSIS — Z78.9 ON TOTAL PARENTERAL NUTRITION (TPN): ICD-10-CM

## 2021-01-01 DIAGNOSIS — I77.1 STENOSIS OF ILIAC ARTERY (HCC): ICD-10-CM

## 2021-01-01 DIAGNOSIS — Z79.4 TYPE 2 DIABETES MELLITUS WITH HYPERGLYCEMIA, WITH LONG-TERM CURRENT USE OF INSULIN (HCC): Primary | ICD-10-CM

## 2021-01-01 DIAGNOSIS — G93.40 SEPSIS WITH ENCEPHALOPATHY WITHOUT SEPTIC SHOCK, DUE TO UNSPECIFIED ORGANISM (HCC): Primary | ICD-10-CM

## 2021-01-01 LAB
ALBUMIN FLD-MCNC: 2 G/DL
ALBUMIN SERPL-MCNC: 1.7 G/DL (ref 3.5–5)
ALBUMIN SERPL-MCNC: 2 G/DL (ref 3.5–5)
ALBUMIN SERPL-MCNC: 2.1 G/DL (ref 3.5–5)
ALBUMIN SERPL-MCNC: 2.6 G/DL (ref 3.5–5)
ALBUMIN SERPL-MCNC: 2.8 G/DL (ref 3.5–5)
ALBUMIN SERPL-MCNC: 2.8 G/DL (ref 3.8–4.8)
ALBUMIN SERPL-MCNC: 2.9 G/DL (ref 3.5–5)
ALBUMIN SERPL-MCNC: 3.3 G/DL (ref 3.8–4.8)
ALBUMIN/GLOB SERPL: 0.4 {RATIO} (ref 1.1–2.2)
ALBUMIN/GLOB SERPL: 0.5 {RATIO} (ref 1.1–2.2)
ALBUMIN/GLOB SERPL: 0.6 {RATIO} (ref 1.1–2.2)
ALBUMIN/GLOB SERPL: 0.8 {RATIO} (ref 1.1–2.2)
ALBUMIN/GLOB SERPL: 0.8 {RATIO} (ref 1.1–2.2)
ALBUMIN/GLOB SERPL: 0.9 {RATIO} (ref 1.2–2.2)
ALBUMIN/GLOB SERPL: 1 {RATIO} (ref 1.2–2.2)
ALP SERPL-CCNC: 104 IU/L (ref 39–117)
ALP SERPL-CCNC: 104 U/L (ref 45–117)
ALP SERPL-CCNC: 134 U/L (ref 45–117)
ALP SERPL-CCNC: 147 U/L (ref 45–117)
ALP SERPL-CCNC: 75 U/L (ref 45–117)
ALP SERPL-CCNC: 81 U/L (ref 45–117)
ALP SERPL-CCNC: 85 U/L (ref 45–117)
ALP SERPL-CCNC: 96 U/L (ref 45–117)
ALP SERPL-CCNC: 97 IU/L (ref 39–117)
ALT SERPL-CCNC: 10 U/L (ref 12–78)
ALT SERPL-CCNC: 11 U/L (ref 12–78)
ALT SERPL-CCNC: 12 U/L (ref 12–78)
ALT SERPL-CCNC: 14 IU/L (ref 0–32)
ALT SERPL-CCNC: 14 U/L (ref 12–78)
ALT SERPL-CCNC: 18 IU/L (ref 0–32)
ALT SERPL-CCNC: 20 U/L (ref 12–78)
ALT SERPL-CCNC: 28 U/L (ref 12–78)
ALT SERPL-CCNC: 32 U/L (ref 12–78)
AMMONIA PLAS-SCNC: 20 UMOL/L
AMMONIA PLAS-SCNC: 50 UMOL/L
AMPHET UR QL SCN: NEGATIVE
ANION GAP SERPL CALC-SCNC: 10 MMOL/L (ref 5–15)
ANION GAP SERPL CALC-SCNC: 2 MMOL/L (ref 5–15)
ANION GAP SERPL CALC-SCNC: 3 MMOL/L (ref 5–15)
ANION GAP SERPL CALC-SCNC: 4 MMOL/L (ref 5–15)
ANION GAP SERPL CALC-SCNC: 5 MMOL/L (ref 5–15)
ANION GAP SERPL CALC-SCNC: 6 MMOL/L (ref 5–15)
ANION GAP SERPL CALC-SCNC: 6 MMOL/L (ref 5–15)
ANION GAP SERPL CALC-SCNC: 8 MMOL/L (ref 5–15)
ANION GAP SERPL CALC-SCNC: 8 MMOL/L (ref 5–15)
ANION GAP SERPL CALC-SCNC: 9 MMOL/L (ref 5–15)
ANION GAP SERPL CALC-SCNC: ABNORMAL MMOL/L (ref 5–15)
ANION GAP SERPL CALC-SCNC: NORMAL MMOL/L (ref 5–15)
APPEARANCE FLD: ABNORMAL
APPEARANCE UR: ABNORMAL
APTT PPP: 28.8 SEC (ref 22.1–31)
APTT PPP: 35.6 SEC (ref 22.1–31)
APTT PPP: 69.1 SEC (ref 22.1–31)
APTT PPP: 92.2 SEC (ref 22.1–31)
ARTERIAL PATENCY WRIST A: YES
AST SERPL-CCNC: 10 U/L (ref 15–37)
AST SERPL-CCNC: 14 U/L (ref 15–37)
AST SERPL-CCNC: 15 IU/L (ref 0–40)
AST SERPL-CCNC: 15 U/L (ref 15–37)
AST SERPL-CCNC: 16 IU/L (ref 0–40)
AST SERPL-CCNC: 17 U/L (ref 15–37)
AST SERPL-CCNC: 21 U/L (ref 15–37)
AST SERPL-CCNC: 25 U/L (ref 15–37)
AST SERPL-CCNC: 29 U/L (ref 15–37)
ATRIAL RATE: 88 BPM
BACTERIA SPEC CULT: ABNORMAL
BACTERIA SPEC CULT: ABNORMAL
BACTERIA SPEC CULT: NORMAL
BACTERIA SPEC CULT: NORMAL
BACTERIA URNS QL MICRO: NEGATIVE /HPF
BARBITURATES UR QL SCN: NEGATIVE
BASE EXCESS BLDA CALC-SCNC: 21.9 MMOL/L
BASOPHILS # BLD: 0 K/UL (ref 0–0.1)
BASOPHILS # BLD: 0.1 K/UL (ref 0–0.1)
BASOPHILS # BLD: NORMAL K/UL (ref 0–0.1)
BASOPHILS NFR BLD: 0 % (ref 0–1)
BASOPHILS NFR BLD: 1 % (ref 0–1)
BASOPHILS NFR BLD: NORMAL % (ref 0–1)
BDY SITE: ABNORMAL
BENZODIAZ UR QL: NEGATIVE
BILIRUB DIRECT SERPL-MCNC: 0.1 MG/DL (ref 0–0.2)
BILIRUB SERPL-MCNC: 0.3 MG/DL (ref 0.2–1)
BILIRUB SERPL-MCNC: 0.3 MG/DL (ref 0–1.2)
BILIRUB SERPL-MCNC: 0.4 MG/DL (ref 0.2–1)
BILIRUB SERPL-MCNC: 0.4 MG/DL (ref 0–1.2)
BILIRUB SERPL-MCNC: 0.5 MG/DL (ref 0.2–1)
BILIRUB SERPL-MCNC: 0.5 MG/DL (ref 0.2–1)
BILIRUB SERPL-MCNC: 0.6 MG/DL (ref 0.2–1)
BILIRUB UR QL STRIP: NORMAL
BILIRUB UR QL: NEGATIVE
BNP SERPL-MCNC: 658 PG/ML
BUN SERPL-MCNC: 16 MG/DL (ref 6–20)
BUN SERPL-MCNC: 16 MG/DL (ref 6–20)
BUN SERPL-MCNC: 17 MG/DL (ref 6–20)
BUN SERPL-MCNC: 17 MG/DL (ref 6–20)
BUN SERPL-MCNC: 18 MG/DL (ref 6–20)
BUN SERPL-MCNC: 19 MG/DL (ref 6–20)
BUN SERPL-MCNC: 19 MG/DL (ref 6–20)
BUN SERPL-MCNC: 22 MG/DL (ref 6–20)
BUN SERPL-MCNC: 24 MG/DL (ref 6–20)
BUN SERPL-MCNC: 25 MG/DL (ref 6–20)
BUN SERPL-MCNC: 35 MG/DL (ref 6–20)
BUN SERPL-MCNC: 39 MG/DL (ref 8–27)
BUN SERPL-MCNC: 40 MG/DL (ref 6–20)
BUN SERPL-MCNC: 43 MG/DL (ref 8–27)
BUN SERPL-MCNC: 44 MG/DL (ref 6–20)
BUN SERPL-MCNC: 53 MG/DL (ref 6–20)
BUN SERPL-MCNC: NORMAL MG/DL (ref 6–20)
BUN/CREAT SERPL: 14 (ref 12–20)
BUN/CREAT SERPL: 15 (ref 12–20)
BUN/CREAT SERPL: 16 (ref 12–20)
BUN/CREAT SERPL: 16 (ref 12–20)
BUN/CREAT SERPL: 17 (ref 12–20)
BUN/CREAT SERPL: 26 (ref 12–28)
BUN/CREAT SERPL: 26 (ref 12–28)
BUN/CREAT SERPL: 28 (ref 12–20)
BUN/CREAT SERPL: 28 (ref 12–20)
BUN/CREAT SERPL: 29 (ref 12–20)
BUN/CREAT SERPL: 29 (ref 12–20)
BUN/CREAT SERPL: 36 (ref 12–20)
BUN/CREAT SERPL: 37 (ref 12–20)
BUN/CREAT SERPL: NORMAL (ref 12–20)
CALCIUM SERPL-MCNC: 6.6 MG/DL (ref 8.5–10.1)
CALCIUM SERPL-MCNC: 6.7 MG/DL (ref 8.5–10.1)
CALCIUM SERPL-MCNC: 6.9 MG/DL (ref 8.5–10.1)
CALCIUM SERPL-MCNC: 7 MG/DL (ref 8.5–10.1)
CALCIUM SERPL-MCNC: 7.1 MG/DL (ref 8.5–10.1)
CALCIUM SERPL-MCNC: 7.3 MG/DL (ref 8.5–10.1)
CALCIUM SERPL-MCNC: 7.3 MG/DL (ref 8.5–10.1)
CALCIUM SERPL-MCNC: 7.7 MG/DL (ref 8.5–10.1)
CALCIUM SERPL-MCNC: 7.8 MG/DL (ref 8.5–10.1)
CALCIUM SERPL-MCNC: 8.1 MG/DL (ref 8.5–10.1)
CALCIUM SERPL-MCNC: 8.2 MG/DL (ref 8.5–10.1)
CALCIUM SERPL-MCNC: 8.4 MG/DL (ref 8.7–10.3)
CALCIUM SERPL-MCNC: 8.5 MG/DL (ref 8.5–10.1)
CALCIUM SERPL-MCNC: 8.5 MG/DL (ref 8.5–10.1)
CALCIUM SERPL-MCNC: 8.9 MG/DL (ref 8.5–10.1)
CALCIUM SERPL-MCNC: 8.9 MG/DL (ref 8.7–10.3)
CALCIUM SERPL-MCNC: NORMAL MG/DL (ref 8.5–10.1)
CALCULATED R AXIS, ECG10: 9 DEGREES
CALCULATED T AXIS, ECG11: -53 DEGREES
CANCER AG125 SERPL-ACNC: 108 U/ML (ref 1.5–35)
CANCER AG19-9 SERPL-ACNC: 147 U/ML (ref 0–35)
CANNABINOIDS UR QL SCN: NEGATIVE
CC UR VC: NORMAL
CEA SERPL-MCNC: 20.3 NG/ML
CEA SERPL-MCNC: 26.1 NG/ML (ref 0–4.7)
CHLORIDE SERPL-SCNC: 100 MMOL/L (ref 97–108)
CHLORIDE SERPL-SCNC: 100 MMOL/L (ref 97–108)
CHLORIDE SERPL-SCNC: 101 MMOL/L (ref 97–108)
CHLORIDE SERPL-SCNC: 104 MMOL/L (ref 96–106)
CHLORIDE SERPL-SCNC: 105 MMOL/L (ref 97–108)
CHLORIDE SERPL-SCNC: 106 MMOL/L (ref 97–108)
CHLORIDE SERPL-SCNC: 107 MMOL/L (ref 96–106)
CHLORIDE SERPL-SCNC: 108 MMOL/L (ref 97–108)
CHLORIDE SERPL-SCNC: 109 MMOL/L (ref 97–108)
CHLORIDE SERPL-SCNC: 110 MMOL/L (ref 97–108)
CHLORIDE SERPL-SCNC: 112 MMOL/L (ref 97–108)
CHLORIDE SERPL-SCNC: 113 MMOL/L (ref 97–108)
CHLORIDE SERPL-SCNC: 87 MMOL/L (ref 97–108)
CHLORIDE SERPL-SCNC: 93 MMOL/L (ref 97–108)
CHLORIDE SERPL-SCNC: 94 MMOL/L (ref 97–108)
CHLORIDE SERPL-SCNC: 96 MMOL/L (ref 97–108)
CHLORIDE SERPL-SCNC: NORMAL MMOL/L (ref 97–108)
CO2 SERPL-SCNC: 21 MMOL/L (ref 20–29)
CO2 SERPL-SCNC: 21 MMOL/L (ref 20–29)
CO2 SERPL-SCNC: 21 MMOL/L (ref 21–32)
CO2 SERPL-SCNC: 21 MMOL/L (ref 21–32)
CO2 SERPL-SCNC: 22 MMOL/L (ref 21–32)
CO2 SERPL-SCNC: 24 MMOL/L (ref 21–32)
CO2 SERPL-SCNC: 25 MMOL/L (ref 21–32)
CO2 SERPL-SCNC: 26 MMOL/L (ref 21–32)
CO2 SERPL-SCNC: 30 MMOL/L (ref 21–32)
CO2 SERPL-SCNC: 33 MMOL/L (ref 21–32)
CO2 SERPL-SCNC: 34 MMOL/L (ref 21–32)
CO2 SERPL-SCNC: 37 MMOL/L (ref 21–32)
CO2 SERPL-SCNC: 37 MMOL/L (ref 21–32)
CO2 SERPL-SCNC: 38 MMOL/L (ref 21–32)
CO2 SERPL-SCNC: 42 MMOL/L (ref 21–32)
CO2 SERPL-SCNC: 43 MMOL/L (ref 21–32)
CO2 SERPL-SCNC: 43 MMOL/L (ref 21–32)
CO2 SERPL-SCNC: >45 MMOL/L (ref 21–32)
CO2 SERPL-SCNC: NORMAL MMOL/L (ref 21–32)
COCAINE UR QL SCN: NEGATIVE
COLOR FLD: ABNORMAL
COLOR UR: ABNORMAL
COMMENT, HOLDF: NORMAL
COVID-19 RAPID TEST, COVR: NOT DETECTED
CREAT SERPL-MCNC: 1.06 MG/DL (ref 0.55–1.02)
CREAT SERPL-MCNC: 1.09 MG/DL (ref 0.55–1.02)
CREAT SERPL-MCNC: 1.09 MG/DL (ref 0.55–1.02)
CREAT SERPL-MCNC: 1.12 MG/DL (ref 0.55–1.02)
CREAT SERPL-MCNC: 1.12 MG/DL (ref 0.55–1.02)
CREAT SERPL-MCNC: 1.14 MG/DL (ref 0.55–1.02)
CREAT SERPL-MCNC: 1.18 MG/DL (ref 0.55–1.02)
CREAT SERPL-MCNC: 1.26 MG/DL (ref 0.55–1.02)
CREAT SERPL-MCNC: 1.27 MG/DL (ref 0.55–1.02)
CREAT SERPL-MCNC: 1.36 MG/DL (ref 0.55–1.02)
CREAT SERPL-MCNC: 1.38 MG/DL (ref 0.55–1.02)
CREAT SERPL-MCNC: 1.38 MG/DL (ref 0.55–1.02)
CREAT SERPL-MCNC: 1.41 MG/DL (ref 0.55–1.02)
CREAT SERPL-MCNC: 1.43 MG/DL (ref 0.55–1.02)
CREAT SERPL-MCNC: 1.51 MG/DL (ref 0.57–1)
CREAT SERPL-MCNC: 1.56 MG/DL (ref 0.55–1.02)
CREAT SERPL-MCNC: 1.64 MG/DL (ref 0.57–1)
CREAT SERPL-MCNC: 1.72 MG/DL (ref 0.55–1.02)
CREAT SERPL-MCNC: NORMAL MG/DL (ref 0.55–1.02)
DIAGNOSIS, 93000: NORMAL
DIFFERENTIAL METHOD BLD: ABNORMAL
DIFFERENTIAL METHOD BLD: NORMAL
DIFFERENTIAL METHOD BLD: NORMAL
DRUG SCRN COMMENT,DRGCM: NORMAL
ECHO AO ROOT DIAM: 2.88 CM
ECHO AV AREA PEAK VELOCITY: 2 CM2
ECHO AV AREA/BSA PEAK VELOCITY: 1.1 CM2/M2
ECHO AV PEAK GRADIENT: 10.09 MMHG
ECHO AV PEAK VELOCITY: 158.81 CM/S
ECHO EST RA PRESSURE: 3 MMHG
ECHO LA AREA 4C: 18.88 CM2
ECHO LA MAJOR AXIS: 3.63 CM
ECHO LA MINOR AXIS: 2.03 CM
ECHO LA VOL 2C: 31.34 ML (ref 22–52)
ECHO LA VOL 4C: 51.31 ML (ref 22–52)
ECHO LA VOL BP: 48.07 ML (ref 22–52)
ECHO LA VOL/BSA BIPLANE: 26.88 ML/M2 (ref 16–28)
ECHO LA VOLUME INDEX A2C: 17.52 ML/M2 (ref 16–28)
ECHO LA VOLUME INDEX A4C: 28.69 ML/M2 (ref 16–28)
ECHO LV E' LATERAL VELOCITY: 6.67 CENTIMETER/SECOND
ECHO LV E' SEPTAL VELOCITY: 6.32 CENTIMETER/SECOND
ECHO LV INTERNAL DIMENSION DIASTOLIC: 3.84 CM (ref 3.9–5.3)
ECHO LV INTERNAL DIMENSION SYSTOLIC: 2.54 CM
ECHO LV IVSD: 1.1 CM (ref 0.6–0.9)
ECHO LV MASS 2D: 113.3 G (ref 67–162)
ECHO LV MASS INDEX 2D: 63.4 G/M2 (ref 43–95)
ECHO LV POSTERIOR WALL DIASTOLIC: 0.83 CM (ref 0.6–0.9)
ECHO LVOT DIAM: 1.9 CM
ECHO LVOT PEAK GRADIENT: 5 MMHG
ECHO LVOT PEAK VELOCITY: 111.84 CM/S
ECHO MV A VELOCITY: 123.1 CENTIMETER/SECOND
ECHO MV E DECELERATION TIME (DT): 312.44 MS
ECHO MV E VELOCITY: 89.18 CENTIMETER/SECOND
ECHO PV PEAK INSTANTANEOUS GRADIENT SYSTOLIC: 4.3 MMHG
ECHO PV REGURGITANT MAX VELOCITY: 103.46 CM/S
ECHO RIGHT VENTRICULAR SYSTOLIC PRESSURE (RVSP): 49.91 MMHG
ECHO RV INTERNAL DIMENSION: 2.88 CM
ECHO RV TAPSE: 1.7 CM (ref 1.5–2)
ECHO TV REGURGITANT MAX VELOCITY: 342.45 CM/S
ECHO TV REGURGITANT PEAK GRADIENT: 46.91 MMHG
EOSINOPHIL # BLD: 0 K/UL (ref 0–0.4)
EOSINOPHIL # BLD: 0.1 K/UL (ref 0–0.4)
EOSINOPHIL # BLD: 0.2 K/UL (ref 0–0.4)
EOSINOPHIL # BLD: NORMAL K/UL (ref 0–0.4)
EOSINOPHIL NFR BLD: 0 % (ref 0–7)
EOSINOPHIL NFR BLD: 1 % (ref 0–7)
EOSINOPHIL NFR BLD: 2 % (ref 0–7)
EOSINOPHIL NFR BLD: NORMAL % (ref 0–7)
EPITH CASTS URNS QL MICRO: ABNORMAL /LPF
ERYTHROCYTE [DISTWIDTH] IN BLOOD BY AUTOMATED COUNT: 12.3 % (ref 11.5–14.5)
ERYTHROCYTE [DISTWIDTH] IN BLOOD BY AUTOMATED COUNT: 12.3 % (ref 11.5–14.5)
ERYTHROCYTE [DISTWIDTH] IN BLOOD BY AUTOMATED COUNT: 12.3 % (ref 11.7–15.4)
ERYTHROCYTE [DISTWIDTH] IN BLOOD BY AUTOMATED COUNT: 13 % (ref 11.5–14.5)
ERYTHROCYTE [DISTWIDTH] IN BLOOD BY AUTOMATED COUNT: 13 % (ref 11.5–14.5)
ERYTHROCYTE [DISTWIDTH] IN BLOOD BY AUTOMATED COUNT: 13.1 % (ref 11.5–14.5)
ERYTHROCYTE [DISTWIDTH] IN BLOOD BY AUTOMATED COUNT: 13.2 % (ref 11.5–14.5)
ERYTHROCYTE [DISTWIDTH] IN BLOOD BY AUTOMATED COUNT: 13.2 % (ref 11.5–14.5)
ERYTHROCYTE [DISTWIDTH] IN BLOOD BY AUTOMATED COUNT: 14.6 % (ref 11.5–14.5)
ERYTHROCYTE [DISTWIDTH] IN BLOOD BY AUTOMATED COUNT: 14.7 % (ref 11.5–14.5)
ERYTHROCYTE [DISTWIDTH] IN BLOOD BY AUTOMATED COUNT: 14.7 % (ref 11.5–14.5)
ERYTHROCYTE [DISTWIDTH] IN BLOOD BY AUTOMATED COUNT: 14.9 % (ref 11.5–14.5)
ERYTHROCYTE [DISTWIDTH] IN BLOOD BY AUTOMATED COUNT: NORMAL % (ref 11.5–14.5)
ETHANOL SERPL-MCNC: <10 MG/DL
GAS FLOW.O2 O2 DELIVERY SYS: 2.5 L/MIN
GLOBULIN SER CALC-MCNC: 3 G/DL (ref 1.5–4.5)
GLOBULIN SER CALC-MCNC: 3.3 G/DL (ref 1.5–4.5)
GLOBULIN SER CALC-MCNC: 3.3 G/DL (ref 2–4)
GLOBULIN SER CALC-MCNC: 3.5 G/DL (ref 2–4)
GLOBULIN SER CALC-MCNC: 3.6 G/DL (ref 2–4)
GLOBULIN SER CALC-MCNC: 3.7 G/DL (ref 2–4)
GLOBULIN SER CALC-MCNC: 4 G/DL (ref 2–4)
GLOBULIN SER CALC-MCNC: 4.2 G/DL (ref 2–4)
GLOBULIN SER CALC-MCNC: 5.2 G/DL (ref 2–4)
GLUCOSE BLD STRIP.AUTO-MCNC: 100 MG/DL (ref 65–100)
GLUCOSE BLD STRIP.AUTO-MCNC: 102 MG/DL (ref 65–100)
GLUCOSE BLD STRIP.AUTO-MCNC: 103 MG/DL (ref 65–100)
GLUCOSE BLD STRIP.AUTO-MCNC: 104 MG/DL (ref 65–100)
GLUCOSE BLD STRIP.AUTO-MCNC: 104 MG/DL (ref 65–100)
GLUCOSE BLD STRIP.AUTO-MCNC: 106 MG/DL (ref 65–100)
GLUCOSE BLD STRIP.AUTO-MCNC: 107 MG/DL (ref 65–100)
GLUCOSE BLD STRIP.AUTO-MCNC: 112 MG/DL (ref 65–100)
GLUCOSE BLD STRIP.AUTO-MCNC: 117 MG/DL (ref 65–100)
GLUCOSE BLD STRIP.AUTO-MCNC: 123 MG/DL (ref 65–100)
GLUCOSE BLD STRIP.AUTO-MCNC: 131 MG/DL (ref 65–100)
GLUCOSE BLD STRIP.AUTO-MCNC: 132 MG/DL (ref 65–100)
GLUCOSE BLD STRIP.AUTO-MCNC: 132 MG/DL (ref 65–100)
GLUCOSE BLD STRIP.AUTO-MCNC: 133 MG/DL (ref 65–100)
GLUCOSE BLD STRIP.AUTO-MCNC: 133 MG/DL (ref 65–100)
GLUCOSE BLD STRIP.AUTO-MCNC: 137 MG/DL (ref 65–100)
GLUCOSE BLD STRIP.AUTO-MCNC: 139 MG/DL (ref 65–100)
GLUCOSE BLD STRIP.AUTO-MCNC: 156 MG/DL (ref 65–100)
GLUCOSE BLD STRIP.AUTO-MCNC: 164 MG/DL (ref 65–100)
GLUCOSE BLD STRIP.AUTO-MCNC: 166 MG/DL (ref 65–100)
GLUCOSE BLD STRIP.AUTO-MCNC: 192 MG/DL (ref 65–100)
GLUCOSE BLD STRIP.AUTO-MCNC: 198 MG/DL (ref 65–100)
GLUCOSE BLD STRIP.AUTO-MCNC: 205 MG/DL (ref 65–100)
GLUCOSE BLD STRIP.AUTO-MCNC: 222 MG/DL (ref 65–100)
GLUCOSE BLD STRIP.AUTO-MCNC: 230 MG/DL (ref 65–100)
GLUCOSE BLD STRIP.AUTO-MCNC: 74 MG/DL (ref 65–100)
GLUCOSE BLD STRIP.AUTO-MCNC: 74 MG/DL (ref 65–100)
GLUCOSE BLD STRIP.AUTO-MCNC: 79 MG/DL (ref 65–100)
GLUCOSE BLD STRIP.AUTO-MCNC: 81 MG/DL (ref 65–100)
GLUCOSE BLD STRIP.AUTO-MCNC: 82 MG/DL (ref 65–100)
GLUCOSE BLD STRIP.AUTO-MCNC: 83 MG/DL (ref 65–100)
GLUCOSE BLD STRIP.AUTO-MCNC: 83 MG/DL (ref 65–100)
GLUCOSE BLD STRIP.AUTO-MCNC: 99 MG/DL (ref 65–100)
GLUCOSE BLD STRIP.AUTO-MCNC: 99 MG/DL (ref 65–100)
GLUCOSE SERPL-MCNC: 101 MG/DL (ref 65–100)
GLUCOSE SERPL-MCNC: 108 MG/DL (ref 65–100)
GLUCOSE SERPL-MCNC: 122 MG/DL (ref 65–100)
GLUCOSE SERPL-MCNC: 125 MG/DL (ref 65–100)
GLUCOSE SERPL-MCNC: 136 MG/DL (ref 65–100)
GLUCOSE SERPL-MCNC: 142 MG/DL (ref 65–100)
GLUCOSE SERPL-MCNC: 169 MG/DL (ref 65–100)
GLUCOSE SERPL-MCNC: 185 MG/DL (ref 65–99)
GLUCOSE SERPL-MCNC: 189 MG/DL (ref 65–100)
GLUCOSE SERPL-MCNC: 213 MG/DL (ref 65–99)
GLUCOSE SERPL-MCNC: 71 MG/DL (ref 65–100)
GLUCOSE SERPL-MCNC: 72 MG/DL (ref 65–100)
GLUCOSE SERPL-MCNC: 80 MG/DL (ref 65–100)
GLUCOSE SERPL-MCNC: 81 MG/DL (ref 65–100)
GLUCOSE SERPL-MCNC: 82 MG/DL (ref 65–100)
GLUCOSE SERPL-MCNC: 84 MG/DL (ref 65–100)
GLUCOSE SERPL-MCNC: 85 MG/DL (ref 65–100)
GLUCOSE SERPL-MCNC: 95 MG/DL (ref 65–100)
GLUCOSE SERPL-MCNC: NORMAL MG/DL (ref 65–100)
GLUCOSE UR STRIP.AUTO-MCNC: NEGATIVE MG/DL
GLUCOSE UR-MCNC: NEGATIVE MG/DL
GRAM STN SPEC: ABNORMAL
GRAM STN SPEC: ABNORMAL
HCO3 BLDA-SCNC: 48 MMOL/L (ref 22–26)
HCT VFR BLD AUTO: 27.6 % (ref 35–47)
HCT VFR BLD AUTO: 29 % (ref 35–47)
HCT VFR BLD AUTO: 29.6 % (ref 35–47)
HCT VFR BLD AUTO: 31.1 % (ref 35–47)
HCT VFR BLD AUTO: 31.7 % (ref 35–47)
HCT VFR BLD AUTO: 31.9 % (ref 35–47)
HCT VFR BLD AUTO: 32.9 % (ref 35–47)
HCT VFR BLD AUTO: 33 % (ref 35–47)
HCT VFR BLD AUTO: 33.5 % (ref 35–47)
HCT VFR BLD AUTO: 35.1 % (ref 34–46.6)
HCT VFR BLD AUTO: 37 % (ref 35–47)
HCT VFR BLD AUTO: 37.4 % (ref 35–47)
HCT VFR BLD AUTO: 39 % (ref 35–47)
HCT VFR BLD AUTO: 41.3 % (ref 35–47)
HCT VFR BLD AUTO: 42.3 % (ref 35–47)
HCT VFR BLD AUTO: NORMAL % (ref 35–47)
HGB BLD-MCNC: 10.7 G/DL (ref 11.5–16)
HGB BLD-MCNC: 10.8 G/DL (ref 11.5–16)
HGB BLD-MCNC: 11.1 G/DL (ref 11.5–16)
HGB BLD-MCNC: 11.8 G/DL (ref 11.5–16)
HGB BLD-MCNC: 12.1 G/DL (ref 11.1–15.9)
HGB BLD-MCNC: 12.2 G/DL (ref 11.5–16)
HGB BLD-MCNC: 13.1 G/DL (ref 11.5–16)
HGB BLD-MCNC: 13.3 G/DL (ref 11.5–16)
HGB BLD-MCNC: 14.1 G/DL (ref 11.5–16)
HGB BLD-MCNC: 8.5 G/DL (ref 11.5–16)
HGB BLD-MCNC: 8.8 G/DL (ref 11.5–16)
HGB BLD-MCNC: 9.4 G/DL (ref 11.5–16)
HGB BLD-MCNC: 9.6 G/DL (ref 11.5–16)
HGB BLD-MCNC: 9.7 G/DL (ref 11.5–16)
HGB BLD-MCNC: 9.9 G/DL (ref 11.5–16)
HGB BLD-MCNC: NORMAL G/DL (ref 11.5–16)
HGB UR QL STRIP: NEGATIVE
IMM GRANULOCYTES # BLD AUTO: 0 K/UL (ref 0–0.04)
IMM GRANULOCYTES # BLD AUTO: 0.1 K/UL (ref 0–0.04)
IMM GRANULOCYTES # BLD AUTO: NORMAL K/UL (ref 0–0.04)
IMM GRANULOCYTES NFR BLD AUTO: 0 % (ref 0–0.5)
IMM GRANULOCYTES NFR BLD AUTO: 1 % (ref 0–0.5)
IMM GRANULOCYTES NFR BLD AUTO: NORMAL % (ref 0–0.5)
INTERPRETATION: NORMAL
INTERPRETATION: NORMAL
KETONES P FAST UR STRIP-MCNC: NEGATIVE MG/DL
KETONES UR QL STRIP.AUTO: NEGATIVE MG/DL
LA VOL DISK BP: 44.36 ML (ref 22–52)
LACTATE SERPL-SCNC: 1.3 MMOL/L (ref 0.4–2)
LACTATE SERPL-SCNC: 5.6 MMOL/L (ref 0.4–2)
LEUKOCYTE ESTERASE UR QL STRIP.AUTO: NEGATIVE
LIPASE SERPL-CCNC: 19 U/L (ref 73–393)
LIPASE SERPL-CCNC: 45 U/L (ref 73–393)
LYMPHOCYTES # BLD: 0.9 K/UL (ref 0.8–3.5)
LYMPHOCYTES # BLD: 1.1 K/UL (ref 0.8–3.5)
LYMPHOCYTES # BLD: 1.1 K/UL (ref 0.8–3.5)
LYMPHOCYTES # BLD: 1.2 K/UL (ref 0.8–3.5)
LYMPHOCYTES # BLD: 1.3 K/UL (ref 0.8–3.5)
LYMPHOCYTES # BLD: 1.4 K/UL (ref 0.8–3.5)
LYMPHOCYTES # BLD: 1.6 K/UL (ref 0.8–3.5)
LYMPHOCYTES # BLD: 1.7 K/UL (ref 0.8–3.5)
LYMPHOCYTES # BLD: 1.8 K/UL (ref 0.8–3.5)
LYMPHOCYTES # BLD: 1.9 K/UL (ref 0.8–3.5)
LYMPHOCYTES # BLD: 2.2 K/UL (ref 0.8–3.5)
LYMPHOCYTES # BLD: 2.5 K/UL (ref 0.8–3.5)
LYMPHOCYTES # BLD: 2.6 K/UL (ref 0.8–3.5)
LYMPHOCYTES # BLD: NORMAL K/UL (ref 0.8–3.5)
LYMPHOCYTES NFR BLD: 10 % (ref 12–49)
LYMPHOCYTES NFR BLD: 11 % (ref 12–49)
LYMPHOCYTES NFR BLD: 11 % (ref 12–49)
LYMPHOCYTES NFR BLD: 12 % (ref 12–49)
LYMPHOCYTES NFR BLD: 14 % (ref 12–49)
LYMPHOCYTES NFR BLD: 14 % (ref 12–49)
LYMPHOCYTES NFR BLD: 15 % (ref 12–49)
LYMPHOCYTES NFR BLD: 15 % (ref 12–49)
LYMPHOCYTES NFR BLD: 16 % (ref 12–49)
LYMPHOCYTES NFR BLD: 17 % (ref 12–49)
LYMPHOCYTES NFR BLD: 17 % (ref 12–49)
LYMPHOCYTES NFR BLD: NORMAL % (ref 12–49)
LYMPHOCYTES NFR FLD: 45 %
MAGNESIUM SERPL-MCNC: 1.3 MG/DL (ref 1.6–2.4)
MAGNESIUM SERPL-MCNC: 1.4 MG/DL (ref 1.6–2.4)
MAGNESIUM SERPL-MCNC: 1.6 MG/DL (ref 1.6–2.4)
MAGNESIUM SERPL-MCNC: 1.7 MG/DL (ref 1.6–2.4)
MAGNESIUM SERPL-MCNC: 2 MG/DL (ref 1.6–2.4)
MAGNESIUM SERPL-MCNC: 2.1 MG/DL (ref 1.6–2.4)
MAGNESIUM SERPL-MCNC: 2.3 MG/DL (ref 1.6–2.4)
MAGNESIUM SERPL-MCNC: 2.4 MG/DL (ref 1.6–2.4)
MAGNESIUM SERPL-MCNC: 2.8 MG/DL (ref 1.6–2.4)
MCH RBC QN AUTO: 30.3 PG (ref 26–34)
MCH RBC QN AUTO: 30.4 PG (ref 26–34)
MCH RBC QN AUTO: 30.6 PG (ref 26–34)
MCH RBC QN AUTO: 30.6 PG (ref 26–34)
MCH RBC QN AUTO: 30.7 PG (ref 26–34)
MCH RBC QN AUTO: 30.9 PG (ref 26–34)
MCH RBC QN AUTO: 30.9 PG (ref 26–34)
MCH RBC QN AUTO: 31.2 PG (ref 26–34)
MCH RBC QN AUTO: 31.3 PG (ref 26.6–33)
MCH RBC QN AUTO: 31.3 PG (ref 26–34)
MCH RBC QN AUTO: NORMAL PG (ref 26–34)
MCHC RBC AUTO-ENTMCNC: 30.1 G/DL (ref 30–36.5)
MCHC RBC AUTO-ENTMCNC: 30.3 G/DL (ref 30–36.5)
MCHC RBC AUTO-ENTMCNC: 31.2 G/DL (ref 30–36.5)
MCHC RBC AUTO-ENTMCNC: 31.2 G/DL (ref 30–36.5)
MCHC RBC AUTO-ENTMCNC: 31.6 G/DL (ref 30–36.5)
MCHC RBC AUTO-ENTMCNC: 31.8 G/DL (ref 30–36.5)
MCHC RBC AUTO-ENTMCNC: 32.2 G/DL (ref 30–36.5)
MCHC RBC AUTO-ENTMCNC: 32.4 G/DL (ref 30–36.5)
MCHC RBC AUTO-ENTMCNC: 32.8 G/DL (ref 30–36.5)
MCHC RBC AUTO-ENTMCNC: 33 G/DL (ref 30–36.5)
MCHC RBC AUTO-ENTMCNC: 33.1 G/DL (ref 30–36.5)
MCHC RBC AUTO-ENTMCNC: 33.3 G/DL (ref 30–36.5)
MCHC RBC AUTO-ENTMCNC: 33.6 G/DL (ref 30–36.5)
MCHC RBC AUTO-ENTMCNC: 34.5 G/DL (ref 31.5–35.7)
MCHC RBC AUTO-ENTMCNC: NORMAL G/DL (ref 30–36.5)
MCV RBC AUTO: 100.3 FL (ref 80–99)
MCV RBC AUTO: 100.6 FL (ref 80–99)
MCV RBC AUTO: 91 FL (ref 79–97)
MCV RBC AUTO: 92.4 FL (ref 80–99)
MCV RBC AUTO: 92.5 FL (ref 80–99)
MCV RBC AUTO: 92.8 FL (ref 80–99)
MCV RBC AUTO: 92.9 FL (ref 80–99)
MCV RBC AUTO: 93.5 FL (ref 80–99)
MCV RBC AUTO: 94.5 FL (ref 80–99)
MCV RBC AUTO: 94.9 FL (ref 80–99)
MCV RBC AUTO: 96.4 FL (ref 80–99)
MCV RBC AUTO: 96.4 FL (ref 80–99)
MCV RBC AUTO: 98.1 FL (ref 80–99)
MCV RBC AUTO: 99.1 FL (ref 80–99)
MCV RBC AUTO: NORMAL FL (ref 80–99)
MESOTHL CELL NFR FLD: 4 %
METHADONE UR QL: NEGATIVE
MONOCYTES # BLD: 0.6 K/UL (ref 0–1)
MONOCYTES # BLD: 0.7 K/UL (ref 0–1)
MONOCYTES # BLD: 0.8 K/UL (ref 0–1)
MONOCYTES # BLD: 0.8 K/UL (ref 0–1)
MONOCYTES # BLD: 0.9 K/UL (ref 0–1)
MONOCYTES # BLD: 0.9 K/UL (ref 0–1)
MONOCYTES # BLD: 1 K/UL (ref 0–1)
MONOCYTES # BLD: NORMAL K/UL (ref 0–1)
MONOCYTES NFR BLD: 5 % (ref 5–13)
MONOCYTES NFR BLD: 6 % (ref 5–13)
MONOCYTES NFR BLD: 7 % (ref 5–13)
MONOCYTES NFR BLD: 7 % (ref 5–13)
MONOCYTES NFR BLD: 8 % (ref 5–13)
MONOCYTES NFR BLD: NORMAL % (ref 5–13)
MONOS+MACROS NFR FLD: 35 %
NEUTROPHILS NFR FLD: 16 %
NEUTS SEG # BLD: 11.2 K/UL (ref 1.8–8)
NEUTS SEG # BLD: 13 K/UL (ref 1.8–8)
NEUTS SEG # BLD: 13 K/UL (ref 1.8–8)
NEUTS SEG # BLD: 13.7 K/UL (ref 1.8–8)
NEUTS SEG # BLD: 14.6 K/UL (ref 1.8–8)
NEUTS SEG # BLD: 6.6 K/UL (ref 1.8–8)
NEUTS SEG # BLD: 6.7 K/UL (ref 1.8–8)
NEUTS SEG # BLD: 6.8 K/UL (ref 1.8–8)
NEUTS SEG # BLD: 7.3 K/UL (ref 1.8–8)
NEUTS SEG # BLD: 7.5 K/UL (ref 1.8–8)
NEUTS SEG # BLD: 7.9 K/UL (ref 1.8–8)
NEUTS SEG # BLD: 8.7 K/UL (ref 1.8–8)
NEUTS SEG # BLD: 9.6 K/UL (ref 1.8–8)
NEUTS SEG # BLD: NORMAL K/UL (ref 1.8–8)
NEUTS SEG NFR BLD: 73 % (ref 32–75)
NEUTS SEG NFR BLD: 74 % (ref 32–75)
NEUTS SEG NFR BLD: 75 % (ref 32–75)
NEUTS SEG NFR BLD: 75 % (ref 32–75)
NEUTS SEG NFR BLD: 77 % (ref 32–75)
NEUTS SEG NFR BLD: 78 % (ref 32–75)
NEUTS SEG NFR BLD: 78 % (ref 32–75)
NEUTS SEG NFR BLD: 81 % (ref 32–75)
NEUTS SEG NFR BLD: 82 % (ref 32–75)
NEUTS SEG NFR BLD: 83 % (ref 32–75)
NEUTS SEG NFR BLD: 84 % (ref 32–75)
NEUTS SEG NFR BLD: NORMAL % (ref 32–75)
NITRITE UR QL STRIP.AUTO: NEGATIVE
NRBC # BLD: 0 K/UL (ref 0–0.01)
NRBC # BLD: NORMAL K/UL (ref 0–0.01)
NRBC BLD-RTO: 0 PER 100 WBC
NRBC BLD-RTO: NORMAL PER 100 WBC
NT-PROBNP SERPL-MCNC: 776 PG/ML (ref 0–287)
NT-PROBNP SERPL-MCNC: 956 PG/ML (ref 0–287)
NUC CELL # FLD: 650 /CU MM
OPIATES UR QL: NEGATIVE
P-R INTERVAL, ECG05: 104 MS
PCO2 BLDA: 55 MMHG (ref 35–45)
PCP UR QL: NEGATIVE
PH BLDA: 7.56 [PH] (ref 7.35–7.45)
PH UR STRIP: 5 [PH] (ref 4.6–8)
PH UR STRIP: 8.5 [PH] (ref 5–8)
PHOSPHATE SERPL-MCNC: 1.9 MG/DL (ref 2.6–4.7)
PHOSPHATE SERPL-MCNC: 2.1 MG/DL (ref 2.6–4.7)
PHOSPHATE SERPL-MCNC: 2.1 MG/DL (ref 2.6–4.7)
PHOSPHATE SERPL-MCNC: 2.7 MG/DL (ref 2.6–4.7)
PHOSPHATE SERPL-MCNC: 2.7 MG/DL (ref 2.6–4.7)
PHOSPHATE SERPL-MCNC: 2.9 MG/DL (ref 2.6–4.7)
PHOSPHATE SERPL-MCNC: 3.1 MG/DL (ref 2.6–4.7)
PHOSPHATE SERPL-MCNC: 3.3 MG/DL (ref 2.6–4.7)
PLATELET # BLD AUTO: 218 X10E3/UL (ref 150–450)
PLATELET # BLD AUTO: 219 K/UL (ref 150–400)
PLATELET # BLD AUTO: 225 K/UL (ref 150–400)
PLATELET # BLD AUTO: 227 K/UL (ref 150–400)
PLATELET # BLD AUTO: 232 K/UL (ref 150–400)
PLATELET # BLD AUTO: 242 K/UL (ref 150–400)
PLATELET # BLD AUTO: 261 K/UL (ref 150–400)
PLATELET # BLD AUTO: 279 K/UL (ref 150–400)
PLATELET # BLD AUTO: 288 K/UL (ref 150–400)
PLATELET # BLD AUTO: 310 K/UL (ref 150–400)
PLATELET # BLD AUTO: 313 K/UL (ref 150–400)
PLATELET # BLD AUTO: 324 K/UL (ref 150–400)
PLATELET # BLD AUTO: 363 K/UL (ref 150–400)
PLATELET # BLD AUTO: 369 K/UL (ref 150–400)
PLATELET # BLD AUTO: NORMAL K/UL (ref 150–400)
PMV BLD AUTO: 10.1 FL (ref 8.9–12.9)
PMV BLD AUTO: 10.2 FL (ref 8.9–12.9)
PMV BLD AUTO: 10.3 FL (ref 8.9–12.9)
PMV BLD AUTO: 10.4 FL (ref 8.9–12.9)
PMV BLD AUTO: 10.4 FL (ref 8.9–12.9)
PMV BLD AUTO: 10.5 FL (ref 8.9–12.9)
PMV BLD AUTO: 10.6 FL (ref 8.9–12.9)
PMV BLD AUTO: 12 FL (ref 8.9–12.9)
PMV BLD AUTO: 9.9 FL (ref 8.9–12.9)
PMV BLD AUTO: NORMAL FL (ref 8.9–12.9)
PO2 BLDA: 73 MMHG (ref 80–100)
POTASSIUM SERPL-SCNC: 2.1 MMOL/L (ref 3.5–5.1)
POTASSIUM SERPL-SCNC: 2.3 MMOL/L (ref 3.5–5.1)
POTASSIUM SERPL-SCNC: 2.5 MMOL/L (ref 3.5–5.1)
POTASSIUM SERPL-SCNC: 2.9 MMOL/L (ref 3.5–5.1)
POTASSIUM SERPL-SCNC: 3 MMOL/L (ref 3.5–5.1)
POTASSIUM SERPL-SCNC: 3.4 MMOL/L (ref 3.5–5.1)
POTASSIUM SERPL-SCNC: 3.4 MMOL/L (ref 3.5–5.1)
POTASSIUM SERPL-SCNC: 3.5 MMOL/L (ref 3.5–5.1)
POTASSIUM SERPL-SCNC: 3.5 MMOL/L (ref 3.5–5.1)
POTASSIUM SERPL-SCNC: 3.6 MMOL/L (ref 3.5–5.1)
POTASSIUM SERPL-SCNC: 3.7 MMOL/L (ref 3.5–5.1)
POTASSIUM SERPL-SCNC: 3.8 MMOL/L (ref 3.5–5.1)
POTASSIUM SERPL-SCNC: 4.1 MMOL/L (ref 3.5–5.1)
POTASSIUM SERPL-SCNC: 4.2 MMOL/L (ref 3.5–5.1)
POTASSIUM SERPL-SCNC: 4.3 MMOL/L (ref 3.5–5.2)
POTASSIUM SERPL-SCNC: 4.6 MMOL/L (ref 3.5–5.2)
POTASSIUM SERPL-SCNC: NORMAL MMOL/L (ref 3.5–5.1)
PROCALCITONIN SERPL-MCNC: 0.37 NG/ML
PROT SERPL-MCNC: 5.3 G/DL (ref 6.4–8.2)
PROT SERPL-MCNC: 5.6 G/DL (ref 6.4–8.2)
PROT SERPL-MCNC: 5.8 G/DL (ref 6–8.5)
PROT SERPL-MCNC: 6 G/DL (ref 6.4–8.2)
PROT SERPL-MCNC: 6.4 G/DL (ref 6.4–8.2)
PROT SERPL-MCNC: 6.6 G/DL (ref 6.4–8.2)
PROT SERPL-MCNC: 6.6 G/DL (ref 6–8.5)
PROT SERPL-MCNC: 6.8 G/DL (ref 6.4–8.2)
PROT SERPL-MCNC: 7.2 G/DL (ref 6.4–8.2)
PROT UR QL STRIP: NORMAL
PROT UR STRIP-MCNC: 30 MG/DL
Q-T INTERVAL, ECG07: 424 MS
QRS DURATION, ECG06: 82 MS
QTC CALCULATION (BEZET), ECG08: 513 MS
RBC # BLD AUTO: 2.89 M/UL (ref 3.8–5.2)
RBC # BLD AUTO: 3.07 M/UL (ref 3.8–5.2)
RBC # BLD AUTO: 3.17 M/UL (ref 3.8–5.2)
RBC # BLD AUTO: 3.17 M/UL (ref 3.8–5.2)
RBC # BLD AUTO: 3.2 M/UL (ref 3.8–5.2)
RBC # BLD AUTO: 3.53 M/UL (ref 3.8–5.2)
RBC # BLD AUTO: 3.56 M/UL (ref 3.8–5.2)
RBC # BLD AUTO: 3.62 M/UL (ref 3.8–5.2)
RBC # BLD AUTO: 3.86 X10E6/UL (ref 3.77–5.28)
RBC # BLD AUTO: 3.88 M/UL (ref 3.8–5.2)
RBC # BLD AUTO: 3.9 M/UL (ref 3.8–5.2)
RBC # BLD AUTO: 4.2 M/UL (ref 3.8–5.2)
RBC # BLD AUTO: 4.37 M/UL (ref 3.8–5.2)
RBC # BLD AUTO: 4.56 M/UL (ref 3.8–5.2)
RBC # BLD AUTO: NORMAL M/UL (ref 3.8–5.2)
RBC # FLD: >100 /CU MM
RBC #/AREA URNS HPF: ABNORMAL /HPF (ref 0–5)
SAMPLES BEING HELD,HOLD: NORMAL
SAO2 % BLD: 96 % (ref 92–97)
SAO2% DEVICE SAO2% SENSOR NAME: ABNORMAL
SARS-COV-2, COV2: NORMAL
SARS-COV-2, COV2NT: NOT DETECTED
SERVICE CMNT-IMP: ABNORMAL
SERVICE CMNT-IMP: NORMAL
SODIUM SERPL-SCNC: 138 MMOL/L (ref 134–144)
SODIUM SERPL-SCNC: 138 MMOL/L (ref 136–145)
SODIUM SERPL-SCNC: 140 MMOL/L (ref 134–144)
SODIUM SERPL-SCNC: 140 MMOL/L (ref 136–145)
SODIUM SERPL-SCNC: 141 MMOL/L (ref 136–145)
SODIUM SERPL-SCNC: 141 MMOL/L (ref 136–145)
SODIUM SERPL-SCNC: 142 MMOL/L (ref 136–145)
SODIUM SERPL-SCNC: 143 MMOL/L (ref 136–145)
SODIUM SERPL-SCNC: 143 MMOL/L (ref 136–145)
SODIUM SERPL-SCNC: 144 MMOL/L (ref 136–145)
SODIUM SERPL-SCNC: 146 MMOL/L (ref 136–145)
SODIUM SERPL-SCNC: NORMAL MMOL/L (ref 136–145)
SOURCE, COVRS: NORMAL
SP GR UR REFRACTOMETRY: 1.01 (ref 1–1.03)
SP GR UR STRIP: 1.03 (ref 1–1.03)
SPECIMEN SITE: ABNORMAL
SPECIMEN SOURCE FLD: ABNORMAL
SPECIMEN SOURCE FLD: NORMAL
THERAPEUTIC RANGE,PTTT: ABNORMAL SECS (ref 58–77)
THERAPEUTIC RANGE,PTTT: NORMAL SECS (ref 58–77)
TROPONIN I SERPL-MCNC: <0.05 NG/ML
TROPONIN I SERPL-MCNC: <0.05 NG/ML
TSH SERPL DL<=0.005 MIU/L-ACNC: 1.76 UIU/ML (ref 0.45–4.5)
UA UROBILINOGEN AMB POC: NORMAL (ref 0.2–1)
UR CULT HOLD, URHOLD: NORMAL
URINALYSIS CLARITY POC: NORMAL
URINALYSIS COLOR POC: NORMAL
URINE BLOOD POC: NEGATIVE
URINE LEUKOCYTES POC: NEGATIVE
URINE NITRITES POC: NEGATIVE
UROBILINOGEN UR QL STRIP.AUTO: 0.2 EU/DL (ref 0.2–1)
VANCOMYCIN SERPL-MCNC: 20.6 UG/ML
VENTRICULAR RATE, ECG03: 88 BPM
WBC # BLD AUTO: 10.1 K/UL (ref 3.6–11)
WBC # BLD AUTO: 10.4 X10E3/UL (ref 3.4–10.8)
WBC # BLD AUTO: 10.6 K/UL (ref 3.6–11)
WBC # BLD AUTO: 12.3 K/UL (ref 3.6–11)
WBC # BLD AUTO: 14.9 K/UL (ref 3.6–11)
WBC # BLD AUTO: 15.5 K/UL (ref 3.6–11)
WBC # BLD AUTO: 16.5 K/UL (ref 3.6–11)
WBC # BLD AUTO: 16.7 K/UL (ref 3.6–11)
WBC # BLD AUTO: 18 K/UL (ref 3.6–11)
WBC # BLD AUTO: 8.7 K/UL (ref 3.6–11)
WBC # BLD AUTO: 8.9 K/UL (ref 3.6–11)
WBC # BLD AUTO: 9 K/UL (ref 3.6–11)
WBC # BLD AUTO: 9.1 K/UL (ref 3.6–11)
WBC # BLD AUTO: 9.8 K/UL (ref 3.6–11)
WBC # BLD AUTO: NORMAL K/UL (ref 3.6–11)
WBC URNS QL MICRO: ABNORMAL /HPF (ref 0–4)

## 2021-01-01 PROCEDURE — C9113 INJ PANTOPRAZOLE SODIUM, VIA: HCPCS | Performed by: NURSE PRACTITIONER

## 2021-01-01 PROCEDURE — 74011250637 HC RX REV CODE- 250/637: Performed by: STUDENT IN AN ORGANIZED HEALTH CARE EDUCATION/TRAINING PROGRAM

## 2021-01-01 PROCEDURE — 74011250636 HC RX REV CODE- 250/636: Performed by: INTERNAL MEDICINE

## 2021-01-01 PROCEDURE — 86304 IMMUNOASSAY TUMOR CA 125: CPT

## 2021-01-01 PROCEDURE — 80053 COMPREHEN METABOLIC PANEL: CPT

## 2021-01-01 PROCEDURE — 97116 GAIT TRAINING THERAPY: CPT

## 2021-01-01 PROCEDURE — 74011250636 HC RX REV CODE- 250/636: Performed by: STUDENT IN AN ORGANIZED HEALTH CARE EDUCATION/TRAINING PROGRAM

## 2021-01-01 PROCEDURE — 88305 TISSUE EXAM BY PATHOLOGIST: CPT

## 2021-01-01 PROCEDURE — 94762 N-INVAS EAR/PLS OXIMTRY CONT: CPT

## 2021-01-01 PROCEDURE — 85025 COMPLETE CBC W/AUTO DIFF WBC: CPT

## 2021-01-01 PROCEDURE — 84100 ASSAY OF PHOSPHORUS: CPT

## 2021-01-01 PROCEDURE — 65660000000 HC RM CCU STEPDOWN

## 2021-01-01 PROCEDURE — 77010033678 HC OXYGEN DAILY

## 2021-01-01 PROCEDURE — 85018 HEMOGLOBIN: CPT

## 2021-01-01 PROCEDURE — 36415 COLL VENOUS BLD VENIPUNCTURE: CPT

## 2021-01-01 PROCEDURE — 82040 ASSAY OF SERUM ALBUMIN: CPT

## 2021-01-01 PROCEDURE — 94760 N-INVAS EAR/PLS OXIMETRY 1: CPT

## 2021-01-01 PROCEDURE — 97165 OT EVAL LOW COMPLEX 30 MIN: CPT

## 2021-01-01 PROCEDURE — 99285 EMERGENCY DEPT VISIT HI MDM: CPT

## 2021-01-01 PROCEDURE — 87186 SC STD MICRODIL/AGAR DIL: CPT

## 2021-01-01 PROCEDURE — 80048 BASIC METABOLIC PNL TOTAL CA: CPT

## 2021-01-01 PROCEDURE — 83880 ASSAY OF NATRIURETIC PEPTIDE: CPT

## 2021-01-01 PROCEDURE — 99222 1ST HOSP IP/OBS MODERATE 55: CPT | Performed by: INTERNAL MEDICINE

## 2021-01-01 PROCEDURE — 1111F DSCHRG MED/CURRENT MED MERGE: CPT | Performed by: STUDENT IN AN ORGANIZED HEALTH CARE EDUCATION/TRAINING PROGRAM

## 2021-01-01 PROCEDURE — 0656 HSPC GENERAL INPATIENT

## 2021-01-01 PROCEDURE — 77030029684 HC NEB SM VOL KT MONA -A

## 2021-01-01 PROCEDURE — 80307 DRUG TEST PRSMV CHEM ANLYZR: CPT

## 2021-01-01 PROCEDURE — 99238 HOSP IP/OBS DSCHRG MGMT 30/<: CPT | Performed by: FAMILY MEDICINE

## 2021-01-01 PROCEDURE — 99218 HC RM OBSERVATION: CPT

## 2021-01-01 PROCEDURE — 74011000250 HC RX REV CODE- 250: Performed by: RADIOLOGY

## 2021-01-01 PROCEDURE — 74011000636 HC RX REV CODE- 636: Performed by: STUDENT IN AN ORGANIZED HEALTH CARE EDUCATION/TRAINING PROGRAM

## 2021-01-01 PROCEDURE — 74011000250 HC RX REV CODE- 250: Performed by: STUDENT IN AN ORGANIZED HEALTH CARE EDUCATION/TRAINING PROGRAM

## 2021-01-01 PROCEDURE — 74011636637 HC RX REV CODE- 636/637: Performed by: STUDENT IN AN ORGANIZED HEALTH CARE EDUCATION/TRAINING PROGRAM

## 2021-01-01 PROCEDURE — 3336500001 HSPC ELECTION

## 2021-01-01 PROCEDURE — 74011000250 HC RX REV CODE- 250: Performed by: NURSE ANESTHETIST, CERTIFIED REGISTERED

## 2021-01-01 PROCEDURE — 96361 HYDRATE IV INFUSION ADD-ON: CPT

## 2021-01-01 PROCEDURE — 88341 IMHCHEM/IMCYTCHM EA ADD ANTB: CPT

## 2021-01-01 PROCEDURE — 81001 URINALYSIS AUTO W/SCOPE: CPT | Performed by: FAMILY MEDICINE

## 2021-01-01 PROCEDURE — 99214 OFFICE O/P EST MOD 30 MIN: CPT | Performed by: FAMILY MEDICINE

## 2021-01-01 PROCEDURE — 99223 1ST HOSP IP/OBS HIGH 75: CPT | Performed by: FAMILY MEDICINE

## 2021-01-01 PROCEDURE — P9045 ALBUMIN (HUMAN), 5%, 250 ML: HCPCS | Performed by: INTERNAL MEDICINE

## 2021-01-01 PROCEDURE — 88342 IMHCHEM/IMCYTCHM 1ST ANTB: CPT

## 2021-01-01 PROCEDURE — 82962 GLUCOSE BLOOD TEST: CPT

## 2021-01-01 PROCEDURE — 74011250636 HC RX REV CODE- 250/636: Performed by: NURSE ANESTHETIST, CERTIFIED REGISTERED

## 2021-01-01 PROCEDURE — 87077 CULTURE AEROBIC IDENTIFY: CPT

## 2021-01-01 PROCEDURE — 89050 BODY FLUID CELL COUNT: CPT

## 2021-01-01 PROCEDURE — 87205 SMEAR GRAM STAIN: CPT

## 2021-01-01 PROCEDURE — 76040000019: Performed by: INTERNAL MEDICINE

## 2021-01-01 PROCEDURE — 97161 PT EVAL LOW COMPLEX 20 MIN: CPT

## 2021-01-01 PROCEDURE — 88112 CYTOPATH CELL ENHANCE TECH: CPT

## 2021-01-01 PROCEDURE — 51798 US URINE CAPACITY MEASURE: CPT

## 2021-01-01 PROCEDURE — 74011000636 HC RX REV CODE- 636: Performed by: RADIOLOGY

## 2021-01-01 PROCEDURE — G0299 HHS/HOSPICE OF RN EA 15 MIN: HCPCS

## 2021-01-01 PROCEDURE — 82077 ASSAY SPEC XCP UR&BREATH IA: CPT

## 2021-01-01 PROCEDURE — 99442 PR PHYS/QHP TELEPHONE EVALUATION 11-20 MIN: CPT | Performed by: STUDENT IN AN ORGANIZED HEALTH CARE EDUCATION/TRAINING PROGRAM

## 2021-01-01 PROCEDURE — 83735 ASSAY OF MAGNESIUM: CPT

## 2021-01-01 PROCEDURE — 74176 CT ABD & PELVIS W/O CONTRAST: CPT

## 2021-01-01 PROCEDURE — 99214 OFFICE O/P EST MOD 30 MIN: CPT | Performed by: STUDENT IN AN ORGANIZED HEALTH CARE EDUCATION/TRAINING PROGRAM

## 2021-01-01 PROCEDURE — 93970 EXTREMITY STUDY: CPT

## 2021-01-01 PROCEDURE — 74011000250 HC RX REV CODE- 250: Performed by: FAMILY MEDICINE

## 2021-01-01 PROCEDURE — 94664 DEMO&/EVAL PT USE INHALER: CPT

## 2021-01-01 PROCEDURE — 74011250636 HC RX REV CODE- 250/636: Performed by: FAMILY MEDICINE

## 2021-01-01 PROCEDURE — 82378 CARCINOEMBRYONIC ANTIGEN: CPT

## 2021-01-01 PROCEDURE — 87040 BLOOD CULTURE FOR BACTERIA: CPT

## 2021-01-01 PROCEDURE — 96376 TX/PRO/DX INJ SAME DRUG ADON: CPT

## 2021-01-01 PROCEDURE — 93306 TTE W/DOPPLER COMPLETE: CPT | Performed by: INTERNAL MEDICINE

## 2021-01-01 PROCEDURE — 99233 SBSQ HOSP IP/OBS HIGH 50: CPT | Performed by: FAMILY MEDICINE

## 2021-01-01 PROCEDURE — 99232 SBSQ HOSP IP/OBS MODERATE 35: CPT | Performed by: FAMILY MEDICINE

## 2021-01-01 PROCEDURE — 97530 THERAPEUTIC ACTIVITIES: CPT

## 2021-01-01 PROCEDURE — 99223 1ST HOSP IP/OBS HIGH 75: CPT | Performed by: INTERNAL MEDICINE

## 2021-01-01 PROCEDURE — 02HV33Z INSERTION OF INFUSION DEVICE INTO SUPERIOR VENA CAVA, PERCUTANEOUS APPROACH: ICD-10-PCS | Performed by: ANESTHESIOLOGY

## 2021-01-01 PROCEDURE — 0W9G3ZZ DRAINAGE OF PERITONEAL CAVITY, PERCUTANEOUS APPROACH: ICD-10-PCS | Performed by: RADIOLOGY

## 2021-01-01 PROCEDURE — 74011250637 HC RX REV CODE- 250/637: Performed by: FAMILY MEDICINE

## 2021-01-01 PROCEDURE — 84484 ASSAY OF TROPONIN QUANT: CPT

## 2021-01-01 PROCEDURE — 77030012890

## 2021-01-01 PROCEDURE — 99213 OFFICE O/P EST LOW 20 MIN: CPT | Performed by: FAMILY MEDICINE

## 2021-01-01 PROCEDURE — 81001 URINALYSIS AUTO W/SCOPE: CPT

## 2021-01-01 PROCEDURE — P9047 ALBUMIN (HUMAN), 25%, 50ML: HCPCS | Performed by: STUDENT IN AN ORGANIZED HEALTH CARE EDUCATION/TRAINING PROGRAM

## 2021-01-01 PROCEDURE — 74011250636 HC RX REV CODE- 250/636: Performed by: NURSE PRACTITIONER

## 2021-01-01 PROCEDURE — 49083 ABD PARACENTESIS W/IMAGING: CPT

## 2021-01-01 PROCEDURE — 87635 SARS-COV-2 COVID-19 AMP PRB: CPT

## 2021-01-01 PROCEDURE — 82140 ASSAY OF AMMONIA: CPT

## 2021-01-01 PROCEDURE — 82803 BLOOD GASES ANY COMBINATION: CPT

## 2021-01-01 PROCEDURE — 83605 ASSAY OF LACTIC ACID: CPT

## 2021-01-01 PROCEDURE — 74011250637 HC RX REV CODE- 250/637: Performed by: NURSE PRACTITIONER

## 2021-01-01 PROCEDURE — 74177 CT ABD & PELVIS W/CONTRAST: CPT

## 2021-01-01 PROCEDURE — 94640 AIRWAY INHALATION TREATMENT: CPT

## 2021-01-01 PROCEDURE — 71045 X-RAY EXAM CHEST 1 VIEW: CPT

## 2021-01-01 PROCEDURE — 77030021593 HC FCPS BIOP ENDOSC BSC -A: Performed by: INTERNAL MEDICINE

## 2021-01-01 PROCEDURE — 96375 TX/PRO/DX INJ NEW DRUG ADDON: CPT

## 2021-01-01 PROCEDURE — 51701 INSERT BLADDER CATHETER: CPT

## 2021-01-01 PROCEDURE — 65270000029 HC RM PRIVATE

## 2021-01-01 PROCEDURE — 96360 HYDRATION IV INFUSION INIT: CPT

## 2021-01-01 PROCEDURE — 99443 PR PHYS/QHP TELEPHONE EVALUATION 21-30 MIN: CPT | Performed by: FAMILY MEDICINE

## 2021-01-01 PROCEDURE — 85730 THROMBOPLASTIN TIME PARTIAL: CPT

## 2021-01-01 PROCEDURE — 99231 SBSQ HOSP IP/OBS SF/LOW 25: CPT | Performed by: INTERNAL MEDICINE

## 2021-01-01 PROCEDURE — 2709999900 HC NON-CHARGEABLE SUPPLY: Performed by: INTERNAL MEDICINE

## 2021-01-01 PROCEDURE — 70450 CT HEAD/BRAIN W/O DYE: CPT

## 2021-01-01 PROCEDURE — 99233 SBSQ HOSP IP/OBS HIGH 50: CPT | Performed by: INTERNAL MEDICINE

## 2021-01-01 PROCEDURE — C1729 CATH, DRAINAGE: HCPCS

## 2021-01-01 PROCEDURE — 97535 SELF CARE MNGMENT TRAINING: CPT

## 2021-01-01 PROCEDURE — 99232 SBSQ HOSP IP/OBS MODERATE 35: CPT | Performed by: INTERNAL MEDICINE

## 2021-01-01 PROCEDURE — 93005 ELECTROCARDIOGRAM TRACING: CPT

## 2021-01-01 PROCEDURE — 96365 THER/PROPH/DIAG IV INF INIT: CPT

## 2021-01-01 PROCEDURE — 82042 OTHER SOURCE ALBUMIN QUAN EA: CPT

## 2021-01-01 PROCEDURE — 93306 TTE W/DOPPLER COMPLETE: CPT

## 2021-01-01 PROCEDURE — 83690 ASSAY OF LIPASE: CPT

## 2021-01-01 PROCEDURE — 96366 THER/PROPH/DIAG IV INF ADDON: CPT

## 2021-01-01 PROCEDURE — 76705 ECHO EXAM OF ABDOMEN: CPT

## 2021-01-01 PROCEDURE — 76060000031 HC ANESTHESIA FIRST 0.5 HR: Performed by: INTERNAL MEDICINE

## 2021-01-01 PROCEDURE — 80076 HEPATIC FUNCTION PANEL: CPT

## 2021-01-01 PROCEDURE — 74011250637 HC RX REV CODE- 250/637: Performed by: SURGERY

## 2021-01-01 PROCEDURE — 99222 1ST HOSP IP/OBS MODERATE 55: CPT | Performed by: FAMILY MEDICINE

## 2021-01-01 PROCEDURE — 94761 N-INVAS EAR/PLS OXIMETRY MLT: CPT

## 2021-01-01 PROCEDURE — 88313 SPECIAL STAINS GROUP 2: CPT

## 2021-01-01 PROCEDURE — 0DBN8ZX EXCISION OF SIGMOID COLON, VIA NATURAL OR ARTIFICIAL OPENING ENDOSCOPIC, DIAGNOSTIC: ICD-10-PCS | Performed by: INTERNAL MEDICINE

## 2021-01-01 PROCEDURE — 77030038620

## 2021-01-01 PROCEDURE — 80202 ASSAY OF VANCOMYCIN: CPT

## 2021-01-01 PROCEDURE — 87086 URINE CULTURE/COLONY COUNT: CPT

## 2021-01-01 PROCEDURE — P9045 ALBUMIN (HUMAN), 5%, 250 ML: HCPCS | Performed by: FAMILY MEDICINE

## 2021-01-01 PROCEDURE — 99221 1ST HOSP IP/OBS SF/LOW 40: CPT | Performed by: INTERNAL MEDICINE

## 2021-01-01 PROCEDURE — 84145 PROCALCITONIN (PCT): CPT

## 2021-01-01 PROCEDURE — 77030040361 HC SLV COMPR DVT MDII -B

## 2021-01-01 PROCEDURE — 77030014115

## 2021-01-01 PROCEDURE — 94618 PULMONARY STRESS TESTING: CPT

## 2021-01-01 PROCEDURE — U0003 INFECTIOUS AGENT DETECTION BY NUCLEIC ACID (DNA OR RNA); SEVERE ACUTE RESPIRATORY SYNDROME CORONAVIRUS 2 (SARS-COV-2) (CORONAVIRUS DISEASE [COVID-19]), AMPLIFIED PROBE TECHNIQUE, MAKING USE OF HIGH THROUGHPUT TECHNOLOGIES AS DESCRIBED BY CMS-2020-01-R: HCPCS

## 2021-01-01 PROCEDURE — 36600 WITHDRAWAL OF ARTERIAL BLOOD: CPT

## 2021-01-01 RX ORDER — POTASSIUM CHLORIDE 14.9 MG/ML
20 INJECTION INTRAVENOUS
Status: COMPLETED | OUTPATIENT
Start: 2021-01-01 | End: 2021-01-01

## 2021-01-01 RX ORDER — HYDROMORPHONE HYDROCHLORIDE 1 MG/ML
0.5 INJECTION, SOLUTION INTRAMUSCULAR; INTRAVENOUS; SUBCUTANEOUS
Status: DISCONTINUED | OUTPATIENT
Start: 2021-01-01 | End: 2021-01-01

## 2021-01-01 RX ORDER — SODIUM CHLORIDE 9 MG/ML
50 INJECTION, SOLUTION INTRAVENOUS CONTINUOUS
Status: DISPENSED | OUTPATIENT
Start: 2021-01-01 | End: 2021-01-01

## 2021-01-01 RX ORDER — ACETAMINOPHEN 650 MG/1
650 SUPPOSITORY RECTAL
Status: DISCONTINUED | OUTPATIENT
Start: 2021-01-01 | End: 2021-01-01 | Stop reason: HOSPADM

## 2021-01-01 RX ORDER — MIDODRINE HYDROCHLORIDE 5 MG/1
5 TABLET ORAL
Qty: 90 TAB | Refills: 0 | Status: SHIPPED | OUTPATIENT
Start: 2021-01-01 | End: 2021-01-01

## 2021-01-01 RX ORDER — LORAZEPAM 2 MG/ML
0.5 INJECTION INTRAMUSCULAR
Status: DISCONTINUED | OUTPATIENT
Start: 2021-01-01 | End: 2021-01-01

## 2021-01-01 RX ORDER — LEVOFLOXACIN 5 MG/ML
750 INJECTION, SOLUTION INTRAVENOUS ONCE
Status: COMPLETED | OUTPATIENT
Start: 2021-01-01 | End: 2021-01-01

## 2021-01-01 RX ORDER — SODIUM CHLORIDE 0.9 % (FLUSH) 0.9 %
5-40 SYRINGE (ML) INJECTION AS NEEDED
Status: DISCONTINUED | OUTPATIENT
Start: 2021-01-01 | End: 2021-01-01 | Stop reason: HOSPADM

## 2021-01-01 RX ORDER — MIDAZOLAM HYDROCHLORIDE 1 MG/ML
.25-5 INJECTION, SOLUTION INTRAMUSCULAR; INTRAVENOUS
Status: DISCONTINUED | OUTPATIENT
Start: 2021-01-01 | End: 2021-01-01 | Stop reason: HOSPADM

## 2021-01-01 RX ORDER — POTASSIUM CHLORIDE 750 MG/1
30 TABLET, FILM COATED, EXTENDED RELEASE ORAL
Status: COMPLETED | OUTPATIENT
Start: 2021-01-01 | End: 2021-01-01

## 2021-01-01 RX ORDER — POTASSIUM CHLORIDE 750 MG/1
40 TABLET, FILM COATED, EXTENDED RELEASE ORAL
Status: COMPLETED | OUTPATIENT
Start: 2021-01-01 | End: 2021-01-01

## 2021-01-01 RX ORDER — LORAZEPAM 2 MG/ML
1 INJECTION INTRAMUSCULAR ONCE
Status: DISCONTINUED | OUTPATIENT
Start: 2021-01-01 | End: 2021-01-01

## 2021-01-01 RX ORDER — ONDANSETRON 2 MG/ML
4 INJECTION INTRAMUSCULAR; INTRAVENOUS
Status: DISCONTINUED | OUTPATIENT
Start: 2021-01-01 | End: 2021-01-01 | Stop reason: HOSPADM

## 2021-01-01 RX ORDER — DEXTROMETHORPHAN/PSEUDOEPHED 2.5-7.5/.8
1.2 DROPS ORAL
Status: DISCONTINUED | OUTPATIENT
Start: 2021-01-01 | End: 2021-01-01 | Stop reason: HOSPADM

## 2021-01-01 RX ORDER — MAGNESIUM SULFATE 100 %
4 CRYSTALS MISCELLANEOUS AS NEEDED
Status: DISCONTINUED | OUTPATIENT
Start: 2021-01-01 | End: 2021-01-01 | Stop reason: HOSPADM

## 2021-01-01 RX ORDER — TRAMADOL HYDROCHLORIDE 50 MG/1
50 TABLET ORAL
Qty: 30 TAB | Refills: 0 | Status: SHIPPED | OUTPATIENT
Start: 2021-01-01 | End: 2021-01-01

## 2021-01-01 RX ORDER — HYDROMORPHONE HYDROCHLORIDE 1 MG/ML
1 INJECTION, SOLUTION INTRAMUSCULAR; INTRAVENOUS; SUBCUTANEOUS
Status: DISCONTINUED | OUTPATIENT
Start: 2021-01-01 | End: 2021-01-01 | Stop reason: HOSPADM

## 2021-01-01 RX ORDER — POLYETHYLENE GLYCOL 3350 17 G/17G
17 POWDER, FOR SOLUTION ORAL DAILY PRN
Status: DISCONTINUED | OUTPATIENT
Start: 2021-01-01 | End: 2021-01-01 | Stop reason: HOSPADM

## 2021-01-01 RX ORDER — LEVOFLOXACIN 5 MG/ML
750 INJECTION, SOLUTION INTRAVENOUS EVERY 24 HOURS
Status: DISCONTINUED | OUTPATIENT
Start: 2021-01-01 | End: 2021-01-01

## 2021-01-01 RX ORDER — POTASSIUM CHLORIDE 7.45 MG/ML
10 INJECTION INTRAVENOUS
Status: DISPENSED | OUTPATIENT
Start: 2021-01-01 | End: 2021-01-01

## 2021-01-01 RX ORDER — LORAZEPAM 2 MG/ML
1 INJECTION INTRAMUSCULAR
Status: COMPLETED | OUTPATIENT
Start: 2021-01-01 | End: 2021-01-01

## 2021-01-01 RX ORDER — NYSTATIN 100000 [USP'U]/G
POWDER TOPICAL 2 TIMES DAILY
Status: DISCONTINUED | OUTPATIENT
Start: 2021-01-01 | End: 2021-01-01 | Stop reason: HOSPADM

## 2021-01-01 RX ORDER — HYDROMORPHONE HYDROCHLORIDE 5 MG/5ML
2 SOLUTION ORAL
Status: DISCONTINUED | OUTPATIENT
Start: 2021-01-01 | End: 2021-01-01

## 2021-01-01 RX ORDER — PROPOFOL 10 MG/ML
INJECTION, EMULSION INTRAVENOUS
Status: DISCONTINUED | OUTPATIENT
Start: 2021-01-01 | End: 2021-01-01 | Stop reason: HOSPADM

## 2021-01-01 RX ORDER — PROCHLORPERAZINE EDISYLATE 5 MG/ML
10 INJECTION INTRAMUSCULAR; INTRAVENOUS
Status: DISCONTINUED | OUTPATIENT
Start: 2021-01-01 | End: 2021-01-01 | Stop reason: HOSPADM

## 2021-01-01 RX ORDER — LIDOCAINE HYDROCHLORIDE 10 MG/ML
10 INJECTION, SOLUTION EPIDURAL; INFILTRATION; INTRACAUDAL; PERINEURAL
Status: COMPLETED | OUTPATIENT
Start: 2021-01-01 | End: 2021-01-01

## 2021-01-01 RX ORDER — HALOPERIDOL 5 MG/ML
1 INJECTION INTRAMUSCULAR
Status: DISCONTINUED | OUTPATIENT
Start: 2021-01-01 | End: 2021-01-01 | Stop reason: HOSPADM

## 2021-01-01 RX ORDER — SODIUM CHLORIDE 9 MG/ML
50 INJECTION, SOLUTION INTRAVENOUS CONTINUOUS
Status: DISCONTINUED | OUTPATIENT
Start: 2021-01-01 | End: 2021-01-01

## 2021-01-01 RX ORDER — ATROPINE SULFATE 0.1 MG/ML
0.4 INJECTION INTRAVENOUS
Status: DISCONTINUED | OUTPATIENT
Start: 2021-01-01 | End: 2021-01-01 | Stop reason: HOSPADM

## 2021-01-01 RX ORDER — LORAZEPAM 2 MG/ML
0.5 INJECTION INTRAMUSCULAR EVERY 4 HOURS
Status: DISCONTINUED | OUTPATIENT
Start: 2021-01-01 | End: 2021-01-01 | Stop reason: HOSPADM

## 2021-01-01 RX ORDER — NALOXONE HYDROCHLORIDE 0.4 MG/ML
0.4 INJECTION, SOLUTION INTRAMUSCULAR; INTRAVENOUS; SUBCUTANEOUS
Status: ACTIVE | OUTPATIENT
Start: 2021-01-01 | End: 2021-01-01

## 2021-01-01 RX ORDER — LORAZEPAM 2 MG/ML
2 INJECTION INTRAMUSCULAR
Status: COMPLETED | OUTPATIENT
Start: 2021-01-01 | End: 2021-01-01

## 2021-01-01 RX ORDER — POTASSIUM CHLORIDE 1500 MG/1
40 TABLET, FILM COATED, EXTENDED RELEASE ORAL 2 TIMES DAILY
Qty: 120 TAB | Refills: 0 | Status: SHIPPED | OUTPATIENT
Start: 2021-01-01 | End: 2021-01-01

## 2021-01-01 RX ORDER — DEXTROSE 50 % IN WATER (D50W) INTRAVENOUS SYRINGE
12.5-25 AS NEEDED
Status: DISCONTINUED | OUTPATIENT
Start: 2021-01-01 | End: 2021-01-01 | Stop reason: HOSPADM

## 2021-01-01 RX ORDER — AMOXICILLIN 250 MG
1 CAPSULE ORAL DAILY
Qty: 30 TAB | Refills: 0 | Status: ON HOLD | OUTPATIENT
Start: 2021-01-01 | End: 2021-01-01

## 2021-01-01 RX ORDER — VANCOMYCIN/0.9 % SOD CHLORIDE 1.5G/250ML
1500 PLASTIC BAG, INJECTION (ML) INTRAVENOUS ONCE
Status: COMPLETED | OUTPATIENT
Start: 2021-01-01 | End: 2021-01-01

## 2021-01-01 RX ORDER — MONTELUKAST SODIUM 4 MG/1
4 TABLET, CHEWABLE ORAL 4 TIMES DAILY
Qty: 480 TAB | Refills: 0 | Status: SHIPPED | OUTPATIENT
Start: 2021-01-01 | End: 2021-01-01

## 2021-01-01 RX ORDER — MAGNESIUM SULFATE HEPTAHYDRATE 40 MG/ML
2 INJECTION, SOLUTION INTRAVENOUS ONCE
Status: COMPLETED | OUTPATIENT
Start: 2021-01-01 | End: 2021-01-01

## 2021-01-01 RX ORDER — ONDANSETRON 4 MG/1
4 TABLET, ORALLY DISINTEGRATING ORAL
Qty: 30 TAB | Refills: 1 | Status: ON HOLD | OUTPATIENT
Start: 2021-01-01 | End: 2021-01-01

## 2021-01-01 RX ORDER — ENOXAPARIN SODIUM 100 MG/ML
80 INJECTION SUBCUTANEOUS EVERY 12 HOURS
Status: DISCONTINUED | OUTPATIENT
Start: 2021-01-01 | End: 2021-01-01 | Stop reason: HOSPADM

## 2021-01-01 RX ORDER — FLUMAZENIL 0.1 MG/ML
0.2 INJECTION INTRAVENOUS
Status: DISCONTINUED | OUTPATIENT
Start: 2021-01-01 | End: 2021-01-01 | Stop reason: HOSPADM

## 2021-01-01 RX ORDER — MONTELUKAST SODIUM 4 MG/1
4 TABLET, CHEWABLE ORAL 4 TIMES DAILY
Status: DISCONTINUED | OUTPATIENT
Start: 2021-01-01 | End: 2021-01-01 | Stop reason: HOSPADM

## 2021-01-01 RX ORDER — ALBUMIN HUMAN 250 G/1000ML
12.5 SOLUTION INTRAVENOUS ONCE
Status: COMPLETED | OUTPATIENT
Start: 2021-01-01 | End: 2021-01-01

## 2021-01-01 RX ORDER — NALOXONE HYDROCHLORIDE 0.4 MG/ML
0.4 INJECTION, SOLUTION INTRAMUSCULAR; INTRAVENOUS; SUBCUTANEOUS
Status: DISCONTINUED | OUTPATIENT
Start: 2021-01-01 | End: 2021-01-01 | Stop reason: HOSPADM

## 2021-01-01 RX ORDER — DEXTROMETHORPHAN POLISTIREX 30 MG/5 ML
SUSPENSION, EXTENDED RELEASE 12 HR ORAL AS NEEDED
Status: DISCONTINUED | OUTPATIENT
Start: 2021-01-01 | End: 2021-01-01 | Stop reason: HOSPADM

## 2021-01-01 RX ORDER — LORAZEPAM 2 MG/ML
0.5 CONCENTRATE ORAL
Status: DISCONTINUED | OUTPATIENT
Start: 2021-01-01 | End: 2021-01-01

## 2021-01-01 RX ORDER — SODIUM CHLORIDE 9 MG/ML
125 INJECTION, SOLUTION INTRAVENOUS CONTINUOUS
Status: DISCONTINUED | OUTPATIENT
Start: 2021-01-01 | End: 2021-01-01 | Stop reason: HOSPADM

## 2021-01-01 RX ORDER — POTASSIUM CHLORIDE 14.9 MG/ML
20 INJECTION INTRAVENOUS
Status: DISCONTINUED | OUTPATIENT
Start: 2021-01-01 | End: 2021-01-01

## 2021-01-01 RX ORDER — EPINEPHRINE 0.1 MG/ML
1 INJECTION INTRACARDIAC; INTRAVENOUS
Status: DISCONTINUED | OUTPATIENT
Start: 2021-01-01 | End: 2021-01-01 | Stop reason: HOSPADM

## 2021-01-01 RX ORDER — PROPOFOL 10 MG/ML
INJECTION, EMULSION INTRAVENOUS AS NEEDED
Status: DISCONTINUED | OUTPATIENT
Start: 2021-01-01 | End: 2021-01-01 | Stop reason: HOSPADM

## 2021-01-01 RX ORDER — INSULIN GLARGINE 100 [IU]/ML
10 INJECTION, SOLUTION SUBCUTANEOUS
Status: DISCONTINUED | OUTPATIENT
Start: 2021-01-01 | End: 2021-01-01

## 2021-01-01 RX ORDER — ONDANSETRON 4 MG/1
4 TABLET, ORALLY DISINTEGRATING ORAL
Qty: 30 TAB | Refills: 0 | Status: ON HOLD | OUTPATIENT
Start: 2021-01-01 | End: 2021-01-01

## 2021-01-01 RX ORDER — SODIUM CHLORIDE 0.9 % (FLUSH) 0.9 %
5-40 SYRINGE (ML) INJECTION EVERY 8 HOURS
Status: DISCONTINUED | OUTPATIENT
Start: 2021-01-01 | End: 2021-01-01 | Stop reason: HOSPADM

## 2021-01-01 RX ORDER — POTASSIUM CHLORIDE 750 MG/1
40 TABLET, FILM COATED, EXTENDED RELEASE ORAL 2 TIMES DAILY
Status: DISCONTINUED | OUTPATIENT
Start: 2021-01-01 | End: 2021-01-01 | Stop reason: HOSPADM

## 2021-01-01 RX ORDER — HYDROMORPHONE HYDROCHLORIDE 1 MG/ML
0.5 INJECTION, SOLUTION INTRAMUSCULAR; INTRAVENOUS; SUBCUTANEOUS EVERY 4 HOURS
Status: DISCONTINUED | OUTPATIENT
Start: 2021-01-01 | End: 2021-01-01 | Stop reason: HOSPADM

## 2021-01-01 RX ORDER — ALBUMIN HUMAN 50 G/1000ML
25 SOLUTION INTRAVENOUS ONCE
Status: COMPLETED | OUTPATIENT
Start: 2021-01-01 | End: 2021-01-01

## 2021-01-01 RX ORDER — LIDOCAINE HYDROCHLORIDE 20 MG/ML
INJECTION, SOLUTION EPIDURAL; INFILTRATION; INTRACAUDAL; PERINEURAL AS NEEDED
Status: DISCONTINUED | OUTPATIENT
Start: 2021-01-01 | End: 2021-01-01 | Stop reason: HOSPADM

## 2021-01-01 RX ORDER — LINEZOLID 600 MG/1
600 TABLET, FILM COATED ORAL 2 TIMES DAILY
Qty: 10 TAB | Refills: 0 | Status: SHIPPED | OUTPATIENT
Start: 2021-01-01 | End: 2021-01-01 | Stop reason: SDUPTHER

## 2021-01-01 RX ORDER — FACIAL-BODY WIPES
10 EACH TOPICAL DAILY PRN
Status: DISCONTINUED | OUTPATIENT
Start: 2021-01-01 | End: 2021-01-01 | Stop reason: HOSPADM

## 2021-01-01 RX ORDER — BUSPIRONE HYDROCHLORIDE 10 MG/1
10 TABLET ORAL
Status: DISCONTINUED | OUTPATIENT
Start: 2021-01-01 | End: 2021-01-01 | Stop reason: HOSPADM

## 2021-01-01 RX ORDER — LORAZEPAM 2 MG/ML
1 INJECTION INTRAMUSCULAR
Status: DISCONTINUED | OUTPATIENT
Start: 2021-01-01 | End: 2021-01-01 | Stop reason: HOSPADM

## 2021-01-01 RX ORDER — LORAZEPAM 0.5 MG/1
0.25 TABLET ORAL
Status: COMPLETED | OUTPATIENT
Start: 2021-01-01 | End: 2021-01-01

## 2021-01-01 RX ORDER — ENOXAPARIN SODIUM 100 MG/ML
80 INJECTION SUBCUTANEOUS EVERY 12 HOURS
Qty: 48 ML | Refills: 0 | Status: SHIPPED | OUTPATIENT
Start: 2021-01-01 | End: 2021-01-01

## 2021-01-01 RX ORDER — POTASSIUM CHLORIDE 7.45 MG/ML
10 INJECTION INTRAVENOUS
Status: DISCONTINUED | OUTPATIENT
Start: 2021-01-01 | End: 2021-01-01

## 2021-01-01 RX ORDER — HEPARIN SODIUM 5000 [USP'U]/ML
80 INJECTION, SOLUTION INTRAVENOUS; SUBCUTANEOUS ONCE
Status: COMPLETED | OUTPATIENT
Start: 2021-01-01 | End: 2021-01-01

## 2021-01-01 RX ORDER — BLOOD-GLUCOSE SENSOR
EACH MISCELLANEOUS
Qty: 3 DEVICE | Refills: 5 | Status: ON HOLD | OUTPATIENT
Start: 2021-01-01 | End: 2021-01-01

## 2021-01-01 RX ORDER — PROMETHAZINE HYDROCHLORIDE 25 MG/1
12.5 TABLET ORAL
Status: DISCONTINUED | OUTPATIENT
Start: 2021-01-01 | End: 2021-01-01 | Stop reason: HOSPADM

## 2021-01-01 RX ORDER — LISINOPRIL 10 MG/1
TABLET ORAL
Qty: 90 TAB | Refills: 3 | Status: SHIPPED | OUTPATIENT
Start: 2021-01-01 | End: 2021-01-01

## 2021-01-01 RX ORDER — ATORVASTATIN CALCIUM 20 MG/1
40 TABLET, FILM COATED ORAL DAILY
Status: DISCONTINUED | OUTPATIENT
Start: 2021-01-01 | End: 2021-01-01 | Stop reason: HOSPADM

## 2021-01-01 RX ORDER — METRONIDAZOLE 500 MG/100ML
500 INJECTION, SOLUTION INTRAVENOUS EVERY 8 HOURS
Status: DISCONTINUED | OUTPATIENT
Start: 2021-01-01 | End: 2021-01-01

## 2021-01-01 RX ORDER — SODIUM CHLORIDE 9 MG/ML
125 INJECTION, SOLUTION INTRAVENOUS CONTINUOUS
Status: DISCONTINUED | OUTPATIENT
Start: 2021-01-01 | End: 2021-01-01

## 2021-01-01 RX ORDER — BUSPIRONE HYDROCHLORIDE 10 MG/1
10 TABLET ORAL
Status: ON HOLD | COMMUNITY
End: 2021-01-01

## 2021-01-01 RX ORDER — ENOXAPARIN SODIUM 100 MG/ML
80 INJECTION SUBCUTANEOUS EVERY 12 HOURS
COMMUNITY

## 2021-01-01 RX ORDER — POTASSIUM CHLORIDE 29.8 MG/ML
20 INJECTION INTRAVENOUS ONCE
Status: COMPLETED | OUTPATIENT
Start: 2021-01-01 | End: 2021-01-01

## 2021-01-01 RX ORDER — NYSTATIN 100000 [USP'U]/G
POWDER TOPICAL 2 TIMES DAILY
COMMUNITY

## 2021-01-01 RX ORDER — HEPARIN SODIUM 10000 [USP'U]/100ML
18-36 INJECTION, SOLUTION INTRAVENOUS
Status: DISCONTINUED | OUTPATIENT
Start: 2021-01-01 | End: 2021-01-01

## 2021-01-01 RX ORDER — SODIUM CHLORIDE 0.9 % (FLUSH) 0.9 %
5-10 SYRINGE (ML) INJECTION AS NEEDED
Status: DISCONTINUED | OUTPATIENT
Start: 2021-01-01 | End: 2021-01-01 | Stop reason: HOSPADM

## 2021-01-01 RX ORDER — POTASSIUM CHLORIDE 1.5 G/1.77G
40 POWDER, FOR SOLUTION ORAL DAILY
Status: DISCONTINUED | OUTPATIENT
Start: 2021-01-01 | End: 2021-01-01

## 2021-01-01 RX ORDER — ACETAMINOPHEN 325 MG/1
650 TABLET ORAL
Status: DISCONTINUED | OUTPATIENT
Start: 2021-01-01 | End: 2021-01-01 | Stop reason: HOSPADM

## 2021-01-01 RX ORDER — IPRATROPIUM BROMIDE AND ALBUTEROL SULFATE 2.5; .5 MG/3ML; MG/3ML
3 SOLUTION RESPIRATORY (INHALATION)
Status: DISCONTINUED | OUTPATIENT
Start: 2021-01-01 | End: 2021-01-01 | Stop reason: HOSPADM

## 2021-01-01 RX ORDER — BLOOD-GLUCOSE,RECEIVER,CONT
EACH MISCELLANEOUS
Qty: 1 EACH | Refills: 1 | Status: ON HOLD | OUTPATIENT
Start: 2021-01-01 | End: 2021-01-01

## 2021-01-01 RX ORDER — DIPHENOXYLATE HYDROCHLORIDE AND ATROPINE SULFATE 2.5; .025 MG/1; MG/1
2 TABLET ORAL 4 TIMES DAILY
Status: DISCONTINUED | OUTPATIENT
Start: 2021-01-01 | End: 2021-01-01 | Stop reason: HOSPADM

## 2021-01-01 RX ORDER — FLUMAZENIL 0.1 MG/ML
0.2 INJECTION INTRAVENOUS
Status: ACTIVE | OUTPATIENT
Start: 2021-01-01 | End: 2021-01-01

## 2021-01-01 RX ORDER — MIDODRINE HYDROCHLORIDE 5 MG/1
5 TABLET ORAL 2 TIMES DAILY WITH MEALS
Status: DISCONTINUED | OUTPATIENT
Start: 2021-01-01 | End: 2021-01-01

## 2021-01-01 RX ORDER — INSULIN LISPRO 100 [IU]/ML
INJECTION, SOLUTION INTRAVENOUS; SUBCUTANEOUS EVERY 6 HOURS
Status: DISCONTINUED | OUTPATIENT
Start: 2021-01-01 | End: 2021-01-01 | Stop reason: HOSPADM

## 2021-01-01 RX ORDER — CIPROFLOXACIN 500 MG/1
500 TABLET ORAL 2 TIMES DAILY
COMMUNITY
End: 2021-01-01

## 2021-01-01 RX ORDER — BLOOD-GLUCOSE TRANSMITTER
EACH MISCELLANEOUS
Qty: 1 DEVICE | Refills: 3 | Status: ON HOLD | OUTPATIENT
Start: 2021-01-01 | End: 2021-01-01

## 2021-01-01 RX ORDER — LORAZEPAM 2 MG/ML
2 INJECTION INTRAMUSCULAR ONCE
Status: DISCONTINUED | OUTPATIENT
Start: 2021-01-01 | End: 2021-01-01

## 2021-01-01 RX ORDER — PHENYLEPHRINE HCL IN 0.9% NACL 0.4MG/10ML
SYRINGE (ML) INTRAVENOUS AS NEEDED
Status: DISCONTINUED | OUTPATIENT
Start: 2021-01-01 | End: 2021-01-01 | Stop reason: HOSPADM

## 2021-01-01 RX ORDER — SODIUM CHLORIDE AND POTASSIUM CHLORIDE .9; .15 G/100ML; G/100ML
SOLUTION INTRAVENOUS CONTINUOUS
Status: DISCONTINUED | OUTPATIENT
Start: 2021-01-01 | End: 2021-01-01

## 2021-01-01 RX ORDER — LOPERAMIDE HCL 2 MG
4 TABLET ORAL
COMMUNITY

## 2021-01-01 RX ORDER — ENOXAPARIN SODIUM 100 MG/ML
1 INJECTION SUBCUTANEOUS EVERY 12 HOURS
Status: DISCONTINUED | OUTPATIENT
Start: 2021-01-01 | End: 2021-01-01 | Stop reason: HOSPADM

## 2021-01-01 RX ORDER — MONTELUKAST SODIUM 4 MG/1
2 TABLET, CHEWABLE ORAL DAILY
Status: DISCONTINUED | OUTPATIENT
Start: 2021-01-01 | End: 2021-01-01

## 2021-01-01 RX ORDER — INSULIN LISPRO 100 [IU]/ML
INJECTION, SOLUTION INTRAVENOUS; SUBCUTANEOUS
Status: DISCONTINUED | OUTPATIENT
Start: 2021-01-01 | End: 2021-01-01 | Stop reason: HOSPADM

## 2021-01-01 RX ORDER — LOPERAMIDE HYDROCHLORIDE 2 MG/1
4 CAPSULE ORAL
Status: DISCONTINUED | OUTPATIENT
Start: 2021-01-01 | End: 2021-01-01 | Stop reason: HOSPADM

## 2021-01-01 RX ORDER — ENOXAPARIN SODIUM 100 MG/ML
30 INJECTION SUBCUTANEOUS EVERY 12 HOURS
Qty: 18 ML | Refills: 0 | Status: SHIPPED
Start: 2021-01-01 | End: 2021-01-01

## 2021-01-01 RX ORDER — NOREPINEPHRINE BITARTRATE/D5W 8 MG/250ML
.5-16 PLASTIC BAG, INJECTION (ML) INTRAVENOUS
Status: DISCONTINUED | OUTPATIENT
Start: 2021-01-01 | End: 2021-01-01

## 2021-01-01 RX ORDER — LORAZEPAM 2 MG/ML
1 INJECTION INTRAMUSCULAR
Status: DISCONTINUED | OUTPATIENT
Start: 2021-01-01 | End: 2021-01-01

## 2021-01-01 RX ORDER — ALBUTEROL SULFATE 90 UG/1
AEROSOL, METERED RESPIRATORY (INHALATION)
Qty: 8.5 INHALER | Refills: 4 | Status: SHIPPED | OUTPATIENT
Start: 2021-01-01

## 2021-01-01 RX ORDER — HALOPERIDOL 5 MG/ML
2 INJECTION INTRAMUSCULAR
Status: DISCONTINUED | OUTPATIENT
Start: 2021-01-01 | End: 2021-01-01

## 2021-01-01 RX ORDER — FUROSEMIDE 20 MG/1
20 TABLET ORAL DAILY
Qty: 5 TAB | Refills: 0 | Status: ON HOLD | OUTPATIENT
Start: 2021-01-01 | End: 2021-01-01

## 2021-01-01 RX ORDER — LOPERAMIDE HYDROCHLORIDE 2 MG/1
2 CAPSULE ORAL
Status: DISCONTINUED | OUTPATIENT
Start: 2021-01-01 | End: 2021-01-01

## 2021-01-01 RX ORDER — DIPHENOXYLATE HYDROCHLORIDE AND ATROPINE SULFATE 2.5; .025 MG/1; MG/1
2 TABLET ORAL
COMMUNITY

## 2021-01-01 RX ORDER — LINEZOLID 600 MG/1
600 TABLET, FILM COATED ORAL 2 TIMES DAILY
Qty: 10 TAB | Refills: 0 | Status: SHIPPED | OUTPATIENT
Start: 2021-01-01 | End: 2021-01-01

## 2021-01-01 RX ORDER — POTASSIUM CHLORIDE 7.45 MG/ML
10 INJECTION INTRAVENOUS
Status: COMPLETED | OUTPATIENT
Start: 2021-01-01 | End: 2021-01-01

## 2021-01-01 RX ORDER — MIDODRINE HYDROCHLORIDE 5 MG/1
5 TABLET ORAL
Status: DISCONTINUED | OUTPATIENT
Start: 2021-01-01 | End: 2021-01-01 | Stop reason: HOSPADM

## 2021-01-01 RX ADMIN — PSYLLIUM HUSK 1 PACKET: 3.4 POWDER ORAL at 17:23

## 2021-01-01 RX ADMIN — ENOXAPARIN SODIUM 80 MG: 80 INJECTION SUBCUTANEOUS at 17:19

## 2021-01-01 RX ADMIN — PHENYLEPHRINE HYDROCHLORIDE 20 MCG/MIN: 10 INJECTION INTRAVENOUS at 10:37

## 2021-01-01 RX ADMIN — PHENYLEPHRINE HYDROCHLORIDE 55 MCG/MIN: 10 INJECTION INTRAVENOUS at 11:16

## 2021-01-01 RX ADMIN — LORAZEPAM 0.5 MG: 2 INJECTION INTRAMUSCULAR; INTRAVENOUS at 17:52

## 2021-01-01 RX ADMIN — MAGNESIUM SULFATE HEPTAHYDRATE 2 G: 40 INJECTION, SOLUTION INTRAVENOUS at 00:17

## 2021-01-01 RX ADMIN — LORAZEPAM 0.5 MG: 2 INJECTION INTRAMUSCULAR; INTRAVENOUS at 00:52

## 2021-01-01 RX ADMIN — COLESTIPOL HYDROCHLORIDE 4 G: 1 TABLET, FILM COATED ORAL at 19:32

## 2021-01-01 RX ADMIN — LIDOCAINE HYDROCHLORIDE 10 ML: 10 INJECTION, SOLUTION EPIDURAL; INFILTRATION; INTRACAUDAL; PERINEURAL at 17:00

## 2021-01-01 RX ADMIN — Medication 10 ML: at 14:36

## 2021-01-01 RX ADMIN — POTASSIUM CHLORIDE 40 MEQ: 750 TABLET, EXTENDED RELEASE ORAL at 04:20

## 2021-01-01 RX ADMIN — DIPHENOXYLATE HYDROCHLORIDE AND ATROPINE SULFATE 2 TABLET: 2.5; .025 TABLET ORAL at 18:53

## 2021-01-01 RX ADMIN — Medication 10 ML: at 06:12

## 2021-01-01 RX ADMIN — LORAZEPAM 0.25 MG: 0.5 TABLET ORAL at 02:48

## 2021-01-01 RX ADMIN — DIPHENOXYLATE HYDROCHLORIDE AND ATROPINE SULFATE 2 TABLET: 2.5; .025 TABLET ORAL at 08:44

## 2021-01-01 RX ADMIN — SODIUM CHLORIDE 1000 ML: 9 INJECTION, SOLUTION INTRAVENOUS at 01:41

## 2021-01-01 RX ADMIN — HEPARIN SODIUM 6200 UNITS: 5000 INJECTION INTRAVENOUS; SUBCUTANEOUS at 13:53

## 2021-01-01 RX ADMIN — COLESTIPOL HYDROCHLORIDE 4 G: 1 TABLET, FILM COATED ORAL at 05:29

## 2021-01-01 RX ADMIN — Medication 10 ML: at 17:24

## 2021-01-01 RX ADMIN — PSYLLIUM HUSK 1 PACKET: 3.4 POWDER ORAL at 13:37

## 2021-01-01 RX ADMIN — HYDROMORPHONE HYDROCHLORIDE 0.5 MG: 1 INJECTION, SOLUTION INTRAMUSCULAR; INTRAVENOUS; SUBCUTANEOUS at 20:59

## 2021-01-01 RX ADMIN — MEROPENEM 500 MG: 500 INJECTION, POWDER, FOR SOLUTION INTRAVENOUS at 23:05

## 2021-01-01 RX ADMIN — PANTOPRAZOLE SODIUM 40 MG: 40 INJECTION, POWDER, FOR SOLUTION INTRAVENOUS at 05:29

## 2021-01-01 RX ADMIN — INSULIN LISPRO 3 UNITS: 100 INJECTION, SOLUTION INTRAVENOUS; SUBCUTANEOUS at 16:47

## 2021-01-01 RX ADMIN — PHENYLEPHRINE HYDROCHLORIDE 30 MCG/MIN: 10 INJECTION INTRAVENOUS at 01:55

## 2021-01-01 RX ADMIN — COLESTIPOL HYDROCHLORIDE 4 G: 1 TABLET, FILM COATED ORAL at 11:23

## 2021-01-01 RX ADMIN — POTASSIUM CHLORIDE 40 MEQ: 750 TABLET, EXTENDED RELEASE ORAL at 08:44

## 2021-01-01 RX ADMIN — SODIUM CHLORIDE 1000 ML: 9 INJECTION, SOLUTION INTRAVENOUS at 00:42

## 2021-01-01 RX ADMIN — HYDROMORPHONE HYDROCHLORIDE 0.5 MG: 1 INJECTION, SOLUTION INTRAMUSCULAR; INTRAVENOUS; SUBCUTANEOUS at 05:45

## 2021-01-01 RX ADMIN — LORAZEPAM 0.5 MG: 2 INJECTION INTRAMUSCULAR; INTRAVENOUS at 06:06

## 2021-01-01 RX ADMIN — LORAZEPAM 0.5 MG: 2 INJECTION INTRAMUSCULAR; INTRAVENOUS at 17:40

## 2021-01-01 RX ADMIN — LOPERAMIDE HYDROCHLORIDE 4 MG: 2 CAPSULE ORAL at 23:03

## 2021-01-01 RX ADMIN — Medication 10 ML: at 00:54

## 2021-01-01 RX ADMIN — COLLAGENASE SANTYL: 250 OINTMENT TOPICAL at 14:29

## 2021-01-01 RX ADMIN — PSYLLIUM HUSK 1 PACKET: 3.4 POWDER ORAL at 16:42

## 2021-01-01 RX ADMIN — SODIUM CHLORIDE 50 ML/HR: 9 INJECTION, SOLUTION INTRAVENOUS at 06:21

## 2021-01-01 RX ADMIN — POTASSIUM CHLORIDE 10 MEQ: 10 INJECTION, SOLUTION INTRAVENOUS at 12:04

## 2021-01-01 RX ADMIN — POTASSIUM CHLORIDE 40 MEQ: 750 TABLET, EXTENDED RELEASE ORAL at 18:07

## 2021-01-01 RX ADMIN — DIPHENOXYLATE HYDROCHLORIDE AND ATROPINE SULFATE 2 TABLET: 2.5; .025 TABLET ORAL at 08:56

## 2021-01-01 RX ADMIN — MIDODRINE HYDROCHLORIDE 5 MG: 5 TABLET ORAL at 12:05

## 2021-01-01 RX ADMIN — ENOXAPARIN SODIUM 80 MG: 80 INJECTION SUBCUTANEOUS at 17:21

## 2021-01-01 RX ADMIN — POTASSIUM BICARBONATE 40 MEQ: 782 TABLET, EFFERVESCENT ORAL at 08:56

## 2021-01-01 RX ADMIN — COLESTIPOL HYDROCHLORIDE 4 G: 1 TABLET, FILM COATED ORAL at 22:18

## 2021-01-01 RX ADMIN — SODIUM PHOSPHATE, MONOBASIC, MONOHYDRATE: 276; 142 INJECTION, SOLUTION INTRAVENOUS at 20:09

## 2021-01-01 RX ADMIN — DIPHENOXYLATE HYDROCHLORIDE AND ATROPINE SULFATE 2 TABLET: 2.5; .025 TABLET ORAL at 12:05

## 2021-01-01 RX ADMIN — LORAZEPAM 0.5 MG: 2 INJECTION INTRAMUSCULAR; INTRAVENOUS at 10:21

## 2021-01-01 RX ADMIN — HYDROMORPHONE HYDROCHLORIDE 0.5 MG: 1 INJECTION, SOLUTION INTRAMUSCULAR; INTRAVENOUS; SUBCUTANEOUS at 00:52

## 2021-01-01 RX ADMIN — VANCOMYCIN HYDROCHLORIDE 1250 MG: 1.25 INJECTION, POWDER, LYOPHILIZED, FOR SOLUTION INTRAVENOUS at 00:16

## 2021-01-01 RX ADMIN — COLESTIPOL HYDROCHLORIDE 2 G: 1 TABLET, FILM COATED ORAL at 11:11

## 2021-01-01 RX ADMIN — IOPAMIDOL 100 ML: 755 INJECTION, SOLUTION INTRAVENOUS at 14:40

## 2021-01-01 RX ADMIN — LORAZEPAM 0.5 MG: 2 INJECTION INTRAMUSCULAR; INTRAVENOUS at 14:46

## 2021-01-01 RX ADMIN — MEROPENEM 500 MG: 500 INJECTION, POWDER, FOR SOLUTION INTRAVENOUS at 17:25

## 2021-01-01 RX ADMIN — Medication 10 ML: at 22:17

## 2021-01-01 RX ADMIN — Medication 80 MCG: at 08:31

## 2021-01-01 RX ADMIN — HYDROMORPHONE HYDROCHLORIDE 0.5 MG: 1 INJECTION, SOLUTION INTRAMUSCULAR; INTRAVENOUS; SUBCUTANEOUS at 15:25

## 2021-01-01 RX ADMIN — HYDROMORPHONE HYDROCHLORIDE 0.5 MG: 1 INJECTION, SOLUTION INTRAMUSCULAR; INTRAVENOUS; SUBCUTANEOUS at 06:06

## 2021-01-01 RX ADMIN — PANTOPRAZOLE SODIUM 40 MG: 40 INJECTION, POWDER, FOR SOLUTION INTRAVENOUS at 05:40

## 2021-01-01 RX ADMIN — LORAZEPAM 0.5 MG: 2 INJECTION INTRAMUSCULAR; INTRAVENOUS at 01:56

## 2021-01-01 RX ADMIN — HYDROMORPHONE HYDROCHLORIDE 0.5 MG: 1 INJECTION, SOLUTION INTRAMUSCULAR; INTRAVENOUS; SUBCUTANEOUS at 08:50

## 2021-01-01 RX ADMIN — DIPHENOXYLATE HYDROCHLORIDE AND ATROPINE SULFATE 2 TABLET: 2.5; .025 TABLET ORAL at 13:37

## 2021-01-01 RX ADMIN — COLESTIPOL HYDROCHLORIDE 4 G: 1 TABLET, FILM COATED ORAL at 12:15

## 2021-01-01 RX ADMIN — SODIUM CHLORIDE 1000 ML: 9 INJECTION, SOLUTION INTRAVENOUS at 00:53

## 2021-01-01 RX ADMIN — MEROPENEM 500 MG: 500 INJECTION, POWDER, FOR SOLUTION INTRAVENOUS at 00:15

## 2021-01-01 RX ADMIN — MEROPENEM 500 MG: 500 INJECTION, POWDER, FOR SOLUTION INTRAVENOUS at 09:57

## 2021-01-01 RX ADMIN — HYDROMORPHONE HYDROCHLORIDE 0.5 MG: 1 INJECTION, SOLUTION INTRAMUSCULAR; INTRAVENOUS; SUBCUTANEOUS at 02:59

## 2021-01-01 RX ADMIN — PHENYLEPHRINE HYDROCHLORIDE 65 MCG/MIN: 10 INJECTION INTRAVENOUS at 11:56

## 2021-01-01 RX ADMIN — LOPERAMIDE HYDROCHLORIDE 4 MG: 2 CAPSULE ORAL at 22:17

## 2021-01-01 RX ADMIN — DIATRIZOATE MEGLUMINE AND DIATRIZOATE SODIUM 30 ML: 660; 100 LIQUID ORAL; RECTAL at 01:20

## 2021-01-01 RX ADMIN — Medication 10 ML: at 00:36

## 2021-01-01 RX ADMIN — POTASSIUM CHLORIDE 40 MEQ: 750 TABLET, EXTENDED RELEASE ORAL at 16:50

## 2021-01-01 RX ADMIN — NYSTATIN: 100000 POWDER TOPICAL at 08:32

## 2021-01-01 RX ADMIN — MIDODRINE HYDROCHLORIDE 5 MG: 5 TABLET ORAL at 18:08

## 2021-01-01 RX ADMIN — LORAZEPAM 0.5 MG: 2 INJECTION INTRAMUSCULAR; INTRAVENOUS at 05:44

## 2021-01-01 RX ADMIN — VANCOMYCIN HYDROCHLORIDE 1500 MG: 10 INJECTION, POWDER, LYOPHILIZED, FOR SOLUTION INTRAVENOUS at 00:42

## 2021-01-01 RX ADMIN — HYDROMORPHONE HYDROCHLORIDE 0.5 MG: 1 INJECTION, SOLUTION INTRAMUSCULAR; INTRAVENOUS; SUBCUTANEOUS at 02:03

## 2021-01-01 RX ADMIN — SODIUM CHLORIDE 125 ML/HR: 9 INJECTION, SOLUTION INTRAVENOUS at 08:41

## 2021-01-01 RX ADMIN — POTASSIUM CHLORIDE 40 MEQ: 750 TABLET, EXTENDED RELEASE ORAL at 17:26

## 2021-01-01 RX ADMIN — ENOXAPARIN SODIUM 80 MG: 80 INJECTION SUBCUTANEOUS at 18:52

## 2021-01-01 RX ADMIN — Medication 10 ML: at 01:10

## 2021-01-01 RX ADMIN — MEROPENEM 500 MG: 500 INJECTION, POWDER, FOR SOLUTION INTRAVENOUS at 17:00

## 2021-01-01 RX ADMIN — POTASSIUM CHLORIDE 10 MEQ: 10 INJECTION, SOLUTION INTRAVENOUS at 12:00

## 2021-01-01 RX ADMIN — SODIUM CHLORIDE 125 ML/HR: 9 INJECTION, SOLUTION INTRAVENOUS at 04:26

## 2021-01-01 RX ADMIN — HYDROMORPHONE HYDROCHLORIDE 0.5 MG: 1 INJECTION, SOLUTION INTRAMUSCULAR; INTRAVENOUS; SUBCUTANEOUS at 16:36

## 2021-01-01 RX ADMIN — Medication 10 ML: at 05:36

## 2021-01-01 RX ADMIN — MEROPENEM 500 MG: 500 INJECTION, POWDER, FOR SOLUTION INTRAVENOUS at 08:41

## 2021-01-01 RX ADMIN — PHENYLEPHRINE HYDROCHLORIDE 50 MCG/MIN: 10 INJECTION INTRAVENOUS at 23:10

## 2021-01-01 RX ADMIN — COLESTIPOL HYDROCHLORIDE 4 G: 1 TABLET, FILM COATED ORAL at 05:40

## 2021-01-01 RX ADMIN — SODIUM CHLORIDE 50 ML/HR: 9 INJECTION, SOLUTION INTRAVENOUS at 07:23

## 2021-01-01 RX ADMIN — SODIUM CHLORIDE 125 ML/HR: 9 INJECTION, SOLUTION INTRAVENOUS at 10:15

## 2021-01-01 RX ADMIN — POTASSIUM CHLORIDE AND SODIUM CHLORIDE: 900; 150 INJECTION, SOLUTION INTRAVENOUS at 07:00

## 2021-01-01 RX ADMIN — HEPARIN SODIUM 6200 UNITS: 5000 INJECTION INTRAVENOUS; SUBCUTANEOUS at 06:21

## 2021-01-01 RX ADMIN — PSYLLIUM HUSK 1 PACKET: 3.4 POWDER ORAL at 11:17

## 2021-01-01 RX ADMIN — LORAZEPAM 1 MG: 2 INJECTION INTRAMUSCULAR; INTRAVENOUS at 15:41

## 2021-01-01 RX ADMIN — LORAZEPAM 0.5 MG: 2 INJECTION INTRAMUSCULAR; INTRAVENOUS at 05:50

## 2021-01-01 RX ADMIN — MEROPENEM 500 MG: 500 INJECTION, POWDER, FOR SOLUTION INTRAVENOUS at 12:06

## 2021-01-01 RX ADMIN — NYSTATIN: 100000 POWDER TOPICAL at 18:12

## 2021-01-01 RX ADMIN — LORAZEPAM 0.5 MG: 2 INJECTION INTRAMUSCULAR; INTRAVENOUS at 20:59

## 2021-01-01 RX ADMIN — LORAZEPAM 0.5 MG: 2 INJECTION INTRAMUSCULAR; INTRAVENOUS at 22:05

## 2021-01-01 RX ADMIN — Medication 10 ML: at 05:47

## 2021-01-01 RX ADMIN — POTASSIUM CHLORIDE 20 MEQ: 400 INJECTION, SOLUTION INTRAVENOUS at 12:59

## 2021-01-01 RX ADMIN — NYSTATIN: 100000 POWDER TOPICAL at 17:20

## 2021-01-01 RX ADMIN — HYDROMORPHONE HYDROCHLORIDE 0.5 MG: 1 INJECTION, SOLUTION INTRAMUSCULAR; INTRAVENOUS; SUBCUTANEOUS at 15:41

## 2021-01-01 RX ADMIN — PSYLLIUM HUSK 1 PACKET: 3.4 POWDER ORAL at 14:57

## 2021-01-01 RX ADMIN — LOPERAMIDE HYDROCHLORIDE 4 MG: 2 CAPSULE ORAL at 17:18

## 2021-01-01 RX ADMIN — Medication 10 ML: at 14:08

## 2021-01-01 RX ADMIN — HYDROMORPHONE HYDROCHLORIDE 0.5 MG: 1 INJECTION, SOLUTION INTRAMUSCULAR; INTRAVENOUS; SUBCUTANEOUS at 22:05

## 2021-01-01 RX ADMIN — DIPHENOXYLATE HYDROCHLORIDE AND ATROPINE SULFATE 2 TABLET: 2.5; .025 TABLET ORAL at 21:35

## 2021-01-01 RX ADMIN — LEVOFLOXACIN 750 MG: 5 INJECTION, SOLUTION INTRAVENOUS at 09:20

## 2021-01-01 RX ADMIN — LORAZEPAM 0.5 MG: 2 INJECTION INTRAMUSCULAR; INTRAVENOUS at 08:28

## 2021-01-01 RX ADMIN — HEPARIN SODIUM AND DEXTROSE 18 UNITS/KG/HR: 10000; 5 INJECTION INTRAVENOUS at 20:50

## 2021-01-01 RX ADMIN — DIPHENOXYLATE HYDROCHLORIDE AND ATROPINE SULFATE 2 TABLET: 2.5; .025 TABLET ORAL at 12:15

## 2021-01-01 RX ADMIN — INSULIN LISPRO 2 UNITS: 100 INJECTION, SOLUTION INTRAVENOUS; SUBCUTANEOUS at 18:37

## 2021-01-01 RX ADMIN — HYDROMORPHONE HYDROCHLORIDE 2 MG: 5 SOLUTION ORAL at 12:09

## 2021-01-01 RX ADMIN — PANTOPRAZOLE SODIUM 40 MG: 40 INJECTION, POWDER, FOR SOLUTION INTRAVENOUS at 06:12

## 2021-01-01 RX ADMIN — DIPHENOXYLATE HYDROCHLORIDE AND ATROPINE SULFATE 2 TABLET: 2.5; .025 TABLET ORAL at 12:59

## 2021-01-01 RX ADMIN — DIPHENOXYLATE HYDROCHLORIDE AND ATROPINE SULFATE 2 TABLET: 2.5; .025 TABLET ORAL at 11:48

## 2021-01-01 RX ADMIN — MIDODRINE HYDROCHLORIDE 5 MG: 5 TABLET ORAL at 08:44

## 2021-01-01 RX ADMIN — PANTOPRAZOLE SODIUM 40 MG: 40 INJECTION, POWDER, FOR SOLUTION INTRAVENOUS at 17:19

## 2021-01-01 RX ADMIN — POTASSIUM CHLORIDE 20 MEQ: 14.9 INJECTION, SOLUTION INTRAVENOUS at 06:37

## 2021-01-01 RX ADMIN — SODIUM CHLORIDE 125 ML/HR: 9 INJECTION, SOLUTION INTRAVENOUS at 22:51

## 2021-01-01 RX ADMIN — LORAZEPAM 0.5 MG: 2 INJECTION INTRAMUSCULAR; INTRAVENOUS at 08:50

## 2021-01-01 RX ADMIN — LORAZEPAM 0.5 MG: 2 INJECTION INTRAMUSCULAR; INTRAVENOUS at 21:47

## 2021-01-01 RX ADMIN — DIPHENOXYLATE HYDROCHLORIDE AND ATROPINE SULFATE 2 TABLET: 2.5; .025 TABLET ORAL at 20:54

## 2021-01-01 RX ADMIN — LOPERAMIDE HYDROCHLORIDE 4 MG: 2 CAPSULE ORAL at 16:48

## 2021-01-01 RX ADMIN — POTASSIUM CHLORIDE 10 MEQ: 10 INJECTION, SOLUTION INTRAVENOUS at 09:30

## 2021-01-01 RX ADMIN — HEPARIN SODIUM 6200 UNITS: 5000 INJECTION INTRAVENOUS; SUBCUTANEOUS at 20:49

## 2021-01-01 RX ADMIN — NYSTATIN: 100000 POWDER TOPICAL at 09:01

## 2021-01-01 RX ADMIN — POTASSIUM PHOSPHATE, MONOBASIC AND POTASSIUM PHOSPHATE, DIBASIC: 224; 236 INJECTION, SOLUTION, CONCENTRATE INTRAVENOUS at 15:45

## 2021-01-01 RX ADMIN — PHENYLEPHRINE HYDROCHLORIDE 65 MCG/MIN: 10 INJECTION INTRAVENOUS at 17:21

## 2021-01-01 RX ADMIN — IOHEXOL 50 ML: 240 INJECTION, SOLUTION INTRATHECAL; INTRAVASCULAR; INTRAVENOUS; ORAL at 11:34

## 2021-01-01 RX ADMIN — Medication 10 ML: at 23:21

## 2021-01-01 RX ADMIN — COLLAGENASE SANTYL: 250 OINTMENT TOPICAL at 08:32

## 2021-01-01 RX ADMIN — POTASSIUM CHLORIDE 30 MEQ: 750 TABLET, FILM COATED, EXTENDED RELEASE ORAL at 10:05

## 2021-01-01 RX ADMIN — INSULIN GLARGINE 10 UNITS: 100 INJECTION, SOLUTION SUBCUTANEOUS at 02:01

## 2021-01-01 RX ADMIN — HYDROMORPHONE HYDROCHLORIDE 0.5 MG: 1 INJECTION, SOLUTION INTRAMUSCULAR; INTRAVENOUS; SUBCUTANEOUS at 17:52

## 2021-01-01 RX ADMIN — DIPHENOXYLATE HYDROCHLORIDE AND ATROPINE SULFATE 2 TABLET: 2.5; .025 TABLET ORAL at 14:29

## 2021-01-01 RX ADMIN — ATORVASTATIN CALCIUM 40 MG: 20 TABLET, FILM COATED ORAL at 10:05

## 2021-01-01 RX ADMIN — PSYLLIUM HUSK 1 PACKET: 3.4 POWDER ORAL at 11:50

## 2021-01-01 RX ADMIN — PSYLLIUM HUSK 1 PACKET: 3.4 POWDER ORAL at 11:15

## 2021-01-01 RX ADMIN — HYDROMORPHONE HYDROCHLORIDE 0.5 MG: 1 INJECTION, SOLUTION INTRAMUSCULAR; INTRAVENOUS; SUBCUTANEOUS at 14:43

## 2021-01-01 RX ADMIN — PROPOFOL 70 MG: 10 INJECTION, EMULSION INTRAVENOUS at 08:07

## 2021-01-01 RX ADMIN — HYDROMORPHONE HYDROCHLORIDE 0.5 MG: 1 INJECTION, SOLUTION INTRAMUSCULAR; INTRAVENOUS; SUBCUTANEOUS at 14:46

## 2021-01-01 RX ADMIN — LOPERAMIDE HYDROCHLORIDE 4 MG: 2 CAPSULE ORAL at 11:48

## 2021-01-01 RX ADMIN — LOPERAMIDE HYDROCHLORIDE 4 MG: 2 CAPSULE ORAL at 11:17

## 2021-01-01 RX ADMIN — MEROPENEM 500 MG: 500 INJECTION, POWDER, FOR SOLUTION INTRAVENOUS at 00:34

## 2021-01-01 RX ADMIN — POTASSIUM CHLORIDE 40 MEQ: 750 TABLET, EXTENDED RELEASE ORAL at 17:18

## 2021-01-01 RX ADMIN — POTASSIUM CHLORIDE 20 MEQ: 14.9 INJECTION, SOLUTION INTRAVENOUS at 04:15

## 2021-01-01 RX ADMIN — SODIUM CHLORIDE 1000 ML: 9 INJECTION, SOLUTION INTRAVENOUS at 21:21

## 2021-01-01 RX ADMIN — IPRATROPIUM BROMIDE AND ALBUTEROL SULFATE 3 ML: .5; 2.5 SOLUTION RESPIRATORY (INHALATION) at 03:45

## 2021-01-01 RX ADMIN — POTASSIUM CHLORIDE 10 MEQ: 10 INJECTION, SOLUTION INTRAVENOUS at 06:56

## 2021-01-01 RX ADMIN — POTASSIUM CHLORIDE AND SODIUM CHLORIDE: 900; 150 INJECTION, SOLUTION INTRAVENOUS at 21:00

## 2021-01-01 RX ADMIN — ENOXAPARIN SODIUM 80 MG: 80 INJECTION SUBCUTANEOUS at 17:54

## 2021-01-01 RX ADMIN — NYSTATIN: 100000 POWDER TOPICAL at 17:30

## 2021-01-01 RX ADMIN — LORAZEPAM 1 MG: 2 INJECTION INTRAMUSCULAR; INTRAVENOUS at 03:26

## 2021-01-01 RX ADMIN — PSYLLIUM HUSK 1 PACKET: 3.4 POWDER ORAL at 08:42

## 2021-01-01 RX ADMIN — SODIUM CHLORIDE 50 ML/HR: 9 INJECTION, SOLUTION INTRAVENOUS at 18:38

## 2021-01-01 RX ADMIN — LORAZEPAM 0.5 MG: 2 INJECTION INTRAMUSCULAR; INTRAVENOUS at 02:59

## 2021-01-01 RX ADMIN — HYDROMORPHONE HYDROCHLORIDE 0.5 MG: 1 INJECTION, SOLUTION INTRAMUSCULAR; INTRAVENOUS; SUBCUTANEOUS at 01:55

## 2021-01-01 RX ADMIN — MAGNESIUM SULFATE HEPTAHYDRATE 2 G: 40 INJECTION, SOLUTION INTRAVENOUS at 14:42

## 2021-01-01 RX ADMIN — HEPARIN SODIUM AND DEXTROSE 15 UNITS/KG/HR: 10000; 5 INJECTION INTRAVENOUS at 07:44

## 2021-01-01 RX ADMIN — MEROPENEM 500 MG: 500 INJECTION, POWDER, FOR SOLUTION INTRAVENOUS at 16:38

## 2021-01-01 RX ADMIN — COLESTIPOL HYDROCHLORIDE 4 G: 1 TABLET, FILM COATED ORAL at 17:27

## 2021-01-01 RX ADMIN — VANCOMYCIN HYDROCHLORIDE 1250 MG: 1.25 INJECTION, POWDER, LYOPHILIZED, FOR SOLUTION INTRAVENOUS at 23:48

## 2021-01-01 RX ADMIN — DIPHENOXYLATE HYDROCHLORIDE AND ATROPINE SULFATE 2 TABLET: 2.5; .025 TABLET ORAL at 17:26

## 2021-01-01 RX ADMIN — MEROPENEM 500 MG: 500 INJECTION, POWDER, FOR SOLUTION INTRAVENOUS at 06:12

## 2021-01-01 RX ADMIN — LOPERAMIDE HYDROCHLORIDE 4 MG: 2 CAPSULE ORAL at 07:00

## 2021-01-01 RX ADMIN — Medication 10 ML: at 14:29

## 2021-01-01 RX ADMIN — LIDOCAINE HYDROCHLORIDE 40 MG: 20 INJECTION, SOLUTION INTRAVENOUS at 08:06

## 2021-01-01 RX ADMIN — NYSTATIN: 100000 POWDER TOPICAL at 08:46

## 2021-01-01 RX ADMIN — HYDROMORPHONE HYDROCHLORIDE 0.5 MG: 1 INJECTION, SOLUTION INTRAMUSCULAR; INTRAVENOUS; SUBCUTANEOUS at 16:13

## 2021-01-01 RX ADMIN — LOPERAMIDE HYDROCHLORIDE 4 MG: 2 CAPSULE ORAL at 21:35

## 2021-01-01 RX ADMIN — PHENYLEPHRINE HYDROCHLORIDE 65 MCG/MIN: 10 INJECTION INTRAVENOUS at 08:21

## 2021-01-01 RX ADMIN — HEPARIN SODIUM AND DEXTROSE 19 UNITS/KG/HR: 10000; 5 INJECTION INTRAVENOUS at 13:37

## 2021-01-01 RX ADMIN — HYDROMORPHONE HYDROCHLORIDE 0.5 MG: 1 INJECTION, SOLUTION INTRAMUSCULAR; INTRAVENOUS; SUBCUTANEOUS at 17:41

## 2021-01-01 RX ADMIN — POTASSIUM CHLORIDE 10 MEQ: 10 INJECTION, SOLUTION INTRAVENOUS at 20:54

## 2021-01-01 RX ADMIN — LOPERAMIDE HYDROCHLORIDE 4 MG: 2 CAPSULE ORAL at 18:08

## 2021-01-01 RX ADMIN — PSYLLIUM HUSK 1 PACKET: 3.4 POWDER ORAL at 08:33

## 2021-01-01 RX ADMIN — PANTOPRAZOLE SODIUM 40 MG: 40 INJECTION, POWDER, FOR SOLUTION INTRAVENOUS at 16:50

## 2021-01-01 RX ADMIN — Medication 10 ML: at 13:38

## 2021-01-01 RX ADMIN — MEROPENEM 500 MG: 500 INJECTION, POWDER, FOR SOLUTION INTRAVENOUS at 17:59

## 2021-01-01 RX ADMIN — LOPERAMIDE HYDROCHLORIDE 4 MG: 2 CAPSULE ORAL at 16:46

## 2021-01-01 RX ADMIN — ENOXAPARIN SODIUM 80 MG: 80 INJECTION SUBCUTANEOUS at 18:05

## 2021-01-01 RX ADMIN — Medication 10 ML: at 13:35

## 2021-01-01 RX ADMIN — Medication 10 ML: at 09:42

## 2021-01-01 RX ADMIN — LOPERAMIDE HYDROCHLORIDE 4 MG: 2 CAPSULE ORAL at 12:15

## 2021-01-01 RX ADMIN — PHENYLEPHRINE HYDROCHLORIDE 30 MCG/MIN: 10 INJECTION INTRAVENOUS at 11:44

## 2021-01-01 RX ADMIN — HYDROMORPHONE HYDROCHLORIDE 1 MG: 1 INJECTION, SOLUTION INTRAMUSCULAR; INTRAVENOUS; SUBCUTANEOUS at 07:52

## 2021-01-01 RX ADMIN — MEROPENEM 500 MG: 500 INJECTION, POWDER, FOR SOLUTION INTRAVENOUS at 00:41

## 2021-01-01 RX ADMIN — PSYLLIUM HUSK 1 PACKET: 3.4 POWDER ORAL at 09:40

## 2021-01-01 RX ADMIN — ENOXAPARIN SODIUM 80 MG: 80 INJECTION SUBCUTANEOUS at 04:21

## 2021-01-01 RX ADMIN — PSYLLIUM HUSK 1 PACKET: 3.4 POWDER ORAL at 18:53

## 2021-01-01 RX ADMIN — Medication 10 ML: at 21:15

## 2021-01-01 RX ADMIN — INSULIN LISPRO 3 UNITS: 100 INJECTION, SOLUTION INTRAVENOUS; SUBCUTANEOUS at 17:55

## 2021-01-01 RX ADMIN — LORAZEPAM 0.5 MG: 2 INJECTION INTRAMUSCULAR; INTRAVENOUS at 02:03

## 2021-01-01 RX ADMIN — PHENYLEPHRINE HYDROCHLORIDE 70 MCG/MIN: 10 INJECTION INTRAVENOUS at 18:57

## 2021-01-01 RX ADMIN — HYDROMORPHONE HYDROCHLORIDE 0.5 MG: 1 INJECTION, SOLUTION INTRAMUSCULAR; INTRAVENOUS; SUBCUTANEOUS at 09:11

## 2021-01-01 RX ADMIN — Medication 10 ML: at 06:15

## 2021-01-01 RX ADMIN — MEROPENEM 500 MG: 500 INJECTION, POWDER, FOR SOLUTION INTRAVENOUS at 01:07

## 2021-01-01 RX ADMIN — LOPERAMIDE HYDROCHLORIDE 4 MG: 2 CAPSULE ORAL at 06:16

## 2021-01-01 RX ADMIN — LIDOCAINE HYDROCHLORIDE 60 MG: 20 INJECTION, SOLUTION INTRAVENOUS at 08:28

## 2021-01-01 RX ADMIN — ENOXAPARIN SODIUM 80 MG: 80 INJECTION SUBCUTANEOUS at 05:40

## 2021-01-01 RX ADMIN — LIDOCAINE HYDROCHLORIDE 10 ML: 10 INJECTION, SOLUTION EPIDURAL; INFILTRATION; INTRACAUDAL; PERINEURAL at 14:28

## 2021-01-01 RX ADMIN — MAGNESIUM SULFATE HEPTAHYDRATE 2 G: 40 INJECTION, SOLUTION INTRAVENOUS at 00:35

## 2021-01-01 RX ADMIN — IOPAMIDOL 100 ML: 755 INJECTION, SOLUTION INTRAVENOUS at 09:48

## 2021-01-01 RX ADMIN — LORAZEPAM 1 MG: 2 INJECTION INTRAMUSCULAR; INTRAVENOUS at 07:52

## 2021-01-01 RX ADMIN — MEROPENEM 500 MG: 500 INJECTION, POWDER, FOR SOLUTION INTRAVENOUS at 11:42

## 2021-01-01 RX ADMIN — HYDROMORPHONE HYDROCHLORIDE 0.5 MG: 1 INJECTION, SOLUTION INTRAMUSCULAR; INTRAVENOUS; SUBCUTANEOUS at 11:24

## 2021-01-01 RX ADMIN — ALBUMIN (HUMAN) 12.5 G: 0.25 INJECTION, SOLUTION INTRAVENOUS at 18:59

## 2021-01-01 RX ADMIN — ALBUMIN (HUMAN) 25 G: 12.5 INJECTION, SOLUTION INTRAVENOUS at 18:55

## 2021-01-01 RX ADMIN — ENOXAPARIN SODIUM 80 MG: 80 INJECTION SUBCUTANEOUS at 06:12

## 2021-01-01 RX ADMIN — ENOXAPARIN SODIUM 80 MG: 80 INJECTION SUBCUTANEOUS at 17:03

## 2021-01-01 RX ADMIN — LORAZEPAM 0.5 MG: 2 INJECTION INTRAMUSCULAR; INTRAVENOUS at 03:49

## 2021-01-01 RX ADMIN — Medication 10 ML: at 06:00

## 2021-01-01 RX ADMIN — Medication 10 ML: at 14:57

## 2021-01-01 RX ADMIN — VANCOMYCIN HYDROCHLORIDE 1250 MG: 1.25 INJECTION, POWDER, LYOPHILIZED, FOR SOLUTION INTRAVENOUS at 23:05

## 2021-01-01 RX ADMIN — DIPHENOXYLATE HYDROCHLORIDE AND ATROPINE SULFATE 2 TABLET: 2.5; .025 TABLET ORAL at 18:07

## 2021-01-01 RX ADMIN — Medication 10 ML: at 06:14

## 2021-01-01 RX ADMIN — SODIUM CHLORIDE 337 ML: 9 INJECTION, SOLUTION INTRAVENOUS at 03:37

## 2021-01-01 RX ADMIN — POTASSIUM CHLORIDE AND SODIUM CHLORIDE: 900; 150 INJECTION, SOLUTION INTRAVENOUS at 00:35

## 2021-01-01 RX ADMIN — ALBUMIN (HUMAN) 12.5 G: 0.25 INJECTION, SOLUTION INTRAVENOUS at 13:37

## 2021-01-01 RX ADMIN — HYDROMORPHONE HYDROCHLORIDE 0.5 MG: 1 INJECTION, SOLUTION INTRAMUSCULAR; INTRAVENOUS; SUBCUTANEOUS at 08:28

## 2021-01-01 RX ADMIN — MEROPENEM 500 MG: 500 INJECTION, POWDER, FOR SOLUTION INTRAVENOUS at 09:41

## 2021-01-01 RX ADMIN — VANCOMYCIN HYDROCHLORIDE 1250 MG: 1.25 INJECTION, POWDER, LYOPHILIZED, FOR SOLUTION INTRAVENOUS at 23:04

## 2021-01-01 RX ADMIN — PHENYLEPHRINE HYDROCHLORIDE 60 MCG/MIN: 10 INJECTION INTRAVENOUS at 16:22

## 2021-01-01 RX ADMIN — DIPHENOXYLATE HYDROCHLORIDE AND ATROPINE SULFATE 2 TABLET: 2.5; .025 TABLET ORAL at 17:18

## 2021-01-01 RX ADMIN — Medication 10 ML: at 22:18

## 2021-01-01 RX ADMIN — HYDROMORPHONE HYDROCHLORIDE 0.5 MG: 1 INJECTION, SOLUTION INTRAMUSCULAR; INTRAVENOUS; SUBCUTANEOUS at 10:22

## 2021-01-01 RX ADMIN — LORAZEPAM 0.5 MG: 2 INJECTION INTRAMUSCULAR; INTRAVENOUS at 14:27

## 2021-01-01 RX ADMIN — PROPOFOL 140 MCG/KG/MIN: 10 INJECTION, EMULSION INTRAVENOUS at 08:07

## 2021-01-01 RX ADMIN — DIPHENOXYLATE HYDROCHLORIDE AND ATROPINE SULFATE 2 TABLET: 2.5; .025 TABLET ORAL at 09:41

## 2021-01-01 RX ADMIN — PHENYLEPHRINE HYDROCHLORIDE 25 MCG/MIN: 10 INJECTION INTRAVENOUS at 06:17

## 2021-01-01 RX ADMIN — POTASSIUM CHLORIDE AND SODIUM CHLORIDE: 900; 150 INJECTION, SOLUTION INTRAVENOUS at 02:37

## 2021-01-01 RX ADMIN — COLESTIPOL HYDROCHLORIDE 4 G: 1 TABLET, FILM COATED ORAL at 16:46

## 2021-01-01 RX ADMIN — POTASSIUM CHLORIDE 40 MEQ: 750 TABLET, EXTENDED RELEASE ORAL at 08:41

## 2021-01-01 RX ADMIN — INSULIN GLARGINE 10 UNITS: 100 INJECTION, SOLUTION SUBCUTANEOUS at 22:17

## 2021-01-01 RX ADMIN — ACETAMINOPHEN 650 MG: 325 TABLET, FILM COATED ORAL at 01:04

## 2021-01-01 RX ADMIN — LORAZEPAM 2 MG: 2 INJECTION INTRAMUSCULAR; INTRAVENOUS at 14:06

## 2021-01-01 RX ADMIN — ENOXAPARIN SODIUM 80 MG: 80 INJECTION SUBCUTANEOUS at 05:36

## 2021-01-01 RX ADMIN — POTASSIUM CHLORIDE 40 MEQ: 750 TABLET, EXTENDED RELEASE ORAL at 11:48

## 2021-01-01 RX ADMIN — NYSTATIN: 100000 POWDER TOPICAL at 11:49

## 2021-01-01 RX ADMIN — NYSTATIN: 100000 POWDER TOPICAL at 16:49

## 2021-01-01 RX ADMIN — POTASSIUM CHLORIDE 40 MEQ: 750 TABLET, EXTENDED RELEASE ORAL at 11:11

## 2021-01-01 RX ADMIN — ENOXAPARIN SODIUM 80 MG: 80 INJECTION SUBCUTANEOUS at 05:29

## 2021-01-01 RX ADMIN — DIPHENOXYLATE HYDROCHLORIDE AND ATROPINE SULFATE 2 TABLET: 2.5; .025 TABLET ORAL at 16:46

## 2021-01-01 RX ADMIN — Medication 10 ML: at 06:23

## 2021-01-01 RX ADMIN — POTASSIUM CHLORIDE 10 MEQ: 10 INJECTION, SOLUTION INTRAVENOUS at 17:27

## 2021-01-01 RX ADMIN — HYDROMORPHONE HYDROCHLORIDE 0.5 MG: 1 INJECTION, SOLUTION INTRAMUSCULAR; INTRAVENOUS; SUBCUTANEOUS at 14:29

## 2021-01-01 RX ADMIN — ALBUMIN (HUMAN) 25 G: 12.5 INJECTION, SOLUTION INTRAVENOUS at 10:33

## 2021-01-01 RX ADMIN — ONDANSETRON 4 MG: 2 INJECTION INTRAMUSCULAR; INTRAVENOUS at 23:08

## 2021-01-01 RX ADMIN — DIPHENOXYLATE HYDROCHLORIDE AND ATROPINE SULFATE 2 TABLET: 2.5; .025 TABLET ORAL at 11:04

## 2021-01-01 RX ADMIN — PHENYLEPHRINE HYDROCHLORIDE 65 MCG/MIN: 10 INJECTION INTRAVENOUS at 01:17

## 2021-01-01 RX ADMIN — LORAZEPAM 1 MG: 2 INJECTION INTRAMUSCULAR; INTRAVENOUS at 15:25

## 2021-01-01 RX ADMIN — POTASSIUM CHLORIDE AND SODIUM CHLORIDE: 900; 150 INJECTION, SOLUTION INTRAVENOUS at 12:19

## 2021-01-01 RX ADMIN — SODIUM CHLORIDE 125 ML/HR: 9 INJECTION, SOLUTION INTRAVENOUS at 11:37

## 2021-01-01 RX ADMIN — PHENYLEPHRINE HYDROCHLORIDE 70 MCG/MIN: 10 INJECTION INTRAVENOUS at 03:16

## 2021-01-01 RX ADMIN — LOPERAMIDE HYDROCHLORIDE 4 MG: 2 CAPSULE ORAL at 08:42

## 2021-01-01 RX ADMIN — DIPHENOXYLATE HYDROCHLORIDE AND ATROPINE SULFATE 2 TABLET: 2.5; .025 TABLET ORAL at 23:03

## 2021-01-01 RX ADMIN — POTASSIUM CHLORIDE 10 MEQ: 10 INJECTION, SOLUTION INTRAVENOUS at 10:54

## 2021-01-01 RX ADMIN — DIPHENOXYLATE HYDROCHLORIDE AND ATROPINE SULFATE 2 TABLET: 2.5; .025 TABLET ORAL at 22:18

## 2021-01-01 RX ADMIN — MEROPENEM 500 MG: 500 INJECTION, POWDER, FOR SOLUTION INTRAVENOUS at 16:48

## 2021-01-01 RX ADMIN — Medication 10 ML: at 20:54

## 2021-01-01 RX ADMIN — POTASSIUM CHLORIDE 20 MEQ: 14.9 INJECTION, SOLUTION INTRAVENOUS at 02:37

## 2021-01-01 RX ADMIN — MIDODRINE HYDROCHLORIDE 5 MG: 5 TABLET ORAL at 16:42

## 2021-01-01 RX ADMIN — LOPERAMIDE HYDROCHLORIDE 4 MG: 2 CAPSULE ORAL at 12:59

## 2021-01-01 RX ADMIN — Medication 10 ML: at 05:30

## 2021-01-01 RX ADMIN — HYDROMORPHONE HYDROCHLORIDE 0.5 MG: 1 INJECTION, SOLUTION INTRAMUSCULAR; INTRAVENOUS; SUBCUTANEOUS at 21:47

## 2021-01-18 NOTE — PROGRESS NOTES
I saw and evaluated the patient, performing the key elements of the service. I discussed the findings, assessment and plan with the resident and agree with the resident's findings and plan as documented in the resident's note. Abd pain, vomiting, LLQ pain, indigestion, hx diverticulitis Pt reports 1 month of symptoms, tried OTC IBS medication, initially had diarrhea but that resolved. Thought related to something she ate. Tried an enema last night but says she feels back to normal now. Has had PO today which did well. Exam with slight distention, NT, possible lipomas v fat nodules palpable. No rebound or guarding. Has colonoscopy scheduled on Thursday. Recommended labs, CT scan and pt declines everything.
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
## 2021-01-18 NOTE — PATIENT INSTRUCTIONS
Abdominal Pain: Care Instructions Your Care Instructions Abdominal pain has many possible causes. Some aren't serious and get better on their own in a few days. Others need more testing and treatment. If your pain continues or gets worse, you need to be rechecked and may need more tests to find out what is wrong. You may need surgery to correct the problem. Don't ignore new symptoms, such as fever, nausea and vomiting, urination problems, pain that gets worse, and dizziness. These may be signs of a more serious problem. Your doctor may have recommended a follow-up visit in the next 8 to 12 hours. If you are not getting better, you may need more tests or treatment. The doctor has checked you carefully, but problems can develop later. If you notice any problems or new symptoms, get medical treatment right away. Follow-up care is a key part of your treatment and safety. Be sure to make and go to all appointments, and call your doctor if you are having problems. It's also a good idea to know your test results and keep a list of the medicines you take. How can you care for yourself at home? · Rest until you feel better. · To prevent dehydration, drink plenty of fluids, enough so that your urine is light yellow or clear like water. Choose water and other caffeine-free clear liquids until you feel better. If you have kidney, heart, or liver disease and have to limit fluids, talk with your doctor before you increase the amount of fluids you drink. · If your stomach is upset, eat mild foods, such as rice, dry toast or crackers, bananas, and applesauce. Try eating several small meals instead of two or three large ones. · Wait until 48 hours after all symptoms have gone away before you have spicy foods, alcohol, and drinks that contain caffeine. · Do not eat foods that are high in fat. · Avoid anti-inflammatory medicines such as aspirin, ibuprofen (Advil, Motrin), and naproxen (Aleve). These can cause stomach upset. Talk to your doctor if you take daily aspirin for another health problem. When should you call for help? Call 911 anytime you think you may need emergency care. For example, call if: 
  · You passed out (lost consciousness).  
  · You pass maroon or very bloody stools.  
  · You vomit blood or what looks like coffee grounds.  
  · You have new, severe belly pain. Call your doctor now or seek immediate medical care if: 
  · Your pain gets worse, especially if it becomes focused in one area of your belly.  
  · You have a new or higher fever.  
  · Your stools are black and look like tar, or they have streaks of blood.  
  · You have unexpected vaginal bleeding.  
  · You have symptoms of a urinary tract infection. These may include: 
? Pain when you urinate. ? Urinating more often than usual. 
? Blood in your urine.  
  · You are dizzy or lightheaded, or you feel like you may faint. Watch closely for changes in your health, and be sure to contact your doctor if: 
  · You are not getting better after 1 day (24 hours). Where can you learn more? Go to http://www.gray.com/ Enter H999 in the search box to learn more about \"Abdominal Pain: Care Instructions. \" Current as of: June 26, 2019               Content Version: 12.6 © 2675-3616 [employer]. Care instructions adapted under license by [employer] (which disclaims liability or warranty for this information). If you have questions about a medical condition or this instruction, always ask your healthcare professional. [place] [address] any warranty or liability for your use of this information. Nausea and Vomiting: Care Instructions Your Care Instructions When you are nauseated, you may feel weak and sweaty and notice a lot of saliva in your mouth. Nausea often leads to vomiting. Most of the time you do not need to worry about nausea and vomiting, but they can be signs of other illnesses. Two common causes of nausea and vomiting are stomach flu and food poisoning. Nausea and vomiting from viral stomach flu will usually start to improve within 24 hours. Nausea and vomiting from food poisoning may last from 12 to 48 hours. The doctor has checked you carefully, but problems can develop later. If you notice any problems or new symptoms, get medical treatment right away. Follow-up care is a key part of your treatment and safety. Be sure to make and go to all appointments, and call your doctor if you are having problems. It's also a good idea to know your test results and keep a list of the medicines you take. How can you care for yourself at home? · To prevent dehydration, drink plenty of fluids, enough so that your urine is light yellow or clear like water. Choose water and other caffeine-free clear liquids until you feel better. If you have kidney, heart, or liver disease and have to limit fluids, talk with your doctor before you increase the amount of fluids you drink. · Rest in bed until you feel better. · When you are able to eat, try clear soups, mild foods, and liquids until all symptoms are gone for 12 to 48 hours. Other good choices include dry toast, crackers, cooked cereal, and gelatin dessert, such as Jell-O. When should you call for help? Call 911 anytime you think you may need emergency care. For example, call if: 
  · You passed out (lost consciousness). Call your doctor now or seek immediate medical care if: 
  · You have symptoms of dehydration, such as: 
? Dry eyes and a dry mouth. ? Passing only a little dark urine. ? Feeling thirstier than usual.  
  · You have new or worsening belly pain.  
  · You have a new or higher fever.   · You vomit blood or what looks like coffee grounds. Watch closely for changes in your health, and be sure to contact your doctor if: 
  · You have ongoing nausea and vomiting.  
  · Your vomiting is getting worse.  
  · Your vomiting lasts longer than 2 days.  
  · You are not getting better as expected. Where can you learn more? Go to http://www.gray.com/ Enter 25 [id number] in the search box to learn more about \"Nausea and Vomiting: Care Instructions. \" Current as of: June 26, 2019               Content Version: 12.6 © 6984-3705 [employer]. Care instructions adapted under license by "[employer] (which disclaims liability or warranty for this information). If you have questions about a medical condition or this instruction, always ask your healthcare professional. [address] any warranty or liability for your use of this information.

## 2021-01-19 NOTE — PROGRESS NOTES
Results noted. Lipase not elevated. , last A1c 2 months ago 7.9. Kidney function down compared to 2 months ago, possibly 2/2 to volume depletion from vomiting and decreased PO intake. Will need to be rechecked, pt has labs scheduled next month with her Endocrinologist. Alk Phos slightly elevated. Will f/u abdominal CT scan. AST, ALT wnl. Abs neutrophils wnl. Attempted to call pt with results, but no answer, left a voicemail and will send mychart comment which patient says is active and working on her end. Mami Eldridge MD 
PGY3 Family Medicine Resident

## 2021-01-21 NOTE — PROGRESS NOTES
Patient resting, denies any pain. Dr. Bella River spoke with the patient at bedside. Dr. Bella River is awaiting results from radiology of the CT scan.

## 2021-01-21 NOTE — PROGRESS NOTES
Rupa Craft 1955 
[id number] Situation: 
Verbal report received from: Cedrick Zamarripa. RN 
Procedure: Procedure(s): 
COLONOSCOPY 
SIGMOIDOSCOPY FLEXIBLE Background: 
 
Preoperative diagnosis: FAMILY HISTORY OF COLON CANCER Postoperative diagnosis: Poor prep :  Dr. Aden Lemus Assistant(s): Endoscopy Technician-1: Carlos Quick Endoscopy RN-1: Neo Montero RN Specimens: * No specimens in log * H. Pylori  no Assessment: 
Intra-procedure medications Anesthesia gave intra-procedure sedation and medications, see anesthesia flow sheet yes Intravenous fluids: NS@ [facility] Vital signs stable   yes Abdominal assessment: round and soft   distended Recommendation: 
Discharge patient per MD order  yes. Return to floor  outpatient Family or Friend daughter Permission to share finding with family or friend yes

## 2021-01-21 NOTE — ANESTHESIA POSTPROCEDURE EVALUATION
Procedure(s): SIGMOIDOSCOPY FLEXIBLE. MAC Anesthesia Post Evaluation Multimodal analgesia: multimodal analgesia used between 6 hours prior to anesthesia start to PACU discharge Patient location during evaluation: PACU Patient participation: complete - patient participated Level of consciousness: awake and alert Pain management: inadequate Airway patency: patent Anesthetic complications: no 
Cardiovascular status: acceptable Respiratory status: acceptable Hydration status: acceptable Comments: Patient having moderate-severe abdominal pain with distension. Procedure aborted because possible perforation found in colon 2/2 to diverticular disease. Patient will have CT and potentially Ex Lap later today depending on results Post anesthesia nausea and vomiting:  none INITIAL Post-op Vital signs:  
Vitals Value Taken Time /59 01/21/21 1036 Temp 36.7 °C (98.1 °F) 01/21/21 4614 Pulse 83 01/21/21 1040 Resp 15 01/21/21 1040 SpO2 96 % 01/21/21 1040 Vitals shown include unvalidated device data.
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
## 2021-01-21 NOTE — PROGRESS NOTES
Endoscopy discharge instructions have been reviewed and given to patient. The patient verbalized understanding and acceptance of instructions. Dr. Bernice Doss discussed with patient procedure findings and next steps.

## 2021-01-21 NOTE — PROGRESS NOTES
Dr. Virginie Hall put in an order for an ultrasound guided paracentesis. The patient will be discharged to home following the procedure.

## 2021-01-21 NOTE — PERIOP NOTES
Patient resting comfortably on stretcher HOB up VSS call bell within reach given another warm blanket awaiting MD for procedure will continue to monitor pt.

## 2021-01-21 NOTE — PROGRESS NOTES
Endoscopy nurse transported patient to ultrasound for procedure ordered by Dr. Romina Jaimes. The patient will be discharged to home upon completion of the procedure.

## 2021-01-21 NOTE — PROGRESS NOTES
Pt received from endo for paracentesis. Confirmed NPO/NKA. Vitals stable, consent signed with Dr Sandra Mancuso, timeout at [phone]. Start time [phone]. Stop time , pt tolerated well, 100 cc clear yellow fluid drained from L side abdomen, sent to lab. Clean dressing applied to pt's L abdomen. Pt in w/c and taken out to [facility] for d/c.  reviewd d/c directions with patient.

## 2021-01-21 NOTE — DISCHARGE INSTRUCTIONS
Patient Education        Learning About Paracentesis  What is paracentesis? Paracentesis (say \"ltjw-yd-hgn-JOHN-haim\") is a procedure that removes fluid from the belly. The buildup of fluid may be caused by infection, inflammation, an injury, or other problems. Swelling from too much fluid may cause pain or trouble breathing. The doctor will remove the extra fluid with a needle attached to a tube. Why is paracentesis done? Paracentesis may be done to:  · Find the cause of fluid buildup in the belly. · Diagnose an infection in the peritoneal fluid. · Check for certain types of cancer. · Remove a large amount of fluid that is causing pain or trouble breathing or that is affecting how the kidneys or the intestines (bowel) are working. · Check for damage after a belly injury. How is the procedure done? You will empty your bladder before the procedure. Your doctor or nurse will clean the area of your belly where the needle will go in. Then he or she will put sterile towels around the area. You may get a shot of numbing medicine in the skin of your belly. Then your doctor will gently insert a needle where the fluid is. He or she may attach a tube (catheter) to the site to help collect the fluid. After the fluid has drained, your doctor will take out the needle or catheter and put a bandage on the site. How long does a paracentesis take? The procedure may take from a few minutes to 30 minutes or more. What happens after the test?  You can do your normal activities after the procedure, unless your doctor tells you not to. After the procedure, you may have some clear fluid draining from the site, especially if a large amount of fluid was taken out. There will be less drainage in 1 to 2 days. Ask your doctor how much drainage to expect. A small gauze pad and bandage may be needed. You may need to change the bandage.   If your doctor thinks that testing the fluid can help find the cause of a problem, he or she will send it to a lab. If fluid builds up in your belly again, your doctor may repeat this procedure. When should you call for help? Call 911 anytime you think you may need emergency care. For example, call if:  · You passed out (lost consciousness). Call your doctor now or seek immediate medical care if:  · You have symptoms of infection, such as:  ? Increased pain, swelling, warmth, or redness. ? Red streaks or pus. ? A fever. · You are dizzy or lightheaded, or you feel like you may faint. · You have new or worse belly pain. Watch closely for changes in your health, and be sure to contact your doctor if:  · Fluid builds up in your belly again. · You do not get better as expected. Where can you learn more? Go to http://www.gray.com/  Enter X447 in the search box to learn more about \"Learning About Paracentesis. \"  Current as of: April 15, 2020               Content Version: 12.6  © 2483-1087 [employer]. Care instructions adapted under license by [employer] (which disclaims liability or warranty for this information). If you have questions about a medical condition or this instruction, always ask your healthcare professional. [place] [address] any warranty or liability for your use of this information. [facility]. Daljit Swenson M.D.  [phone]            COLON DISCHARGE INSTRUCTIONS       2021    Мария Griffin  :  1955  [facility] Medical Record Number:  [id number]      COLONOSCOPY FINDINGS:  Your colonoscopy showed evidence of inflammation in the sigmoid colon, we stopped at this point because of concern of perforation. The CT scan did not show evidence of perforation but showed inflammation in the sigmoid colon and fluid in the abdomen that needs to be drained and sampled.     DISCOMFORT:  Redness at IV site- apply warm compress to area; if redness or soreness persist- contact your physician  There may be a slight amount of blood passed from the rectum  Gaseous discomfort- walking, belching will help relieve any discomfort  You may not operate a vehicle for 12 hours  You may not engage in an occupation involving machinery or appliances for rest of today  You may not drink alcoholic beverages for at least 12 hours  Avoid making any critical decisions for at least 24 hour  DIET:   Low fat full liquid diet for 2 days, then low residue diet   - however -  remember your colon is empty and a heavy meal will produce gas. Avoid these foods:  vegetables, fried / greasy foods, carbonated drinks for today     ACTIVITY:  You may resume your normal daily activities it is recommended that you spend the remainder of the day resting -  avoid any strenuous activity. CALL M.D. ANY SIGN OF:   Increasing pain, nausea, vomiting  Abdominal distension (swelling)  New increased bleeding (oral or rectal)  Fever (chills)  Pain in chest area  Bloody discharge from nose or mouth   Shortness of breath    Follow-up Instructions:   Call Dr. Itzel Browning if any questions or problems. Telephone # [phone]  Antibiotics were sent to your pharmacy, take as prescribed and we will touch base next week once I have the fluid analysis results back.

## 2021-01-21 NOTE — PROCEDURES
[address] Flora Romero M.D. 
[phone] 
         
2021 Colonoscopy Operative Report Sonja Gonsales :  1955 [place] Medical Record Number:  [id number] Indications:    Family history of coloretal cancer (screening only) :  Robinson Pantoja MD 
 
Referring Provider: Minda Bates MD 
 
Sedation:  MAC anesthesia Pre-Procedural Exam: Airway: clear,  No airway problems anticipated Heart: RRR, without gallops or rubs Lungs: clear bilaterally without wheezes, crackles, or rhonchi Abdomen: soft, nontender, nondistended, bowel sounds present Mental Status: awake, alert and oriented to person, place and time Procedure Details:  After informed consent was obtained with all risks and benefits of procedure explained and preoperative exam completed, the patient was taken to the endoscopy suite and placed in the left lateral decubitus position. Upon sequential sedation as per above, a digital rectal exam was performed. The Olympus videocolonoscope  was inserted in the rectum and carefully advanced to the sigmoid colon. The quality of preparation was inadequate. The colonoscope was slowly withdrawn with careful inspection and evaluation between folds. Retroflexion in the rectum was performed. Findings: There was semi-solid stools in the rectum, with washing the mucosa could be seen well and the adult scope was further advanced to the sigmoid colon where much less residual stools seen and better visualization of the mucosa. However an area of erythema and lumen narrowing was noted. The adult scope could not pass. The pediatric slim scope was used and was able to pass a segment of erythema and lumen narrowing about 4 to 5 cm, then severe tortuosity was noted and could not pass the scope further. At this point, I felt her abdomen and noted a hard bulge in the right lower quadrant. Procedure was stopped at this point. Interventions:  none Specimen Removed:  none Complications: None. EBL:  None. Impression:  Aborted colonoscopy for fear of diverticulitis and possible perforation. Recommendations: 
- Will obtain stat CT scan abdomen and pelvis - Will get stat labs - Further disposition based on above results Danii Vuong MD 
1/21/2021  8:36 AM

## 2021-01-21 NOTE — ANESTHESIA PREPROCEDURE EVALUATION
Anesthetic History No history of anesthetic complications Review of Systems / Medical History Patient summary reviewed, nursing notes reviewed and pertinent labs reviewed Pulmonary Comments: Cold symptoms, URI, congestion Neuro/Psych Within defined limits Comments: PTSD and anxiety Cardiovascular Hypertension Comments:  lisinopril GI/Hepatic/Renal 
  
GERD: well controlled Endo/Other Diabetes: poorly controlled, type 2 Other Findings Physical Exam 
 
Airway Mallampati: II 
TM Distance: 4 - 6 cm Neck ROM: normal range of motion Mouth opening: Normal 
 
 Cardiovascular Regular rate and rhythm,  S1 and S2 normal,  no murmur, click, rub, or gallop Rhythm: regular Rate: normal 
 
 
 
 Dental 
 
Dentition: Full lower dentures and Full upper dentures Pulmonary Breath sounds clear to auscultation Abdominal 
GI exam deferred Other Findings Anesthetic Plan ASA: 3 Anesthesia type: MAC Induction: Intravenous Anesthetic plan and risks discussed with: Patient

## 2021-01-29 NOTE — PROGRESS NOTES
Identified Patient with two Patient identifiers (Name and ). Two Patient Identifiers confirmed. Reviewed record in preparation for visit and have obtained necessary documentation. Chief Complaint Patient presents with  Abdominal Pain  
  follow up  Foot Swelling  
  bilateral x1 week Visit Vitals /65 (BP 1 Location: Left upper arm, BP Patient Position: Sitting) Pulse 82 Temp 97.1 °F (36.2 °C) (Temporal) Resp 20 Ht 5' 2\" (1.575 m) Wt 171 lb (77.6 kg) SpO2 97% BMI 31.28 kg/m² 1. Have you been to the ER, urgent care clinic since your last visit? Hospitalized since your last visit? No 
 
2. Have you seen or consulted any other health care providers outside of the [address] [place] [address] since your last visit? Include any pap smears or colon screening.  No
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
## 2021-02-02 PROBLEM — K56.609 SMALL BOWEL OBSTRUCTION (HCC): Status: ACTIVE | Noted: 2021-01-01

## 2021-02-02 NOTE — TELEPHONE ENCOUNTER
Reviewed results of CXR and KUB, c/w SBO. Have informed pt and she will go to ER to facilitate admission. Have informed [address] [facility] team.  Will notify ED provider.

## 2021-02-02 NOTE — ED TRIAGE NOTES
Pt states she was sent from Dr Semaj Duran for admission due to Bowel Obstruction. States she has had abdominal pain, n/v, diarrhea and constipation.

## 2021-02-03 NOTE — PERIOP NOTES
TRANSFER - OUT REPORT: 
 
Verbal report given to Vianey Farias RN (name) on Sissy Hull  being transferred to 4th floor (unit) for routine post - op Report consisted of patients Situation, Background, Assessment and  
Recommendations(SBAR). Information from the following report(s) Procedure Summary and Recent Results was reviewed with the receiving nurse. Lines:  
Peripheral IV 02/02/21 Right Antecubital (Active) Site Assessment Clean, dry, & intact 02/03/21 0756 Phlebitis Assessment 0 02/03/21 0756 Infiltration Assessment 0 02/03/21 0756 Dressing Status Clean, dry, & intact 02/03/21 0756 Dressing Type Transparent 02/03/21 0756 Hub Color/Line Status Pink 02/03/21 0756 Action Taken Open ports on tubing capped 02/03/21 0756 Alcohol Cap Used Yes 02/03/21 0756 Opportunity for questions and clarification was provided. Patient transported with: 
 O2 @ 2 liters Tech

## 2021-02-03 NOTE — PERIOP NOTES
Pt tolerated procedure well. No sedation given. Pt drowsy from previous ativan. Endoscope was pre-cleaned at bedside immediately following procedure by Barb.

## 2021-02-03 NOTE — PROCEDURES
[address] Mateo Philip M.D. 
[phone] 
         
2/3/2021 Sigmoidoscopy Operative Report Drake Ends :  1955 [place] Medical Record Number:  [id number] Indications:    Sigmoid stricture :  Teagan Dennis MD 
 
Referring Provider: Brendan Sutton MD 
 
Sedation:  None Pre-Procedural Exam: Airway: clear,  No airway problems anticipated Heart: RRR, without gallops or rubs Lungs: clear bilaterally without wheezes, crackles, or rhonchi Abdomen: soft, nontender, nondistended, bowel sounds present Mental Status: awake, alert and oriented to person, place and time Procedure Details:  After informed consent was obtained with all risks and benefits of procedure explained and preoperative exam completed, the patient was taken to the endoscopy suite and placed in the left lateral decubitus position. Upon sequential sedation as per above, a digital rectal exam was performed. The Olympus videocolonoscope  was inserted in the rectum and carefully advanced to the descending colon. The quality of preparation was fair. The colonoscope was slowly withdrawn with careful inspection and evaluation between folds. Retroflexion in the rectum was performed. Findings: The upper endoscope was advanced from the rectum to about 20 cm where abnormal inflamed erythematous and congested mucosa was noted with a tight lumen narrowing. The scope was able to advance further, the narrowing was about 4 cm, then normal lumen could be seen with some solid stools, however congested edematous mucosa could be seen. Scope advanced to about 40 cm with stools seen, we decided to stop at this point. Interventions:  none Specimen Removed:  Multiple biopsies were obtained from the stricture in the sigmoid colon Complications: None. EBL:  None. Impression:  Abnormal mucosa seen in the distal sigmoid around 20 cm from the anal margin with lumen narrowing, no mass lesion seen however. Biopsies were obtained Recommendations: - Await pathology results 
- Consider NGT decompression if vomiting recurs - Surgical consult 
- Start Anti-coagulation for newly found PE 
- Obtain doppler of lower extremities.  
 
Edward Clifton MD 
2/3/2021  4:46 PM

## 2021-02-03 NOTE — ANESTHESIA PREPROCEDURE EVALUATION
Anesthetic History No history of anesthetic complications Review of Systems / Medical History Patient summary reviewed, nursing notes reviewed and pertinent labs reviewed Pulmonary Comments: Bilateral PE Neuro/Psych Within defined limits Comments: PTSD and anxiety Cardiovascular Hypertension Comments:  lisinopril GI/Hepatic/Renal 
  
GERD: well controlled Comments: Peritoneal carcinomatosis Endo/Other Diabetes: poorly controlled, type 2 Other Findings Physical Exam 
 
Airway Mallampati: II 
TM Distance: 4 - 6 cm Neck ROM: normal range of motion Mouth opening: Normal 
 
 Cardiovascular Regular rate and rhythm,  S1 and S2 normal,  no murmur, click, rub, or gallop Rhythm: regular Rate: normal 
 
 
 
 Dental 
 
Dentition: Full lower dentures and Full upper dentures Pulmonary Breath sounds clear to auscultation Abdominal 
 
Distended Other Findings Anesthetic Plan ASA: 3 Anesthesia type: MAC and general - backup Induction: Intravenous Anesthetic plan and risks discussed with: Patient and Son / Daughter Informed consent with patient and over phone with daughter

## 2021-02-03 NOTE — DISCHARGE SUMMARY
Roney Piotr Miriam Siddiqui [address] Office [phone] Fax [phone] Discharge / Transfer / Off-Service Note Name: Helene Iraheta MRN: [id number]  Sex: Female YOB: 1955  Age: 61 y.o. PCP: Kobi Valencia MD  
 
Date of admission: 2/2/2021 Date of discharge/transfer: 2/5/2021 Attending physician at admission: Dawood Keith MD  
 
Attending physician at discharge/transfer: Dr. Samuel Christensen Resident physician at discharge/transfer: Sara Wynn MD 
  
Consultants during hospitalization IP CONSULT TO GASTROENTEROLOGY 
IP CONSULT TO HEMATOLOGY 
IP CONSULT TO ANESTHESIOLOGY 
IP CONSULT TO VASCULAR SURGERY 
IP CONSULT TO GENERAL SURGERY Admission diagnoses Small bowel obstruction ([address] [Y51.584] Abdominal pain, unspecified abdominal location [R10.9] SBO (small bowel obstruction) ([address] [U60.498] Bilateral pulmonary embolism ([address] [I26.99] Recommended follow-up after discharge 1. PCP: Kobi Valencia MD 
 
Things to follow up on with PCP: 
Pulmonary Embolism (blood clot in lungs) History of Present Illness Per admitting provider, Helene Iraheta is a 61 y.o. female with PMHx of TIIDM, neuropathy, HTN, PTSD who presents to the ED complaining of abd pain and distention. For the last month pt has been having abd pain and distention, loose stools, N/V, and increasing LE edema. Pt seen in clinic on 1/29 for evaluation for these symptoms - at that time a KUB was ordered. The KUB was performed on 2/2 and showed possible SBO, prompting her to present to the hospital. Pt also complains of dyspnea on exertion and weight gain but denies orthopnea, cough, fever, chills, CP. The abd pain is primarily L-sided and intermittent. Additionally pt has had reduced urine output over the last several weeks.  Pt has been passing gas, her most recent bowel movement was 2 days ago and pt also endorses one small soft bowel movement after arrival to the ED. 
  
Pt had a routine colonoscopy w/ Dr. Tawana Castle on 1/21 which was aborted at the level of the sigmoid due to narrowing of the lumen. CT was performed out of concern for possible malignancy and showed diffuse enterocolitis and moderate ascites. Pt was placed on cipro w/o improvement of her symptoms and had a subsequent paracentesis that did not show malignant cells. HOSPITAL COURSE Sheri Phillips is a 61 y. o. female with a PMHx of TIIDM, neuropathy, HTN, PTSD who is admitted for possible SBO. 
  
Carcinomatosis/Small bowel obstruction: Possibly 2/2 GYN vs colorectal malignancy, inflammatory/infectious etiology. KUB w/ findings suggestive of SBO. Paracentesis 1/21 negative for malignant cells. GI scope w/ abnml mucosa in distal sigmoid w/ lumen narrowing, CA-125//CEA elevated. CT w/ carcinomatosis. Paracentesis 2L taken off on 2/4. Gen Surg consulted: no acute need for surgery, continue supportive treatment 
-F/u sigmoid biopsy pathology 
-F/u paracentesis cytology 
  
Colitis: CT abd/pelv on 1/21 significant for moderate diffuse enterocolitis, sigmoid colonic wall thickening, moderate intraperitoneal ascites. Per pt she was placed on cipro to treat the colitis. Not seen on CT during this admission 
  
Bilateral PE: CT chest (2/3) w/ bilateral pulmonary PE and bilateral pleural effusions 
-Lovenox 80mg BID for 3-6 months 
  
Elevated creatinine: resolving. POA 1.65 (b/l 1.1). Likely 2/2 poor PO intake, IVVD. 
  
Severe aortic atherosclerosis: Severe sclerosis of the abdominal aorta and common iliac and femoral arteries noted on CT abd/pelv 1/21.   
-Outpatient Vascular 
  
TIIDM: A1c 7.9 (11/2020).   
-Home Januvia 
-Continue sliding scale detemir QHS, most recently 20u nightly 
-Home ASA 81mg daily 
  
Hypertension: BP on admission 95/52.  
-Hold home lisinopril 10mg QHS, low-normal BP throughout admission 
-Restart lisinopril if blood pressure elevated outpatient 
  
PTSD: 
-Home buspar 10mg PRN 
  HLD: Lipid panel 11/2020 w/ Tchol 192, HDL 37, , tri 170.  
-Home lipitor 40mg QHS Physical exam at discharge: 
 
Vitals Reviewed. Visit Vitals BP (!) 96/51 Pulse 87 Temp 97.9 °F (36.6 °C) Resp 17 Ht 5' 2\" (1.575 m) Wt 171 lb (77.6 kg) SpO2 96% BMI 31.28 kg/m² Physical Exam 
General: No acute distress. Alert. Cooperative. Head: Normocephalic. Atraumatic. Eyes:              Conjunctiva pink. Sclera white. Ears:              Hearing grossly intact. Nose:             Septum midline. Mucosa pink. No drainage. Throat: Mucosa pink. Moist mucous membranes. No tonsillar exudates or erythema. Palate movement equal bilaterally. Neck: Supple. Normal ROM. No stiffness. Respiratory: CTAB. No w/r/r/c.  
Cardiovascular: RRR. Normal S1,S2. No m/r/g. Pulses 2+ throughout. GI: L-sided hyperactive bowel sounds, R-sided hypoactive bowel sounds. Mild tenderness to palpation LLQ. No rebound tenderness or guarding. Abdominal distention w/ no fluid wave noted, firm to palpation w/ no masses appreciated. Extremities: 2+ LE edema b/l, RLE circumference > L. Distal pulses intact. Musculoskeletal: Full ROM in all extremities. Skin: Warm, dry. No rashes. Neuro: CN II-XII grossly intact. Strength 5/5 in all extremities.   
  
Condition at discharge: Stable Labs Recent Labs 02/05/21 
0425 02/05/21 
0235 02/04/21 
1055 WBC 8.9 PLEASE DISREGARD RESULTS 8.7 HGB 10.7* PLEASE DISREGARD RESULTS 10.8* HCT 33.0* PLEASE DISREGARD RESULTS 32.9*  
 PLEASE DISREGARD RESULTS 227 Recent Labs 02/05/21 
0425 02/05/21 
0235 02/04/21 
0210  PLEASE DISREGARD RESULTS 143  
K 3.4* PLEASE DISREGARD RESULTS 3.4*  
* PLEASE DISREGARD RESULTS 113* CO2 24 PLEASE DISREGARD RESULTS 22 BUN 35* PLEASE DISREGARD RESULTS 40* CREA 1.27* PLEASE DISREGARD RESULTS 1.38* * PLEASE DISREGARD RESULTS 84  
CA 7.7* PLEASE DISREGARD RESULTS 8.1* Recent Labs 02/05/21 
0425 02/02/21 
2113 ALT 14 20 AP 81 104 TBILI 0.4 0.4 TP 5.3* 6.8 ALB 2.0* 2.6*  
GLOB 3.3 4.2* Recent Labs 02/05/21 
1659 02/05/21 
1250 02/05/21 446 6421 02/05/21 
0604 02/05/21 
0425 02/05/21 
0003 02/04/21 
2148 02/04/21 
1055 02/04/21 
1055 APTT  --   --  35.6*  --  69.1*  --   --   --  92.2*  
GLUCPOC 205* 137*  --  112*  --  132* 133*   < >  --   
 < > = values in this interval not displayed. Microbiology Results Procedure Component Value Units Date/Time COVID-19 RAPID TEST [[id number]] Collected: 02/03/21 1522 Order Status: Completed Specimen: Nasopharyngeal Updated: 02/03/21 2349 Specimen source Nasopharyngeal     
  COVID-19 rapid test Not detected Comment: Rapid Abbott ID Now Rapid NAAT:  The specimen is NEGATIVE for SARS-CoV-2, the novel coronavirus associated with COVID-19. Negative results should be treated as presumptive and, if inconsistent with clinical signs and symptoms or necessary for patient management, should be tested with an alternative molecular assay. Negative results do not preclude SARS-CoV-2 infection and should not be used as the sole basis for patient management decisions. This test has been authorized by the FDA under an Emergency Use Authorization (EUA) for use by authorized laboratories. Fact sheet for Healthcare Providers: ConventionUpdate.co.nz Fact sheet for Patients: ConventionUpdate.co.nz Methodology: Isothermal Nucleic Acid Amplification Procedures / Diagnostic Studies Echo Results  (Last 48 hours) None Imaging Xr Chest Pa Lat Result Date: 2/2/2021 Evidence of pulmonary hyperaeration. No evidence of lobar consolidation. Presence of a small left pleural effusion. Xr Abd (kub) Result Date: 2/2/2021 Radiographic findings suggestive of small bowel obstruction. Further studies are recommended. Ct Chest Abd Pelv W Cont Result Date: 2/3/2021 1. Bilateral pulmonary emboli. 2. Large volume ascites with diffuse distention of small bowel loops and nodularity of the peritoneum compatible with peritoneal carcinomatosis and small bowel obstruction. 3. Bilateral pleural effusions. Findings discussed with Dr. Nick Lizarraga. Us Guide Paracentesis Result Date: 2/5/2021 Ultrasound-guided paracentesis. Xr Chest [facility] Result Date: 2/4/2021 Central venous acces catheter in position for use. No pneumothorax No other significant interval change. Chronic diagnoses Problem List as of 2/5/2021 Date Reviewed: 11/17/2020 Codes Class Noted - Resolved Peritoneal carcinomatosis ([facility] ICD-10-CM: C78.6, C80.1 ICD-9-CM: 197.6, 199.1  2/5/2021 - Present SBO (small bowel obstruction) ([facility] ICD-10-CM: Q38.085 ICD-9-CM: 560.9  2/4/2021 - Present Abdominal pain ICD-10-CM: R10.9 ICD-9-CM: 789.00  2/4/2021 - Present * (Principal) Bilateral pulmonary embolism ([facility] ICD-10-CM: I26.99 
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 
ICD-9-CM: 562.11  12/10/2017 - Present Depression ICD-10-CM: F32.9 ICD-9-CM: 681  7/24/2017 - Present Type 2 diabetes mellitus with hyperglycemia, with long-term current use of insulin (HCC) ICD-10-CM: E11.65, Z79.4 ICD-9-CM: 250.00, 790.29, V58.67  11/29/2016 - Present  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 [id number]-45-HV: Srinath Gonzales ICD-9-CM: 250.60, V58.67  11/29/2016 - Present Persistent proteinuria associated with type 2 diabetes mellitus (HCC) ICD-10-CM: E11.29, R80.9 ICD-9-CM: 250.40, 791.0  4/12/2016 - Present Essential hypertension ICD-10-CM: I10 
ICD-9-CM: 401.9  10/9/2015 - Present Diabetes mellitus with complication (HCC) HLK-21-VT: E11.8 ICD-9-CM: 250.90  12/8/2014 - Present Overview Signed 12/8/2014  9:55 PM by Ben Tello MD  
  Retinopathy Microalbuminuria Toe amputation x2 Family hx of colon cancer ICD-10-CM: Z80.0 ICD-9-CM: V16.0  12/8/2014 - Present Glaucoma, narrow-angle ICD-10-CM: H40.20X0 ICD-9-CM: 365.20, 365.70  9/18/2014 - Present Diabetic foot ulcer ([facility] ICD-10-CM: E11.621, K01.815 ICD-9-CM: 250.80, 707.15  9/3/2014 - Present Posttraumatic stress disorder ICD-10-CM: F43.10 ICD-9-CM: 309.81  6/20/2014 - Present Diabetic peripheral neuropathy ([facility] ICD-10-CM: E11.42 
ICD-9-CM: 250.60, 357.2  4/4/2014 - Present Hyperlipidemia ICD-10-CM: E78.5 ICD-9-CM: 272.4  3/5/2014 - Present RESOLVED: Severe aortic stenosis ICD-10-CM: I35.0 ICD-9-CM: 424.1  2/4/2021 - 2/4/2021 RESOLVED: BMI 33.0-33.9,adult ICD-10-CM: D07.30 
ICD-9-CM: V85.33  7/3/2015 - 7/6/2017 Discharge/Transfer Medications Current Discharge Medication List  
  
START taking these medications Details  
enoxaparin (Lovenox) 80 mg/0.8 mL injection 80 mg by SubCUTAneous route every twelve (12) hours for 30 days. Qty: 48 mL, Refills: 0 CONTINUE these medications which have NOT CHANGED Details  
busPIRone (BUSPAR) 10 mg tablet Take 10 mg by mouth two (2) times daily as needed (anxiety). insulin detemir U-100 (LEVEMIR FLEXTOUCH) 100 unit/mL (3 mL) inpn 20 Units by SubCUTAneous route daily. furosemide (LASIX) 20 mg tablet Take 1 Tab by mouth daily. Qty: 5 Tab, Refills: 0  Associated Diagnoses: Pitting edema  
  
ondansetron (ZOFRAN ODT) 4 mg disintegrating tablet Take 1 Tab by mouth every eight (8) hours as needed for Nausea or Vomiting. Qty: 30 Tab, Refills: 0 Associated Diagnoses: Left lower quadrant abdominal pain  
  
albuterol (ProAir HFA) 90 mcg/actuation inhaler INHALE 2 PUFFS BY MOUTH EVERY 4 HOURS AS NEEDED FOR WHEEZE Qty: 8.5 Inhaler, Refills: 0 Comments: DX Code Needed  . Associated Diagnoses: Wheezing; Bronchitis SITagliptin (Januvia) 100 mg tablet Take 1 Tab by mouth every morning. Qty: 90 Tab, Refills: 3 Associated Diagnoses: Essential hypertension with goal blood pressure less than 140/90; Type 2 diabetes mellitus with hyperglycemia, with long-term current use of insulin (HCC)  
  
atorvastatin (LIPITOR) 40 mg tablet TAKE 1 TABLET BY MOUTH EVERY DAY Qty: 30 Tab, Refills: 6  
  
aspirin delayed-release 81 mg tablet Take 81 mg by mouth nightly. STOP taking these medications  
  
 ciprofloxacin HCl (Cipro) 500 mg tablet Comments:  
Reason for Stopping:   
   
 lisinopriL (PRINIVIL, ZESTRIL) 10 mg tablet Comments:  
Reason for Stopping:   
   
  
 
  
Diet:  Diabetic diet. Activity:  As tolerated Disposition: Home Discharge instructions to patient/family Please seek medical attention for any new or worsening symptoms particularly fever, chest pain, shortness of breath, abdominal pain, nausea, vomiting Follow up plans/appointments Follow-up Information Follow up With Specialties Details Why Contact Eulalia Leavitt DO Family Medicine On 2/8/2021 Virtual Visit on 2/8 at 1:45pm  [address] 
[phone] Nazanin Hung MD Hematology and Oncology, Internal Medicine, Hematology, Oncology Schedule an appointment as soon as possible for a visit in 1 week please call to make follow up appt  [address] 
[phone] 1403 [facility] [address], If you have not received a phone call to schedule, call directly within 24 hours of discharge. [address] [facility], [address] [place] [address]aki[address] Close Uyen Ibarra MD Family Medicine   Roney Mcleodpiedad Siddiqui [address] [place] [address] 
[phone] Mickey Felix MD 
Family Medicine Resident For Billing Chief Complaint Patient presents with  Abdominal Pain  Diarrhea  Constipation Hospital Problems  Date Reviewed: 11/17/2020 Codes Class Noted POA Peritoneal carcinomatosis ([facility] ICD-10-CM: C78.6, C80.1 ICD-9-CM: 197.6, 199.1  2/5/2021 Unknown SBO (small bowel obstruction) ([facility] ICD-10-CM: Z94.763 ICD-9-CM: 560.9  2/4/2021 Unknown Abdominal pain ICD-10-CM: R10.9 ICD-9-CM: 789.00  2/4/2021 Unknown * (Principal) Bilateral pulmonary embolism ([facility] ICD-10-CM: I26.99 
ICD-9-CM: 415.19  2/4/2021 Unknown Colitis ICD-10-CM: K52.9 ICD-9-CM: 558.9  2/4/2021 Unknown Aortic atherosclerosis (HCC) ICD-10-CM: I70.0 ICD-9-CM: 440.0  2/4/2021 Unknown Depression ICD-10-CM: F32.9 ICD-9-CM: 744  7/24/2017 Yes Essential hypertension ICD-10-CM: I10 
ICD-9-CM: 401.9  10/9/2015 Yes Diabetes mellitus with complication (HCC) PYA-17-LK: E11.8 ICD-9-CM: 250.90  12/8/2014 Yes Overview Signed 12/8/2014  9:55 PM by Bj Fagan MD  
  Retinopathy Microalbuminuria Toe amputation x2 Family hx of colon cancer ICD-10-CM: Z80.0 ICD-9-CM: V16.0  12/8/2014 Yes Posttraumatic stress disorder ICD-10-CM: F43.10 ICD-9-CM: 309.81  6/20/2014 Yes Hyperlipidemia ICD-10-CM: E78.5 ICD-9-CM: 272.4  3/5/2014 Yes

## 2021-02-03 NOTE — PROGRESS NOTES
Sissy Hull 1955 
[id number] Situation: 
Verbal report received from: Mariann DESHPANDE Procedure: Procedure(s): SIGMOIDOSCOPY FLEXIBLE 
COLON BIOPSY Background: 
 
Preoperative diagnosis: abnormal CT scan Postoperative diagnosis: Sigmoid Stricture :  Dr. Rox Virgen Assistant(s): Endoscopy Technician-1: Eder Chambers Endoscopy RN-1: Chepe Jin RN Specimens:  
ID Type Source Tests Collected by Time Destination 1 : Sigmoid Mass Biopsies Preservative   Javier Vines MD 2/3/2021 7154 Pathology H. Pylori  no Assessment: Anesthesia gave intra-procedure sedation and medications, see anesthesia flow sheet no Intravenous fluids: NS@ Ingrid Ty Vital signs stable Abdominal assessment: round and soft Recommendation: 
Discharge patient per MD order. Return to floor Family or Friend Permission to share finding with family or friend yes
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
## 2021-02-03 NOTE — PROGRESS NOTES
[address] [facility] RESIDENCY PROGRAM 
PROGRESS NOTE  
 
2/3/2021 PCP: Jake Pike MD  
 
Assessment/Plan:  
 
Anthony Arteaga is a 61 y.o. female with a PMHx of TIIDM, neuropathy, HTN, PTSD who is admitted for possible SBO. 24-hour events: Pt unable to tolerate CT scan due to claustrophobia.   
  
Small bowel obstruction: Possibly 2/2 GYN vs colorectal malignancy, inflammatory/infectious etiology. KUB earlier in the day w/ findings suggestive of SBO. Paracentesis 1/21 negative for malignant cells. Colonoscopy 3/2015 notable for internal hemorrhoids, normal colonic mucosa. -CT chest/abd/pelv w/ contrast pending - will give ativan x1 for anxiety prior to scan 
-Pending CT results will consider consult to general surgery 
-GI consulted 
-NPO, no NG tube at this time -MIVF 
-CA-125, CA 19-9, CEA pending 
-Daily CBC, BMP 
  
Colitis: CT abd/pelv on 1/21 significant for moderate diffuse enterocolitis, sigmoid colonic wall thickening, moderate intraperitoneal ascites. Per pt she was placed on cipro to treat the colitis. -Continue cipro to treat colitis 
-GI consulted 
  
Elevated creatinine: POA 1.65 (b/l 1.1). Likely 2/2 poor PO intake, IVVD. -MIVF 
-Daily BMP 
  
Severe aortic atherosclerosis: Severe sclerosis of the abdominal aorta and common iliac and femoral arteries noted on CT abd/pelv 1/21.   
-Pt has been referred to vascular in the OP setting 
  
TIIDM: A1c 7.9 (11/2020).   
-Hold home Januvia 
-Pt takes sliding scale detemir QHS, most recently 20u nightly - will start w/ 50% of home dose and give 10u lantus QHS 
-Insulin Sliding Scale normal sensitivity with Q6h glucose checks while NPO 
-Hypoglycemia protocols ordered  
-Hold home ASA 81mg daily while NPO 
  
Hypertension: BP on admission 95/52.  
-Hold home lisinopril 10mg QHS while NPO 
-Will continue to monitor at this time and readjust as BP's trend. 
  
PTSD: 
-Hold home buspar 10mg PRN while NPO 
  
 HLD: Lipid panel 2020 w/ Tchol 192, HDL 37, , tri 170.  
-Hold home lipitor 40mg QHS while NPO 
  
  
FEN/GI - NPO. NS at 125 mL/hr. Activity - Out of bed with assistance DVT prophylaxis - SCDs GI prophylaxis - Not indicated at this time Fall prophylaxis - Not indicated at this time. Code Status - Full. Discussed with patient / caregivers. Next of [facility] 71 Name and [address] More,  Daughter - [phone] 
  
Patient Sissy Hull will be discussed with Dr. Philippe Galindo. Subjective: Pt was seen and examined at bedside. Afebrile and hemodynamically stable. Some abdominal pain but not bad. Pt amenable to trying CT scan again with something to help her anxiety. Denies chest pain, SOB, nausea, vomiting, abdominal pain, dizziness. Objective:  
Physical examination Patient Vitals for the past 24 hrs: 
 Temp Pulse Resp BP SpO2  
21 0513 98 °F (36.7 °C) 98 18 103/64 92 % 21 0228 97.5 °F (36.4 °C) (!) 102 20 132/61 91 % 21 0200 97.9 °F (36.6 °C) 79 18 (!) 83/68 92 % 21 0142    (!) 130/45 96 % 21 0030    (!) 111/47 93 % 21 0000    (!) 106/49 93 % 21 2330    (!) 112/50 97 % 21 2300    (!) 124/48 98 % 21 2230    (!) 114/48 99 % 21 2200    (!) 119/45   
21 2130    (!) 117/48 97 % 21 2046     98 % 21 1520 98.7 °F (37.1 °C) 86 20 (!) 92/52 97 % Temp (24hrs), Av °F (36.7 °C), Min:97.5 °F (36.4 °C), Max:98.7 °F (37.1 °C) O2 Device: Room air Date 21 0700 - 21 8152 21 0700 - 21 0642 Shift 5681-1062 5953-4755 24 Hour Total 7137-7886 9575-5158 24 Hour Total  
INTAKE  
I.V.(mL/kg/hr)  3018.8 3018.8 Volume (sodium chloride 0.9 % bolus infusion 1,000 mL)  1000 1000 Volume (0.9% sodium chloride infusion)  452.1 452.1 Volume (sodium chloride 0.9 % bolus infusion 1,000 mL)  1566.7 1566.7 Shift Total(mL/kg)  [id number]) 4782.4(91.1) OUTPUT Urine(mL/kg/hr) Urine Occurrence(s)  1 x 1 x Shift Total(mL/kg) NET  3018.8 3018.8 Weight (kg) 77.6 77.6 77.6 77.6 77.6 77.6 Physical Exam 
General: No acute distress. Alert. Cooperative. Head: Normocephalic. Atraumatic. Eyes:              Conjunctiva pink. Sclera white. Ears:              Hearing grossly intact. Nose:             Septum midline. Mucosa pink. No drainage. Throat: Mucosa pink. Moist mucous membranes. No tonsillar exudates or erythema. Palate movement equal bilaterally. Neck: Supple. Normal ROM. No stiffness. Respiratory: CTAB. No w/r/r/c.  
Cardiovascular: RRR. Normal S1,S2. No m/r/g. Pulses 2+ throughout. GI: L-sided hyperactive bowel sounds, R-sided hypoactive bowel sounds. Mild tenderness to palpation LLQ. No rebound tenderness or guarding. Abdominal distention w/ no fluid wave noted, firm to palpation w/ no masses appreciated. Extremities: 2+ LE edema b/l, RLE circumference > L. Distal pulses intact. Musculoskeletal: Full ROM in all extremities. Skin: Warm, dry. No rashes. Neuro: CN II-XII grossly intact. Strength 5/5 in all extremities. Data Review: CBC: 
Recent Labs 02/02/21 2113 WBC 9.1 HGB 13.1 HCT 39.0  Metabolic Panel: 
Recent Labs 02/02/21 
2113   
K 3.6 * CO2 21 BUN 44* CREA 1.56* * CA 8.2* ALB 2.6* TBILI 0.4 ALT 20 Micro: 
Lab Results Component Value Date/Time Culture result: MIXED UROGENITAL HARRY ISOLATED 12/10/2017 08:01 PM  
 Culture result: NO GROWTH 4 DAYS 09/05/2014 04:22 PM  
 Culture result: NO GROWTH 4 DAYS 09/05/2014 04:22 PM  
 
Imaging: Xr Chest Pa Lat Result Date: 2/2/2021 Chest 2 views Comparison chest dated 2/27/2020 History is shortness of breath PA and lateral views of the chest were obtained. The cardiac silhouette is normal in size. There is hyperaeration of the lungs. No areas of lobar consolidation are identified. There is minimal blunting of the left costophrenic angle. This is suggestive of the presence of a small pleural effusion. The right costophrenic angle is sharp in appearance. Evidence of pulmonary hyperaeration. No evidence of lobar consolidation. Presence of a small left pleural effusion. Xr Abd (kub) Result Date: 2/2/2021 KUB 2 views dated 2/2/2021 History is abdominal distention and pain 2 AP supine views of the abdomen were obtained. Dilated loops small bowel are noted. These loops of small bowel are best appreciated on the left. These loops small bowel measure approximately 5.5 cm in transverse diameter. A horizontal beam film (upright or left lateral decubitus) may render additional information regarding the possibility of possible air-fluid levels within the dilated loops of small bowel as well as for assessment of possible free intraperitoneal air. Surgical clips project over the right upper quadrant of the abdomen. Radiographic findings suggestive of small bowel obstruction. Further studies are recommended. Ct Abd Pelv W Cont Result Date: 1/21/2021 EXAM: CT ABD PELV W CONT History: Incomplete colonoscopy INDICATION: incomplete colonoscopy with mass felt in right colon COMPARISON: 12/26/2017 CONTRAST: 100 mL of Isovue-370. TECHNIQUE: Following the uneventful intravenous administration of contrast, thin axial images were obtained through the abdomen and pelvis. Coronal and sagittal reconstructions were generated. Oral contrast was not administered. CT dose reduction was achieved through use of a standardized protocol tailored for this examination and automatic exposure control for dose modulation. FINDINGS: LOWER THORAX: Coronary vascular calcifications. Minimal atelectasis. LIVER: Portal vein is patent. No focal hepatic mass. Post cholecystectomy. BILIARY TREE: Absent CBD is not dilated. SPLEEN: within normal limits. PANCREAS: No mass or ductal dilatation. ADRENALS: Unremarkable. KIDNEYS: Mild renal cortical thinning bilaterally. STOMACH: Unremarkable. SMALL BOWEL: Hyperemia small bowel loops. Minimal small bowel wall thickening. COLON: Hyperemia of large bowel mucosa. Sigmoid colonic wall thickening. APPENDIX: Not definitely visualized PERITONEUM: Moderate intraperitoneal ascites with rind of mild enhancement. Effacement of the hepatic contour/margin RETROPERITONEUM: Moderate to severe aortic atherosclerotic change. Moderate to severe distal abdominal aortic stenosis. Moderate to severe common iliac artery stenoses. REPRODUCTIVE ORGANS: Uterine fibroids at the fundus. URINARY BLADDER: Nondistended. BONES: Degenerative change with moderate to severe canal stenosis at L4-5. Foraminal stenosis at L4-5. ABDOMINAL WALL: No mass or hernia.  ADDITIONAL COMMENTS: N/A  
 
 Imaging findings consistent with a moderate diffuse enterocolitis. Sigmoid colonic wall thickening. No free intraperitoneal air or evidence of perforation. Consider infectious and inflammatory etiologies. Moderate intraperitoneal ascites. New since the previous examination. Enhancement of the peritoneal margin. Paracentesis will be performed for further delineation. Severe aortic atherosclerotic change with severe stenosis of the abdominal aorta and severe stenoses demonstrated in the common iliac and femoral arteries as well. Us Guide Paracentesis Result Date: 1/21/2021 INDICATION: Ascites. The risks, benefits and alternatives of ultrasound guided diagnostic paracentesis were discussed with the patient. After all questions were answered and informed written consent was obtained, the patient was prepped and draped in the standard sterile fashion in a supine position. 1% lidocaine was used as local anesthetic. Using sonographic guidance, a 5 Western Shivani Yueh catheter was advanced into left lower quadrant of the abdomen. Approximately 100 mL of serous fluid was removed. Samples were sent to cytology, microbiology and chemistry laboratories for further evaluation. There is no immediate complication. The patient was discharged in stable condition with specific postprocedure instructions. Ultrasound guided diagnostic paracentesis. No complication. [address] [place] [address] Result Date: 2/1/2021 Procedure: Limited ultrasound abdomen INDICATION: Evaluate for paracentesis TECHNIQUE: Grayscale ultrasound of the 4 quadrants of the abdomen was performed. FINDINGS: Small pockets of ascites are noted predominantly in the upper abdomen around the liver and spleen. There is a small amount of fluid in the left lower quadrant although insufficient for therapeutic paracentesis Small volume of ascites which is predominantly in the upper abdomen. No suitable site for therapeutic paracentesis was identified Medications reviewed Current Facility-Administered Medications Medication Dose Route Frequency  insulin glargine (LANTUS) injection 10 Units  10 Units SubCUTAneous QHS  insulin lispro (HUMALOG) injection   SubCUTAneous Q6H  
 glucose chewable tablet 16 g  4 Tab Oral PRN  
 dextrose (D50W) injection syrg 12.5-25 g  12.5-25 g IntraVENous PRN  
 glucagon (GLUCAGEN) injection 1 mg  1 mg IntraMUSCular PRN  
 LORazepam (ATIVAN) 2 mg/mL injection 2 mg  2 mg IntraVENous ONCE  
 0.9% sodium chloride infusion  125 mL/hr IntraVENous CONTINUOUS  
 sodium chloride (NS) flush 5-40 mL  5-40 mL IntraVENous Q8H  
 sodium chloride (NS) flush 5-40 mL  5-40 mL IntraVENous PRN  
 acetaminophen (TYLENOL) tablet 650 mg  650 mg Oral Q6H PRN Or  
 acetaminophen (TYLENOL) suppository 650 mg  650 mg Rectal Q6H PRN  polyethylene glycol (MIRALAX) packet 17 g  17 g Oral DAILY PRN  promethazine (PHENERGAN) tablet 12.5 mg  12.5 mg Oral Q6H PRN Or  
 ondansetron (ZOFRAN) injection 4 mg  4 mg IntraVENous Q6H PRN Signed: 
 Jyoti Canada MD 
 Resident, Family Medicine Attending note: Attending note to follow. ..

## 2021-02-03 NOTE — PROGRESS NOTES
Admission Medication Reconciliation: 
  
Information obtained from:   
Patient via phone call to [phone] RxQuery data available¹:  YES Comments/Recommendations:  
Patient able to confirm name, , allergies, and preferred pharmacy Updated PTA medication list 
 
  
¹RxQuery pharmacy benefit data reflects medications filled and processed through the patient's insurance, however  
this data does NOT capture whether the medication was picked up or is currently being taken by the patient. Prior to Admission Medications Prescriptions Last Dose Informant Taking? SITagliptin (Januvia) 100 mg tablet 2021 at Unknown time Self Yes Sig: Take 1 Tab by mouth every morning. albuterol (ProAir HFA) 90 mcg/actuation inhaler  Self Yes Sig: INHALE 2 PUFFS BY MOUTH EVERY 4 HOURS AS NEEDED FOR WHEEZE  
aspirin delayed-release 81 mg tablet 2021 Self Yes Sig: Take 81 mg by mouth nightly. atorvastatin (LIPITOR) 40 mg tablet 2021 at Unknown time Self Yes Sig: TAKE 1 TABLET BY MOUTH EVERY DAY  
busPIRone (BUSPAR) 10 mg tablet  Self Yes Sig: Take 10 mg by mouth two (2) times daily as needed (anxiety). ciprofloxacin HCl (Cipro) 500 mg tablet 2021 at Unknown time Self Yes Sig: Take 500 mg by mouth two (2) times a day. 10 day course as of 2/3/21 the patient reports having 2 pills left  
furosemide (LASIX) 20 mg tablet 2021 at Unknown time Self Yes Sig: Take 1 Tab by mouth daily. insulin detemir U-100 (LEVEMIR FLEXTOUCH) 100 unit/mL (3 mL) inpn 2021 at Unknown time Self Yes Si Units by SubCUTAneous route daily. lisinopriL (PRINIVIL, ZESTRIL) 10 mg tablet 2021 Self Yes Sig: TAKE 1 TABLET BY MOUTH EVERY DAY AT NIGHT  
ondansetron (ZOFRAN ODT) 4 mg disintegrating tablet  Self Yes Sig: Take 1 Tab by mouth every eight (8) hours as needed for Nausea or Vomiting. Facility-Administered Medications: None Please contact the main inpatient pharmacy with any questions or concerns at [phone] and we will direct you to the clinical pharmacist covering this patient's care while in-house.   
Mary Jaimes, SanchezD, BCPS

## 2021-02-03 NOTE — PROGRESS NOTES
[facility]  
Senior Resident Admission Note CC: abdominal pain HPI: 
Romy Rosado is a 61 y.o. female with PMH of diabetes, neuropathy, HTN, PTSD who presents to the ER complaining of abdominal pain and distention for 1 month. Symptoms started 1 month ago, associated with loose stools, some nausea and vomiting, that briefly stopped 2 days ago but has returned today. Patient also has noted weight gain of 11 lbs and significant JASWINDER and ESTES. Patient has been seen at [facility] clinic as also evaluated by Dr Batsheva Rahman recently. She was sent to the ED from clinic with concern for SBO noted on KUB. Chart reviewed. Patient seen, examined, and discussed with Dr. Amy Maravilla (PGY-1). See his note for more details. /61 (BP 1 Location: Left upper arm, BP Patient Position: Sitting)   Pulse (!) 102   Temp 97.5 °F (36.4 °C)   Resp 20   Ht 5' 2\" (1.575 m)   Wt 171 lb (77.6 kg)   SpO2 91%   BMI 31.28 kg/m² . Physical Exam 
Vitals signs and nursing note reviewed. Constitutional:   
   Appearance: Normal appearance. She is ill-appearing. She is not toxic-appearing or diaphoretic. HENT:  
   Head: Normocephalic and atraumatic. Cardiovascular:  
   Rate and Rhythm: Tachycardia present. Pulses: Normal pulses. Heart sounds: No murmur. Pulmonary:  
   Effort: Pulmonary effort is normal. No respiratory distress. Breath sounds: Normal breath sounds. Abdominal:  
   General: There is distension. Tenderness: There is no abdominal tenderness. Musculoskeletal: Normal range of motion. Right lower leg: Edema (R>L pitting 2+ edema) present. Left lower leg: Edema present. Skin: 
   General: Skin is warm. Capillary Refill: Capillary refill takes less than 2 seconds. Coloration: Skin is not jaundiced. Neurological:  
   General: No focal deficit present. Mental Status: She is alert and oriented to person, place, and time. Mental status is at baseline. Psychiatric:     
   Mood and Affect: Mood normal.     
   Behavior: Behavior normal.     
   Thought Content: Thought content normal.     
   Judgment: Judgment normal.  
 
 
A/P: 61 y.o. female with a PMHx of TIIDM, neuropathy, HTN, PTSD who is admitted for possible SBO. 
  
1. Small bowel obstruction: risk factors include previous c/s. Thre is a concern for malignancy, CT films reviewed by Dr Laila Henao and reading radiologist, more work up needed to rule out neoplasia. KUB earlier in the day w/ findings suggestive of SBO, however, patient is not vomiting, passing gas and some stool. KUB is only 60% sensitive for SBO and further studies needed. - CT chest/abd/pelv w/ contrast 
- NGT placement and gen surg consult based on CT findings -GI to see tomorrow 
-NPO 
-MIVF 
-CA-125, CA 19-9, CEA pending 
  
2. Colitis: CT abd/pelv on 1/21 significant for moderate diffuse enterocolitis, sigmoid colonic wall thickening, moderate intraperitoneal ascites. Per pt she was placed on cipro to treat the colitis. -Continue cipro to treat colitis 
-GI consulted 
  
3. Elevated creatinine: likely 2/2 decreased PO intake and  IVVD. -MIVF 
-Daily BMP 
   
4. TIIDM: A1c 7.9 (11/2020). -Hold home Januvia 
-Start w/ 50% of home dose and give 10u lantus QHS 
-SSI with q6h BG checks while NPO I agree with remaining assessment and plan as documented in Dr. De Leon note. Pt discussed with Claudene Rana (on-call attending physician) Tosha Cardozo MD 
Family Medicine Resident

## 2021-02-03 NOTE — PERIOP NOTES
Eliceoluiza Brito 1955 
[id number] Situation: 
 
Scheduled Procedure: Procedure(s): SIGMOIDOSCOPY FLEXIBLE Verbal report received from:Karin RN, Endoscopy who received report from [facility] on the fourth floor Preoperative diagnosis: abnormal CT scan Background: 
 
Procedure: Procedure(s): SIGMOIDOSCOPY FLEXIBLE Physician performing procedure; Dr. Jagruti Hoang. SBAR QUESTIONS FLOOR TO ENDO RN 
 
NPO Status/Last PO Intake: yes Pregnancy Test:Not applicable If yes, result: none Is the patient taking Blood Thinners: YES If yes, list: aspirin and last taken 2/1/2021 Is the patient diabetic:yes If yes, what was the last BS:  117  Time taken? 46  Anything given? no          
Does the patient have a Pacemaker/Defibrillator in place?: no  
Does the patient need antibiotics before/during/after procedure: no If the patient is having a colon, How much prep was drank? Patient given a STAT enema, results will be known upon arrival to endoscopy What were the Colon prep results? See above note. Does the patient have SCD in place:no Is patient on CONTACT precautions:no Assessment: 
Are the vital signs stable prior to patient coming to ENDO?  yes Is the patient alert/oriented and able to sign consent for the procedures:yes, patient recently given versed and ativan, will get consent from family How does the patient's abdomen feel prior to coming to ENDO? round and soft yes Does the patient have a patient IV in place? yes Recommendation: 
Family or Friend present yes Permission to share finding with Family or Friend yes
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
## 2021-02-03 NOTE — ED NOTES
TRANSFER - OUT REPORT: 
 
Verbal report given to CHARLEE Kennedy(name) on Anna Silverio  being transferred to 4th Floor(unit) for routine progression of care Report consisted of patients Situation, Background, Assessment and  
Recommendations(SBAR). Information from the following report(s) SBAR, Kardex, ED Summary, [facility] and Recent Results was reviewed with the receiving nurse. Lines:  
Peripheral IV 02/02/21 Right Antecubital (Active) Opportunity for questions and clarification was provided. Patient transported with: 
 Monitor Registered Nurse

## 2021-02-03 NOTE — PROGRESS NOTES
2/3/2021 
9:11 AM 
Observation notice provided in writing to patient and/or caregiver as well as verbal explanation of the policy. Patients who are in outpatient status also receive the Observation notice. Discharge Location Discharge Placement: Home with family assistance
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
## 2021-02-04 PROBLEM — K56.609 SBO (SMALL BOWEL OBSTRUCTION) (HCC): Status: ACTIVE | Noted: 2021-01-01

## 2021-02-04 PROBLEM — I26.99 BILATERAL PULMONARY EMBOLISM (HCC): Status: ACTIVE | Noted: 2021-01-01

## 2021-02-04 PROBLEM — K52.9 COLITIS: Status: ACTIVE | Noted: 2021-01-01

## 2021-02-04 PROBLEM — I35.0 SEVERE AORTIC STENOSIS: Status: RESOLVED | Noted: 2021-01-01 | Resolved: 2021-01-01

## 2021-02-04 PROBLEM — I70.0 AORTIC ATHEROSCLEROSIS (HCC): Status: ACTIVE | Noted: 2021-01-01

## 2021-02-04 PROBLEM — I35.0 SEVERE AORTIC STENOSIS: Status: ACTIVE | Noted: 2021-01-01

## 2021-02-04 PROBLEM — R10.9 ABDOMINAL PAIN: Status: ACTIVE | Noted: 2021-01-01

## 2021-02-04 NOTE — PROGRESS NOTES
2/4/2021 
12:03 PM 
Reason for Admission: Elective - Small biwel obstruction requiring surgery Assessment:  
[x]In person with pt  
[]Via p/c with pt  
[]With family member in person. Who/Relation:    
[]With family member via p/c. Who/Relation:  
[]Chart Review RUR:  13% Risk Level: [x]Low []Moderate []High Value-based purchasing: [] Yes [x] No 
Bundle patient: [] Yes [x] No 
 Specify:  
 
Advance Directive: Full Code. No AD on file. Assessment: 
 
Age: 61 Sex: [] Male [x]Female Residency: [x]Private residence []Apartment []Assisted Living []LTC []Other:  
Exterior Steps: exterior stair lift Interior Steps: 0 interior steps Lives With: []With spouse []Other family members []Underage children []Alone []Care provider [x]Other: Son and DIL. Pt reported son is a [facility] physician Prior functioning:  [x]Independent []Dependent []Partial dependence Pt requires assistance with: []Bathing []Dressing []Toileting []Ambulation Prior DME required:  []None [x]RW [x]Cane []Crutches []Bedside commode []CPAP []Home O2 (Liter/Provider: ) []Nebulizer []Shower Chair []Wheelchair []Hospital Bed []Louis []Stair lift []Rollator []Other: DME available: []None [x]RW [x]Cane []Crutches []Bedside commode []CPAP []Home O2 (Liter/Provider: ) []Nebulizer []Shower Chair []Wheelchair []Hospital Bed []Louis []Stair lift []Rollator []Other: 
 
Rehab history: []None []Outpatient PT [x]Home Health (Provider/Date: unknown providers / several occasions) []SNF (Provider/Date: ) []IPR (Provider/Date: ) []LTC (Provider/Date: ) Discharge Concerns: []Yes [x]No []Unknown Describe: Insurer:  [facility] Medicaid PCP: Joyce Rivers, but also is seen by Mirlande Diop and Dr Laura Arriaga 
 Name of Practice: [facility] Current patient: []Yes [x]No 
 Approximate date of last visit: 1/29/21 Access to virtual PCP visits: []Yes [x]No 
 
Pharmacy: [address] [place] [address] Transport: Family Transition of care plan: [x]Unable to determine at this time. Awaiting clinical progress, and disposition recommendations. [] Home with outpatient follow-up [] Home with Outpatient PT and outpatient follow-up Pt aware of OP appt? []Yes, Provider:   []Not scheduled Transport provider:  
 
[] Home with family assistance as needed and outpatient follow-up Family able to assist: 
  Schedule: 
Transport provider:   
 
[] Home with Home Health - Provider:  
 
[]SNF/IPR 
 -[]Preferences given:   []Listing provided and preferences requested 
 -Status: []Pending []Accepted:  
 -Auth required: []Yes []No 
  -Auth initiated date: 
 -3 midnight stay required: []Yes []No  Date satisfied:  
 
[] Other:   
 
Belem Cowan MA Care Management Interventions PCP Verified by CM: Yes(Nicolle/Sheryl/Morgan) Mode of Transport at Discharge: Other (see comment)(MIKE) Current Support Network: Relative's Home Confirm Follow Up Transport: Family Discharge Location Discharge Placement: Unable to determine at this time

## 2021-02-04 NOTE — PROGRESS NOTES
[facility] RESIDENCY PROGRAM 
PROGRESS NOTE  
 
2/4/2021 PCP: Jake Pike MD  
 
Assessment/Plan:  
 
Anthony Arteaga is a 61 y.o. female with a PMHx of TIIDM, neuropathy, HTN, PTSD who is admitted for possible SBO. 24-hour events: Central line placed for heparin drip for bilat PE   
  
Carcinomatosis/Small bowel obstruction: Possibly 2/2 GYN vs colorectal malignancy, inflammatory/infectious etiology. KUB w/ findings suggestive of SBO. Paracentesis 1/21 negative for malignant cells. GI scope w/ abnml mucosa in distal sigmoid w/ lumen narrowing, CA-125//CEA elevated. CT w/ carcinomatosis. -GI consulted: await path, NGT if vomiting 
-Gen Surg consulted: OR today/terri for possible colostomy due to stricture 
-NPO for possible surgery -MIVF 
-Heme/Onc consulted 
  
Colitis: CT abd/pelv on 1/21 significant for moderate diffuse enterocolitis, sigmoid colonic wall thickening, moderate intraperitoneal ascites. Per pt she was placed on cipro to treat the colitis. -Continue cipro to treat colitis Bilateral PE: CT chest (2/3) w/ bilateral pulmonary PE and bilateral pleural effusions 
-Central Line placed 
-Started on Heparin gtt 
  
Elevated creatinine: POA 1.65 (b/l 1.1). Likely 2/2 poor PO intake, IVVD. -MIVF 
-Daily BMP 
  
Severe aortic atherosclerosis: Severe sclerosis of the abdominal aorta and common iliac and femoral arteries noted on CT abd/pelv 1/21.   
-Pt has been referred to vascular in the OP setting 
-Vascular consulted, appreciate recs 
  
TIIDM: A1c 7.9 (11/2020).   
-Hold home Januvia 
-Pt takes sliding scale detemir QHS, most recently 20u nightly - will start w/ 50% of home dose and give 10u lantus QHS 
-Insulin Sliding Scale normal sensitivity with Q6h glucose checks while NPO 
-Hypoglycemia protocols ordered  
-Hold home ASA 81mg daily while NPO 
  
Hypertension: BP on admission 95/52.  
-Hold home lisinopril 10mg QHS while NPO 
 -Will continue to monitor at this time and readjust as BP's trend. 
  
PTSD: 
-Hold home buspar 10mg PRN while NPO 
  
HLD: Lipid panel 2020 w/ Tchol 192, HDL 37, , tri 170.  
-Hold home lipitor 40mg QHS while NPO 
  
  
FEN/GI - NPO. NS at 125 mL/hr. Activity - Out of bed with assistance DVT prophylaxis - SCDs GI prophylaxis - Not indicated at this time Fall prophylaxis - Not indicated at this time. Code Status - Full. Discussed with patient / caregivers. Next of Eleniva 71 Name and [address],  Daughter - [phone] 
  
Patient Darius Crhistopher will be discussed with Dr. Flaquita Abdullahi. Subjective: Pt was seen and examined at bedside. Afebrile and hemodynamically stable. Some abdominal pain but not bad. Feeling tired and a little nauseous today. Had a long night. Denies chest pain, SOB, vomiting, abdominal pain, dizziness. Objective:  
Physical examination Patient Vitals for the past 24 hrs: 
 Temp Pulse Resp BP SpO2  
21 0703  87     
21 0254 98.6 °F (37 °C) 94 18 91/63 96 % 21 2326  91     
21 2315 98.3 °F (36.8 °C) 77 16 113/68 93 % 21 2033 98.1 °F (36.7 °C) 88 17 100/66 100 % 21 1930 97.8 °F (36.6 °C) 93 22 119/76 90 % 21 1839     92 % 21 1838 97.6 °F (36.4 °C) 89 22 105/64 (!) 88 % 21 1730 97.4 °F (36.3 °C) 94 22 (!) 92/57 96 % 21 1705  94 25 (!) 96/59 98 % 21 1656  93 24 108/60 99 % 21 1651 97.6 °F (36.4 °C) 94 17 (!) 111/58 99 % 21 1634  95 26 120/69 96 % 21 1622 98.2 °F (36.8 °C) 97 21 110/69 97 % 21 1558 97.6 °F (36.4 °C) 92  114/73 91 % 21 1226 97.3 °F (36.3 °C) 80 17 104/68 95 % 21 1138  2828 [facility] Temp (24hrs), Av.9 °F (36.6 °C), Min:97.3 °F (36.3 °C), Max:98.6 °F (37 °C) O2 Flow Rate (L/min): 2 l/min O2 Device: Nasal cannula Date 21 - 21 7588 21 - 21 6913 Shift [id number] 1900-0659 24 Hour Total 6680-2972 5268-6557 24 Hour Total  
INTAKE  
P.O. 0  0     
  P. O. 0  0 Shift Total(mL/kg) 0(0)  0(0) OUTPUT Urine(mL/kg/hr) Urine Occurrence(s) 1 x  1 x Stool Stool Occurrence(s) 1 x  1 x Shift Total(mL/kg) NET 0  0 Weight (kg) 77.6 77.6 77.6 77.6 77.6 77.6 Physical Exam 
General: No acute distress. Alert. Cooperative. Head: Normocephalic. Atraumatic. Eyes:              Conjunctiva pink. Sclera white. Ears:              Hearing grossly intact. Nose:             Septum midline. Mucosa pink. No drainage. Throat: Mucosa pink. Moist mucous membranes. No tonsillar exudates or erythema. Palate movement equal bilaterally. Neck: Supple. Normal ROM. No stiffness. Respiratory: CTAB. No w/r/r/c.  
Cardiovascular: RRR. Normal S1,S2. No m/r/g. Pulses 2+ throughout. GI: L-sided hyperactive bowel sounds, R-sided hypoactive bowel sounds. Mild tenderness to palpation LLQ. No rebound tenderness or guarding. Abdominal distention w/ no fluid wave noted, firm to palpation w/ no masses appreciated. Extremities: 2+ LE edema b/l, RLE circumference > L. Distal pulses intact. Musculoskeletal: Full ROM in all extremities. Skin: Warm, dry. No rashes. Neuro: CN II-XII grossly intact. Strength 5/5 in all extremities. Data Review: CBC: 
Recent Labs 02/04/21 
0363 02/03/21 2246 02/02/21 2113 WBC 9.0 10.6 9.1 HGB 11.1* 12.2 13.1 HCT 33.5* 37.0 39.0  242 288 Metabolic Panel: 
Recent Labs 02/04/21 
5408 02/03/21 2246 02/02/21 2113 02/02/21 
1325  142 140 138  
K 3.4* 3.5 3.6 4.6 * 113* 110* 104 CO2 22 21 21 21 BUN 40* 40* 44* 43* CREA 1.38* 1.38* 1.56* 1.64* GLU 84 95 142* 185* CA 8.1* 8.5 8.2* 8.9 ALB  --   --  2.6* 3.3* TBILI  --   --  0.4 0.4 ALT  --   --  20 18 Micro: 
Lab Results Component Value Date/Time Culture result: MIXED UROGENITAL HARRY ISOLATED 12/10/2017 08:01 PM  
 Culture result: NO GROWTH 4 DAYS 09/05/2014 04:22 PM  
 Culture result: NO GROWTH 4 DAYS 09/05/2014 04:22 PM  
 
Imaging: Xr Chest Pa Lat Result Date: 2/2/2021 Chest 2 views Comparison chest dated 2/27/2020 History is shortness of breath PA and lateral views of the chest were obtained. The cardiac silhouette is normal in size. There is hyperaeration of the lungs. No areas of lobar consolidation are identified. There is minimal blunting of the left costophrenic angle. This is suggestive of the presence of a small pleural effusion. The right costophrenic angle is sharp in appearance. Evidence of pulmonary hyperaeration. No evidence of lobar consolidation. Presence of a small left pleural effusion. Xr Abd (kub) Result Date: 2/2/2021 KUB 2 views dated 2/2/2021 History is abdominal distention and pain 2 AP supine views of the abdomen were obtained. Dilated loops small bowel are noted. These loops of small bowel are best appreciated on the left. These loops small bowel measure approximately 5.5 cm in transverse diameter. A horizontal beam film (upright or left lateral decubitus) may render additional information regarding the possibility of possible air-fluid levels within the dilated loops of small bowel as well as for assessment of possible free intraperitoneal air. Surgical clips project over the right upper quadrant of the abdomen. Radiographic findings suggestive of small bowel obstruction. Further studies are recommended. Ct Abd Pelv W Cont Result Date: 1/21/2021 EXAM: CT ABD PELV W CONT History: Incomplete colonoscopy INDICATION: incomplete colonoscopy with mass felt in right colon COMPARISON: 12/26/2017 CONTRAST: 100 mL of Isovue-370. TECHNIQUE: Following the uneventful intravenous administration of contrast, thin axial images were obtained through the abdomen and pelvis. Coronal and sagittal reconstructions were generated. Oral contrast was not administered. CT dose reduction was achieved through use of a standardized protocol tailored for this examination and automatic exposure control for dose modulation. FINDINGS: LOWER THORAX: Coronary vascular calcifications. Minimal atelectasis. LIVER: Portal vein is patent. No focal hepatic mass. Post cholecystectomy. BILIARY TREE: Absent CBD is not dilated. SPLEEN: within normal limits. PANCREAS: No mass or ductal dilatation. ADRENALS: Unremarkable. KIDNEYS: Mild renal cortical thinning bilaterally. STOMACH: Unremarkable. SMALL BOWEL: Hyperemia small bowel loops. Minimal small bowel wall thickening. COLON: Hyperemia of large bowel mucosa. Sigmoid colonic wall thickening. APPENDIX: Not definitely visualized PERITONEUM: Moderate intraperitoneal ascites with rind of mild enhancement. Effacement of the hepatic contour/margin RETROPERITONEUM: Moderate to severe aortic atherosclerotic change. Moderate to severe distal abdominal aortic stenosis. Moderate to severe common iliac artery stenoses. REPRODUCTIVE ORGANS: Uterine fibroids at the fundus. URINARY BLADDER: Nondistended. BONES: Degenerative change with moderate to severe canal stenosis at L4-5. Foraminal stenosis at L4-5. ABDOMINAL WALL: No mass or hernia.  ADDITIONAL COMMENTS: N/A  
 
 Imaging findings consistent with a moderate diffuse enterocolitis. Sigmoid colonic wall thickening. No free intraperitoneal air or evidence of perforation. Consider infectious and inflammatory etiologies. Moderate intraperitoneal ascites. New since the previous examination. Enhancement of the peritoneal margin. Paracentesis will be performed for further delineation. Severe aortic atherosclerotic change with severe stenosis of the abdominal aorta and severe stenoses demonstrated in the common iliac and femoral arteries as well. Us Guide Paracentesis Result Date: 1/21/2021 INDICATION: Ascites. The risks, benefits and alternatives of ultrasound guided diagnostic paracentesis were discussed with the patient. After all questions were answered and informed written consent was obtained, the patient was prepped and draped in the standard sterile fashion in a supine position. 1% lidocaine was used as local anesthetic. Using sonographic guidance, a 5 [employer]ivani Yueh catheter was advanced into left lower quadrant of the abdomen. Approximately 100 mL of serous fluid was removed. Samples were sent to cytology, microbiology and chemistry laboratories for further evaluation. There is no immediate complication. The patient was discharged in stable condition with specific postprocedure instructions. Ultrasound guided diagnostic paracentesis. No complication. [address] [place] [address] Result Date: 2/1/2021 Procedure: Limited ultrasound abdomen INDICATION: Evaluate for paracentesis TECHNIQUE: Grayscale ultrasound of the 4 quadrants of the abdomen was performed. FINDINGS: Small pockets of ascites are noted predominantly in the upper abdomen around the liver and spleen. There is a small amount of fluid in the left lower quadrant although insufficient for therapeutic paracentesis Small volume of ascites which is predominantly in the upper abdomen. No suitable site for therapeutic paracentesis was identified Medications reviewed Current Facility-Administered Medications Medication Dose Route Frequency  0.9% sodium chloride infusion  50 mL/hr IntraVENous CONTINUOUS  
 insulin glargine (LANTUS) injection 10 Units  10 Units SubCUTAneous QHS  insulin lispro (HUMALOG) injection   SubCUTAneous Q6H  
 glucose chewable tablet 16 g  4 Tab Oral PRN  
 dextrose (D50W) injection syrg 12.5-25 g  12.5-25 g IntraVENous PRN  
 glucagon (GLUCAGEN) injection 1 mg  1 mg IntraMUSCular PRN  mineral oil (FLEET) enema   Rectal PRN  
 heparin 25,000 units in D5W 250 ml infusion  18-36 Units/kg/hr IntraVENous TITRATE  sodium chloride (NS) flush 5-40 mL  5-40 mL IntraVENous Q8H  
 sodium chloride (NS) flush 5-40 mL  5-40 mL IntraVENous PRN  
 acetaminophen (TYLENOL) tablet 650 mg  650 mg Oral Q6H PRN Or  
 acetaminophen (TYLENOL) suppository 650 mg  650 mg Rectal Q6H PRN  polyethylene glycol (MIRALAX) packet 17 g  17 g Oral DAILY PRN  promethazine (PHENERGAN) tablet 12.5 mg  12.5 mg Oral Q6H PRN Or  
 ondansetron (ZOFRAN) injection 4 mg  4 mg IntraVENous Q6H PRN Signed: 
 Lawyer Alessandra MD 
 Resident, Family Medicine Attending note: Attending note to follow. ..

## 2021-02-04 NOTE — PROGRESS NOTES
Assessment / Plan: Pt with carcinamotosis and bowel obstruction Discussed further with Dr. Valdez Henao this am he does not feel she is obstructed from the sigmoid stricture and patient reports bowel movement today. CT does not show colonic dilation Pt does have small bowel obstruction likely due to peritoneal implants but denies significant nausea today - may consider clears later today Will attempt to have IR complete paracentesis for further diagnosis I did discuss with the patient that often with carcinomatosis operative intervention is associated with significant morbidity. She will likely have progressive disease and worsening bowel obstruction over time - this is a very challenging situation to help manage symptoms Would consider pallative consult when patient and family are ready Bria Restrepo MD 
[facility] Office:  [phone] General Surgery Daily Progress Note Patient: Anthony Arteaga MRN: [id number]  SSN: [id number] YOB: 1955  Age: 61 y.o. Sex: female Subjective:  
Patient feels better this am 
 
Current Facility-Administered Medications Medication Dose Route Frequency  0.9% sodium chloride infusion  50 mL/hr IntraVENous CONTINUOUS  
 insulin lispro (HUMALOG) injection   SubCUTAneous Q6H  
 glucose chewable tablet 16 g  4 Tab Oral PRN  
 dextrose (D50W) injection syrg 12.5-25 g  12.5-25 g IntraVENous PRN  
 glucagon (GLUCAGEN) injection 1 mg  1 mg IntraMUSCular PRN  mineral oil (FLEET) enema   Rectal PRN  
 heparin 25,000 units in D5W 250 ml infusion  18-36 Units/kg/hr IntraVENous TITRATE  sodium chloride (NS) flush 5-40 mL  5-40 mL IntraVENous Q8H  
 sodium chloride (NS) flush 5-40 mL  5-40 mL IntraVENous PRN  
 acetaminophen (TYLENOL) tablet 650 mg  650 mg Oral Q6H PRN  Or  
 acetaminophen (TYLENOL) suppository 650 mg  650 mg Rectal Q6H PRN  
  polyethylene glycol (MIRALAX) packet 17 g  17 g Oral DAILY PRN  promethazine (PHENERGAN) tablet 12.5 mg  12.5 mg Oral Q6H PRN Or  
 ondansetron (ZOFRAN) injection 4 mg  4 mg IntraVENous Q6H PRN Objective:  
02/04 0701 - 02/04 1900 In: 35 [I.V.:35] Out: -  
02/02 1901 - 02/04 0700 In: 3018.8 [I.V.:3018.8] Out: -  
Patient Vitals for the past 8 hrs: 
 BP Temp Pulse Resp SpO2  
02/04/21 0900 (!) 99/58 98.9 °F (37.2 °C) 87 16 91 % 02/04/21 0703   87   Physical Exam: 
General: Alert, cooperative, no distress, appears stated age. Neck:  Supple, symmetrical, trachea midline Lungs: Clear to auscultation bilaterally. Heart:  Regular rate and rhythm Abdomen: Soft, mildly tender, disended Extremities: Extremities normal, atraumatic Skin:  Skin color, texture, turgor normal. No rashes or lesions Labs:  
Recent Labs 02/04/21 
1055 WBC 8.7 HGB 10.8* HCT 32.9*  
 Recent Labs 02/04/21 
5594 02/02/21 2113 02/02/21 2113    < > 140  
K 3.4*   < > 3.6 *   < > 110* CO2 22   < > 21  
GLU 84   < > 142* BUN 40*   < > 44* CREA 1.38*   < > 1.56* CA 8.1*   < > 8.2* ALB  --   --  2.6* TBILI  --   --  0.4 ALT  --   --  20  
 < > = values in this interval not displayed. · Active Problems: Hyperlipidemia (3/5/2014) Posttraumatic stress disorder (6/20/2014) Diabetes mellitus with complication ([facility] (48/1/2962) Overview: Retinopathy Microalbuminuria Toe amputation x2 Essential hypertension (10/9/2015) SBO (small bowel obstruction) ([facility] (2/4/2021) Abdominal pain (2/4/2021) Bilateral pulmonary embolism ([facility] (2/4/2021) Colitis (2/4/2021) Aortic atherosclerosis ([address] (2/4/2021) Problem List Items Addressed This Visit Hyperlipidemia Diabetes mellitus with complication ([facility] [address] Relevant Medications  
 insulin detemir U-100 (LEVEMIR FLEXTOUCH) 100 unit/mL (3 mL) inpn Essential hypertension Depression Relevant Medications  
 busPIRone (BUSPAR) 10 mg tablet Diabetic peripheral neuropathy ([facility] [address] Relevant Medications  
 busPIRone (BUSPAR) 10 mg tablet  
 insulin detemir U-100 (LEVEMIR FLEXTOUCH) 100 unit/mL (3 mL) inpn Other Visit Diagnoses Small bowel obstruction ([facility]    -  Primary Peritoneal carcinomatosis ([facility] [address] Sigmoid stricture ([facility] [address]

## 2021-02-04 NOTE — PROGRESS NOTES
1618 Pt in ultrasound for Paracentesis. Pt A&Ox3 and stated she just feels bad.  
1620  [facility] Marisela obtained informed consent for paracentesis. 1625  [facility] Marisela placed a 6 fr pigtail catheter and removed fluid and sent to lab.  
1640 Total Paracentesis fluid 1950 ml. Lft lower abdomen pigtail drain removed and band aid placed over site. Pt tolerated procedure well. TRANSFER - OUT REPORT: 
 
Verbal report given to Kamran RN(name) on Enoch Cushing  being transferred to [facility](unit) for routine progression of care Report consisted of patients Situation, Background, Assessment and  
Recommendations(SBAR). Information from the following report(s) SBAR and Procedure Summary was reviewed with the receiving nurse. Lines:  
Triple Lumen 02/04/21 Anterior;Right Neck (Active) Opportunity for questions and clarification was provided. Patient transported with: 
 Rewarding Return
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
## 2021-02-04 NOTE — PROGRESS NOTES
Central Line Procedure Note Name:  Eliceo Brito Age:  61 y.o. MRN:  [id number] CSN:  [id number] :  1955 Referring physician: Franchesca Tracy* Indication: Inadequate venous access Risks, benefits, alternatives explained and patient agrees to proceed. Patient positioned in Trendelenburg. 7-Step Sterility Protocol followed. (cap, mask sterile gown, sterile gloves, large sterile sheet, hand hygiene, 2% chlorhexidine for cutaneous antisepsis) Right internal jugular cannulated x 1 attempt(s) using ultrasound guidance. Catheter secured & Biopatch applied. Sterile Tegaderm placed. CXR pending.    
 
Gama Vazquez MD

## 2021-02-04 NOTE — PROGRESS NOTES
Problem: Falls - Risk of 
Goal: *Absence of Falls Description: Document Najma Cage Fall Risk and appropriate interventions in the flowsheet. Outcome: Progressing Towards Goal 
Note: Fall Risk Interventions: 
Mobility Interventions: PT Consult for mobility concerns, PT Consult for assist device competence Medication Interventions: Patient to call before getting OOB, Teach patient to arise slowly Problem: Patient Education: Go to Patient Education Activity Goal: Patient/Family Education Outcome: Progressing Towards Goal 
  
Problem: Breathing Pattern - Ineffective Goal: *Absence of hypoxia Outcome: Progressing Towards Goal 
Goal: *Use of effective breathing techniques Outcome: Progressing Towards Goal 
  
Problem: Patient Education: Go to Patient Education Activity Goal: Patient/Family Education Outcome: Progressing Towards Goal

## 2021-02-04 NOTE — PROGRESS NOTES
Shift Change: 
 
Bedside and Verbal shift change report given to Kamran RN (oncoming nurse) by CHARLEE Schwarz (offgoing nurse). Report included the following information SBAR, Kardex, Intake/Output, MAR, Recent Results and Cardiac Rhythm NSR.  
 
 
1118: Notified FP Dr. Duncan Johnson that blood sugars have been low. Requested MIVF to be changed. FP will update orders. 1928: Spoke with Pharmacist in regards to heparin gtt. Advised to restart per previous protocol. Will start at 15 units/kg/hr then redraw aPTT in 6 hours from start.

## 2021-02-04 NOTE — PROGRESS NOTES
Pt has had difficulty with venous access throughout the day, had difficulty drawing daily labs on her as pt is a difficult stick. Order placed for midline for later in the day today however overnight pt completely lost peripheral IV access. As pt was found to have b/l PE and has a heparin drip and planned surgery, an order for central line has been placed. Spoke w/ anesthesia Dr. Chantal Gama who will perform the procedure in the PACU. Pt is in agreement but is extremely anxious about the central line placement. Will premedicate w/ 1mg ativan IM prior to procedure. BP 91/63 (BP 1 Location: Left upper arm, BP Patient Position: Sitting)   Pulse 94   Temp 98.6 °F (37 °C)   Resp 18   Ht 5' 2\" (1.575 m)   Wt 171 lb (77.6 kg)   SpO2 96%   BMI 31.28 kg/m² Jose Robertson MD 
3:21 AM

## 2021-02-05 PROBLEM — C78.6 PERITONEAL CARCINOMATOSIS (HCC): Status: ACTIVE | Noted: 2021-01-01
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
## 2021-02-05 NOTE — DISCHARGE INSTRUCTIONS
HOME DISCHARGE INSTRUCTIONS    Eliceo Brito / [id number] : 1955    Admission date: 2021 Discharge date: 2021     Please bring this form with you to show your care provider at your follow-up appointment. Primary care provider:  Kimber Vasquez MD    Discharging provider:  Kelly Pizano MD  - Family Medicine Resident  Dr. Amnada Luis - Attending, Family Medicine     You have been admitted to the hospital with the following diagnoses:    ACUTE DIAGNOSES:  Small bowel obstruction ([address] [K56.609]  Abdominal pain, unspecified abdominal location [R10.9]  SBO (small bowel obstruction) ([address] [K56.609]  Bilateral pulmonary embolism ([address] [I26.99]   Peritoneal Carcinomatosis  . . . . . . . . . . . . . . . . . . . . . . . . . . . . . . . . . . . . . . . . . . . . . . . . . . . . . . . . . . . . . . . . . . . . . . . Dalila Kid FOLLOW-UP CARE RECOMMENDATIONS:  You are well enough to be discharged from the hospital. However, because you were staying in a hospital, you are at greater risk of having been exposed to the coronavirus. PLEASE stay inside and quarantine for 14 days to prevent further spread of the coronavirus. Appointments  Follow-up Information     Follow up With Specialties Details Why Contact Info    Remedios Busby DO Family Medicine On 2021 Virtual Visit on  at 1:45pm  [address] [address]  [phone]      Sylvia Rogers MD Hematology and Oncology, Internal Medicine, Hematology, Oncology Schedule an appointment as soon as possible for a visit in 1 week please call to make follow up appt  [address] [facility] [address]  [phone] [address], If you have not received a phone call to schedule, call directly within 24 hours of discharge.  [address] [facility], [address] [place] [address] [phone]         Please follow up with your PCP regarding:  -Pulmonary Embolism (blood clot in lungs)    Please follow up with Heme/Onc regarding:  - Possible peritoneal carcinomatosis     Please follow up with GI regarding:  -Sigmoid stricture and small bowel obstruction (intestine blockage)    Follow-up tests needed: none    Pending test results: At the time of your discharge the following test results are still pending: Colon Biopsy Results and evaluation of paracentesis (abdominal) Fluid  Please make sure you review these results with your outpatient follow-up provider(s). Specific symptoms to watch for: chest pain, shortness of breath, fever, chills, nausea, vomiting, diarrhea, change in mentation, falling, weakness, bleeding. DIET/what to eat:  Diabetic Diet    ACTIVITY:  Activity as tolerated    Wound care: none    Equipment needed:  none    What to do if new or unexpected symptoms occur? If you experience any of the above symptoms (or should other concerns or questions arise after discharge) please call your primary care physician. Return to the emergency room if you cannot get hold of your doctor. · It is very important that you keep your follow-up appointment(s). · Please bring discharge papers, medication list (and/or medication bottles) to your follow-up appointments for review by your outpatient provider(s). · Please check the list of medications and be sure it includes every medication (even non-prescription medications) that your provider wants you to take. · It is important that you take the medication exactly as they are prescribed. · Keep your medication in the bottles provided by the pharmacist and keep a list of the medication names, dosages, and times to be taken in your wallet. · Do not take other medications without consulting your doctor.    · If you have any questions about your medications or other instructions, please talk to your nurse or care provider before you leave the hospital.     Information obtained by: I understand that if any problems occur once I am at home I am to contact my physician. These instructions were explained to me and I had the opportunity to ask questions. I understand and acknowledge receipt of the instructions indicated above.                                                                                                                                                Physician's or R.N.'s Signature                                                                  Date/Time                                                                                                                                              Patient or Representative Signature                                                          Date/Time

## 2021-02-05 NOTE — PROGRESS NOTES
[id number] [facility] PROGRAM 
PROGRESS NOTE  
 
2/5/2021 PCP: Aurora Jenkins MD  
 
Assessment/Plan:  
 
Geovany Lopez is a 61 y.o. female with a PMHx of TIIDM, neuropathy, HTN, PTSD who is admitted for possible SBO. 24-hour events: Central line placed for heparin drip for bilat PE   
  
Carcinomatosis/Small bowel obstruction: Possibly 2/2 GYN vs colorectal malignancy, inflammatory/infectious etiology. KUB w/ findings suggestive of SBO. Paracentesis 1/21 negative for malignant cells. GI scope w/ abnml mucosa in distal sigmoid w/ lumen narrowing, CA-125//CEA elevated. CT w/ carcinomatosis. Paracentesis 2L taken off on 2/4. -GI consulted: await path, NGT if vomiting 
-Gen Surg consulted: supportive treatment for now, recommended Palliative when patient and family are ready -MIVF 50mL/hr 
-Heme/Onc: follow up outpatient, switch to Lovenox on DC for PE 
-F/u sigmoid biopsy pathology 
-F/u paracentesis cytology 
  
Colitis: CT abd/pelv on 1/21 significant for moderate diffuse enterocolitis, sigmoid colonic wall thickening, moderate intraperitoneal ascites. Per pt she was placed on cipro to treat the colitis. 
-Not seen on CT during this admission Bilateral PE: CT chest (2/3) w/ bilateral pulmonary PE and bilateral pleural effusions 
-Central Line placed 
-On hep gtt, switch to Lovenox when ready for discharge 
  
Elevated creatinine: POA 1.65 (b/l 1.1). Likely 2/2 poor PO intake, IVVD. -MIVF 
-Daily BMP 
  
Severe aortic atherosclerosis: Severe sclerosis of the abdominal aorta and common iliac and femoral arteries noted on CT abd/pelv 1/21.   
-Pt has been referred to vascular in the OP setting 
-Vascular consulted, appreciate recs 
  
TIIDM: A1c 7.9 (11/2020). -Hold home Januvia 
-Pt takes sliding scale detemir QHS, most recently 20u nightly - will start w/ 50% of home dose and give 10u lantus QHS 
-Insulin Sliding Scale normal sensitivity with ACHS checks -Hypoglycemia protocols ordered  
-Hold home ASA 81mg daily Hypertension: BP on admission 95/52.  
-Hold home lisinopril 10mg QHS while BP in low normal range 
-Will continue to monitor at this time and readjust as BP's trend. 
  
PTSD: 
-Home buspar 10mg PRN 
  
HLD: Lipid panel 2020 w/ Tchol 192, HDL 37, , tri 170.  
-Home lipitor 40mg QHS 
  
FEN/GI - Full Liquid. NS at 50 mL/hr. Activity - Out of bed with assistance DVT prophylaxis - Hep gtt GI prophylaxis - Not indicated at this time Fall prophylaxis - Not indicated at this time. Code Status - Full. Discussed with patient / caregivers. Next of Socrates 71 Name and [address] More,  Daughter - [phone] 
  
Patient Anna Silverio will be discussed with Dr. Leanne Adkins. Subjective: Pt was seen and examined at bedside. Afebrile and hemodynamically stable. Some abdominal pain but not bad. Feeling tired and a little nauseous today. Had a long night. Denies chest pain, SOB, vomiting, abdominal pain, dizziness. Objective:  
Physical examination Patient Vitals for the past 24 hrs: 
 Temp Pulse Resp BP SpO2  
21 0323 98.2 °F (36.8 °C) 97 16 (!) 91/53 91 % 21 2338  94     
21 2256 97.4 °F (36.3 °C) 95 17 (!) 93/59 90 % 21 97.9 °F (36.6 °C) 88 18 103/62 93 % 21     94 % 21 1749 98.1 °F (36.7 °C) 88 18 121/70   
21 1641  98 19 (!) 106/55   
21 1636  90 18 (!) 111/58   
21 1633  89 18 (!) 98/50   
21 1625  90 19 (!) 108/58   
21 1618  90 16 (!) 116/56 94 % 21 1506  84     
21 0900 98.9 °F (37.2 °C) 87 16 (!) 99/58 91 % 21 0703  87    Temp (24hrs), Av.1 °F (36.7 °C), Min:97.4 °F (36.3 °C), Max:98.9 °F (37.2 °C) O2 Flow Rate (L/min): 2 l/min O2 Device: Room air Date 21 - 21 2570 21 - 21 8262 Shift [id number] 1900-0659 24 Hour Total 7930-8143 2395-2960 24 Hour Total  
INTAKE  
P.O. 0  0     
  P. O. 0  0     
I. V.(mL/kg/hr) 35(0)  35 Volume (0.9% sodium chloride infusion) 35  35 [facility] Shift Total(mL/kg) [id number])  [id number]) OUTPUT Shift Total(mL/kg) [address] Weight (kg) 77.6 77.6 77.6 77.6 77.6 77.6 Physical Exam 
General: No acute distress. Alert. Cooperative. Head: Normocephalic. Atraumatic. Eyes:              Conjunctiva pink. Sclera white. Ears:              Hearing grossly intact. Nose:             Septum midline. Mucosa pink. No drainage. Throat: Mucosa pink. Moist mucous membranes. No tonsillar exudates or erythema. Palate movement equal bilaterally. Neck: Supple. Normal ROM. No stiffness. Respiratory: CTAB. No w/r/r/c.  
Cardiovascular: RRR. Normal S1,S2. No m/r/g. Pulses 2+ throughout. GI: L-sided hyperactive bowel sounds, R-sided hypoactive bowel sounds. Mild tenderness to palpation LLQ. No rebound tenderness or guarding. Abdominal distention w/ no fluid wave noted, firm to palpation w/ no masses appreciated. Extremities: 2+ LE edema b/l, RLE circumference > L. Distal pulses intact. Musculoskeletal: Full ROM in all extremities. Skin: Warm, dry. No rashes. Neuro: CN II-XII grossly intact. Strength 5/5 in all extremities. Data Review: CBC: 
Recent Labs 02/05/21 
0235 02/04/21 
1055 02/04/21 
7888 WBC PLEASE DISREGARD RESULTS 8.7 9.0 HGB PLEASE DISREGARD RESULTS 10.8* 11.1* HCT PLEASE DISREGARD RESULTS 32.9* 33.5* PLT PLEASE DISREGARD RESULTS 227 225 Metabolic Panel: 
Recent Labs 02/05/21 
0776 02/04/21 
4610 02/03/21 
2246 02/02/21 
2113 02/02/21 
1325 NA PLEASE DISREGARD RESULTS 143 142 140 138  
K PLEASE DISREGARD RESULTS 3.4* 3.5 3.6 4.6 CL PLEASE DISREGARD RESULTS 113* 113* 110* 104 CO2 PLEASE DISREGARD RESULTS 22 21 21 21 BUN PLEASE DISREGARD RESULTS 40* 40* 44* 43* CREA PLEASE DISREGARD RESULTS 1.38* 1.38* 1.56* 1.64* GLU PLEASE DISREGARD RESULTS 84 95 142* 185* CA PLEASE DISREGARD RESULTS 8.1* 8.5 8.2* 8.9 ALB  --   --   --  2.6* 3.3* TBILI  --   --   --  0.4 0.4 ALT  --   --   --  20 18 Micro: 
Lab Results Component Value Date/Time Culture result: MIXED UROGENITAL HARRY ISOLATED 12/10/2017 08:01 PM  
 Culture result: NO GROWTH 4 DAYS 09/05/2014 04:22 PM  
 Culture result: NO GROWTH 4 DAYS 09/05/2014 04:22 PM  
 
Imaging: Xr Chest Pa Lat Result Date: 2/2/2021 Chest 2 views Comparison chest dated 2/27/2020 History is shortness of breath PA and lateral views of the chest were obtained. The cardiac silhouette is normal in size. There is hyperaeration of the lungs. No areas of lobar consolidation are identified. There is minimal blunting of the left costophrenic angle. This is suggestive of the presence of a small pleural effusion. The right costophrenic angle is sharp in appearance. Evidence of pulmonary hyperaeration. No evidence of lobar consolidation. Presence of a small left pleural effusion. Xr Abd (kub) Result Date: 2/2/2021 KUB 2 views dated 2/2/2021 History is abdominal distention and pain 2 AP supine views of the abdomen were obtained. Dilated loops small bowel are noted. These loops of small bowel are best appreciated on the left. These loops small bowel measure approximately 5.5 cm in transverse diameter. A horizontal beam film (upright or left lateral decubitus) may render additional information regarding the possibility of possible air-fluid levels within the dilated loops of small bowel as well as for assessment of possible free intraperitoneal air. Surgical clips project over the right upper quadrant of the abdomen. Radiographic findings suggestive of small bowel obstruction. Further studies are recommended. Ct Abd Pelv W Cont Result Date: 1/21/2021 EXAM: CT ABD PELV W CONT History: Incomplete colonoscopy INDICATION: incomplete colonoscopy with mass felt in right colon COMPARISON: 12/26/2017 CONTRAST: 100 mL of Isovue-370. TECHNIQUE: Following the uneventful intravenous administration of contrast, thin axial images were obtained through the abdomen and pelvis. Coronal and sagittal reconstructions were generated. Oral contrast was not administered. CT dose reduction was achieved through use of a standardized protocol tailored for this examination and automatic exposure control for dose modulation. FINDINGS: LOWER THORAX: Coronary vascular calcifications. Minimal atelectasis. LIVER: Portal vein is patent. No focal hepatic mass. Post cholecystectomy. BILIARY TREE: Absent CBD is not dilated. SPLEEN: within normal limits. PANCREAS: No mass or ductal dilatation. ADRENALS: Unremarkable. KIDNEYS: Mild renal cortical thinning bilaterally. STOMACH: Unremarkable. SMALL BOWEL: Hyperemia small bowel loops. Minimal small bowel wall thickening. COLON: Hyperemia of large bowel mucosa. Sigmoid colonic wall thickening. APPENDIX: Not definitely visualized PERITONEUM: Moderate intraperitoneal ascites with rind of mild enhancement. Effacement of the hepatic contour/margin RETROPERITONEUM: Moderate to severe aortic atherosclerotic change. Moderate to severe distal abdominal aortic stenosis. Moderate to severe common iliac artery stenoses. REPRODUCTIVE ORGANS: Uterine fibroids at the fundus. URINARY BLADDER: Nondistended. BONES: Degenerative change with moderate to severe canal stenosis at L4-5. Foraminal stenosis at L4-5. ABDOMINAL WALL: No mass or hernia.  ADDITIONAL COMMENTS: N/A  
 
 Imaging findings consistent with a moderate diffuse enterocolitis. Sigmoid colonic wall thickening. No free intraperitoneal air or evidence of perforation. Consider infectious and inflammatory etiologies. Moderate intraperitoneal ascites. New since the previous examination. Enhancement of the peritoneal margin. Paracentesis will be performed for further delineation. Severe aortic atherosclerotic change with severe stenosis of the abdominal aorta and severe stenoses demonstrated in the common iliac and femoral arteries as well. Us Guide Paracentesis Result Date: 1/21/2021 INDICATION: Ascites. The risks, benefits and alternatives of ultrasound guided diagnostic paracentesis were discussed with the patient. After all questions were answered and informed written consent was obtained, the patient was prepped and draped in the standard sterile fashion in a supine position. 1% lidocaine was used as local anesthetic. Using sonographic guidance, a 5 Western Shivani Yueh catheter was advanced into left lower quadrant of the abdomen. Approximately 100 mL of serous fluid was removed. Samples were sent to cytology, microbiology and chemistry laboratories for further evaluation. There is no immediate complication. The patient was discharged in stable condition with specific postprocedure instructions. Ultrasound guided diagnostic paracentesis. No complication. [address] [place] [address] Result Date: 2/1/2021 Procedure: Limited ultrasound abdomen INDICATION: Evaluate for paracentesis TECHNIQUE: Grayscale ultrasound of the 4 quadrants of the abdomen was performed. FINDINGS: Small pockets of ascites are noted predominantly in the upper abdomen around the liver and spleen. There is a small amount of fluid in the left lower quadrant although insufficient for therapeutic paracentesis Small volume of ascites which is predominantly in the upper abdomen. No suitable site for therapeutic paracentesis was identified Medications reviewed Current Facility-Administered Medications Medication Dose Route Frequency  0.9% sodium chloride infusion  50 mL/hr IntraVENous CONTINUOUS  
 insulin lispro (HUMALOG) injection   SubCUTAneous Q6H  
 glucose chewable tablet 16 g  4 Tab Oral PRN  
 dextrose (D50W) injection syrg 12.5-25 g  12.5-25 g IntraVENous PRN  
 glucagon (GLUCAGEN) injection 1 mg  1 mg IntraMUSCular PRN  mineral oil (FLEET) enema   Rectal PRN  
 heparin 25,000 units in D5W 250 ml infusion  18-36 Units/kg/hr IntraVENous TITRATE  sodium chloride (NS) flush 5-40 mL  5-40 mL IntraVENous Q8H  
 sodium chloride (NS) flush 5-40 mL  5-40 mL IntraVENous PRN  
 acetaminophen (TYLENOL) tablet 650 mg  650 mg Oral Q6H PRN Or  
 acetaminophen (TYLENOL) suppository 650 mg  650 mg Rectal Q6H PRN  polyethylene glycol (MIRALAX) packet 17 g  17 g Oral DAILY PRN  promethazine (PHENERGAN) tablet 12.5 mg  12.5 mg Oral Q6H PRN Or  
 ondansetron (ZOFRAN) injection 4 mg  4 mg IntraVENous Q6H PRN Signed: 
 Tab Scales MD 
 Resident, Family Medicine Attending note: Attending note to follow. ..

## 2021-02-05 NOTE — PROGRESS NOTES
SURGERY PROGRESS NOTE Admit Date: 2021 POD 2 Days Post-Op Procedure: Procedure(s): SIGMOIDOSCOPY FLEXIBLE 
COLON BIOPSY Subjective:  
Feels better today Tolerating clears Objective:  
 
Visit Vitals /70 (BP 1 Location: Left upper arm, BP Patient Position: At rest) Pulse 99 Temp 98.2 °F (36.8 °C) Resp 18 Ht 5' 2\" (1.575 m) Wt 77.6 kg (171 lb) SpO2 94% BMI 31.28 kg/m² Temp (24hrs), Av.8 °F (36.6 °C), Min:97.2 °F (36.2 °C), Max:98.2 °F (36.8 °C) Physical Exam: Abdomen:  Soft. Non-tender, decreased distended. Lab Results Component Value Date/Time WBC 8.9 2021 04:25 AM  
 HGB 10.7 (L) 2021 04:25 AM  
 HCT 33.0 (L) 2021 04:25 AM  
 PLATELET 613  04:25 AM  
 MCV 93.5 2021 04:25 AM  
 
Lab Results Component Value Date/Time GFR est non-AA 42 (L) 2021 04:25 AM  
 GFR est AA 51 (L) 2021 04:25 AM  
 Creatinine 1.27 (H) 2021 04:25 AM  
 BUN 35 (H) 2021 04:25 AM  
 Sodium 143 2021 04:25 AM  
 Potassium 3.4 (L) 2021 04:25 AM  
 Chloride 113 (H) 2021 04:25 AM  
 CO2 24 2021 04:25 AM  
 Magnesium 1.7 2017 03:44 AM  
 Phosphorus 4.5 2017 03:44 AM  
 
 
Assessment:  
 
Principal Problem: 
  Bilateral pulmonary embolism ([facility] (2021) Active Problems: Hyperlipidemia (3/5/2014) Posttraumatic stress disorder (2014) Diabetes mellitus with complication ([facility] () Overview: Retinopathy Microalbuminuria Toe amputation x2 Family hx of colon cancer (2014) Essential hypertension (10/9/2015) Depression (2017) SBO (small bowel obstruction) ([address] (2021) Abdominal pain (2021) Colitis (2021) Aortic atherosclerosis ([address] (2021) Peritoneal carcinomatosis ([address] (2021) Plan/Recommendations/Medical Decision Making: Abd less distended after paracentesis yesterday - awaiting cytology for help with diagnosis Awaiting biopsies from colonoscopy Tolerating clears - advance as tolerates No plan for operative intervention at this time as she is having BM's and tolerating diet

## 2021-02-05 NOTE — PROGRESS NOTES
2/5/2021  
 
4:01 PM 
CM received denial from [facility] due to staffing. CM submitted referral to [facility], and they accepted. 2:33 PM 
RUR:  14% Risk Level: [x]Low []Moderate []High Value-based purchasing: [] Yes [x] No 
Bundle patient: [] Yes [x] No 
 Specify:  
 
Transition of care plan: 1. Awaiting medical clearance and DC order. Pt may DC today. Oncology, PT, OT, Gen Surg, and GI following. 2. Home with  - CM met with pt who indicated [facility] as preference. CM completed referral via [address], and is awaiting response. Pt lives with DTR and BRITNI. [facility] completed per pt's request, and pt provided a copy via [employer] to her email ([email or web address]). 3. Outpatient follow-up. 4. Pt's family to transport.

## 2021-02-05 NOTE — PROGRESS NOTES
[facility] [place] [facility] [place] [address] [facility] [address] [facility] W: [phone]  F: [phone] Reason for Visit:  
Sue Ulloa is a 61 y.o. female who is seen in consultation at the request of Dr. Saeed Isabel  for evaluation of peritoneal carcinomatosis Hematology / Oncology Treatment History:  
None prior History of Present Illness: Ms. Duarte Melara is a 62 y/o female admitted on 21 from the ED when she presented with abd pain and distention associated with loose stools, N/V and LE edema. Had limited colonoscopy on 21 with Dr. Katherine Edmonds: notable for narrowing of the lumen preventing passage of the scope. 21 CT scan showed colitis, sigmoid colonic wall thickening; Underwent diagnostic paracentesis same day with no malignant cells found on cytology. Was treated with antibiotics to decrease inflammation. However, she then developed SBO seen on KUB and was admitted. ED CT C/A/P on 2/3/21 revealed bilateral PE, ascites and nodular peritoneum compatible with peritoneal carcinomatosis and small bowel obstruction, bilateral pleural effusions. Today pt is sleeping; arouses easily. Not having a lot of able pain; having stools. Reports recent weight loss from 177# down to 160#. Daughter, Chino Babb at bedside. Adds to hx that her mom had a virtual appt with Dr. Mora Staff in [place], who did not feel malignancy was gynecologic in origin. She is interested in finding out more about \"heat directed chemo\" for her mother. States her mother is somewhat active at home; but has limited mobility due to toe amputations 2/2 to her DM. Patient has had a prior colonoscopy 6 yrs ago. Is up to date on mammograms. PMHx: DM, HTN, Peripheral autonomic neuropathy, PTSD, Glaucoma PSurgHx: Cholecystectomy, , Toe amputation on R foot SHx: 1ppd x 45 yrs. Rare use of EtOH. , lives with daughter Chino Babb.  Has 2 daughter, both local.  
 FHx: Mother and brother had colon cancer. Interval History:  
 
 Sitting up in chair at bedside.; no complaints at present. Tolerating diet. States not ready to make decisions about tx yet; would like to go home and discuss with her family and PCP. Declines making follow up appt at this time; will call our office once decision made Current Facility-Administered Medications Medication Dose Route Frequency  atorvastatin (LIPITOR) tablet 40 mg  40 mg Oral DAILY  busPIRone (BUSPAR) tablet 10 mg  10 mg Oral BID PRN  
 0.9% sodium chloride infusion  50 mL/hr IntraVENous CONTINUOUS  
 insulin lispro (HUMALOG) injection   SubCUTAneous Q6H  
 glucose chewable tablet 16 g  4 Tab Oral PRN  
 dextrose (D50W) injection syrg 12.5-25 g  12.5-25 g IntraVENous PRN  
 glucagon (GLUCAGEN) injection 1 mg  1 mg IntraMUSCular PRN  mineral oil (FLEET) enema   Rectal PRN  
 heparin 25,000 units in D5W 250 ml infusion  18-36 Units/kg/hr IntraVENous TITRATE  sodium chloride (NS) flush 5-40 mL  5-40 mL IntraVENous Q8H  
 sodium chloride (NS) flush 5-40 mL  5-40 mL IntraVENous PRN  
 acetaminophen (TYLENOL) tablet 650 mg  650 mg Oral Q6H PRN Or  
 acetaminophen (TYLENOL) suppository 650 mg  650 mg Rectal Q6H PRN  polyethylene glycol (MIRALAX) packet 17 g  17 g Oral DAILY PRN  promethazine (PHENERGAN) tablet 12.5 mg  12.5 mg Oral Q6H PRN Or  
 ondansetron (ZOFRAN) injection 4 mg  4 mg IntraVENous Q6H PRN Allergies Allergen Reactions  Keflex [Cephalexin] Hives  Pcn [Penicillins] Hives  Tanzeum [Albiglutide] Nausea Only  Vioxx [Rofecoxib] Nausea Only Review of Systems: A complete review of systems was obtained, negative except as described above. Physical Exam:  
 
Visit Vitals BP (!) 96/51 Pulse 87 Temp 97.9 °F (36.6 °C) Resp 17 Ht 5' 2\" (1.575 m) Wt 77.6 kg (171 lb) SpO2 96% BMI 31.28 kg/m² ECOG PS: 2 General: no distress Eyes: anicteric sclerae HENT: atraumatic Neck: supple Respiratory: normal respiratory effort CV: 1-2+ BLE edema noted GI: soft, nontender, nondistended, no masses, no hepatomegaly, no splenomegaly Skin: no rashes; no ecchymoses; no petechiae Psych: alert, oriented, appropriate affect, normal judgment/insight Results:  
 
Lab Results Component Value Date/Time WBC 8.9 02/05/2021 04:25 AM  
 HGB 10.7 (L) 02/05/2021 04:25 AM  
 HCT 33.0 (L) 02/05/2021 04:25 AM  
 PLATELET 558 24/51/9316 04:25 AM  
 MCV 93.5 02/05/2021 04:25 AM  
 ABS. NEUTROPHILS 6.8 02/05/2021 04:25 AM  
 
Lab Results Component Value Date/Time Sodium 143 02/05/2021 04:25 AM  
 Potassium 3.4 (L) 02/05/2021 04:25 AM  
 Chloride 113 (H) 02/05/2021 04:25 AM  
 CO2 24 02/05/2021 04:25 AM  
 Glucose 122 (H) 02/05/2021 04:25 AM  
 BUN 35 (H) 02/05/2021 04:25 AM  
 Creatinine 1.27 (H) 02/05/2021 04:25 AM  
 GFR est AA 51 (L) 02/05/2021 04:25 AM  
 GFR est non-AA 42 (L) 02/05/2021 04:25 AM  
 Calcium 7.7 (L) 02/05/2021 04:25 AM  
 Glucose (POC) 137 (H) 02/05/2021 12:50 PM  
 
Lab Results Component Value Date/Time Bilirubin, total 0.4 02/05/2021 04:25 AM  
 ALT (SGPT) 14 02/05/2021 04:25 AM  
 Alk. phosphatase 81 02/05/2021 04:25 AM  
 Protein, total 5.3 (L) 02/05/2021 04:25 AM  
 Albumin 2.0 (L) 02/05/2021 04:25 AM  
 Globulin 3.3 02/05/2021 04:25 AM  
 
Lab Results Component Value Date/Time Ferritin 160 (H) 10/12/2016 01:56 PM  
 Sed rate (ESR) 47 (H) 09/02/2014 04:02 PM  
 Sed rate, automated 58 (H) 09/03/2014 05:45 PM  
 C-Reactive Protein, Cardiac 149.80 (H) 09/02/2014 04:02 PM  
 TSH 1.760 01/29/2021 11:44 AM  
 Antinuclear Antibodies Direct Negative 10/12/2016 01:56 PM  
 Lipase 45 (L) 01/18/2021 12:15 PM  
 
Lab Results Component Value Date/Time  
 aPTT 35.6 (H) 02/05/2021 12:15 PM  
 
Lab Results Component Value Date/Time  CEA 20.3 02/02/2021 09:13 PM  
 CEA 26.1 (H) 02/02/2021 01:25 PM  
 Carbohydrate Antigen 19-9, (CA 19-9) 147 (H) 02/02/2021 01:25 PM  
 CA-125 108 (H) 02/02/2021 09:13 PM  
 
2/3/21 CT CHEST ABD PELV W CONT IMPRESSION 
  
1. Bilateral pulmonary emboli. 2. Large volume ascites with diffuse distention of small bowel loops and 
nodularity of the peritoneum compatible with peritoneal carcinomatosis and small 
bowel obstruction. 3. Bilateral pleural effusions. 2/3/21 Bilateral LE edema Bilateral lower extremity venous duplex negative for deep venous thrombosis or thrombophlebitis in the visualized veins. 2/4/21 XR CHEST IMPRESSION Central venous acces catheter in position for use. 
 No pneumothorax 
 No other significant interval change. 2/4/21 US GUIDE PARACENTESIS 
pending Assessment and Recommendations:  
62 yo female with PMHx of DM, HTN, PTSD admitted with SBO.  
 
1. Peritoneal carcinomatosis / Small Bowel obstruction:  
Concerning for malignancy and awaiting results from biopsy taken with sigmoidoscopy. CEA 20.3, CA-125 108, CA 19-9 147 - these levels of tumor markers are nonspecific. Dr. Alvin Garcia from general surgery has evaluated and recommend conservative management at this time given SBO is current improving, pt having stools. However, the peritoneal carcinomatosis places pt at risk for recurrent SBO.  
-- Follow up biopsy results from sigmoidoscopy - if patient is discharged home, we will arrange for oncology follow up to discuss results and formulate a plan.  
-pt would like to go home and discuss with her family before making follow up appt; info to our clinic placed in discharge summery and clinic card provided to pt.  
-discussed with FP 
-discussed with radiology ; scans reviewed and recommend if needed pt could have CT guided bx of omental as outpatient 2. Ascites:  
Likely malignant, but 1/21/21 paracentesis was negative for malignant cells. 2/4  paracentesis : 1950 ml fluid drained; cytology pending 3. Colitis: On abx coverage per primary team 
 
4. Bilateral Pulmonary emboli Likely provoked by malignancy. BLE dopplers negative for DVT. -- Agree with heparin gtt for now. If patient is otherwise ready for discharge home, could switch to therapeutic Lovenox (1mg/kg SQ every 12 hrs). -recommend home with Lovenox until pt decides on if she would like additional testing if needed or tx. ; discussed with FP 
 
 
5. Type II DM: Management per primary team 
 
6. Health maintenance: 
Had last colonoscopy 6 yrs ago, was due last year, but delayed due to Zafar. Up to date on yearly mammograms. Plan reviewed with Dr Marlyn Sharp Signed By: Bruce Segal NP

## 2021-02-05 NOTE — PROGRESS NOTES
Pt's right IJ removed pressure applied for 5 minutes no bleeding or swelling noted covered with teraderm
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
## 2021-02-07 NOTE — PROGRESS NOTES
Physician Progress Note Tracy BOWLES #:                  [id number] :                       1955 ADMIT DATE:       2021 3:26 PM 
[address] DATE:        2021 6:59 PM 
RESPONDING 
PROVIDER #:        Sean Martinez MD 
 
 
 
 
QUERY TEXT: 
 
Pt admitted with Peritoneal carcinomatosis / Small Bowel obstruction and has colitis documented. If possible, please document the type of colitis in the medical record. The medical record reflects the following: 
Risk Factors: 62 yo F admitted with Peritoneal carcinomatosis / Small Bowel obstruction Clinical Indicators:  Progress notes state \"Colitis:?CT abd/pelv on  significant for moderate diffuse enterocolitis, sigmoid colonic wall thickening, moderate intraperitoneal ascites. Per pt she was placed on cipro to treat the colitis. 
-Not seen on CT during this admission\" Treatment:  IV 1 L NS Bolus, H*&P says continue cipro but not noted on [facility] Options provided: 
-- Bacterial Colitis -- Ischemic Colitis -- Ulcerative Colitis -- Colitis d/t radiation -- Other - I will add my own diagnosis -- Disagree - Not applicable / Not valid -- Disagree - Clinically unable to determine / Unknown 
-- Refer to Clinical Documentation Reviewer PROVIDER RESPONSE TEXT: 
 
This patient has bacterial colitis.  
 
Query created by: Mirna Larry on 2021 12:12 PM 
 
 
Electronically signed by:  Sean Martinez MD 2021 5:11 AM
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
## 2021-02-08 NOTE — PROGRESS NOTES
Patient contacted regarding recent discharge and COVID-19 risk. Discussed COVID-19 related testing which was not done at this time. Test results were not done. Patient informed of results, if available? N/A Outreach made within 2 business days of discharge: Yes Care Transition Nurse/ Ambulatory Care Manager/ LPN Care Coordinator contacted the patient by telephone to perform post discharge assessment. Verified name and  with patient as identifiers. Patient has following risk factors of: diabetes and Peritoneal carcinomatosis . CTN/ACM/LPN reviewed discharge instructions, medical action plan and red flags related to discharge diagnosis. Reviewed and educated them on any new and changed medications related to discharge diagnosis; Rx - lovenox/ Discontinue - cipro, lisinopril. Advised obtaining a 90-day supply of all daily and as-needed medications. Advance Care Planning:  
Does patient have an Advance Directive: currently not on file; patient declined education Patient declines education regarding infection prevention, and signs and symptoms of COVID-19 and when to seek medical attention. Patient/family/caregiver given information for [facility] and agrees to enroll no Patient's preferred e-mail:  N/A Patient's preferred phone number: N/A Based on Loop alert triggers, patient will be contacted by nurse care manager for worsening symptoms. Patient verbalizes concerns regarding recent hospital admission; ACM provided patient with contact information for [facility]. Pt was advised to f/u with PCP (Dr. Greta Cabral) on  for virtual f/u, Dr. Maximo Echavarria (Hem/Onc) post-discharge. Patient reports that she does not plan to f/u with Dr. Maximo Echavarria post-discharge and her daughter is currently trying to get her scheduled with [facility] Hem/On[facility] Dept. Plan for follow-up call in 7-14 days based on severity of symptoms and risk factors.

## 2021-02-09 NOTE — TELEPHONE ENCOUNTER
----- Message from Charis Cespedes sent at 2/8/2021  2:00 PM EST -----  Regarding: Dr. Hoang Echeverria first and last name: Heidi Dean Bustillosvard       Reason for call: Requested a call back       Callback required yes/no and why: Yes       Best contact number(s): [phone]      Details to clarify the request: Tone Palomares stated they admitted the patient through home health services today and she's complaining of some nausea and vomiting since she got home from the hospital.       Charis De Los Santos

## 2021-02-11 NOTE — TELEPHONE ENCOUNTER
Per call from Ran with [facility], she notes that patient states she's tried. She wants to post phone until next week.     Questions call [phone]

## 2021-02-15 NOTE — TELEPHONE ENCOUNTER
Charis with [facility] calling, notes pt was seen today in reports fall on Friday. She was coming inside from deck back door in tried to grab her Rolator which apparently may not have been locked in fell backwards. She did hit her head, no bruising, no fractures and no pain from fall. Asked that you be aware. Also notes that patient states she's in a lot of pain from current issues such as abd pain, bowel obstructions,and etc. Asking if you can provide medication for pain?     Call [phone]

## 2021-02-15 NOTE — TELEPHONE ENCOUNTER
[facility] calling for status of medical release request. I did confirm office received per [facility]. Provided fax number to [facility] for status.

## 2021-02-17 NOTE — TELEPHONE ENCOUNTER
Charis with [facility] calling back, relayed message of Dr. Ramana Dixon. She understands. She states that patient was seen yesterday at [facility]. She states the patient wants to hold off PT until next week.       [phone]

## 2021-02-23 NOTE — PROGRESS NOTES
ACM outreach to patient today in order to perform final COVID Care Transitions assessment; two patient identifiers verified. Patient states that she is currently admitted to the hospital, however did not state name of facility. Patient states, Gregory Albert you call me back another time\" and disconnected telephone call. No current [facility]HAB[facility] facility admissions are noted in EMR. COVID Care Transitions episode resolved at this time; patient will be reassigned to ACM/CTN Team as needed at hospital discharge.

## 2021-03-01 NOTE — TELEPHONE ENCOUNTER
----- Message from Bing Calderón sent at 2/26/2021  4:27 PM EST -----  Regarding: Dr. Posada Venancio first and last name:Joelle/home health  Reason for call: resume service  Callback required yes/no and why: yes  Best contact number(s): [phone]  Details to clarify the request:Joelle calling to advise Riana Collazo will be discharged from [place] [address] today 2/26 and home health services will resume 2/27/21

## 2021-03-12 NOTE — TELEPHONE ENCOUNTER
Per call from [place] with [facility], pt eval was done on yesterday. They will be faxing over Plan of Care orders to be signed.     Questions call/ [phone]

## 2021-03-18 NOTE — TELEPHONE ENCOUNTER
----- Message from [place] sent at 3/18/2021  8:16 AM EDT -----  Regarding: Dr. Valverde/Telephone  General Message/Vendor Calls      Caller's first and last name:  Mynor Kline with [facility]      Reason for call:  Pt has a previously closed abdominal wound that is now red with green drainage. Requesting a call back to discuss.        Callback required yes/no and why: yes      Best contact number(s): [phone]      Details to clarify the request:  [facility]

## 2021-03-18 NOTE — TELEPHONE ENCOUNTER
----- Message from Wali Fleming sent at 3/18/2021  1:34 PM EDT -----  Regarding: Dr. Saunders Mode: [phone]  General Message/Vendor Calls    Caller's first and last name: Carlitos Roman, [address]      Reason for call: Prior authorization       Callback required yes/no and why: Yes/Confirm       Best contact number(s):[phone]      Details to clarify the request: Carlitos Roman is calling on a prior authorization for medication Dexcom G6.         Wali [place]

## 2021-03-19 NOTE — PROGRESS NOTES
Timmy Woodruff 61 y.o. female 1955 [address] 
[id number] [address]:   
Telemedicine Progress Note Daphne Atwood MD 
  
 
Encounter Date and Time: March 19, 2021 at 2:51 PM 
 
Consent: 
She and/or the health care decision maker is aware that that she may receive a bill for this telephone service, depending on her insurance coverage, and has provided verbal consent to proceed: Yes Chief Complaint Patient presents with  Transitions Of Care History of Present Illness Timmy Woodruff is a 61 y.o. female was evaluated by synchronous (real-time) audio-video technology from home, through the Audio only. Today hospital follow up. Pt was admitted to [address] Pt admitted on 3/2/21 and discharge on 3/8/21. During time pt underwent exploratory laparotomy. Placement of gastrostomy tube. Creation of loop ileostomy by Surg Onc Dr. Jonathan Crowell. Pt did have complications postop and required STICU care for hypotension, which improved with albumin boluses and 1 unit PRBC. Pt improved and Pt toleration PO on discharge. Since discharge pt has had some problems with fit of ileostomy, has been working with [facility]. On LR ( 2L) for dehydration at home. Is taking po intake and holding on tubes right now. Skilled nursing once a week and PT/OT at home. On Lomotil 4x a day and Imodium 4 caps 4x a day, and metamucil at TID with meals. Continues to hold Lisinopril, Asa and Lipitor. Continues to be on Lovenox injection. Has talked to heme/onc who noted pt would not be candidate for chemo or radiation right now. Pain is well controlled and has not needed any pain medication. Pt continues to be in good spirits. Was seen at [facility] yesterday due to concern for infection and everything checked out. Pt does have stage 1-2 bed sores getting wound care at home.  Does get out of breath at times and daughter notes times when oxygen saturation dips to low 80s. Has been checked at home by nursing and had readings in low 80s.   
 
   
Review of Systems Review of Systems Constitutional: Positive for malaise/fatigue. Negative for chills and fever. Respiratory: Positive for shortness of breath. Cardiovascular: Negative for chest pain. Neurological: Negative for dizziness. Vitals/Objective:  
 
General: alert, cooperative, no distress Mental  status: mental status: alert, oriented to person, place, and time, normal mood, behavior, speech, dress, motor activity, and thought processes Resp: resp: normal effort and no respiratory distress Neuro: neuro: no gross deficits Due to this being a TeleHealth evaluation, many elements of the physical examination are unable to be assessed. Assessment and Plan: 1. Encounter for support and coordination of transition of care - will follow up complete medical record from [facility] rec done 2. SBO (small bowel obstruction) ([facility] 
- continue close follow up With Surg Onc  
- monitor for signs of infection around ostomy site 3. Gastric adenocarcinoma ([facility] 
- no current plans for chemo or radiation 4. Bronchitis 
- albuterol (ProAir HFA) 90 mcg/actuation inhaler; INHALE 2 PUFFS BY MOUTH EVERY 4 HOURS AS NEEDED FOR WHEEZE  Dispense: 8.5 Inhaler; Refill: 4 
 
5. Wheezing 
- albuterol (ProAir HFA) 90 mcg/actuation inhaler; INHALE 2 PUFFS BY MOUTH EVERY 4 HOURS AS NEEDED FOR WHEEZE  Dispense: 8.5 Inhaler; Refill: 4 
 
6. Type 2 diabetes mellitus with hyperglycemia, with long-term current use of insulin (HCC) - Will send [employer] Auth for Dex comp 7. Bilateral pulmonary embolism ([facility] 
- based on O2 readings at home will try to obtain home O2 for pt as it would be indicated for hypoxia  
- message sent to case management Time spent in direct conversation with the patient to include medical condition(s) discussed, assessment and treatment plan: 
 
  
We discussed the expected course, resolution and complications of the diagnosis(es) in detail. Medication risks, benefits, costs, interactions, and alternatives were discussed as indicated. I advised her to contact the office if her condition worsens, changes or fails to improve as anticipated. She expressed understanding with the diagnosis(es) and plan. Patient understands that this encounter was a temporary measure, and the importance of further follow up and examination was emphasized. Patient verbalized understanding. Patient informed to follow up: 1 month . Electronically Signed: Gabriel Schrader MD 
 
Providers location when delivering service (clinic, hospital, home): home CPT Codes 03351-22750 for Established Patients may apply to this Telehealth Visit. POS code: 18. Modifier GT Pursuant to the emergency declaration under the [address] [place] [address], [facility] waiver authority and the [employer] Act, this Virtual  Visit was conducted, with patient's consent, to reduce the patient's risk of exposure to COVID-19 and provide continuity of care for an established patient. Services were provided through a video synchronous discussion virtually to substitute for in-person clinic visit. History Patients past medical, surgical and family histories were reviewed and updated. Past Medical History:  
Diagnosis Date  Amputated toe of right foot ([address] due to dmII  Diabetes ([address]  Glaucoma Bilateral  
 Hypertension  Peripheral autonomic neuropathy due to diabetes mellitus ([address]  Peritoneal carcinomatosis ([facility] 2/5/2021  Psychiatric disorder PTSD; 9/11/2003 [place] [phone] [facility]  PTSD (post-traumatic stress disorder) Past Surgical History:  
Procedure Laterality Date  FLEXIBLE SIGMOIDOSCOPY N/A 1/21/2021 SIGMOIDOSCOPY FLEXIBLE [facility]  FLEXIBLE SIGMOIDOSCOPY N/A 2/3/2021 SIGMOIDOSCOPY FLEXIBLE performed by Lior Sanches MD [facility]  
 HX AMPUTATION Right Right  big toe  and right second toe amputated   HX CARPAL TUNNEL RELEASE Right  HX CATARACT REMOVAL Right  HX  SECTION    
 HX CHOLECYSTECTOMY  HX KNEE ARTHROSCOPY Right   
 right knee   HX OTHER SURGICAL    
 right groin cysts removed   HX TRABECULECTOMY Bilateral   
 
Family History Problem Relation Age of Onset  Heart Disease Mother  Hypertension Mother  Cancer Mother   
     cancer  Diabetes Father  Cancer Brother   
     cancer  Diabetes Brother  Diabetes Maternal Grandmother  Diabetes Paternal Grandmother  Hypertension Paternal Grandmother  Diabetes Paternal Grandfather Social History Socioeconomic History  Marital status:  Spouse name: Not on file  Number of children: Not on file  Years of education: Not on file  Highest education level: Not on file Occupational History  Not on file Social Needs  Financial resource strain: Not on file  Food insecurity Worry: Not on file Inability: Not on file  Transportation needs Medical: Not on file Non-medical: Not on file Tobacco Use  Smoking status: Current Every Day Smoker Packs/day: 1.00 Years: 47.00 Pack years: 47.00 Types: Cigarettes  Smokeless tobacco: Never Used Substance and Sexual Activity  Alcohol use: No  
 Drug use: No  
 Sexual activity: Not Currently Lifestyle  Physical activity Days per week: Not on file Minutes per session: Not on file  Stress: Not on file Relationships  Social connections Talks on phone: Not on file Gets together: Not on file Attends Uats[employer] service: Not on file Active member of club or organization: Not on file Attends meetings of clubs or organizations: Not on file   Relationship status: Not on file  Intimate partner violence Fear of current or ex partner: Not on file Emotionally abused: Not on file Physically abused: Not on file Forced sexual activity: Not on file Other Topics Concern  Not on file Social History Narrative  Not on file Patient Active Problem List  
Diagnosis Code  Hyperlipidemia E78.5  Posttraumatic stress disorder F43.10  Diabetic foot ulcer ([facility] E11.621, L97.509  Glaucoma, narrow-angle H40.20X0  Diabetes mellitus with complication ([facility] Q16.6  Family hx of colon cancer Z80.0  Essential hypertension I10  
 Persistent proteinuria associated with type 2 diabetes mellitus ([facility]) E11.29, R80.9  Type 2 diabetes mellitus with hyperglycemia, with long-term current use of insulin ([facility]) E11.65, Z79.4  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 [id number]  Depression F32.9  Diverticulitis K57.92  
 Posterior vitreous detachment of right eye H43.811  Stable proliferative diabetic retinopathy of both eyes associated with type 2 diabetes mellitus ([facility] T83.5786  Diabetic peripheral neuropathy ([facility]) E11.42  
 Hypoglycemia E16.2  SBO (small bowel obstruction) ([facility] K56.609  Abdominal pain R10.9  Bilateral pulmonary embolism (HCC) I26.99  
 Colitis K52.9  Aortic atherosclerosis ([facility]) I70.0  Peritoneal carcinomatosis ([facility]) C78.6, C80.1 Current Medications/Allergies Medications and Allergies reviewed: 
 
Current Outpatient Medications Medication Sig Dispense Refill  albuterol (ProAir HFA) 90 mcg/actuation inhaler INHALE 2 PUFFS BY MOUTH EVERY 4 HOURS AS NEEDED FOR WHEEZE 8.5 Inhaler 4  
 diphenoxylate-atropine (LomotiL) 2.5-0.025 mg per tablet Take 2 Tabs by mouth four (4) times daily.  loperamide (IMMODIUM) 2 mg tablet Take 4 mg by mouth four (4) times daily as needed for Diarrhea.  psyllium (METAMUCIL) packet Take 1 Packet by mouth three (3) times daily.     
 insulin detemir U-100 (LEVEMIR FLEXTOUCH) 100 unit/mL (3 mL) inpn 20 Units by SubCUTAneous route daily.  ondansetron (ZOFRAN ODT) 4 mg disintegrating tablet Take 1 Tab by mouth every eight (8) hours as needed for Nausea or Vomiting. 30 Tab 0  
 SITagliptin (Januvia) 100 mg tablet Take 1 Tab by mouth every morning. 90 Tab 3  Blood-Glucose Meter,Continuous (Dexcom G6 ) misc Use for continuous monitoring of glucose. 1 Each 1  Blood-Glucose Sensor (Dexcom G6 Sensor) nishi Use for continuous glucose monitoring 3 Device 5  Blood-Glucose Transmitter (Dexcom G6 Transmitter) nishi Use for continuous glucose monitoring 1 Device 3  
 senna-docusate (PERICOLACE) 8.6-50 mg per tablet Take 1 Tab by mouth daily. 30 Tab 0  
 ondansetron (ZOFRAN ODT) 4 mg disintegrating tablet Take 1 Tab by mouth every eight (8) hours as needed for Nausea or Vomiting. 30 Tab 1  
 busPIRone (BUSPAR) 10 mg tablet Take 10 mg by mouth two (2) times daily as needed (anxiety).  furosemide (LASIX) 20 mg tablet Take 1 Tab by mouth daily. 5 Tab 0  
 atorvastatin (LIPITOR) 40 mg tablet TAKE 1 TABLET BY MOUTH EVERY DAY 30 Tab 6  
 aspirin delayed-release 81 mg tablet Take 81 mg by mouth nightly. Allergies Allergen Reactions  Keflex [Cephalexin] Hives  Pcn [Penicillins] Hives  Tanzeum [Albiglutide] Nausea Only  Vioxx [Rofecoxib] Nausea Only

## 2021-03-19 NOTE — TELEPHONE ENCOUNTER
Per call from Ran with [facility], pt missed PT VISITS as other people seeing her in she was just at [facility] yesterday. Therefore  Pt was only seen once this week. Ask that you be aware.     Call [phone]

## 2021-03-23 PROBLEM — A41.9 SEVERE SEPSIS (HCC): Status: ACTIVE | Noted: 2021-01-01

## 2021-03-23 PROBLEM — R65.20 SEVERE SEPSIS (HCC): Status: ACTIVE | Noted: 2021-01-01

## 2021-03-23 NOTE — TELEPHONE ENCOUNTER
Prior auth approval for Dexcom G6 approval for 1 year expires 03/21/2022 ref# [id number]. Pt notified and request rx  be sent to [address] [place] [address]. Rx called to [facility] from Dr. Lady Almanza previous rx and states will be ready for  later this evening.

## 2021-03-23 NOTE — TELEPHONE ENCOUNTER
Megan/OT with [facility] calling , notes pt need wheel chair. Asking for order for manual wheel chair for assessment.     Fax to [phone]   [phone]      thanks

## 2021-03-24 PROBLEM — R41.0 DISORIENTATION: Status: ACTIVE | Noted: 2021-01-01

## 2021-03-24 PROBLEM — R94.31 PROLONGED Q-T INTERVAL ON ECG: Status: ACTIVE | Noted: 2021-01-01

## 2021-03-24 PROBLEM — R06.02 SHORTNESS OF BREATH: Status: ACTIVE | Noted: 2021-01-01

## 2021-03-24 PROBLEM — N18.32 STAGE 3B CHRONIC KIDNEY DISEASE (HCC): Status: ACTIVE | Noted: 2021-01-01

## 2021-03-24 PROBLEM — S31.000A SACRAL WOUND: Status: ACTIVE | Noted: 2021-01-01

## 2021-03-24 PROBLEM — E87.6 HYPOKALEMIA: Status: ACTIVE | Noted: 2021-01-01

## 2021-03-24 NOTE — PROGRESS NOTES
Bedside and Verbal shift change report given to Darrick Redd RN (oncoming nurse) by Maribel Acosta RN (offgoing nurse). Report included the following information SBAR, Kardex, Intake/Output, MAR, Accordion and Recent Results. 1 - Called family practice regarding pt's MAP in 50's with mariana drip at 45 mcg/min and voiced concerns of possibly needing pressors if pt does not tolerate the mariana drip. Per MD continue to monitor the MAP and titrate as necessary, call if drip is titrated up to 75 mcg/min. [address] with family practice about pt mariana drip at 70 mcg/min. [facility] w/ family practice regarding patient's ostomy leaking & constant output, causing frequent ostomy and wound care changes. Per MD page gen surg for any suggestions. Voiced concern over pt potassium level with constant ostomy output. This patient was assisted with Intentional Toileting every 2 hours during this shift. Documentation of ambulation and output reflected on Flowsheet. Bedside and Verbal shift change report given to Samuel Baker RN (oncoming nurse) by Darrick Redd RN (offgoing nurse). Report included the following information SBAR, Kardex, Intake/Output, MAR, Accordion and Recent Results.

## 2021-03-24 NOTE — PROGRESS NOTES
[address]   Senior Resident Admission Note    CC: AMS    HPI:  Nelli Yoon is a 61 y.o. female with PMHx of metastatic colon cancer in which she underwent exploratory lap with placement of G tube, loop ileostomy and open lac scar  on 3/2/21 at [facility] (Dr. Michael Reynaga). She had post op complications and required STICU for hypotension which improved with albumin and 1U PRBC. She presents to the ER via EMS for Altered mental status, confusion and inability to follow directions onset at 1600 today. Daughter states that she had a an episode of AMS the day prior that lasted about 30 minutes ten resolved. Dtr states that Pt has had some intermittent SOB with some hypoxia into the 80's and non productive cough for the past week but other wise Pt has not been complaining of any symptoms including fever, chills, abdominal pain, n/v/d, dysuria, hematuria, dizziness, HA, body aches. Has been eating and drinking well and voiding without issue. Dtr states that she has been holding on tube feeds now and has been receiving 2L of LR for dehydration at home. She has had a PICC line in place in the Rt arm for ~4 week but dtr has not noticed any external signs of surrounding infection. She has had ~3-4L output form ileostomy daily. There was some concern last week for an infection around the G tube site and was seen a [facility] and told everything looked okay. Was told it was yeast and advised to use nystatin. She continues with the Lovenox BID for known bilateral PE's and she does not have a home O2 requirement. Her blood sugars have been fluctuating the past few days and she has not needed to take the detemir in the last 2 days due to low POC glucose readings. Pt is followed by hemeonc at [facility] and was told she was not a candidate for chemo or radiation at this time. She has also established with palliative.         In the ED:   Vitals:   Patient Vitals for the past 4 hrs:   Pulse Resp BP SpO2   03/24/21 0000 89 20 (!) 127/56 94 %   03/23/21 2300 90 30 (!) 104/57 97 %   03/23/21 2245 88 30 (!) 126/50 95 %   03/23/21 2151 87 (!) 31 (!) 124/56 96 %   03/23/21 2143    95 %         Labs: LA 5.6, WBC 15.5, K 3.0, Bicarb >45, Cl 87, Cr 1.72. EKG: NSR rate 88. Imaging: CT head wnl, CXR  Lt small to moderate pleural effusion and basilar atelectasis, No edema or consolidation. Pt received: 1L NS bolus in ED (part of sepsis bundle)       SH: Alcohol Consumption: yes 1/2ppd- Smoking: no, Living arrangement: daughter and son in law, Ambulation: walker  Code Status: FULL     Chart reviewed. Patient seen, examined, and discussed with Dr. Margarito Valentin  (PGY-1). See Resident H&P note for more details. Pertinent PE Findings:   Physical Exam  Vitals signs and nursing note reviewed. Constitutional:       General: She is awake. She is not in acute distress. Appearance: She is ill-appearing (chronic). HENT:      Head: Normocephalic and atraumatic. Mouth/Throat:      Mouth: Mucous membranes are dry. Eyes:      Extraocular Movements: Extraocular movements intact. Pupils: Pupils are equal, round, and reactive to light. Neck:      Musculoskeletal: Normal range of motion. Cardiovascular:      Rate and Rhythm: Normal rate and regular rhythm. Pulses: Normal pulses. Heart sounds: Normal heart sounds. No murmur. No friction rub. No gallop. Pulmonary:      Effort: Pulmonary effort is normal. No respiratory distress. Breath sounds: Normal breath sounds. No wheezing, rhonchi or rales. Abdominal:      General: Bowel sounds are normal. There is no distension. Palpations: Abdomen is soft. There is no mass. Tenderness: There is no abdominal tenderness. There is no guarding.           Comments: Moderate amount of yeast surround the outside of the G-tube   Packing in place in open lap scar   Small amount of yellow/brown liquid in ostomy bag     Overall no overt signs of infection surround open lap scar, ostomy , or Gtube    Musculoskeletal: Normal range of motion. Lymphadenopathy:      Cervical: No cervical adenopathy. Skin:     General: Skin is warm and dry. Neurological:      Mental Status: She is alert. She is confused. Comments: A&O x1 (self)   Able to follow most commands but does not answer questions           A/P: Symptoms likely d/t Severe sepsis      Severe Sepsis: infectious sources likely Intrabdominal vs PICC line. Less likely sacral wounds. 4/4 SIRS criteria met (WBC 15.5, HR 94, RR 39, T 100.8) with LA 5.6. CXR reveals Lt small to moderate pleural effusion and basilar atelectasis, No edema or consolidation.    - Admit to tele  - Vital signs per unit protocol  -Strict I & O  - IV Fluids: Sepsis bundle initiated in ER. Continue MIVF to maintain a MAP >65, would consider pressors if MAP drops below goal.   - IV Antibiotics: Broad spectrum Vancomycin for MRSA coverage, & Meropenam for Grm pos, neg and anaerobic coverage ( Pt w/ PCN, cephalosporin allergy and prolonged Qtc- discuss abx tx with pharmacy prior)   - Obtain blood cultures form PICC line and peripheral, Urine Cx   - CT Abd/Pelvis w/wo contrast- GFR 30, may need to hydrate first and recheck GFR prior   -daily CBC, BMP  - trend LA q4h until <2  -consult general surgery appreciate recs. AMS : likely 2/2 severe sepsis. CT head unremarkable. Ammonia high at 50 but noted to have hemolyzed. Lipase wnl. UDS neg,   -f/u ETOH   -f/u Trop   -repeat Ammonia given hemolyzed initially       SOB with new O2 requirement: known to have BL PE treated with BID lovenox outpatient. 2L O2 NC in ED and sats improved. New hypoxia likely d/t newonset severe sepsis. PESI score 194 in setting of known PE's. CXR unremarkable without signs of infection   -continue home Lovenox 1mg/kg BID     Elevated Bicarb:POA >45.  likely 2/2 hypokalemia and IVVD.     -daily BMP     Elevated Creatinine on CKD 3: POA Cr 1.72 (BL 1.1-1. 3)   -daily BMP   -avoid nephrotoxic agents     Bilateral sacral wound: appear to be stage 1. I do no suspect these are the source of infection as they appear to be well healing and are rather superficial.   -wound care consulted appreciate assistance       Hypokalemia: POA 3.0. 40 meq PO now   -daily BMP  -replete PRN       QTc prolonged: POA   -avoid prolonging agents       I agree with remaining assessment and plan as documented in Dr. Kin Jerez  note.       Pt discussed with Dr. Fredi Maurice  (on-call attending physician)    Mana Cabral DO  Family Medicine Resident PGY-2

## 2021-03-24 NOTE — ED NOTES
TRANSFER - OUT REPORT:    Verbal report given to CHARLEE Morataya (name) on Arnold Baumann  being transferred to [facility] (unit) for routine progression of care       Report consisted of patients Situation, Background, Assessment and   Recommendations(SBAR). Information from the following report(s) SBAR, Kardex, ED Summary, [facility] and Recent Results was reviewed with the receiving nurse. Lines:   Peripheral IV 03/23/21 Left Wrist (Active)   Site Assessment Clean, dry, & intact 03/23/21 2203   Phlebitis Assessment 0 03/23/21 2203   Infiltration Assessment 0 03/23/21 2203   Dressing Status Clean, dry, & intact 03/23/21 2203        Opportunity for questions and clarification was provided.       Patient transported with:   Monitor  Patient-specific medications from Pharmacy  Registered Nurse  Tech

## 2021-03-24 NOTE — CONSULTS
[facility]     Name: Dorothy Jones MRN: [id number]   : 1955 Hospital: [address]   Date: 3/24/2021        Impression Plan   1. Intermittent hypoxia  2. Shortness of breath  3. Small to moderate left side pleural effusion  4. Metastatic adenocarcinoma  5. Mild to moderate pulmHTN  6. Wheezing  7. Atelectasis               · Hypoxia likely due to atelectasis due to distended abdomen and lack of diaphragmatic exursion. Pt able to lay flat on RA without desaturations. Clinically very little benefit in thoracentesis in a patient with known metastatic disease and generalized anasarca. Discussed with with her daughter at bedside. · Agree with albuterol for any wheezing  · IS  · OOB if pt is able         Radiology  ( personally reviewed) CT abdomen reviewed- small to moderate left sided pleural effusion with associated atelectasis in LLL. No infiltrates. ABG No results for input(s): PHI, PO2I, PCO2I in the last 72 hours. Subjective     Cc: hypoxia    62 yo with metastatic adenocarcinoma and PEs on lovenox who presented with increasing confusion on 3/23. Found to be intermittently hypoxic and with left sided pleural effusion. Kadie states that O2 sats intermittently fluctuates low at home when pt is laying flat, but increase as pt is moving around. They have noted some wheezing, but was written for albuterol which resolves the sxs. Pt currently laying flat on RA with sats of 92%. Pt received more than 2.5 L of fluids on admission for sepsis and despite this has been hypotensive today, requiring mariana. Has had persistent leg edema for over 2 months. Echo with mild to modetate pulmHTN and mild diatolic LV dysfunction    Review of Systems:  A comprehensive review of systems was negative except for that written in the HPI.     Past Medical History:   Diagnosis Date    Amputated toe of right foot ([address]     due to dmII    Diabetes ([address]     Glaucoma     Bilateral  Hypertension     Peripheral autonomic neuropathy due to diabetes mellitus [facility]     Peritoneal carcinomatosis ([facility] 2021    Psychiatric disorder     PTSD; 2003 [address] PTSD (post-traumatic stress disorder)       Past Surgical History:   Procedure Laterality Date    FLEXIBLE SIGMOIDOSCOPY N/A 2021    SIGMOIDOSCOPY FLEXIBLE performed by Darell Rob MD at [address] N/A 2/3/2021    [address] performed by Darell Rob MD at [facility] HX AMPUTATION Right     Right  big toe  and right second toe amputated     HX CARPAL TUNNEL RELEASE Right     HX CATARACT REMOVAL Right     HX  SECTION      HX CHOLECYSTECTOMY      HX KNEE ARTHROSCOPY Right     right knee     HX OTHER SURGICAL      right groin cysts removed     HX TRABECULECTOMY Bilateral       Prior to Admission medications    Medication Sig Start Date End Date Taking? Authorizing Provider   SITagliptin (Januvia) 100 mg tablet Take 100 mg by mouth every evening. Yes Provider, Historical   enoxaparin (Lovenox) 80 mg/0.8 mL injection 80 mg by SubCUTAneous route every twelve (12) hours. Yes Provider, Historical   nystatin (MYCOSTATIN) powder Apply  to affected area two (2) times a day. To j-peg site    Yes Provider, Historical   albuterol (ProAir HFA) 90 mcg/actuation inhaler INHALE 2 PUFFS BY MOUTH EVERY 4 HOURS AS NEEDED FOR WHEEZE 3/19/21  Yes Collette Valverde MD   diphenoxylate-atropine (LomotiL) 2.5-0.025 mg per tablet Take 2 Tabs by mouth Before breakfast, lunch, dinner and at bedtime. Yes Provider, Historical   loperamide (IMMODIUM) 2 mg tablet Take 4 mg by mouth Before breakfast, lunch, dinner and at bedtime. Yes Provider, Historical   psyllium (METAMUCIL) packet Take 1 Packet by mouth Before breakfast, lunch, dinner and at bedtime.    Yes Provider, Historical   insulin detemir U-100 (LEVEMIR FLEXTOUCH) 100 unit/mL (3 mL) inpn by SubCUTAneous route daily. Patient has been using on a sliding scale due to low blood glucose readings recently. Dose of 2 units on 3/22/21   Yes Provider, Historical     Current Facility-Administered Medications   Medication Dose Route Frequency    meropenem (MERREM) 500 mg in sterile water (preservative free) 10 mL IV syringe  0.5 g IntraVENous Q8H    sodium chloride (NS) flush 5-40 mL  5-40 mL IntraVENous Q8H    insulin lispro (HUMALOG) injection   SubCUTAneous AC&HS    enoxaparin (LOVENOX) injection 80 mg  1 mg/kg SubCUTAneous Q12H    0.9% sodium chloride infusion  125 mL/hr IntraVENous CONTINUOUS    [START ON 3/25/2021] vancomycin (VANCOCIN) 1,250 mg in 0.9% sodium chloride 250 mL (VIAL-MATE)  1,250 mg IntraVENous Q24H    diphenoxylate-atropine (LOMOTIL) tablet 2 Tab  2 Tab Oral QID    psyllium husk-aspartame (METAMUCIL FIBER) packet 1 Packet  1 Packet Oral TID WITH MEALS    nystatin (MYCOSTATIN) 100,000 unit/gram powder   Topical BID    PHENYLephrine (ROGER-SYNEPHRINE) 30 mg in 0.9% sodium chloride 250 mL infusion   mcg/min IntraVENous TITRATE     Allergies   Allergen Reactions    Keflex [Cephalexin] Hives    Pcn [Penicillins] Hives    Tanzeum [Albiglutide] Nausea Only    Vioxx [Rofecoxib] Nausea Only      Social History     Tobacco Use    Smoking status: Current Every Day Smoker     Packs/day: 1.00     Years: 47.00     Pack years: 47.00     Types: Cigarettes    Smokeless tobacco: Never Used   Substance Use Topics    Alcohol use: No      Family History   Problem Relation Age of Onset    Heart Disease Mother     Hypertension Mother     Cancer Mother         cancer    Diabetes Father     Cancer Brother         cancer    Diabetes Brother     Diabetes Maternal Grandmother     Diabetes Paternal Grandmother     Hypertension Paternal Grandmother     Diabetes Paternal Grandfather           Laboratory: I have personally reviewed the critical care flowsheet and labs.      Recent Labs 03/24/21  0454 03/23/21  2150   WBC 16.5* 15.5*   HGB 9.4* 11.8   HCT 29.6* 37.4    369     Recent Labs     03/24/21 0454 03/23/21  2150    138   K 2.1* 3.0*   CL 93* 87*   CO2 43* >45*   GLU 81 80   BUN 24* 25*   CREA 1.41* 1.72*   CA 6.7* 7.8*   MG 1.7  --    PHOS 2.9  --    ALB 1.7* 2.0*   ALT  --  12       Objective:     Mode Rate Tidal Volume Pressure FiO2 PEEP                    Vital Signs:     TMAX(24)      Intake/Output:   Last shift:         Last 3 shifts: 03/24 0701 - 03/24 1900  In: 0   Out: 400 RRIOLAST3    Intake/Output Summary (Last 24 hours) at 3/24/2021 1536  Last data filed at 3/24/2021 1156  Gross per 24 hour   Intake 0 ml   Output 900 ml   Net -900 ml     EXAM:   GENERAL: well developed, on RA,, HEENT:  PERRL, EOMI, no alar flaring or epistaxis, oral mucosa moist without cyanosis, NECK:  no jugular vein distention, no retractions, no thyromegaly or masses, LUNGS: distant, CTA, no w/e/e, HEART:  Regular rate and rhythm with no MGR; +2 edema is present, ABDOMEN:  soft with no tenderness, bowel sounds present, EXTREMITIES:  warm with no cyanosis, SKIN:  no jaundice or ecchymosis and NEUROLOGIC:  alert and oriented, grossly non-focal    Deangelo Roldan MD  [facility]

## 2021-03-24 NOTE — ED NOTES
Bedside and Verbal shift change report given to Kelley Ahn RN (oncoming nurse) by Nancy Guillory RN/Li (offgoing nurse). Report included the following information SBAR, Kardex, ED Summary, MAR, Recent Results and Cardiac Rhythm NSR.

## 2021-03-24 NOTE — PROGRESS NOTES
Physical Therapy orders acknowledged, chart reviewed and discussed with nurse patient not medically appropriate for PT evaluation today. We will continue to follow up with the patient for therapy thank you.

## 2021-03-24 NOTE — ED NOTES
Pt to be given oral contrast and wait an hour after for her to have CT scan. Pt's daughter preferred for CT to be done this evening.

## 2021-03-24 NOTE — PROGRESS NOTES
Occupational Therapy Note:  Orders acknowledged, chart reviewed, and spoke with nursing. RN reports patient not medically appropriate for OT evaluation today. Will follow-up next tx day. Thank you.   Chey Pineda, OTR/L

## 2021-03-24 NOTE — WOUND CARE
Wound Consult:  First Visit. Chart reviewed. Consulted for Ostomy, abdominal lap wound, sacral,ischial wounds and blister on left foot  Patient laying in renetta bed, will add COBY, daughter at bedside who is caregiver at home. Very knowledgeable about patients care and dressings/ostomy changes. States patient sits in chair atfor long periods of time at home ,Son-in-law MD  NPO at this time, patient requesting food. Purewick in place. Illeostomy LLQ draining green/brown liquid in single ostomy bag. LUQ PEG intact and capped. Patient's nurse Ingrid Gomes at bedside for assessment and ostomy bag change. bilateral heels boggy and blanchable redness  Heels off loaded with heel boots. Patient is alert and orientedX4; requires some assist to move side to side in bed. Dr. Nya Root and NP at bedside during assessment and evaluated abdominal wound. Assessment: All wounds below are POA  Left hand skin tear 1x0.2x0. flap 80%intact pink wound bed, bloody drainage. Right  dorsal foot unroofed blister 8x6x0. 1 pink wound bed,  Moderate amount of serous sanguinous drainage. periwound slight erythema. Left medial lower leg blisters- two-1cm intact fluid filled blisters. Right/mid sacrum 3x2. 4x unstageable-100% slough with  Moderate amount ofserous sanguinous drainage, periwound moist and slightly white. Blanchable redness throughout sacral area. Unstageable pressure injury  Left sacrum 2x1x unstageable-100% slough, periwound red with blanchable redness throughout sacrum. Moderate amount of serous sanguinous drainage. Unstageable pressure injury  Right upper posterior thigh/  Lower edge of buttocks 5. 6x3- 80% slough/ re/pink wound bed. Slight serous drainage, blanchable erythema. Most likely from sitting in chair at home unstageable pressure injury  Left upper posterior thigh/ lower edge of buttocks 4x1.4x0.1`red/pink wound bed with slight serous drainage , periwound red with dry flaky skin.  Blanchable erythema, most likely from sitting in chair at home. Stage 2 pressure injury  LUQ PEG-  left of peg, area of pink macerated skin(1x2cm), irregular edges. Left abdominal ileostomy- ostomy red/budded, from 5 o'clock to 10oclock side of ostomy edges pulling away. one piece with areas of nonsticking wafer. daughter stated patient having issues of wafer nonadherent and needing daily changes. periwound intact, no breakdown noted. Right abdominal laparotomy scar opened surgically- 6x3. 6x4cm/ tunneling at [facility] 4cm/at 12oclock 7.2cm/ at [facility] wound bed pink/rd with stringy yellow nonviable tissue. noted larger amountof yellow at bottom of wound. With cleaning increase of odor. Moderate amount of yellow/brown serous sanguinous drainage. periwound blanchable erythema,moist.Upper end of incision well approximated,slight redness. small scabs on right and left arms. Edges intact. Treatment:  Abdominal laporotomy wound: cleanse with NSS, pack lightly with 1 piece of  Moist rolled gauze,cover with 4x4's and composite dressing. Left sacrum/right sacrum/bilateral posterior thigh wounds: cleanse with NSS, cover wound beds with maxsorb silver and foam dressing  Left hand skin tear- xeroform and foam dressing  Right dorsal foot cleanse with NSS and cover wound bed with xeroform,4x4's and rolled gauze  Left blisters-xeroform to blisters and cover with rolled gauze  Illeostomy: sureprep to periwound skin, ostomy wafer/bag changed. One piece with ring. PEG- marathon to periwound maceration. Wound Recommendations:  . Abdominal laporotomy wound: cleanse with NSS, pack lightly with 1 piece of  Moist rolled gauze,cover with 4x4's and composite dressing.  daily  Left sacrum/right sacrum/bilateral posterior thigh wounds: cleanse with NSS, cover wound beds with maxsorb silver and foam dressing every other day  Left hand skin tear- xeroform and foam dressing every 3 days  Right dorsal foot cleanse with NSS and cover wound bed with xeroform,4x4's and rolled gauze  Left blisters-xeroform to blisters and cover with rolled gauze every other day  Ostomy care- one piece with ring on wafer. Change as needed. PEG- marathon to periwound every 3 days or as needed. Skin Care Recommendations:  1. Minimize friction/shear: minimize layers of linen/pads under patient. 2. Off load pressure/reposition:   turn and reposition approximately every 2 hours; float heels   or use off loading heel boots; waffle cushion for sitting; position wedge. 3. Manage Moisture - keep skin folds dry; incontinence skin care with incontinence wipes;   barrier ointment; appropriate sized briefs if needed; pirewick in use to help contain urine. 4. Continue to monitor nutrition, pain, and skin risk scale, and skin assessment. Plan:  Spoke with Dr. Norma Rivera regarding findings and proposed orders for treatment. We will continue to reassess and as needed.     [facility], Wound / [address] [facility] Office [phone]

## 2021-03-24 NOTE — PROGRESS NOTES
[address] [facility] RESIDENCY PROGRAM  PROGRESS NOTE     3/25/2021  PCP: Maryam Jay MD     [address] [facility] Medicine Residency Attending Addendum:  I saw and evaluated the patient on the day of the encounter with Dr. Gutierrez Almeida, performing the key elements of the service. I discussed the findings, assessment and plan with the resident and agree with the resident's findings and plan as documented in the resident's note. Potassium repleted recheck labs this afternoon. Weaning mariana but if unable to, transition to Levophed and transferred to the ICU. Will consult infectious disease as well. We will culture the wound. Patient really desires to go home but understands that she needs to be stabilized prior to discharge. For repletion recommend potassium chloride rather than potassium bicarbonate. Sarah Ronquillo MD, FAAFP, Pedro Lin, [facility]      Assessment/Plan:     Ruth Larios is a 61 y.o. female with a PMHx of metastatic adenocarcinoma, T2DM, Neuropathy, HTN who is admitted for Severe Sepsis. 24 hour events: Neosyn gtt started for low MAP    Septic Shock: POA 4/4 SIRS (T100.8F, WBC 15.5, RR 39, HR 94), LA 5.6 (resolved). Likely 2/2 PICC line VS. intraabdominal. CXR w/o PNA. CT a/p ileus and locules of free air. Pt with open laparotomy incision with packing.   - Neosyn gtt to maintain MAP > 65 (started 3/23)  - Continue Vanc and Meropenem (started 3/23)  - BCx NG x1d  - UCx pending  - General surgery consulted - free locules of air more likely related to small fascial dehiscence opposed to perforated bowel. - OK for diet, no intervention at this time  - Nutrition consulted - consistent carb diet + protein supplementation  - NPO d/t ostomy bag leaking, MIVF  -Strict I&O  -Daily CBC, BMP    Hypokalemia: Persistent . High output from ostomy bag, unable to maintain potassium repletion. POA K 2.1.  Likely 2/2 gastric losses with ileostomy.    -Monitor closely, replete as needed  - 20meq KCL added to MIVF  - Consult to GI d/t excessive GI losses through ostomy bag    SOB: Covid neg. CXR slight inc L sm-mod pleural effusion & basilar atelectasis. LVEF 55-60% (2/2021). - Pulmonology consulted - hypoxia likely d/t atelectasis secondary to dec diaphragmatic excursion.          - recommend against thoracentesis          - albuterol neb prn for wheezing       Metabolic alkalosis w/ resp acidosis: persistent. Likely 2/2 gastric losses with ileostomy.    - Monitor bicarb on daily CMP    Hypotension: fluctuating. Has not required vasopressors yet. - Pramod-syn prn to maintain MAP > 65    AMS: Resolved. Likely 2/2 severe hypokalemia vs sepsis. CT head nml. POA glu 80. EtOH/UDS neg. Trop, NH3 neg.        Elevated Cr in CKD3: Likely 2/2 IVVD. POA Cr 1.72 (BL 1.1-1.3). -Continue to monitor on daily labs, expect to improve with fluids     Metastatic gastric/colon adenocarcinoma: S/p laparotomy, with open incision w/ packing, scheduled to see oncologic surgery on 3/[facility] at [facility]. S/p Ileostomy and PEG placement (3/2021). Home Lomotil 2 tabs QID, Imodium 4 caps QID prn, Metamucil TID with meals. Home 2L LR daily. F/w Surg Onc Dr. Jonathan Crowell ([facility]). -Consider Heme-Once consult   -Continue Lomotil QID, Psyllium husk TID w/meals  - Wound care following     Bilateral pulmonary emboli: Diagnosed 2/3/2021  - Continue home lovenox 80mg BID     Bilateral Sacral ulcers: Pt following with home wound care.   -Wound care, appreciate assistance     Prolonged QTc: POA QTc 513  -Avoid QTc prolonging agents     T2DM: A1c 7.9 (11/2020). Home Januvia 100mg daily. No home Detremir last 2 days due to fasting BG in 70-80s.    -HOLD home Januvia 100mg daily  -HOLD home Detremir given low BG readings, will restart if needed  -SSI w/POC glu checks     HTN: BP on admission 124/56. BP normotensive outpatient without antihypertensives, previously on lisinopril 10mg QHS. -Monitor BP      Wheezing: Chronic. 0.5PPD smoker x 40 years.  Home Albuterol inhaler 2 puff q4H prn.   -Rec'd PFT outpatient     PTSD: Rarely uses Buspar 10mg BID PRN.     Severe aortic atherosclerosis: Severe sclerosis of the abdominal aorta and common iliac and femoral arteries noted on CT abd/pelv 1/21.    -Follow up outpatient Vascular     HLD: Lipid panel 11/2020 w/ Tchol 192, HDL 37, , tri 170. Holding home Lipitor 40mg QHS.       FEN/GI - NPO. NS + KCL 20mEq at 125cc/hr  Activity - Ambulate with assistance  DVT prophylaxis - Lovenox  GI prophylaxis - Not indicated at this time  Fall prophylaxis - Fall precautions ordered. Disposition - Admit to Telemetry. Plan to d/c to Home. Consulting PT and OT  Code Status - Full. Discussed with Daughter. Next of Kin Name and Dereck Lara,  Daughter - [address] discussed with Dr. Alonzo Evans. Subjective:   Pt was seen and examined at bedside. Afebrile and hemodynamically stable. Concerns overnight include: persistent hypokalemia. Denies chest pain, SOB, nausea, vomiting, abdominal pain, dizziness.      Objective:   Physical examination  Patient Vitals for the past 24 hrs:   Temp Pulse Resp BP SpO2   03/25/21 0738 98.4 °F (36.9 °C) 72 22 (!) 120/92 97 %   03/25/21 0700  77      03/25/21 0350 98 °F (36.7 °C) 77 14 (!) 144/53 98 %   03/25/21 0316  75  (!) 91/38    03/25/21 0007 97.3 °F (36.3 °C) 81 22 (!) 135/48 96 %   03/24/21 2320  74      03/24/21 2006 97 °F (36.1 °C) 84 17 (!) 132/53 92 %   03/24/21 1845    105/75    03/24/21 1830  90 15 104/62 91 %   03/24/21 1822  80 27 (!) 106/40 92 %   03/24/21 1615  78 17 (!) 123/52 91 %   03/24/21 1605  79 (!) 32 (!) 123/33 95 %   03/24/21 1535 98 °F (36.7 °C) 76 22 (!) 122/44 93 %   03/24/21 1530  76 17 (!) 113/48 92 %   03/24/21 1525     91 %   03/24/21 1520    (!) 84/64 96 %   03/24/21 1515    112/69 93 %   03/24/21 1510  79 15 (!) 119/48 92 %   03/24/21 1505  79 17 (!) 110/46 92 %   03/24/21 1501  84      03/24/21 1500  84 23 (!) 96/36 97 %   21 1455  79 16 110/76 92 %   21 1419  80 17 (!) 106/43 91 %   21 1415  81 16 (!) 112/37 92 %   21 1400  79 20 (!) 108/41 94 %   21 1355  81 24 (!) 97/44 97 %   21 1155  83 22 (!) 110/48 95 %   21 1140  81 17 (!) 90/38 92 %   21 1135 99 °F (37.2 °C) 84 16 (!) 101/51 92 %   21 1040    (!) 96/41    21 0829     90 %      Temp (24hrs), Av °F (36.7 °C), Min:97 °F (36.1 °C), Max:99 °F (37.2 °C)     O2 Flow Rate (L/min): 2 l/min   O2 Device: Nasal cannula    Date 21 07 - 21 0659 21 07 - 21 0659   Shift 0806-0882 1869-8294 24 Hour Total 1720-6368 6531-6850 24 Hour Total   INTAKE   P.O. 240  240        P. O. 240  240      Shift Total(mL/kg) 240(3)  240(3)      OUTPUT   Urine(mL/kg/hr)           Urine Occurrence(s) 1 x  1 x      Stool 1300  1300        Output (ml) (Ileostomy 21 Left Abdomen) 1300  1300      Shift Total(mL/kg) 1300(16.2)  1300(16.2)      NET -1060  -1060      Weight (kg) 80.3 80.3 80.3 80.3 80.3 80.3       General: No acute distress. Alert. Cooperative. Head: Normocephalic. Neck: Supple. Respiratory: CTAB. No w/r/r/c.  Cardiovascular: RRR. Normal S1,S2. No m/r/g.   GI: + bowel sounds. Nontender. No rebound tenderness or guarding. Nondistended. PEG tube with purulent drainage. Ileostomy bag leaking. Laparoscopic incision open with packing and dressing C/D/I. Extremities:  No LE edema. Distal pulses present. PICC line R arm no erythema. Neuro: CN II-XII grossly intact.    Data Review:     CBC:  Recent Labs     21  0011 21  0454 21  2150   WBC 16.7* 16.5* 15.5*   HGB 9.7* 9.4* 11.8   HCT 31.1* 29.6* 37.4    462 459     Metabolic Panel:  Recent Labs     21  0011 21  1505 21  0454 21  2150    140 140 138   K 2.3* 2.5* 2.1* 3.0*   CL 96* 94* 93* 87*   CO2 43* 42* 43* >45*   BUN 19 22* 24* 25*   CREA 1.26* 1.36* 1.41* 1.72*   GLU 72 82 81 80   CA 7.0* 6.6* 6.7* 7.8*   MG 1.6 1.4* 1.7  --    PHOS  --  2.7 2.9  --    ALB 2.0*  --  1.7* 2.0*   TBILI 0.5  --   --  0.6   ALT 10*  --   --  12     Micro:  Results     Procedure Component Value Units Date/Time    CULTURE, BLOOD [[id number]] Collected: 03/24/21 0038    Order Status: Completed Specimen: Blood Updated: 03/25/21 0127     Special Requests: NO SPECIAL REQUESTS        Culture result: NO GROWTH 1 DAY       CULTURE, BLOOD [[id number]]     Order Status: Canceled Specimen: Blood     CULTURE, BLOOD [[id number]]     Order Status: Canceled Specimen: Blood     URINE CULTURE HOLD SAMPLE [[id number]] Collected: 03/23/21 2150    Order Status: Completed Specimen: Urine from Serum Updated: 03/23/21 2248     Urine culture hold       Urine on hold in Microbiology dept for 2 days. If unpreserved urine is submitted, it cannot be used for addtional testing after 24 hours, recollection will be required. CULTURE, URINE [[id number]] Collected: 03/23/21 2150    Order Status: Completed Specimen: Urine from Clean catch Updated: 03/24/21 0841         Imaging:  Ct Head Wo Cont    Result Date: 3/23/2021  EXAM: CT HEAD WO CONT INDICATION: AMS COMPARISON: None. CONTRAST: None. TECHNIQUE: Unenhanced CT of the head was performed using 5 mm images. Brain and bone windows were generated. Coronal and sagittal reformats. CT dose reduction was achieved through use of a standardized protocol tailored for this examination and automatic exposure control for dose modulation. FINDINGS: The ventricles and sulci are normal in size, shape and configuration. . There is no significant white matter disease. There is no intracranial hemorrhage, extra-axial collection, or mass effect. The basilar cisterns are open. No CT evidence of acute infarct. The bone windows demonstrate no abnormalities. The visualized portions of the paranasal sinuses and mastoid air cells are clear. No acute findings.      Ct Abd Pelv Wo Cont    Result Date: 3/24/2021  INDICATION: Severe sepsis, r/o intraabd source COMPARISON: February 3, 2021 TECHNIQUE: Noncontrast thin axial images were obtained through the abdomen and pelvis. Coronal and sagittal reconstructions were generated. CT dose reduction was achieved through use of a standardized protocol tailored for this examination and automatic exposure control for dose modulation. FINDINGS: LUNG BASES: Moderate to large left pleural effusion with left lower lobe atelectasis. Small right pleural effusion. LIVER: No mass or biliary dilatation. GALLBLADDER: Surgically absent. SPLEEN: No enlargement or lesion. PANCREAS: No mass or ductal dilatation. ADRENALS: No mass. KIDNEYS: No nephrolithiasis or hydronephrosis. GI TRACT:  Left lower quadrant ileostomy. Gastrostomy tube present within the stomach. Dilated fluid-filled small bowel loops throughout the central abdomen. PERITONEUM: Small amount of ascites. There are locules of gas in the ascites in the midline of the central abdomen, extending to 8 large anterior abdominal wall defect at the midline likely related to recent surgery. IV contrast was not administered. APPENDIX: Not visualized. RETROPERITONEUM: Atherosclerosis without aneurysm. LYMPH NODES:  None enlarged. ADDITIONAL COMMENTS: Anasarca. URINARY BLADDER: Unremarkable. REPRODUCTIVE ORGANS: Unremarkable. LYMPH NODES:  None enlarged. FREE FLUID:  Small amount. BONES: No destructive bone lesion. ADDITIONAL COMMENTS: Anasarca. 1. Interval placement of gastrostomy tube, and left lower quadrant ileostomy, likely accounting for locules of gas within ascites in the central abdomen communicating with a large anterior abdominal wall defect. No definite drainable abscess allowing for lack of IV contrast material. If any more recent CT imaging from [facility] given recent surgery and hospital stay there, may be helpful for direct comparison. 2. Anasarca.  Moderate to large left pleural effusion and basilar atelectasis. Trace right pleural effusion. 3. Dilated fluid-filled small bowel loops throughout the central abdomen with wall thickening, may represent ileus or partial obstruction. Again any postoperative comparison examinations would be helpful. Xr Chest Port    Result Date: 3/23/2021  INDICATION: Eval for Infiltrate EXAM:  AP CHEST RADIOGRAPH COMPARISON: February 4, 2021 FINDINGS: AP portable view of the chest demonstrates a normal cardiomediastinal silhouette. Slight increase in small-to-moderate left pleural effusion and basilar atelectasis. No pulmonary edema, consolidation, or pneumothorax. The osseous structures are unremarkable. Slight increase in left small-to-moderate pleural effusion and basilar atelectasis.     .  Medications reviewed  Current Facility-Administered Medications   Medication Dose Route Frequency    0.9% sodium chloride with KCl 20 mEq/L infusion   IntraVENous CONTINUOUS    potassium chloride 20 mEq in 100 ml IVPB  20 mEq IntraVENous Q2H    meropenem (MERREM) 500 mg in sterile water (preservative free) 10 mL IV syringe  0.5 g IntraVENous Q8H    sodium chloride (NS) flush 5-40 mL  5-40 mL IntraVENous Q8H    sodium chloride (NS) flush 5-40 mL  5-40 mL IntraVENous PRN    acetaminophen (TYLENOL) tablet 650 mg  650 mg Oral Q6H PRN    Or    acetaminophen (TYLENOL) suppository 650 mg  650 mg Rectal Q6H PRN    prochlorperazine (COMPAZINE) injection 10 mg  10 mg IntraVENous Q6H PRN    insulin lispro (HUMALOG) injection   SubCUTAneous AC&HS    glucose chewable tablet 16 g  4 Tab Oral PRN    dextrose (D50W) injection syrg 12.5-25 g  12.5-25 g IntraVENous PRN    glucagon (GLUCAGEN) injection 1 mg  1 mg IntraMUSCular PRN    enoxaparin (LOVENOX) injection 80 mg  1 mg/kg SubCUTAneous Q12H    vancomycin (VANCOCIN) 1,250 mg in 0.9% sodium chloride 250 mL (VIAL-MATE)  1,250 mg IntraVENous Q24H    albuterol-ipratropium (DUO-NEB) 2.5 MG-0.5 MG/3 ML  3 mL Nebulization Q4H PRN    diphenoxylate-atropine (LOMOTIL) tablet 2 Tab  2 Tab Oral QID    psyllium husk-aspartame (METAMUCIL FIBER) packet 1 Packet  1 Packet Oral TID WITH MEALS    nystatin (MYCOSTATIN) 100,000 unit/gram powder   Topical BID    PHENYLephrine (ROGER-SYNEPHRINE) 30 mg in 0.9% sodium chloride 250 mL infusion   mcg/min IntraVENous TITRATE    potassium bicarb-citric acid (EFFER-K) tablet 40 mEq  40 mEq Oral DAILY    sodium chloride (NS) flush 5-10 mL  5-10 mL IntraVENous PRN         Signed:   Aj Hastings DO   Resident, Family Medicine

## 2021-03-24 NOTE — ED NOTES
Bedside and Verbal shift change report given to Gisela Granados RN (oncoming nurse) by Karen Erickson RN (offgoing nurse). Report included the following information SBAR, Kardex, ED Summary and Recent Results.

## 2021-03-24 NOTE — ED PROVIDER NOTES
Patient is a 53-year-old female presenting the emergency department for altered mental status. Received a phone call from family medicine resident stating that patient was coming to the ER for new onset altered mental status. Patient has a history of metastatic colon cancer, diabetes. Patient apparently had an episode of altered mental status yesterday that lasted approximately 30 minutes when the patient was confused and then this evening became confused while using a TV remote. She also has a history of bilateral PEs per EMS patient's O2 sats were in the high 80s to low 90s on room air. EMS states that patient was following commands confused speech. Unclear when last well-known was.            Past Medical History:   Diagnosis Date    Amputated toe of right foot ([address]     due to dmII    Diabetes ([address]     Glaucoma     Bilateral    Hypertension     Peripheral autonomic neuropathy due to diabetes mellitus ([address]     Peritoneal carcinomatosis ([address] 2021    Psychiatric disorder     PTSD; 2003 [address] PTSD (post-traumatic stress disorder)        Past Surgical History:   Procedure Laterality Date    FLEXIBLE SIGMOIDOSCOPY N/A 2021    SIGMOIDOSCOPY FLEXIBLE performed by Reggie Koo MD at [address] N/A 2/3/2021    SIGMOIDOSCOPY FLEXIBLE performed by Reggie Koo MD at [facility] HX AMPUTATION Right     Right  big toe  and right second toe amputated     HX CARPAL TUNNEL RELEASE Right     HX CATARACT REMOVAL Right     HX  SECTION      HX CHOLECYSTECTOMY      HX KNEE ARTHROSCOPY Right     right knee     HX OTHER SURGICAL      right groin cysts removed     HX TRABECULECTOMY Bilateral          Family History:   Problem Relation Age of Onset    Heart Disease Mother     Hypertension Mother     Cancer Mother         cancer    Diabetes Father     Cancer Brother         cancer    Diabetes Brother     Diabetes Maternal Grandmother     Diabetes Paternal Grandmother     Hypertension Paternal Grandmother     Diabetes Paternal Grandfather        Social History     Socioeconomic History    Marital status:      Spouse name: Not on file    Number of children: Not on file    Years of education: Not on file    Highest education level: Not on file   Occupational History    Not on file   Social Needs    Financial resource strain: Not on file    Food insecurity     Worry: Not on file     Inability: Not on file   [employer] needs     Medical: Not on file     Non-medical: Not on file   Tobacco Use    Smoking status: Current Every Day Smoker     Packs/day: 1.00     Years: 47.00     Pack years: 47.00     Types: Cigarettes    Smokeless tobacco: Never Used   Substance and Sexual Activity    Alcohol use: No    Drug use: No    Sexual activity: Not Currently   Lifestyle    Physical activity     Days per week: Not on file     Minutes per session: Not on file    Stress: Not on file   Relationships    Social connections     Talks on phone: Not on file     Gets together: Not on file     Attends [employer] service: Not on file     Active member of club or organization: Not on file     Attends meetings of clubs or organizations: Not on file     Relationship status: Not on file    Intimate partner violence     Fear of current or ex partner: Not on file     Emotionally abused: Not on file     Physically abused: Not on file     Forced sexual activity: Not on file   Other Topics Concern    Not on file   Social History Narrative    Not on file         ALLERGIES: Keflex [cephalexin], Pcn [penicillins], Tanzeum [albiglutide], and Vioxx [rofecoxib]    Review of Systems   Unable to perform ROS: Mental status change       Vitals:    03/23/21 2130 03/23/21 2143   BP: 118/61    Pulse: 94    Resp: (!) 39    SpO2: 95% 95%   Weight: 77.9 kg (171 lb 12.8 oz)    Height: 5' 2\" (1.575 m)             Physical Exam  Vitals signs and nursing note reviewed. Constitutional:       General: She is in acute distress. Appearance: She is ill-appearing. HENT:      Head: Normocephalic and atraumatic. Eyes:      Extraocular Movements: Extraocular movements intact. Pupils: Pupils are equal, round, and reactive to light. Neck:      Musculoskeletal: Normal range of motion. Cardiovascular:      Rate and Rhythm: Normal rate and regular rhythm. Pulmonary:      Effort: Tachypnea present. Abdominal:      General: Abdomen is protuberant. A surgical scar is present. The ostomy site is clean. Bowel sounds are normal.      Palpations: Abdomen is soft. Tenderness: There is generalized abdominal tenderness. Comments: Colostomy site clean dry and intact   Musculoskeletal:      Right lower le+ Edema present. Left lower le+ Edema present. Skin:     Findings: Rash present. Neurological:      Mental Status: She is disoriented and confused. GCS: GCS eye subscore is 3. GCS verbal subscore is 2. MDM  Number of Diagnoses or Management Options  Diagnosis management comments: A/P: Hepatic encephalopathy, encephalopathy, CVA, metastatic disease. 61-year-old female presenting the emergency department with increased confusion do not feel like this is a stroke as patient does not have any focal findings she is generally confused difficult to redirect. Will evaluate for organic causes of altered mental status including labs, drug screen, EtOH, CT head without contrast, UA.        Amount and/or Complexity of Data Reviewed  Clinical lab tests: ordered and reviewed  Tests in the radiology section of CPT®: reviewed and ordered    Risk of Complications, Morbidity, and/or Mortality  Presenting problems: moderate  Diagnostic procedures: moderate  Management options: moderate    Patient Progress  Patient progress: stable         Procedures      Perfect Serve Consult for Admission  11:11 PM    ED Room Number: ER20/20  Patient Name and age: Zeus Phillips 61 y.o.  female  Working Diagnosis:   1. Sepsis with encephalopathy without septic shock, due to unspecified organism ([facility]    2. Disorientation        COVID-19 Suspicion:  no  Sepsis present:  yes  Reassessment needed: yes  Code Status:  Full Code  Readmission: yes  Isolation Requirements:  no  Recommended Level of Care:  telemetry  Department:[facility] ED - [phone]  Other:      IMPRESSION:  1. Sepsis with encephalopathy without septic shock, due to unspecified organism ([facility]    2. Disorientation        - Broad Spectrum Antibiotics ordered:  Levofloxacin, Flagyl  - Repeat lactic acid ordered for time 2359  - Re-assessment performed at time 2317 and clinical condition stable.     - Hypotension or Lactic Acidosis present (SBP<90, MAP<65, Lactate >4): YES IVF:  30cc/kg actual Body Weight  - Persistent Hypotension despite IVF resuscitation: NO  Vasopressors: Not indicated due to Septic Shock not present    Plan:  Admit to Telemetry    Total critical care time spent exclusive of procedures:  70 minutes    Raquel Perez MD

## 2021-03-24 NOTE — PROGRESS NOTES
Admission Medication Reconciliation:     Information obtained from:    Patient's daughter via interview at the bedside. RxQuery data available¹:  YES    Comments/Recommendations:    Patient's daughter is able to confirm name, , allergies, and preferred pharmacy   Updated PTA medication list   The patient's daughter notes many medications have been on hold since discharge from [facility]: asprin, atorvastatin, furosemide.  Since ostomy placement the patient's blood glucose readings have been now. Yesterday her reading were in the 80s so she did not receive any insulin detemir. On 3/22 the patient received 2 units of insulin detemir. Two units is the average dose she has required since discharge from [address] [place] [address]. ¹RxQuery pharmacy benefit data reflects medications filled and processed through the patient's insurance, however   this data does NOT capture whether the medication was picked up or is currently being taken by the patient. Prior to Admission Medications   Prescriptions Last Dose Informant Taking? SITagliptin (Januvia) 100 mg tablet 3/23/2021 at Unknown time Child Yes   Sig: Take 100 mg by mouth every evening. albuterol (ProAir HFA) 90 mcg/actuation inhaler  Child Yes   Sig: INHALE 2 PUFFS BY MOUTH EVERY 4 HOURS AS NEEDED FOR WHEEZE   diphenoxylate-atropine (LomotiL) 2.5-0.025 mg per tablet 3/23/2021 at Unknown time Child Yes   Sig: Take 2 Tabs by mouth Before breakfast, lunch, dinner and at bedtime. enoxaparin (Lovenox) 80 mg/0.8 mL injection 3/23/2021 at 7pm Child Yes   Si mg by SubCUTAneous route every twelve (12) hours. insulin detemir U-100 (LEVEMIR FLEXTOUCH) 100 unit/mL (3 mL) inpn 3/22/2021 Child Yes   Sig: by SubCUTAneous route daily. Patient has been using on a sliding scale due to low blood glucose readings recently.  Dose of 2 units on 3/22/21   loperamide (IMMODIUM) 2 mg tablet 3/23/2021 at Unknown time Child Yes   Sig: Take 4 mg by mouth Before breakfast, lunch, dinner and at bedtime. nystatin (MYCOSTATIN) powder 3/23/2021 at Unknown time Child Yes   Sig: Apply  to affected area twice daily. To j-peg site   psyllium (METAMUCIL) packet 3/23/2021 at Unknown time Child Yes   Sig: Take 1 Packet by mouth Before breakfast, lunch, dinner and at bedtime. Facility-Administered Medications: None         Please contact the main inpatient pharmacy with any questions or concerns at [phone] and we will direct you to the clinical pharmacist covering this patient's care while in-house.    Racheal Peter, PharmD, BCPS

## 2021-03-24 NOTE — PROGRESS NOTES
SEVERE SEPSIS / SEPTIC SHOCK REASSESSMENT    S: Carlton Huerta is currently being treated for Severe Sepsis or Septic Shock. She is undergoing treatment, including Broad-Spectrum Antibiotics and Fluid Challenge. They have recently completed the initial 30 cc/kg fluid challenge. O:   Sepsis Re-Assessment Documentation:   Vital Signs  Level of Consciousness: Alert  Temp: (!) 100.8 °F (38.2 °C)  Temp Source: Rectal  Pulse (Heart Rate): 89  Heart Rate Source: Monitor  Resp Rate: 20  BP: (!) 127/56  MAP (Monitor): 74  MAP (Calculated): 80  BP 1 Location: Left upper arm  BP 1 Method: Automatic  BP Patient Position: At rest  MEWS Score: 3    Recent Results (from the past 4 hour(s))   ETHYL ALCOHOL    Collection Time: 03/24/21 12:30 AM   Result Value Ref Range    ALCOHOL(ETHYL),SERUM <10 <10 MG/DL   LACTIC ACID    Collection Time: 03/24/21 12:38 AM   Result Value Ref Range    Lactic acid 1.3 0.4 - 2.0 MMOL/L   SAMPLES BEING HELD    Collection Time: 03/24/21 12:38 AM   Result Value Ref Range    SAMPLES BEING HELD NO SAMPLE RECEIVED      COMMENT        Add-on orders for these samples will be processed based on acceptable specimen integrity and analyte stability, which may vary by analyte. General appearance: alert, cooperative, no distress, appears stated age  Lungs: Diffuse wheezing in all lung fields. No increased work of breathing. No crackles/rales/rhonchi. Heart: regular rate and rhythm, S1, S2 normal, no murmur, click, rub or gallop. Pulses: 2+ and symmetric (Present)   Capillary Refill: <3 seconds (normal/brisk)   Abdomen: soft, non-tender. Bowel sounds normal. No masses,  no organomegaly  Extremities: extremities normal, atraumatic, no cyanosis or edema  Skin: Warm and pink. Neurologic: More alert and orient compared with assessment on admission. Normal strength and tone.      Assessment and Plan:66 y.o. female currently being treated for severe sepsis/septic shock    Severe Sepsis: POA 4/4 SIRS (T100.8F, WBC 15.5, RR 39, HR 94), LA 5.6 > 1.3. Likely source of infections PICC line VS. intraabdominal. CXR not concerning for PNA. ECHO LVEF 55-60% (2/2021). -Vanc, Meropenem   -Completed 30cc/kg bolus, start MIVF  -Strict I&O  -CT A/P, will give Ativan 0.25mg po once as Daughter and patient report patient experiences anxiety with CT scans  -Gen Surg consult  -F/u BCx (peripheral and picc line), UCx   -Daily CBC, BMP    Wheezing: Chronic. 0.5PPD smoker x 40 years. Home Albuterol inhaler 2 puff q4H prn.   -Duoneb q4H prn, advised nursing to give treatment after CT scan  -Rec'd PFT outpatient      Patient seen and examined with Dr. Lexie Babinski.    Gaston Carrillo MD  3/24/2021 2:30AM

## 2021-03-24 NOTE — ED TRIAGE NOTES
Patient presents to ED via EMS for new onset AMS since approx 1600 this evening. Family reports she had a similar episode yesterday where she became confused but it lasted approx 30 minutes. Family reports that this evening she became confused while using the TV remote. Pt hx of colon cancer and PE's. O2 was between 88%-92% on RA, for which EMS placed on 2L NC. Patient's O2 sat was 93% on RA but did decrease during triage. Patient was following commands and responding appropriately for EMS. Upon arrival, patient has decreased responses.  en route.

## 2021-03-24 NOTE — PROGRESS NOTES
[facility] Dosing Services: Antimicrobial Stewardship Progress Note    Consult for antibiotic dosing of Vancomycin and auto-renal adjustment of Merrem by Dr. Aureliano Doyle  Pharmacist reviewed antibiotic appropriateness for 61year old , female  for indication of sepsis, intra-abdominal vs picc line  Day of Therapy 1    Plan:  Vancomycin therapy:  Start Vancomycin therapy, with loading dose of 1.5 grams  Follow with maintenance dose of 1.25 grams every 24 hours  Dose calculated to approximate a therapeutic trough of 15-20 mcg/mL. Last trough level / Plan for level: prior to 3rd dose  Pharmacy to follow daily and will make changes to dose and/or frequency based on clinical status. Non-Kinetic Antimicrobial Dosing: for CrCl of 30-35, changed Merrem to 0.5 grams every 8 hours     Cultures     pending   Serum Creatinine     Lab Results   Component Value Date/Time    Creatinine 1.72 (H) 03/23/2021 09:50 PM       Creatinine Clearance Estimated Creatinine Clearance: 31.9 mL/min (A) (based on SCr of 1.72 mg/dL (H)).      WBC   Lab Results   Component Value Date/Time    WBC 15.5 (H) 03/23/2021 09:50 PM       H/H   Lab Results   Component Value Date/Time    HGB 11.8 03/23/2021 09:50 PM      Platelets Lab Results   Component Value Date/Time    PLATELET 645 85/79/5694 09:50 PM            Pharmacist: [address] information:

## 2021-03-24 NOTE — H&P
[address] [facility] [address] [address]   Office [phone]  Fax [phone]       Admission H&P     Name: Sharda Ortiz MRN: [id number]  Sex: Female   YOB: 1955  Age: 61 y.o. PCP: Desmond Mcfarlane MD     Source of Information: patient, medical records    Chief complaint: Confusion    History of Present Illness  Sharda Ortiz is a 61 y.o. female with PMHx of metastatic adenocarcinoma s/p ileostomy & PEG, T2DM, Neuropathy, HTN who presented via EMS from home due to increased confusion since 4pm on 3/23. Daughter at bedside reports patient alert and oriented at baseline, however now confused and unable to follow commands. Previously experienced episode of HA and confusion on 3/22. Of note per chart review, patient recently admitted to [address] [place] [address] 3/2-3/8/31. Underwent exploratory laparotomy with placement of PEG tube and creation of loop ileostomy by Dr. Jaime Whelan. Required STICU care for postop hypotension. Dtr states that Pt has had some intermittent SOB with some hypoxia into the 80's and non productive cough for the past week but other wise Pt has not been complaining of any symptoms including fever, chills, abdominal pain, n/v/d, dysuria, hematuria, dizziness, HA, body aches. Has been eating and drinking well and voiding without issue. Dtr states that she has been holding on tube feeds now and has been receiving 2L of LR for dehydration at home. She has had a PICC line in place in the Rt arm for ~4 week but dtr has not noticed any external signs of surrounding infection. She has had ~3-4L output form ileostomy daily. Seen last week at [address] [place] [address] for possible infection around the G tube site and was prescribed nystatin for yeast infection.      She continues with the Lovenox BID for known bilateral PE's and she does not have a home O2 requirement.   Her blood sugars have been fluctuating the past few days and she has not needed to take the detemir in the last 2 days due to low POC glucose readings. Pt is followed by phillip at [facility] and was told she was not a candidate for chemo or radiation at this time. She has also established with palliative. COVID Questions:   Experiencing any of the following symptoms: fever, chills, cough, SOB, diarrhea, URI symptoms. Yes - SOB  Any Sick contacts with fever, cough, diarrhea, SOB, URI symptoms. NO  Traveled out of state or out of country. NO  Lives with family. Has been staying at home. Yes    In the ED:  Vitals: Temp 100.8F   /61   HR 94   RR 39   SatO2  95% on 2L  Labs: LA 5.6, WBC 15.5, K 3 (hemolyzed), Cl 87, Bicarb >45, BUN 25, Cr 1.72 (BL 1.1-1.3), Albumin 2, Trop <0.05, Lipase 19, Ammonia 50 (hemolyzed), UA mod epi/3- protien/pH 8.5, UDS p, paired BCx p  Imaging: CT head no acute findings, CXR Lt small to moderate pleural effusion and basilar atelectasis, No edema or consolidation. Treatment: 2.337 L NS bolus (30cc/kg)    EKG: NSR rate 88. Patient Vitals for the past 12 hrs:   Temp Pulse Resp BP SpO2   03/24/21 0000  89 20 (!) 127/56 94 %   03/23/21 2300  90 30 (!) 104/57 97 %   03/23/21 2151  87 (!) 31 (!) 124/56 96 %   03/23/21 2143     95 %   03/23/21 2130 (!) 100.8 °F (38.2 °C) 94 (!) 39 118/61 95 %   03/23/21 2125  92 15 (!) 122/38 91 %       Review of Systems  Review of Systems   Unable to perform ROS: Mental status change       Home Medications   Prior to Admission medications    Medication Sig Start Date End Date Taking? Authorizing Provider   albuterol (ProAir HFA) 90 mcg/actuation inhaler INHALE 2 PUFFS BY MOUTH EVERY 4 HOURS AS NEEDED FOR WHEEZE 3/19/21   Mirta Valverde MD   diphenoxylate-atropine (LomotiL) 2.5-0.025 mg per tablet Take 2 Tabs by mouth four (4) times daily. Provider, Historical   loperamide (IMMODIUM) 2 mg tablet Take 4 mg by mouth four (4) times daily as needed for Diarrhea. Provider, Historical   psyllium (METAMUCIL) packet Take 1 Packet by mouth three (3) times daily. Provider, Historical   Blood-Glucose Meter,Continuous (Dexcom G6 ) misc Use for continuous monitoring of glucose. 2/26/21   Una Saucedo DO   Blood-Glucose Sensor (Dexcom G6 Sensor) nishi Use for continuous glucose monitoring 2/26/21   Una Saucedo DO   Blood-Glucose Transmitter (Dexcom G6 Transmitter) nishi Use for continuous glucose monitoring 2/26/21   Una Saucedo DO   senna-docusate (PERICOLACE) 8.6-50 mg per tablet Take 1 Tab by mouth daily. 2/16/21   Kris Valverde MD   ondansetron (ZOFRAN ODT) 4 mg disintegrating tablet Take 1 Tab by mouth every eight (8) hours as needed for Nausea or Vomiting. 2/8/21   Elisha Candelario DO   busPIRone (BUSPAR) 10 mg tablet Take 10 mg by mouth two (2) times daily as needed (anxiety). Provider, Historical   insulin detemir U-100 (LEVEMIR FLEXTOUCH) 100 unit/mL (3 mL) inpn 20 Units by SubCUTAneous route daily. Provider, Historical   furosemide (LASIX) 20 mg tablet Take 1 Tab by mouth daily. 1/29/21   Una Saucedo DO   ondansetron (ZOFRAN ODT) 4 mg disintegrating tablet Take 1 Tab by mouth every eight (8) hours as needed for Nausea or Vomiting. 1/18/21   Jazzmine Marie MD   SITagliptin (Januvia) 100 mg tablet Take 1 Tab by mouth every morning. 11/19/20   Constanza Alston MD   atorvastatin (LIPITOR) 40 mg tablet TAKE 1 TABLET BY MOUTH EVERY DAY 9/11/20   Constanza Alston MD   aspirin delayed-release 81 mg tablet Take 81 mg by mouth nightly.     Provider, Historical       Allergies  Allergies   Allergen Reactions    Keflex [Cephalexin] Hives    Pcn [Penicillins] Hives    Tanzeum [Albiglutide] Nausea Only    Vioxx [Rofecoxib] Nausea Only       Past Medical History  Past Medical History:   Diagnosis Date    Amputated toe of right foot ([facility] [address]     due to dmII    Diabetes ([facility] [address]     Glaucoma     Bilateral    Hypertension     Peripheral autonomic neuropathy due to diabetes mellitus ([facility] [address]     Peritoneal carcinomatosis ([facility] [address] 2/5/2021    Psychiatric disorder     PTSD; 2003 [address] PTSD (post-traumatic stress disorder)        Previous Hospitalization(s)  Past Surgical History:   Procedure Laterality Date    FLEXIBLE SIGMOIDOSCOPY N/A 2021    SIGMOIDOSCOPY FLEXIBLE performed by Tate Busch MD at [facility] N/A 2/3/2021    [address] performed by Tate Busch MD at [address] [facility] HX AMPUTATION Right     Right  big toe  and right second toe amputated     HX CARPAL TUNNEL RELEASE Right     HX CATARACT REMOVAL Right     HX  SECTION      HX CHOLECYSTECTOMY      HX KNEE ARTHROSCOPY Right     right knee     HX OTHER SURGICAL      right groin cysts removed     HX TRABECULECTOMY Bilateral        Family History  Family History   Problem Relation Age of Onset    Heart Disease Mother     Hypertension Mother     Cancer Mother         cancer    Diabetes Father     Cancer Brother         cancer    Diabetes Brother     Diabetes Maternal Grandmother     Diabetes Paternal Grandmother     Hypertension Paternal Grandmother     Diabetes Paternal Grandfather        Social History  Alcohol history: Rarely  Smoking history: Smokes 0.5 ppd x 40 years  Illicit drug history: Not at all  Living arrangement: patient lives with their family. Ambulates: With walker    Vital Signs  Visit Vitals  BP (!) 127/56   Pulse 89   Temp (!) 100.8 °F (38.2 °C)   Resp 20   Ht 5' 2\" (1.575 m)   Wt 171 lb 12.8 oz (77.9 kg)   SpO2 94%   BMI 31.42 kg/m²       Physical Exam      General No acute distress. Ill appearing (chronic)   HENT Head Normocephalic and atraumatic. Eyes Conjunctivae are normal, no discharge. No scleral icterus. PERRLA. Oral Dry mucous membranes   Cardio Normal rate, regular rhythm. Exam reveals no gallop and no friction rub. No murmur heard. No chest wall tenderness. Pulmonary Effort normal and breath sounds normal. No respiratory distress.    No wheezes, no rales. Abdominal Soft. Bowel sounds normal. No distension. No tenderness. Gtube, Ileostomy bag present. Moderate amount of yeast surround the outside of the G-tube. Small amount of yellow/brown liquid in ostomy bag. Packing in place in open laparotomy scar. No warmth or erythema concerning for infection. No gross drainage of pus.  Deferred. Extremities  Musculoskeletal:      2+ LE edema (chronic). Cap refill <2 sec. Full ROM in all extremities   Neurological Confused. A&O x1 (self). Able to follow most commands but does not answer questions     Dermatology Skin is warm and dry. Sacral ulcers present. Erythema of right foot (chronic). Psychiatric Affect and judgment normal.                        Laboratory Data  Recent Results (from the past 8 hour(s))   CBC WITH AUTOMATED DIFF    Collection Time: 03/23/21  9:50 PM   Result Value Ref Range    WBC 15.5 (H) 3.6 - 11.0 K/uL    RBC 3.88 3.80 - 5.20 M/uL    HGB 11.8 11.5 - 16.0 g/dL    HCT 37.4 35.0 - 47.0 %    MCV 96.4 80.0 - 99.0 FL    MCH 30.4 26.0 - 34.0 PG    MCHC 31.6 30.0 - 36.5 g/dL    RDW 14.7 (H) 11.5 - 14.5 %    PLATELET 056 841 - 450 K/uL    MPV 9.9 8.9 - 12.9 FL    NRBC 0.0 0  WBC    ABSOLUTE NRBC 0.00 0.00 - 0.01 K/uL    NEUTROPHILS 84 (H) 32 - 75 %    LYMPHOCYTES 10 (L) 12 - 49 %    MONOCYTES 5 5 - 13 %    EOSINOPHILS 0 0 - 7 %    BASOPHILS 0 0 - 1 %    IMMATURE GRANULOCYTES 1 (H) 0.0 - 0.5 %    ABS. NEUTROPHILS 13.0 (H) 1.8 - 8.0 K/UL    ABS. LYMPHOCYTES 1.6 0.8 - 3.5 K/UL    ABS. MONOCYTES 0.7 0.0 - 1.0 K/UL    ABS. EOSINOPHILS 0.0 0.0 - 0.4 K/UL    ABS. BASOPHILS 0.0 0.0 - 0.1 K/UL    ABS. IMM.  GRANS. 0.1 (H) 0.00 - 0.04 K/UL    DF AUTOMATED     METABOLIC PANEL, COMPREHENSIVE    Collection Time: 03/23/21  9:50 PM   Result Value Ref Range    Sodium 138 136 - 145 mmol/L    Potassium 3.0 (L) 3.5 - 5.1 mmol/L    Chloride 87 (L) 97 - 108 mmol/L    CO2 >45 (HH) 21 - 32 mmol/L    Anion gap Cannot be calculated 5 - 15 mmol/L    Glucose 80 65 - 100 mg/dL    BUN 25 (H) 6 - 20 MG/DL    Creatinine 1.72 (H) 0.55 - 1.02 MG/DL    BUN/Creatinine ratio 15 12 - 20      GFR est AA 36 (L) >60 ml/min/1.73m2    GFR est non-AA 30 (L) >60 ml/min/1.73m2    Calcium 7.8 (L) 8.5 - 10.1 MG/DL    Bilirubin, total 0.6 0.2 - 1.0 MG/DL    ALT (SGPT) 12 12 - 78 U/L    AST (SGOT) 29 15 - 37 U/L    Alk. phosphatase 96 45 - 117 U/L    Protein, total 7.2 6.4 - 8.2 g/dL    Albumin 2.0 (L) 3.5 - 5.0 g/dL    Globulin 5.2 (H) 2.0 - 4.0 g/dL    A-G Ratio 0.4 (L) 1.1 - 2.2     TROPONIN I    Collection Time: 03/23/21  9:50 PM   Result Value Ref Range    Troponin-I, Qt. <0.05 <0.05 ng/mL   LIPASE    Collection Time: 03/23/21  9:50 PM   Result Value Ref Range    Lipase 19 (L) 73 - 393 U/L   LACTIC ACID    Collection Time: 03/23/21  9:50 PM   Result Value Ref Range    Lactic acid 5.6 (HH) 0.4 - 2.0 MMOL/L   AMMONIA    Collection Time: 03/23/21  9:50 PM   Result Value Ref Range    Ammonia 50 (H) <32 UMOL/L   URINALYSIS W/MICROSCOPIC    Collection Time: 03/23/21  9:50 PM   Result Value Ref Range    Color YELLOW/STRAW      Appearance CLOUDY (A) CLEAR      Specific gravity 1.014 1.003 - 1.030      pH (UA) 8.5 (H) 5.0 - 8.0      Protein 30 (A) NEG mg/dL    Glucose Negative NEG mg/dL    Ketone Negative NEG mg/dL    Bilirubin Negative NEG      Blood Negative NEG      Urobilinogen 0.2 0.2 - 1.0 EU/dL    Nitrites Negative NEG      Leukocyte Esterase Negative NEG      WBC 0-4 0 - 4 /hpf    RBC 0-5 0 - 5 /hpf    Epithelial cells MODERATE (A) FEW /lpf    Bacteria Negative NEG /hpf   URINE CULTURE HOLD SAMPLE    Collection Time: 03/23/21  9:50 PM    Specimen: Serum; Urine   Result Value Ref Range    Urine culture hold        Urine on hold in Microbiology dept for 2 days. If unpreserved urine is submitted, it cannot be used for addtional testing after 24 hours, recollection will be required.    DRUG SCREEN, URINE    Collection Time: 03/23/21  9:50 PM   Result Value Ref Range    AMPHETAMINES Negative NEG      BARBITURATES Negative NEG      BENZODIAZEPINES Negative NEG      COCAINE Negative NEG      METHADONE Negative NEG      OPIATES Negative NEG      PCP(PHENCYCLIDINE) Negative NEG      THC (TH-CANNABINOL) Negative NEG      Drug screen comment (NOTE)    SAMPLES BEING HELD    Collection Time: 03/23/21  9:50 PM   Result Value Ref Range    SAMPLES BEING HELD NORMA. BLDCS     COMMENT        Add-on orders for these samples will be processed based on acceptable specimen integrity and analyte stability, which may vary by analyte. Imaging  CXR Results  (Last 48 hours)               03/23/21 2319  XR CHEST PORT Final result    Impression:  Slight increase in left small-to-moderate pleural effusion and basilar   atelectasis. Narrative:  INDICATION: Eval for Infiltrate       EXAM:  AP CHEST RADIOGRAPH       COMPARISON: February 4, 2021       FINDINGS:       AP portable view of the chest demonstrates a normal cardiomediastinal   silhouette. Slight increase in small-to-moderate left pleural effusion and   basilar atelectasis. No pulmonary edema, consolidation, or pneumothorax. The   osseous structures are unremarkable. CT Results  (Last 48 hours)               03/23/21 2215  CT HEAD WO CONT Final result    Impression:  No acute findings. Narrative:  EXAM: CT HEAD WO CONT       INDICATION: AMS       COMPARISON: None. CONTRAST: None. TECHNIQUE: Unenhanced CT of the head was performed using 5 mm images. Brain and   bone windows were generated. Coronal and sagittal reformats. CT dose reduction   was achieved through use of a standardized protocol tailored for this   examination and automatic exposure control for dose modulation. FINDINGS:   The ventricles and sulci are normal in size, shape and configuration. . There is   no significant white matter disease. There is no intracranial hemorrhage,   extra-axial collection, or mass effect. The basilar cisterns are open.  No CT evidence of acute infarct. The bone windows demonstrate no abnormalities. The visualized portions of the   paranasal sinuses and mastoid air cells are clear. Assessment and Plan     Carlton Huerta is a 61 y.o. female with a PMHx of metastatic adenocarcinoma, T2DM, Neuropathy, HTN who is admitted for Severe Sepsis. Severe Sepsis: POA 4/4 SIRS (T100.8F, WBC 15.5, RR 39, HR 94), LA 5.6. Likely source of infections PICC line VS. intraabdominal. CXR not concerning for PNA. ECHO LVEF 55-60% (2/2021). -Admit to telemetry  -VS per unit routine  -Vanc, Meropenem (Previously tolerated Meropenem per discussion with pharmacy)  -Continue 30cc/kg bolus started in the ED, will place on MIVF after completion of bolus  -Trend LA   -Strict I&O  -CT A/P  -BCx from picc line  -F/u BCx   -UCx  -Daily CBC, BMP    AMS: Likely 2/2 sepsis. CT head no acute abnormality. POA glu 80. UDS neg. Trop neg x1.      -F/u Etoh  -Repeat NH3, previous sample hemolyzed  -Trend trop     SOB in setting of bilateral PE: POA rapid Covid neg, CXR slight inc L sm-mod pleural effusion & basilar atelectasis. Not on home O2, O2 sat 85-88% on RA at home. Bilat PE diagnosed 2/3/2021.   -Supplemental O2 prn to maintain O2 sat >88%  -Continue home Lovenox 80mg BID      Elevated Cr in CKD3: Likely 2/2 IVVD. POA Cr 1.72 (BL 1.1-1.3). -Continue to monitor on daily labs, expect to improve with fluids    Metastatic gastric/colon adenocarcinoma: S/p laparotomy. S/p Ileostomy and PEG placement (3/2021). Home Lomotil 2 tabs QID, Imodium 4 caps QID prn, Metamucil TID with meals. Home 2L LR daily. F/w Surg Onc Dr. Bard York ([facility]). -Consider Heme-Once consult   -Continue Lomotil QID, Psyllium husk TID w/meals    Bilateral Sacral ulcers: Pt following with home wound care.   -Wound care, appreciate assistance    Prolonged QTc: POA QTc 513  -Avoid QTc prolonging agents    Elevated Bicarb: likely 2/2 hypokalemia and IVVD. POA bicarb >45. -Monitor on daily labs    Hypokalemia: POA K 3.0 (hemolyzed). -Monitor on daily labs  -Replete PRN     T2DM: A1c 7.9 (11/2020). Home Januvia 100mg daily. No home Detremir last 2 days due to fasting BG in 70-80s. -HOLD home Januvia 100mg daily  -HOLD home Detremir given low BG readings, will restart if needed  -SSI w/POC glu checks     HTN: BP on admission 124/56. BP normotensive outpatient without antihypertensives, previously on lisinopril 10mg QHS. -Monitor BP     Wheezing: Chronic. 0.5PPD smoker x 40 years. Home Albuterol inhaler 2 puff q4H prn.   -Rec'd PFT outpatient     PTSD: Rarely uses Buspar 10mg BID PRN. Severe aortic atherosclerosis: Severe sclerosis of the abdominal aorta and common iliac and femoral arteries noted on CT abd/pelv 1/21.    -Follow up outpatient Vascular     HLD: Lipid panel 11/2020 w/ Tchol 192, HDL 37, , tri 170. Holding home Lipitor 40mg QHS. FEN/GI - Diabetic diet. 30cc/kg NS bolus  Activity - Ambulate with assistance  DVT prophylaxis - Lovenox  GI prophylaxis - Not indicated at this time  Fall prophylaxis - Fall precautions ordered. Disposition - Admit to Telemetry. Plan to d/c to Home. Consulting PT and OT  Code Status - Full. Discussed with Daughter. Next of Kin Name and Christina Root,  Daughter - [phone]    Patient seen and examined with Dr. Alondra Vega. Patient Arnold Baumann will be discussed with Dr. Giacomo Ding.     11:14 PM, 03/24/21  Claudetta Norway, MD  Family Medicine Resident       For Billing    Chief Complaint   Patient presents with    Altered mental status       Hospital Problems  Date Reviewed: 11/17/2020          Codes Class Noted POA    Severe sepsis [facility]) ICD-10-CM: A41.9, R65.20  ICD-9-CM: 038.9, 995.92  3/23/2021 Unknown               [address] Medicine Residency Attending Addendum:  I saw and evaluated the patient on the day of the encounter with Dr. Hetal Sanches, performing the soto elements of the service.   I discussed the findings, assessment and plan with the resident and agree with the resident's findings and plan as documented in the resident's note. Continue abx, pending cultures. Replete electrolytes and consult surgery.       Kasandra Stephen MD, FAAFP, CAQSM, RMSK

## 2021-03-24 NOTE — PROGRESS NOTES
[address] [place] [facility] for report. 0025 TRANSFER - IN REPORT:    Verbal report received from Clementina Martínez RN(name) on Threasa Hand  being received from ED(unit) for routine progression of care      Report consisted of patients Situation, Background, Assessment and   Recommendations(SBAR). Information from the following report(s) SBAR, Kardex, Intake/Output, MAR and Recent Results was reviewed with the receiving nurse. Opportunity for questions and clarification was provided. 0330 Assessment completed upon patients arrival to unit and care assumed. 0500 Notified Family practice of drainage around peg site. Wound consult in place. 0310 Notified Dr. Lofton Copping of critical potassium of 2.1. New orders added. 2494 Notified Dr. Lofton Copping of ABG results. This patient was assisted with Intentional Toileting every 2 hours during this shift. Documentation of ambulation and output reflected on Flowsheet. Central line Type: [facility]  Central Line Insert Date: Not sure, POA  Reason Central Line Placed:Patient takes IV medication at home  [place] Date:  Biopatch in place? Yes No: yes  Tubing labeled and appropriate? Yes No: yes  Alcohol caps on all open ports? Yes No: yes  Last CHG bath (time&date):03/24/21 0330  Reviewed with provider and central line must stay in for the following reasons: Will review. 0730 Bedside and Verbal shift change report given to [facility], RN (oncoming nurse) by Jacek Hayes RN (offgoing nurse). Report included the following information SBAR, Kardex, Intake/Output, MAR, Recent Results and Cardiac Rhythm NSR.
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
## 2021-03-24 NOTE — CONSULTS
Comprehensive Nutrition Assessment    Type and Reason for Visit: Initial, Consult, Wound    Nutrition Recommendations/Plan:   1. Continue consistent carb diet. 2. Add strawberry Glucerna BID to promote protein intake. Nutrition Assessment:      3/24: 62 y/o female admitted with severe sepsis. PMH includes DM, mets colon CA, s/p ileostomy and PEG 3/2. Spoke with pt and daughter in room. Per daughter, pt used PEG while in hospital but has not used it since. Says they kept it in just in case she ended up needing it, but the plan was to have in removed on Friday. Says they have been flushing it with water everyday to maintain it. Pt just received and started eating lunch at time of visit. Daughter says pt was eating pretty well at home PTA. Says she had a few weeks before her surgery when she wasn't eating well d/t N/V, but it has much improved since surgery. She says pt also drinks two Premier protein drinks at home each day. Agreeable to trying Glucerna while here for extra protein for wounds. Weight appears stable per EMR. No chewing or swallowing issues. No N/V currently. Labs- K 2.1, Cr 1.41, Ca 6.7, Hgb 9.4. Meds- insulin, Merrem, KCl, metamucil fiber. Weight hx: Wt Readings from Last 10 Encounters:   03/24/21 80.3 kg (177 lb 0.5 oz)   02/05/21 77.6 kg (171 lb)   01/29/21 77.6 kg (171 lb)   01/21/21 72.6 kg (160 lb)   01/18/21 72.9 kg (160 lb 12.8 oz)   11/17/20 72.8 kg (160 lb 8 oz)   03/12/20 80.3 kg (177 lb)   02/27/20 78.5 kg (173 lb)   02/13/20 80.3 kg (177 lb)   09/30/19 83 kg (183 lb)     Malnutrition Assessment:  Malnutrition Status: none identified    Estimated Daily Nutrient Needs:  Energy (kcal): 6465(4731 x 1.3 AF); Weight Used for Energy Requirements: Current  Protein (g): 96(1.2-1.4 g/kg);  Weight Used for Protein Requirements: Current  Fluid (ml/day): 1697; Method Used for Fluid Requirements: 1 ml/kcal    Nutrition Related Findings:  ostomy; 3+ LE edema      Wounds:    Multiple, Pressure injury, Skin tears(hand skin tear, leg/foot blisters, unstageable sacral PIs)       Current Nutrition Therapies:  DIET DIABETIC CONSISTENT CARB Regular  DIET NUTRITIONAL SUPPLEMENTS Dinner, Breakfast; Glucerna Shake    Anthropometric Measures:  · Height:  5' 2\" (157.5 cm)  · Current Body Wt:  80.3 kg (177 lb 0.5 oz)   · Admission Body Wt:       · Usual Body Wt:        · Ideal Body Wt:  110 lbs:  160.9 %   · Adjusted Body Weight:   ; Weight Adjustment for: No adjustment   · Adjusted BMI:       · BMI Category:  Obese class 1 (BMI 30.0-34. 9)       Nutrition Diagnosis:   · Increased nutrient needs related to increased demand for energy/nutrients(protein) as evidenced by wounds    Nutrition Interventions:   Food and/or Nutrient Delivery: Continue current diet, Start oral nutrition supplement  Nutrition Education and Counseling: No recommendations at this time  Coordination of Nutrition Care: Continue to monitor while inpatient    Goals:  PO intake >50% meals + ONS meeting protein needs next 3-5 days       Nutrition Monitoring and Evaluation:   Behavioral-Environmental Outcomes: None identified  Food/Nutrient Intake Outcomes: Food and nutrient intake, Supplement intake  Physical Signs/Symptoms Outcomes: Weight, GI status, Biochemical data, Fluid status or edema    Discharge Planning:    Continue current diet, Continue oral nutrition supplement     Electronically signed by Toño Worrell RDN on 3/24/2021 at 1:18 PM    Contact: [phone]

## 2021-03-25 NOTE — PROGRESS NOTES
[address] [facility] RESIDENCY PROGRAM  PROGRESS NOTE     3/26/2021  PCP: Natalya Fermin MD     Assessment/Plan:     Jaja Marshall is a 61 y.o. female with a PMHx of metastatic adenocarcinoma, T2DM, Neuropathy, HTN who is admitted for Severe Sepsis. 24 hour events: None     Septic Shock: POA 4/4 SIRS (T100.8F, WBC 15.5, RR 39, HR 94), LA 5.6 (resolved). Likely 2/2 PICC line VS. intraabdominal. CXR w/o PNA. CT a/p ileus and locules of free air. Pt with open laparotomy incision with packing.   - BCx NG x 2d  - Ucx 40K aerococcus sanguinous (covered w/ meropenem)  - Neosyn gtt to maintain MAP > 65 (started 3/23), no longer weaning, will need to consider central line and levophed  - Continue Vanc and Meropenem (started 3/23)  - General surgery consulted - free locules of air more likely related to small fascial dehiscence opposed to perforated bowel. - OK for diet, no intervention at this time       - ostomy care following  - GI consulted       - Increased colestipol       - Protonix BID       - Continue psyllium, Imodium, Lomotil  - Infectious disease consulted due to persistently elevated WBC despite aggressive antibiotics         - Wound culture of open surgical incision site  - Nutrition consulted - consistent carb diet + protein supplementation  -Strict I&O  -Daily CBC, BMP    Hypokalemia: Improved . 2/2 high output from ostomy bag POA K 2.1.  - Monitor closely with BMP twice daily, replete as needed  - NS + 20meq KCl MIVF  - Immodium, lomotil, psyllium to bulk stools    SOB: Covid neg. CXR slight inc L sm-mod pleural effusion & basilar atelectasis. LVEF 55-60% (2/2021). - Pulmonology consulted - hypoxia likely d/t atelectasis secondary to dec diaphragmatic excursion.          - recommend against thoracentesis          - albuterol neb prn for wheezing       Metabolic alkalosis w/ resp acidosis: persistent.  Likely 2/2 gastric losses with ileostomy.    - Monitor bicarb on daily CMP    Hypotension:   - Pramod-syn to maintain MAP > 65, likely will need to transition to Levophed today since no longer weaning pramod     AMS: Resolved. Likely 2/2 severe hypokalemia vs sepsis. CT head nml. POA glu 80. EtOH/UDS neg. Trop, NH3 neg.        Elevated Cr in CKD3: Likely 2/2 IVVD. POA Cr 1.72 (BL 1.1-1.3). -Continue to monitor on daily labs, expect to improve with fluids     Metastatic gastric/colon adenocarcinoma: S/p laparotomy, with open incision w/ packing, scheduled to see oncologic surgery on 3/26 at [facility]. S/p Ileostomy and PEG placement (3/2021). Home Lomotil 2 tabs QID, Imodium 4 caps QID prn, Metamucil TID with meals. Home 2L LR daily. F/w Surg Onc Dr. Arianne Cruz ([facility]). -Consider Heme-Once consult   -Continue Lomotil QID, Psyllium husk TID w/meals  - Wound care following     Bilateral pulmonary emboli: Diagnosed 2/3/2021  - Continue home lovenox 80mg BID     Bilateral Sacral ulcers: Pt following with home wound care.   -Wound care, appreciate assistance     Prolonged QTc: POA QTc 513  -Avoid QTc prolonging agents     T2DM: A1c 7.9 (11/2020). Home Januvia 100mg daily. No home Detremir last 2 days due to fasting BG in 70-80s.    -HOLD home Januvia 100mg daily  -HOLD home Detremir given low BG readings, will restart if needed  -SSI w/POC glu checks     HTN: BP on admission 124/56. BP normotensive outpatient without antihypertensives, previously on lisinopril 10mg QHS. -Monitor BP      Wheezing: Chronic. 0.5PPD smoker x 40 years. Home Albuterol inhaler 2 puff q4H prn.   -Rec'd PFT outpatient     PTSD: Rarely uses Buspar 10mg BID PRN.     Severe aortic atherosclerosis: Severe sclerosis of the abdominal aorta and common iliac and femoral arteries noted on CT abd/pelv 1/21.    -Follow up outpatient Vascular     HLD: Lipid panel 11/2020 w/ Tchol 192, HDL 37, , tri 170. Holding home Lipitor 40mg QHS.       FEN/GI - Regular.  NS + KCL 20mEq at 125cc/hr  Activity - Ambulate with assistance  DVT prophylaxis - Lovenox  GI prophylaxis - Not indicated at this time  Fall prophylaxis - Fall precautions ordered. Disposition - Admit to Telemetry. Plan to d/c to Home. Consulting PT and OT  Code Status - Full. Discussed with Daughter. Next of Kin Name and Sam To,  Daughter - [phone]  [address] [place] [address] discussed with Dr. Eugenia Thurman     Subjective:   Pt was seen and examined at bedside. Afebrile and hemodynamically stable. Concerns overnight include: None. Denies chest pain, SOB, nausea, vomiting, abdominal pain, dizziness. Objective:   Physical examination  Patient Vitals for the past 24 hrs:   Temp Pulse Resp BP SpO2   21 0729 98.7 °F (37.1 °C) 70 14 (!) 134/50 92 %   21 0325 97.5 °F (36.4 °C) 68 14 (!) 106/40 90 %   21 2341 98.5 °F (36.9 °C) 74 25 (!) 115/50 94 %   21 2300  73      21 2004 98 °F (36.7 °C) 69 21 (!) 106/47 92 %   21 1930  73 22 (!) 117/54    21 1900  71 22 (!) 118/47    21 1830  69 22 (!) 106/42    21 1606 98 °F (36.7 °C) 72 19 95/69    21 1600 98 °F (36.7 °C) 69 25 (!) 114/47 91 %   21 1504  75      21 1353    (!) 132/50    21 1339    (!) 116/53    21 1108 97.6 °F (36.4 °C) 70 20 111/65 92 %      Temp (24hrs), Av °F (36.7 °C), Min:97.5 °F (36.4 °C), Max:98.7 °F (37.1 °C)     O2 Flow Rate (L/min): 2 l/min   O2 Device: Room air    Date 21 07 - 21 0621 07 - 21 0659   Shift 2760-4476 9671-1865 24 Hour Total 0036-2537 0448-1553 24 Hour Total   INTAKE   P.O. 710 120 830        P. O. 710 120 830      I. V.(mL/kg/hr) 10(0)  10(0) 3834.8  3834.8     Phenylephrine Volume    1264.8  1264.8     Volume (0.9% sodium chloride with KCl 20 mEq/L infusion)    2000  2000     Volume (meropenem (MERREM) 500 mg in sterile water (preservative free) 10 mL IV syringe) 10  10 20  20     Volume (vancomycin (VANCOCIN) 1,250 mg in 0.9% sodium chloride 250 mL (VIAL-MATE))    500  500     Volume (magnesium sulfate 2 g/50 ml IVPB (premix or compounded))    50  50   Shift Total(mL/kg) 720(9) 120(1.5) 840(10.5) 3834. 8(47.8)  3834. 8(47.8)   OUTPUT   Urine(mL/kg/hr)  200(0.2) 200(0.1) 200  200     Urine Voided  200 200        Urine Occurrence(s)  1 x 1 x        Urine Output (mL) (External Female Catheter 03/26/21)    200  200   Stool 4050 600 4650        Stool Occurrence(s)  1 x 1 x        Output (ml) (Ileostomy 03/24/21 Left Abdomen) 4050 600 4650      Shift Total(mL/kg) 4050(50.4) 800(10) 4850(60.4) 200(2.5)  200(2.5)   NET -4956 -100 -0772 3634.8  3634.8   Weight (kg) 80.3 80.3 80.3 80.3 80.3 80.3       General: No acute distress. Alert. Cooperative. Head: Normocephalic. Neck: Supple. Respiratory: CTAB. No w/r/r/c.  Cardiovascular: RRR. Normal S1,S2. No m/r/g.   GI: + bowel sounds. Nontender. No rebound tenderness or guarding. Nondistended. PEG tube with surrounding yeast. Ileostomy bag leaking. Laparoscopic incision open with packing and dressing C/D/I. Extremities:  No LE edema. Distal pulses present. PICC line R arm no erythema.     Data Review:     CBC:  Recent Labs     03/26/21  0356 03/25/21  0011 03/24/21  0454   WBC 18.0* 16.7* 16.5*   HGB 9.6* 9.7* 9.4*   HCT 31.9* 31.1* 29.6*    800 148     Metabolic Panel:  Recent Labs     03/25/21  1938 03/25/21  1126 03/25/21  0011 03/24/21  1505 03/24/21  0454 03/23/21  2150 03/23/21  2150    141 142 140 140  --  138   K 3.6 2.9* 2.3* 2.5* 2.1*  --  3.0*    100 96* 94* 93*  --  87*   CO2 38* 37* 43* 42* 43*  --  >45*   BUN 17 17 19 22* 24*  --  25*   CREA 1.14* 1.09* 1.26* 1.36* 1.41*  --  1.72*   * 85 72 82 81  --  80   CA 7.1* 6.9* 7.0* 6.6* 6.7*  --  7.8*   MG 2.0 1.3* 1.6 1.4* 1.7   < >  --    PHOS  --  1.9* 2.7 2.7 2.9   < >  --    ALB  --   --  2.0*  --  1.7*  --  2.0*   TBILI  --   --  0.5  --   --   --  0.6   ALT  --   --  10*  --   --   --  12    < > = values in this interval not displayed. Micro:  Results     Procedure Component Value Units Date/Time    CULTURE, Kirstin Camden STAIN [[id number]] Collected: 03/25/21 1430    Order Status: Completed Specimen: Abdomen Updated: 03/25/21 1924     Special Requests: NO SPECIAL REQUESTS        GRAM STAIN RARE WBCS SEEN         NO ORGANISMS SEEN        Culture result: PENDING    CULTURE, BLOOD [[id number]] Collected: 03/24/21 0038    Order Status: Completed Specimen: Blood Updated: 03/26/21 0638     Special Requests: NO SPECIAL REQUESTS        Culture result: NO GROWTH 2 DAYS       CULTURE, BLOOD [[id number]]     Order Status: Canceled Specimen: Blood     CULTURE, BLOOD [[id number]]     Order Status: Canceled Specimen: Blood     URINE CULTURE HOLD SAMPLE [[id number]] Collected: 03/23/21 2150    Order Status: Completed Specimen: Urine from Serum Updated: 03/23/21 2248     Urine culture hold       Urine on hold in Microbiology dept for 2 days. If unpreserved urine is submitted, it cannot be used for addtional testing after 24 hours, recollection will be required. CULTURE, URINE [[id number]] Collected: 03/23/21 2150    Order Status: Completed Specimen: Urine from Clean catch Updated: 03/25/21 1128     Special Requests: NO SPECIAL REQUESTS        [place] Count --        00802  COLONIES/mL       Culture result: AEROCOCCUS SANGUINICOLA, KNOWN TO BE SUSCEPTIBLE TO PENCILLIN, CEFOTAXIME, MEROPENEM, VANCOMYCIN, LINEZOLID AND RIFAMPIN         Imaging:  Ct Head Wo Cont    Result Date: 3/23/2021  EXAM: CT HEAD WO CONT INDICATION: AMS COMPARISON: None. CONTRAST: None. TECHNIQUE: Unenhanced CT of the head was performed using 5 mm images. Brain and bone windows were generated. Coronal and sagittal reformats. CT dose reduction was achieved through use of a standardized protocol tailored for this examination and automatic exposure control for dose modulation. FINDINGS: The ventricles and sulci are normal in size, shape and configuration. . There is no significant white matter disease. There is no intracranial hemorrhage, extra-axial collection, or mass effect. The basilar cisterns are open. No CT evidence of acute infarct. The bone windows demonstrate no abnormalities. The visualized portions of the paranasal sinuses and mastoid air cells are clear. No acute findings. Ct Abd Pelv Wo Cont    Result Date: 3/24/2021  INDICATION: Severe sepsis, r/o intraabd source COMPARISON: February 3, 2021 TECHNIQUE: Noncontrast thin axial images were obtained through the abdomen and pelvis. Coronal and sagittal reconstructions were generated. CT dose reduction was achieved through use of a standardized protocol tailored for this examination and automatic exposure control for dose modulation. FINDINGS: LUNG BASES: Moderate to large left pleural effusion with left lower lobe atelectasis. Small right pleural effusion. LIVER: No mass or biliary dilatation. GALLBLADDER: Surgically absent. SPLEEN: No enlargement or lesion. PANCREAS: No mass or ductal dilatation. ADRENALS: No mass. KIDNEYS: No nephrolithiasis or hydronephrosis. GI TRACT:  Left lower quadrant ileostomy. Gastrostomy tube present within the stomach. Dilated fluid-filled small bowel loops throughout the central abdomen. PERITONEUM: Small amount of ascites. There are locules of gas in the ascites in the midline of the central abdomen, extending to 8 large anterior abdominal wall defect at the midline likely related to recent surgery. IV contrast was not administered. APPENDIX: Not visualized. RETROPERITONEUM: Atherosclerosis without aneurysm. LYMPH NODES:  None enlarged. ADDITIONAL COMMENTS: Anasarca. URINARY BLADDER: Unremarkable. REPRODUCTIVE ORGANS: Unremarkable. LYMPH NODES:  None enlarged. FREE FLUID:  Small amount. BONES: No destructive bone lesion. ADDITIONAL COMMENTS: Anasarca.      1. Interval placement of gastrostomy tube, and left lower quadrant ileostomy, likely accounting for locules of gas within ascites in the central abdomen communicating with a large anterior abdominal wall defect. No definite drainable abscess allowing for lack of IV contrast material. If any more recent CT imaging from [facility] given recent surgery and hospital stay there, may be helpful for direct comparison. 2. Anasarca. Moderate to large left pleural effusion and basilar atelectasis. Trace right pleural effusion. 3. Dilated fluid-filled small bowel loops throughout the central abdomen with wall thickening, may represent ileus or partial obstruction. Again any postoperative comparison examinations would be helpful. Xr Chest Port    Result Date: 3/23/2021  INDICATION: Eval for Infiltrate EXAM:  AP CHEST RADIOGRAPH COMPARISON: February 4, 2021 FINDINGS: AP portable view of the chest demonstrates a normal cardiomediastinal silhouette. Slight increase in small-to-moderate left pleural effusion and basilar atelectasis. No pulmonary edema, consolidation, or pneumothorax. The osseous structures are unremarkable. Slight increase in left small-to-moderate pleural effusion and basilar atelectasis.     .  Medications reviewed  Current Facility-Administered Medications   Medication Dose Route Frequency    0.9% sodium chloride with KCl 20 mEq/L infusion   IntraVENous CONTINUOUS    potassium chloride SR (KLOR-CON 10) tablet 40 mEq  40 mEq Oral BID    loperamide (IMODIUM) capsule 4 mg  4 mg Oral AC&HS    colestipoL (COLESTID) tablet 4 g  4 g Oral QID    pantoprazole (PROTONIX) 40 mg in 0.9% sodium chloride 10 mL injection  40 mg IntraVENous Q12H    meropenem (MERREM) 500 mg in sterile water (preservative free) 10 mL IV syringe  0.5 g IntraVENous Q8H    sodium chloride (NS) flush 5-40 mL  5-40 mL IntraVENous Q8H    sodium chloride (NS) flush 5-40 mL  5-40 mL IntraVENous PRN    acetaminophen (TYLENOL) tablet 650 mg  650 mg Oral Q6H PRN    Or    acetaminophen (TYLENOL) suppository 650 mg  650 mg Rectal Q6H PRN    prochlorperazine (COMPAZINE) injection 10 mg  10 mg IntraVENous Q6H PRN    insulin lispro (HUMALOG) injection   SubCUTAneous AC&HS    glucose chewable tablet 16 g  4 Tab Oral PRN    dextrose (D50W) injection syrg 12.5-25 g  12.5-25 g IntraVENous PRN    glucagon (GLUCAGEN) injection 1 mg  1 mg IntraMUSCular PRN    enoxaparin (LOVENOX) injection 80 mg  1 mg/kg SubCUTAneous Q12H    vancomycin (VANCOCIN) 1,250 mg in 0.9% sodium chloride 250 mL (VIAL-MATE)  1,250 mg IntraVENous Q24H    albuterol-ipratropium (DUO-NEB) 2.5 MG-0.5 MG/3 ML  3 mL Nebulization Q4H PRN    diphenoxylate-atropine (LOMOTIL) tablet 2 Tab  2 Tab Oral QID    psyllium husk-aspartame (METAMUCIL FIBER) packet 1 Packet  1 Packet Oral TID WITH MEALS    nystatin (MYCOSTATIN) 100,000 unit/gram powder   Topical BID    PHENYLephrine (ROGER-SYNEPHRINE) 30 mg in 0.9% sodium chloride 250 mL infusion   mcg/min IntraVENous TITRATE    sodium chloride (NS) flush 5-10 mL  5-10 mL IntraVENous PRN         Signed:   Lina Villegas DO   Resident, Family Medicine

## 2021-03-25 NOTE — WOUND CARE
Wound Consult: Follow Up Visit. Chart reviewed. Consulted for problematic ileostomy; multiple pressure injuries and wounds to feet POA. Spoke with patients nurse this am and since to follow up on ostomy seal - continued to have seal throughout am since seen 0845 this a.m. Patient is resting on a Marcello with COBY pump added. Heels off loaded with boots. Patient is alert and oriented x 4; requires minimal assistance to move side to side in bed. Had ileostomy in early March at [facility]; problematic with leaking (only getting 24 hour wear time at home since per patient report). Much leaking since arriving yesterday. Brought in [address] to try today (using [place] at home). Assessment:  LLQ ileostomy - red, bumpy stoma with mucocutaneous separation 7 to 11 o'clock; os is below skin level and tips downward when she sits up - skin fold also present. Liquid moreno output. Surrounding skin with slight skin irritation noted. Other wounds assessed yesterday on admission; and did not take down today. Treatment:  Used [employer] to [employer]; placed a [employer] one piece appliance with eakins ring around opening. Ostomy recommendations:  · Being using [place] one piece  · Marathon to tania-stomal skin with changes; allow to completely dry  · Empty appliance when no more than 1/3 to 1/2 full to avoid leaking. Skin Care Recommendations:  1. Minimize friction/shear: minimize layers of linen/pads under patient. 2. Off load pressure/reposition:  turn and reposition approximately every 2 hours; float heels / use off loading heel boots; waffle cushion for sitting. COBY pump added to bed surface. 3. Manage Moisture - patient has Pure wick in use. Change ostomy appliance if leaking occurs. 4. Continue to monitor nutrition, pain, and skin risk scale, and skin assessment. Plan: We will continue to reassess routinely and as needed.   Miriam Howell, [address] [place] [address], Wound / Ostomy Department  Wound Healing Office [phone]
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
## 2021-03-25 NOTE — PROGRESS NOTES
PULMON[facility]     Name: Louie Edwards MRN: [id number]   : 1955 Hospital: [facility]   Date: 3/25/2021        Impression Plan   1. Intermittent hypoxia  2. Shortness of breath  3. Small to moderate left side pleural effusion  4. Metastatic adenocarcinoma  5. Mild to moderate pulmHTN  6. Wheezing  7. Atelectasis               · Hypoxia likely due to atelectasis due to distended abdomen and lack of diaphragmatic exursion. Pt able to lay flat on RA without desaturations. Clinically very little benefit in thoracentesis in a patient with known metastatic disease and generalized anasarca. Discussed with with her daughter at bedside. · Agree with albuterol for any wheezing  · IS  · Walking oximetry prior to discharge  · K and Mag being repleted; continue aggressive electrolyte replacement  · OOB if pt is able    Ms. Maged Johnson is stable from a pulmonary standpoint. We will sign off and arrange for outpatient follow-up in 1-2 weeks. Please call with questions. Radiology  ( personally reviewed) CT abdomen reviewed- small to moderate left sided pleural effusion with associated atelectasis in LLL. No infiltrates. ABG No results for input(s): PHI, PO2I, PCO2I in the last 72 hours. Subjective     Cc: hypoxia    60 yo with metastatic adenocarcinoma and PEs on lovenox who presented with increasing confusion on 3/23. Found to be intermittently hypoxic and with left sided pleural effusion. Kadie states that O2 sats intermittently fluctuates low at home when pt is laying flat, but increase as pt is moving around. They have noted some wheezing, but was written for albuterol which resolves the sxs. Pt currently laying flat on RA with sats of 92%. Pt received more than 2.5 L of fluids on admission for sepsis and despite this has been hypotensive today, requiring mariana. Has had persistent leg edema for over 2 months.  Echo with mild to modetate pulmHTN and mild diatolic LV dysfunction    Review of Systems:  A comprehensive review of systems was negative except for that written in the HPI. Interval History:    Afebrile  On 30mcg/min Pramod  Sats 97% on RA  WBC 16.7; increased slightly  H&H stable  K 2.3  Creat 1.26; better  Mag 1.6  Blood cultures negative x 1 day    ROS:  Denies SOB this morning. Feels well. Eating breakfast.    Past Medical History:   Diagnosis Date    Amputated toe of right foot ([facility]     due to dmII    Diabetes ([facility]     Glaucoma     Bilateral    Hypertension     Peripheral autonomic neuropathy due to diabetes mellitus ([facility]     Peritoneal carcinomatosis ([facility] 2021    Psychiatric disorder     PTSD; 2003 [address] at [address] PTSD (post-traumatic stress disorder)       Past Surgical History:   Procedure Laterality Date    FLEXIBLE SIGMOIDOSCOPY N/A 2021    SIGMOIDOSCOPY FLEXIBLE performed by Moi Ramirez MD at [address] N/A 2/3/2021    SIGMOIDOSCOPY FLEXIBLE performed by Moi Ramirez MD at [facility] HX AMPUTATION Right     Right  big toe  and right second toe amputated     HX CARPAL TUNNEL RELEASE Right     HX CATARACT REMOVAL Right     HX  SECTION      HX CHOLECYSTECTOMY      HX KNEE ARTHROSCOPY Right     right knee     HX OTHER SURGICAL      right groin cysts removed     HX TRABECULECTOMY Bilateral       Prior to Admission medications    Medication Sig Start Date End Date Taking? Authorizing Provider   SITagliptin (Januvia) 100 mg tablet Take 100 mg by mouth every evening. Yes Provider, Historical   enoxaparin (Lovenox) 80 mg/0.8 mL injection 80 mg by SubCUTAneous route every twelve (12) hours. Yes Provider, Historical   nystatin (MYCOSTATIN) powder Apply  to affected area two (2) times a day.  To j-peg site    Yes Provider, Historical   albuterol (ProAir HFA) 90 mcg/actuation inhaler INHALE 2 PUFFS BY MOUTH EVERY 4 HOURS AS NEEDED FOR WHEEZE 3/19/21  Yes Nicolle Christian Garcia MD   diphenoxylate-atropine (LomotiL) 2.5-0.025 mg per tablet Take 2 Tabs by mouth Before breakfast, lunch, dinner and at bedtime. Yes Provider, Historical   loperamide (IMMODIUM) 2 mg tablet Take 4 mg by mouth Before breakfast, lunch, dinner and at bedtime. Yes Provider, Historical   psyllium (METAMUCIL) packet Take 1 Packet by mouth Before breakfast, lunch, dinner and at bedtime. Yes Provider, Historical   insulin detemir U-100 (LEVEMIR FLEXTOUCH) 100 unit/mL (3 mL) inpn by SubCUTAneous route daily. Patient has been using on a sliding scale due to low blood glucose readings recently.  Dose of 2 units on 3/22/21   Yes Provider, Historical     Current Facility-Administered Medications   Medication Dose Route Frequency    0.9% sodium chloride with KCl 20 mEq/L infusion   IntraVENous CONTINUOUS    colestipoL (COLESTID) tablet 2 g  2 g Oral DAILY    potassium chloride SR (KLOR-CON 10) tablet 40 mEq  40 mEq Oral BID    meropenem (MERREM) 500 mg in sterile water (preservative free) 10 mL IV syringe  0.5 g IntraVENous Q8H    sodium chloride (NS) flush 5-40 mL  5-40 mL IntraVENous Q8H    insulin lispro (HUMALOG) injection   SubCUTAneous AC&HS    enoxaparin (LOVENOX) injection 80 mg  1 mg/kg SubCUTAneous Q12H    vancomycin (VANCOCIN) 1,250 mg in 0.9% sodium chloride 250 mL (VIAL-MATE)  1,250 mg IntraVENous Q24H    diphenoxylate-atropine (LOMOTIL) tablet 2 Tab  2 Tab Oral QID    psyllium husk-aspartame (METAMUCIL FIBER) packet 1 Packet  1 Packet Oral TID WITH MEALS    nystatin (MYCOSTATIN) 100,000 unit/gram powder   Topical BID    PHENYLephrine (ROGER-SYNEPHRINE) 30 mg in 0.9% sodium chloride 250 mL infusion   mcg/min IntraVENous TITRATE     Allergies   Allergen Reactions    Keflex [Cephalexin] Hives    Pcn [Penicillins] Hives    Tanzeum [Albiglutide] Nausea Only    Vioxx [Rofecoxib] Nausea Only      Social History     Tobacco Use    Smoking status: Current Every Day Smoker     Packs/day: 1.00     Years: 47.00     Pack years: 47.00     Types: Cigarettes    Smokeless tobacco: Never Used   Substance Use Topics    Alcohol use: No      Family History   Problem Relation Age of Onset    Heart Disease Mother     Hypertension Mother     Cancer Mother         cancer    Diabetes Father     Cancer Brother         cancer    Diabetes Brother     Diabetes Maternal Grandmother     Diabetes Paternal Grandmother     Hypertension Paternal Grandmother     Diabetes Paternal Grandfather           Laboratory: I have personally reviewed the critical care flowsheet and labs. Recent Labs     03/25/21  0011 03/24/21  0454 03/23/21  2150   WBC 16.7* 16.5* 15.5*   HGB 9.7* 9.4* 11.8   HCT 31.1* 29.6* 37.4    324 369     Recent Labs     03/25/21  0011 03/24/21  1505 03/24/21  0454 03/23/21  2150    140 140 138   K 2.3* 2.5* 2.1* 3.0*   CL 96* 94* 93* 87*   CO2 43* 42* 43* >45*   GLU 72 82 81 80   BUN 19 22* 24* 25*   CREA 1.26* 1.36* 1.41* 1.72*   CA 7.0* 6.6* 6.7* 7.8*   MG 1.6 1.4* 1.7  --    PHOS 2.7 2.7 2.9  --    ALB 2.0*  --  1.7* 2.0*   ALT 10*  --   --  12       Objective:     Mode Rate Tidal Volume Pressure FiO2 PEEP                    Vital Signs:     TMAX(24)      Intake/Output:   Last shift:         Last 3 shifts: No intake/output data recorded. RRIOLAST3    Intake/Output Summary (Last 24 hours) at 3/25/2021 1032  Last data filed at 3/24/2021 1845  Gross per 24 hour   Intake 240 ml   Output 1000 ml   Net -760 ml     EXAM:   GENERAL: well developed, on RA,, HEENT:  PERRL, EOMI, no alar flaring or epistaxis, oral mucosa moist without cyanosis, NECK:  no jugular vein distention, no retractions, no thyromegaly or masses, LUNGS: mild expiratory wheeze with decent air movement, CTA, no w/e/e, HEART:  Regular rate and rhythm with no MGR; +2 edema is present, ABDOMEN:  soft with no tenderness, bowel sounds present, EXTREMITIES:  warm with no cyanosis, SKIN:  no jaundice or ecchymosis and NEUROLOGIC:  alert and oriented, grossly non-focal    Hafsa Martinez NP  [facility]

## 2021-03-25 NOTE — PROGRESS NOTES
Shift Change:     Bedside and Verbal shift change report given to  [facility], RN(oncoming nurse) by CHARLEE AMARAL(offgoing nurse). Report included the following information SBAR, Kardex, and Recent Results. This patient was assisted with Intentional Toileting every 2 hours during this shift. Documentation of ambulation and output reflected on Flowsheet. [facility] need for patient to become NPO related to dumping. 2217- Pt new orders for NPO to begin at midnight. 0300- pt CHG bath and wound care completed. Ostomy bag changed x3 this shift related to constant leakage. Shift Report:     Bedside and Verbal shift change report given to Leesa Saint, RN___ (oncoming nurse) by Preet Long RN (offgoing nurse). Report included the following information SBAR, Kardex, and Recent Results.

## 2021-03-25 NOTE — PROGRESS NOTES
0700  Bedside and Verbal shift change report given to Marley Sandhu RN (oncoming nurse) by Zita Rene RN (offgoing nurse). Report included the following information SBAR, Kardex, Procedure Summary, Intake/Output, MAR, Accordion and Recent Results. Patient on bed. Initial assessment done. AOx3. Ileostomy leaking. Bag changed. Wound care done. CHG done. No complaints of pain. But insisting to sit on the edge of the bed. Advised to wait for PT. Wound care nurses at the bedside. This patient was assisted with Intentional Toileting every 2 hours during this shift. Documentation of ambulation and output reflected on Flowsheet. 3096  Patient refused the metamucil. She verbalized \"The only way I can take fiber is with [employer] ice\". But she doesn't like the [employer] ice that's from [employer]. 0930  Had to empty the ileostomy bag every 15-30 minutes. Still draining watery stool. Visit Vitals  BP 95/69   Pulse 72   Temp 97.6 °F (36.4 °C)   Resp 19   Ht 5' 2\" (1.575 m)   Wt 80.3 kg (177 lb 0.5 oz)   SpO2 91%   BMI 32.38 kg/m²     1700  PT at the bedside working with the patient. Emptied 4050 ml on my shift (7a-7p) from the ileostom     1910  Bedside and Verbal shift change report given to [address] (oncoming nurse) by Marley Sandhu RN (offgoing nurse). Report included the following information SBAR, Kardex, Procedure Summary, Intake/Output, MAR, Accordion and Recent Results. 1948  Labs drawn and sent.

## 2021-03-25 NOTE — PROGRESS NOTES
Assessment / Plan:   Pt much more alert hungry - wants to eat  Ileostomy are high output and always a challenge to manage add peanut butter and crackers, bananas and fiber to diet  Add imodium and colestipol as needed  Ostomy care to help trouble shoot leaking ostomy  Leaking will be decreased if bag is emptied much more frequently - the weight of the fluid disrupts the seal  Aggressive K repletion    Shaylee Davis MD  [facility]  [phone]        General Surgery Daily Progress Note      Patient: Glenys Monroe MRN: [id number]  SSN: [id number]    YOB: 1955  Age: 61 y.o.   Sex: female          Subjective:   Patient feels better today    Current Facility-Administered Medications   Medication Dose Route Frequency    0.9% sodium chloride with KCl 20 mEq/L infusion   IntraVENous CONTINUOUS    potassium chloride 20 mEq in 100 ml IVPB  20 mEq IntraVENous Q2H    meropenem (MERREM) 500 mg in sterile water (preservative free) 10 mL IV syringe  0.5 g IntraVENous Q8H    sodium chloride (NS) flush 5-40 mL  5-40 mL IntraVENous Q8H    sodium chloride (NS) flush 5-40 mL  5-40 mL IntraVENous PRN    acetaminophen (TYLENOL) tablet 650 mg  650 mg Oral Q6H PRN    Or    acetaminophen (TYLENOL) suppository 650 mg  650 mg Rectal Q6H PRN    prochlorperazine (COMPAZINE) injection 10 mg  10 mg IntraVENous Q6H PRN    insulin lispro (HUMALOG) injection   SubCUTAneous AC&HS    glucose chewable tablet 16 g  4 Tab Oral PRN    dextrose (D50W) injection syrg 12.5-25 g  12.5-25 g IntraVENous PRN    glucagon (GLUCAGEN) injection 1 mg  1 mg IntraMUSCular PRN    enoxaparin (LOVENOX) injection 80 mg  1 mg/kg SubCUTAneous Q12H    vancomycin (VANCOCIN) 1,250 mg in 0.9% sodium chloride 250 mL (VIAL-MATE)  1,250 mg IntraVENous Q24H    albuterol-ipratropium (DUO-NEB) 2.5 MG-0.5 MG/3 ML  3 mL Nebulization Q4H PRN    diphenoxylate-atropine (LOMOTIL) tablet 2 Tab  2 Tab Oral QID    psyllium husk-aspartame (METAMUCIL FIBER) packet 1 Packet  1 Packet Oral TID WITH MEALS    nystatin (MYCOSTATIN) 100,000 unit/gram powder   Topical BID    PHENYLephrine (ROGER-SYNEPHRINE) 30 mg in 0.9% sodium chloride 250 mL infusion   mcg/min IntraVENous TITRATE    potassium bicarb-citric acid (EFFER-K) tablet 40 mEq  40 mEq Oral DAILY    sodium chloride (NS) flush 5-10 mL  5-10 mL IntraVENous PRN        Objective:   No intake/output data recorded. 03/23 1901 - 03/25 0700  In: 240 [P.O.:240]  Out: 1800   Patient Vitals for the past 8 hrs:   BP Temp Pulse Resp SpO2 Weight   03/25/21 0738 (!) 120/92 98.4 °F (36.9 °C) 72 22 97 %    03/25/21 0700   77      03/25/21 0535      80.3 kg (177 lb 0.5 oz)   03/25/21 0350 (!) 144/53 98 °F (36.7 °C) 77 14 98 %    03/25/21 0316 (!) 91/38  75          Physical Exam:  General: Alert, cooperative, no distress, appears stated age. Neck:  Supple, symmetrical, trachea midline  Lungs: Aerating well no distress, on O2  Heart:  Regular rate and rhythm  Abdomen: Soft, non tender. Ileostomy pink productive, midline wound healthy minimal drainage on dressing  Extremities: Extremities normal, atraumatic, no cyanosis or edema.   Skin:  Skin color, texture, turgor normal. No rashes or lesions    Labs:   Recent Labs     03/25/21  0011   WBC 16.7*   HGB 9.7*   HCT 31.1*        Recent Labs     03/25/21  0011 03/24/21  1505    140   K 2.3* 2.5*   CL 96* 94*   CO2 43* 42*   GLU 72 82   BUN 19 22*   CREA 1.26* 1.36*   CA 7.0* 6.6*   MG 1.6 1.4*   PHOS  --  2.7   ALB 2.0*  --    TBILI 0.5  --    ALT 10*  --        ·     Principal Problem:    Severe sepsis ([facility] (3/23/2021)    Active Problems:    Diabetes mellitus with complication ([facility] (91/1/1603)      Overview: Retinopathy      Microalbuminuria      Toe amputation x2      Depression (7/24/2017)      Bilateral pulmonary embolism ([facility] (2/4/2021)      Disorientation (3/24/2021)      Stage 3b chronic kidney disease (3/24/2021)      Sacral wound (3/24/2021)      Hypokalemia (3/24/2021)      Shortness of breath (3/24/2021)      Prolonged Q-T interval on ECG (3/24/2021)        Problem List Items Addressed This Visit     Disorientation      Other Visit Diagnoses     Sepsis with encephalopathy without septic shock, due to unspecified organism [facility])    -  Primary    Relevant Medications    enoxaparin (Lovenox) 80 mg/0.8 mL injection    nystatin (MYCOSTATIN) powder

## 2021-03-25 NOTE — PROGRESS NOTES
Reason for Admission:     Severe sepsis, increased confusion. At [facility] 3/2 - 3/8 for exploratory lap, PEG placement, ileostomy. Hx metastatic adenocarcinoma, diabetes, HTN. RUR Score:     22%/ moderate risk             PCP: First and Last name:   Lavonne Espinoza MD (sees a different MD in at the practice)     Name of Practice:    Are you a current patient: Yes/No:  yes   Approximate date of last visit:  1 week ago   Can you participate in a virtual visit if needed:  yes    Do you (patient/family) have any concerns for transition/discharge? Patient will need continued follow up from home health and MD follow up. Plan for utilizing home health:   Open to [facility] care, nursing, PT, [place].                                                             NEED resumption of care order. Current Advanced Directive/Advance Care Plan:  Full Code    Healthcare Decision Maker:   Click here to complete HealthCare Decision Makers including selection of the Healthcare Decision Maker Relationship (ie \"Primary\")    Daughter - Mariama Garcia [phone] - primary decision maker  Daughter - Chelsey Flor [phone] - secondary decision maker            Transition of Care Plan:        Chart reviewed, demographics verified. CM role and follow up discussed. Met with patient at bedside, face mask and goggles on. Patient lives with her daughter and son-in-law. Patient lives in a two story home with 0 steps to enter home and bed/bath on main level. Patient has prescription drug coverage, uses [facility] on [facility]. Patient currently requires assistance with her care, provides self care when able. Daughter, son-in-law , and niece assist her with care and transportation currently. Current status:  Patient currently requiring medical management including mariana-synephrine drip, IV antibiotics, potassium replete. Having large amount of ileostomy output, issues with leaking.  OT recommending home care follow up, patient already open to [facility]. PLAN:  1. Monitor patient response to treatment and recommendations. 2. Medical management continues. 3. Patient is appropriate for resumption of home care, NEED ORDER. 4. Patient transport home per family at discharge. 5. CM to monitor clinical progress and disposition recommendations. Care Management Interventions  PCP Verified by CM: Yes(Dr. Valverde)  Mode of Transport at Discharge:  Other (see comment)(per daughter or niece)  Transition of Care Consult (CM Consult): Discharge Planning, [address]: No  Reason Outside [place]: Patient already serviced by other home care/hospice agency  Physical Therapy Consult: Yes  Occupational Therapy Consult: Yes  Current Support Network: Lives with Caregiver  Confirm Follow Up Transport: Family  The Plan for Transition of Care is Related to the Following Treatment Goals : return home at [facility]  Discharge Placement: Home with home health    Gi Zuniga RN, MSN, Care manager

## 2021-03-25 NOTE — PROGRESS NOTES
[facility] Vancomycin Pharmacy Consult 03/25/21  Vancomycin RANDOM level = 20.6 mcg/ml (drawn 17.3hrs post dose), extrapolates to a trough of 15.85 mcg/ml. Level is THERAPEUTIC   Goal Trough: 15-20 mcg/ml    Plan:   Will continue current regimen    Thank you  Felix Reilly, PharmD  [phone]

## 2021-03-25 NOTE — PROGRESS NOTES
Attempted to work with the patient for Physical Therapy, chart reviewed and discussed with nurse who was in the room with the patient managing the ostomy seal, not leaking for now and still monitoring the seal. We will defer therapy evaluation for now and continue to follow up tomorrow.  Thank you
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
## 2021-03-25 NOTE — PROGRESS NOTES
Patient seen by OT. Able to get to EOB with min A. At rest mostly stays between 90's and 92% on room air. Sat for several minutes with oxygen fluctuating between high 80's and mid 90's on 2lpm as she was staying in mid 80's with exertion. Able to stand with Min A to change pad and then settled back to bed. She requires significant care at home and daughter and BRITNI providing with family assisstance. They are looking into [facility] aide as she needs round the clock care. Current plan is to go home with continued care and resume [facility] services. Note to follow.     Renaldo Stephenson MS, OTR/L, CSRS
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
## 2021-03-26 NOTE — ROUTINE PROCESS
Bedside shift change report given to Maldonado Shen (oncoming nurse) by Ponce Carrion (offgoing nurse). Report included the following information SBAR, Kardex, ED Summary, Procedure Summary, Intake/Output, MAR, Accordion, Recent Results, Med Rec Status and Cardiac Rhythm NSR.

## 2021-03-26 NOTE — ROUTINE PROCESS
Wound care nurse saw patient today. Changed ileostomy. New dressing applied to midline abdominal wound, packed with moist dressing. R foot wound dressing changed with kerlex. Sacral wound dressing changed with foam mepllex. Will continue to monitor.

## 2021-03-26 NOTE — ROUTINE PROCESS
Central line Type: PICC  Central Line Insert Date: 03/24/21  Reason Central Line Placed: long term IV abx  Central Line Dressing Date: 03/24/21  Biopatch in place? Yes No: yes  Tubing labeled and appropriate? Yes No: yes  Alcohol caps on all open ports?  Yes No: yes  Last CHG bath (time&date): 03/26/21 at 1124  Reviewed with provider and central line must stay in for the following reasons:

## 2021-03-26 NOTE — ROUTINE PROCESS
Upon assessment, pt has mariana going at 65mcg/min. MAP 78. HR 68, /50. Documented mariana infusion rate in MAR.

## 2021-03-26 NOTE — PROGRESS NOTES
[address] [facility] Progress Note           Lorena Early DO    [phone] Office  [phone]  Fax    3/26/2021      Assessment & Plan:   · Low-grade fever and leukocytosis. Etiology unclear. Fever has resolved but WBC remains elevated despite broad-spectrum antibiotics  Discussed with wound care nurse who examined all wounds this afternoon. No concern for wound infection. Midline abdominal incision site examined yesterday and looked relatively clean. Culture from the site pending. General surgery following. No plans for intervention and they are recommending follow-up with [facility] surgery. BSI related to PICC line in differential however blood cultures NGSF. Abdominal exam benign. Low suspicion for pneumonia. UA bland. Very low suspicion for C. Difficile. Will need to consider noninfectious causes of leukocytosis? May be reactive from recent extensive abdominal surgery/postoperative wound infection/and colon cancer/carcinomatosis. Consider whole-body tagged WBC scan Monday. Consider removal of PICC line. Continue vancomycin and meropenem. Unfortunately antibiotic de-escalation options limited due to multiple antibiotic allergies  · Metastatic gastric/colon adenocarcinoma. Status post laparotomy and ileostomy with PEG tube placement. Followed by [facility] oncology. Not felt to be a candidate for chemotherapy or radiation at this time  · Intermittent hypoxia with left-sided small to moderate pleural effusion. Hypoxia thought to be due to atelectasis per pulmonary. No plans for thoracentesis at this time   · Encephalopathy. TME. Resolving  · Diabetes mellitus. Hemoglobin A1c 7.9  · Penicillin and cephalexin allergies. Currently tolerating meropenem  · CKD. Stable          Subjective:     No complaints.   Wants to go home    Objective:     Vitals:   Visit Vitals  BP (!) 121/40   Pulse 67   Temp 97.7 °F (36.5 °C)   Resp 26   Ht 5' 2\" (1.575 m)   Wt 177 lb 0.5 oz (80.3 kg) SpO2 96%   BMI 32.38 kg/m²        Tmax:  Temp (24hrs), Av.1 °F (36.7 °C), Min:97.5 °F (36.4 °C), Max:98.7 °F (37.1 °C)      Exam:   Patient is intubated:  no    Physical Examination:   General:  Alert, cooperative, no distress   Head:  Normocephalic, atraumatic. Eyes:  Conjunctivae clear   Neck: Supple       Lungs:   No distress. Clear to auscultation anteriorly   Chest wall:     Heart:  Regular rate and rhythm   Abdomen:   Soft, non-tender, non-distended. PEG tube in place. Ostomy with liquid yellow drainage. Midline abdominal wound dressed and packed   Extremities: Moves all. RUE PICC   Skin:  No rash   Neurologic: CNII-XII intact. Normal strength     Labs:        No lab exists for component: ITNL   Recent Labs     21  0454 21  2150   Arlice Slice <0.05 <0.05     Recent Labs     21  0356 21  1938 21  1126 21  0011 21  0454 21  0454 21  2150    142 141 142   < > 140 138   K 3.6 3.6 2.9* 2.3*   < > 2.1* 3.0*    101 100 96*   < > 93* 87*   CO2 37* 38* 37* 43*   < > 43* >45*   BUN 19 17 17 19   < > 24* 25*   CREA 1.12* 1.14* 1.09* 1.26*   < > 1.41* 1.72*   * 101* 85 72   < > 81 80   PHOS 3.1  --  1.9* 2.7   < > 2.9  --    MG 2.8* 2.0 1.3* 1.6   < > 1.7  --    ALB  --   --   --  2.0*  --  1.7* 2.0*   WBC 18.0*  --   --  16.7*  --  16.5* 15.5*   HGB 9.6*  --   --  9.7*  --  9.4* 11.8   HCT 31.9*  --   --  31.1*  --  29.6* 37.4     --   --  363  --  324 369    < > = values in this interval not displayed. No results for input(s): INR, PTP, APTT, INREXT in the last 72 hours.   Needs: urine analysis, urine sodium, protein and creatinine  Lab Results   Component Value Date/Time    Creatinine, urine 108.6 2020 10:43 AM         Cultures:     No results found for: REED  Lab Results   Component Value Date/Time    Culture result: LIGHT POSSIBLE ALPHA STREPTOCOCCUS (A) 2021 02:30 PM    Culture result: CHECKING FOR POSSIBLE  OTHER ORGANISM   03/25/2021 02:30 PM    Culture result: NO GROWTH 2 DAYS 03/24/2021 12:38 AM       Radiology:     Medications       Current Facility-Administered Medications   Medication Dose Route Frequency Last Admin    [START ON 3/27/2021] collagenase (SANTYL) 250 unit/gram ointment   Topical DAILY      0.9% sodium chloride with KCl 20 mEq/L infusion   IntraVENous CONTINUOUS New Bag at 03/26/21 1219    potassium chloride SR (KLOR-CON 10) tablet 40 mEq  40 mEq Oral BID 40 mEq at 03/26/21 0841    loperamide (IMODIUM) capsule 4 mg  4 mg Oral AC&HS 4 mg at 03/26/21 1215    colestipoL (COLESTID) tablet 4 g  4 g Oral QID 4 g at 03/26/21 1215    pantoprazole (PROTONIX) 40 mg in 0.9% sodium chloride 10 mL injection  40 mg IntraVENous Q12H 40 mg at 03/26/21 0529    meropenem (MERREM) 500 mg in sterile water (preservative free) 10 mL IV syringe  0.5 g IntraVENous Q8H 500 mg at 03/26/21 0841    sodium chloride (NS) flush 5-40 mL  5-40 mL IntraVENous Q8H 10 mL at 03/26/21 0530    sodium chloride (NS) flush 5-40 mL  5-40 mL IntraVENous PRN      acetaminophen (TYLENOL) tablet 650 mg  650 mg Oral Q6H  mg at 03/24/21 0104    Or    acetaminophen (TYLENOL) suppository 650 mg  650 mg Rectal Q6H PRN      prochlorperazine (COMPAZINE) injection 10 mg  10 mg IntraVENous Q6H PRN      insulin lispro (HUMALOG) injection   SubCUTAneous AC&HS Stopped at 03/24/21 0730    glucose chewable tablet 16 g  4 Tab Oral PRN      dextrose (D50W) injection syrg 12.5-25 g  12.5-25 g IntraVENous PRN      glucagon (GLUCAGEN) injection 1 mg  1 mg IntraMUSCular PRN      enoxaparin (LOVENOX) injection 80 mg  1 mg/kg SubCUTAneous Q12H 80 mg at 03/26/21 0529    vancomycin (VANCOCIN) 1,250 mg in 0.9% sodium chloride 250 mL (VIAL-MATE)  1,250 mg IntraVENous Q24H 1,250 mg at 03/25/21 2304    albuterol-ipratropium (DUO-NEB) 2.5 MG-0.5 MG/3 ML  3 mL Nebulization Q4H PRN 3 mL at 03/24/21 0345    diphenoxylate-atropine (LOMOTIL) tablet 2 Tab  2 Tab Oral QID 2 Tab at 03/26/21 1215    psyllium husk-aspartame (METAMUCIL FIBER) packet 1 Packet  1 Packet Oral TID WITH MEALS 1 Packet at 03/26/21 0842    nystatin (MYCOSTATIN) 100,000 unit/gram powder   Topical BID Given at 03/26/21 0846    PHENYLephrine (ROGER-SYNEPHRINE) 30 mg in 0.9% sodium chloride 250 mL infusion   mcg/min IntraVENous TITRATE 65 mcg/min at 03/26/21 3308    sodium chloride (NS) flush 5-10 mL  5-10 mL IntraVENous PRN             Case discussed with: Surgery, Dr. Alyce Eddy, DO

## 2021-03-26 NOTE — PROGRESS NOTES
CM follow up:    Reason for Admission:     Severe sepsis, increased confusion. At [facility] 3/2 - 3/8 for exploratory lap, PEG placement, ileostomy. Hx metastatic adenocarcinoma, diabetes, HTN. RUR Score:     22%/ moderate risk    Current status:  Patient currently requiring medical management including mariana-synephrine drip, IV antibiotics. Having large amount of ileostomy output, issues with leaking. OT recommending home care follow up, patient already open to [facility]. Order received for resumption of home care, sent to [facility] care via [facility].     PLAN:  1. Monitor patient response to treatment and recommendations. 2. Medical management continues, remains on mariana-synephrine drip. [facility] [facility] home care will follow at [place]. 4. Patient transport home per family at discharge. 5. CM to monitor clinical progress and disposition recommendations.      Tamara Morales RN, MSN/Care manager  [phone]

## 2021-03-26 NOTE — ROUTINE PROCESS
Pt . Per sliding scale, pt would receive 2 units. Pt declined insulin. RN educated pt her sugar is elevated and insulin is ordered. Pt declined still.

## 2021-03-26 NOTE — ROUTINE PROCESS
Pt asking to go outside. RN explained pt is on a mariana drip and hooked up to the monitor so we can keep a close eye on her. Pt persistent on getting RN to call MD to get order to go outside. Spoke to MD. Not safe for patient to go outside at this time. Will educate pt.

## 2021-03-26 NOTE — CONSULTS
Nutrition Note    3/26: RD consult received for diet education, \"foods to avoid to prevent high output in ostomy bag. \" Provided pt with written educational materials. Pt says she received this education after her surgery and is familiar with it. RD contact info provided for any questions.     Electronically signed by Armen Yousif RDN on 3/26/2021 at 10:17 AM    Contact: [phone]

## 2021-03-26 NOTE — ROUTINE PROCESS
Pt has R wrist skin tear and abrasion. Refuses mepellex. Wound isopen to air. Will continue to monitor.

## 2021-03-26 NOTE — PROGRESS NOTES
Attempted to work with the patient for Physical Therapy, chart reviewed and discussed with nurse cleared for therapy. Patient still up on the recliner offered if she is ready to go back to bed after ambulation. Patient declined stated she likes to stay up on the chair for now. Does not want to go back to bed. Performed some simple active range of motion exercise on both LE. Educate and encourage patient to keep on moving both LE for management of swelling. Communicated with nurse who agreed to continue to monitor patient. We will continue to follow up with the patient for therapy thank you.

## 2021-03-26 NOTE — PROGRESS NOTES
[id number] RESIDENCY PROGRAM  PROGRESS NOTE     3/28/2021  PCP: Sarah Hodge MD     Assessment/Plan:         Arnold Baumann is a 61 y.o. female with a PMHx of metastatic adenocarcinoma, T2DM, Neuropathy, HTN who is admitted for Severe Sepsis. 24 hour events: Albumin given for low BP    Septic Shock: POA 4/4 SIRS (T100.8F, WBC 15.5, RR 39, HR 94), LA 5.6 (resolved). Likely 2/2 PICC line VS. intraabdominal. CXR w/o PNA. CT a/p ileus and locules of free air. Pt with open laparotomy incision with packing.   - BCx NG x 4d  - Ucx 40K aerococcus sanguinous (covered w/ meropenem)  - Continue Vanc and Meropenem (started 3/23)  - Neosyn gtt to maintain MAP > 65 (started 3/23)        - cardiology consulted - albumin 1d on 3/27, midodrine 5 BID,  taper off vasopressors       - primary surgeon consulted in regards to BP - fluids if asymptomatic vs midodrine   - General surgery consulted - free locules of air more likely related to small fascial dehiscence opposed to perforated bowel.       - no intervention at this time       - ostomy care following       - Dr. London Swan () wants wound vac at discharge  - GI consulted        - colestipol TID       - Protonix BID       - Continue psyllium, Imodium, Lomotil  - Infectious disease consulted - WBC improving         - Prelim wound culture incision site growing possible alpha strep + enteroccocus         - Continue empiric Abx treatment, will narrow pending speciation/sensitivities  - Nutrition consulted - consistent carb diet + protein supplementation. Info provided regarding foods to prevent viji output in ostomy bag.  -Strict I&O  -Daily CBC, BMP    Hypokalemia: Improved with bulking stools . 2/2 high output from ostomy bag POA K 2.1.  - Monitor closely with BMP twice daily, replete as needed  - NS + 20meq KCl MIVF  - Immodium, lomotil, psyllium to bulk stools    Hypotension:   - cardiology consulted        - s/p albumin on 3/27       - midodrine 5 BID, taper off vasopressors  - primary surgeon consulted in regards to BP - fluids if asymptomatic vs midodrine       SOB: Resolved. Covid neg. CXR slight inc L sm-mod pleural effusion & basilar atelectasis. LVEF 55-60% (2/2021). - Pulmonology consulted - hypoxia likely d/t atelectasis secondary to dec diaphragmatic excursion.          - recommend against thoracentesis          - albuterol neb prn for wheezing       Metabolic alkalosis w/ resp acidosis: Improved. 2/2 gastric losses with ileostomy.    - Monitor bicarb on daily CMP    AMS: Resolved. Likely 2/2 severe hypokalemia vs sepsis. CT head nml. POA glu 80. EtOH/UDS neg. Trop, NH3 neg.        Elevated Cr in CKD3: Improved. Likely 2/2 IVVD. POA Cr 1.72 (BL 1.1-1.3). -Continue to monitor on daily labs, expect to improve with fluids     Metastatic gastric/colon adenocarcinoma: S/p laparotomy, with open incision w/ packing, scheduled to see oncologic surgery on 3/26 at [facility]. S/p Ileostomy and PEG placement (3/2021). Home Lomotil 2 tabs QID, Imodium 4 caps QID prn, Metamucil TID with meals. Home 2L LR daily. F/w Surg Onc Dr. Orquidea Velasco ([facility]). - Continue Lomotil QID, Psyllium husk TID w/meals  - Wound care following     Bilateral pulmonary emboli: Diagnosed 2/3/2021  - Continue home lovenox 80mg BID     Bilateral Sacral ulcers: Pt following with home wound care.   -Wound care, appreciate assistance     Prolonged QTc: POA QTc 513  -Avoid QTc prolonging agents     T2DM: A1c 7.9 (11/2020). Home Januvia 100mg daily. No home Detremir last 2 days due to fasting BG in 70-80s.    -HOLD home Januvia 100mg daily  -HOLD home Detremir given low BG readings, will restart if needed  -SSI w/POC glu checks     HTN: BP on admission 124/56. BP normotensive outpatient without antihypertensives, previously on lisinopril 10mg QHS. -Monitor BP      Wheezing: Chronic. 0.5PPD smoker x 40 years.  Home Albuterol inhaler 2 puff q4H prn.   -Rec'd PFT outpatient     PTSD: Rarely uses Buspar 10mg BID PRN.     Severe aortic atherosclerosis: Severe sclerosis of the abdominal aorta and common iliac and femoral arteries noted on CT abd/pelv 1/21.    -Follow up outpatient Vascular     HLD: Lipid panel 11/2020 w/ Tchol 192, HDL 37, , tri 170. Holding home Lipitor 40mg QHS.    FEN/GI - Regular. NS 125cc/hr   Activity - Ambulate with assistance  DVT prophylaxis - Lovenox  GI prophylaxis - Not indicated at this time  Fall prophylaxis - Fall precautions ordered. Disposition - Admit to Telemetry. Plan to d/c to Home. Consulting PT and OT  Code Status - Full. Discussed with Daughter. Next of Kin Name and Taz Valerio,  Daughter - [phone]   [facility] discussed with Dr. Erika Hoskins     Subjective:   Pt was seen and examined at bedside. Afebrile and hemodynamically stable. Concerns overnight include: None. Denies chest pain, SOB, nausea, vomiting, abdominal pain, dizziness.      Objective:   Physical examination  Patient Vitals for the past 24 hrs:   Temp Pulse Resp BP SpO2   03/28/21 0352 97.3 °F (36.3 °C) 81 16 92/78 98 %   03/27/21 2300  69 12 (!) 116/55 96 %   03/27/21 2037  72 17  97 %   03/27/21 2036  73 18  97 %   03/27/21 2035  72 18  98 %   03/27/21 2034  72 16  96 %   03/27/21 2033  75 24  96 %   03/27/21 2032  75 21  96 %   03/27/21 2031  72 21  96 %   03/27/21 2030  75 20  96 %   03/27/21 2029  76 21  96 %   03/27/21 2028  76 20  96 %   03/27/21 2027  79 14  98 %   03/27/21 2026  80 23  98 %   03/27/21 2024  79 18  97 %   03/27/21 2023  79 16  97 %   03/27/21 2022  78 16  97 %   03/27/21 2021  79 14  97 %   03/27/21 2020  78 20  97 %   03/27/21 2019  72 19  97 %   03/27/21 2018  72 18  97 %   03/27/21 2017  80 21  98 %   03/27/21 2016  78 19  97 %   03/27/21 2015  79 18  99 %   03/27/21 2014  75 21  98 %   03/27/21 2013  77 23  98 %   03/27/21 2012  73 19  98 %   03/27/21 2011  77 18  96 %   03/27/21 2010  78 21  97 % 03/27/21 2009  78 24  98 %   03/27/21 2008  77 17  98 %   03/27/21 2007  79 18  95 %   03/27/21 2006  75 14  97 %   03/27/21 2005  76 18  99 %   03/27/21 2004  75 16  98 %   03/27/21 2003  71 15  98 %   03/27/21 2002  75 18  98 %   03/27/21 2001  73 14  98 %   03/27/21 2000  74 15 (!) 116/46 98 %   03/27/21 1959  76 18  97 %   03/27/21 1958  74 16  97 %   03/1957  74 20  97 %   03/27/21 1956  77 15  96 %   03/27/21 1955  77 15  96 %   03/27/21 1954  78 22  96 %   03/27/21 1953  75 14  96 %   03/27/21 1952  73 14  96 %   03/27/21 1951  71 13  95 %   03/27/21 1950  74 22  96 %   03/27/21 1949  73 21  96 %   03/27/21 1948  74 18  98 %   03/27/21 1947  73 18  99 %   03/27/21 1946  72 12  96 %   03/27/21 1945  71 13  96 %   03/27/21 1944  72 13  96 %   03/27/21 1943  72 13  96 %   03/27/21 1942  74 14  96 %   03/27/21 1941  76 16  95 %   03/27/21 1940  74 16  96 %   03/27/21 1939  76 15  96 %   03/27/21 1938  79 15  98 %   03/27/21 1937  80 17  100 %   03/27/21 1936  79 14  97 %   03/27/21 1935  78 14  97 %   03/27/21 1934  79 17  96 %   03/27/21 1933  82 17  96 %   03/27/21 1932  84 19     03/27/21 1931  82 16  95 %   03/27/21 1930  80 18  97 %   03/27/21 1929  78 12  98 %   03/27/21 1928  82 15  99 %   03/27/21 1927  80 19  98 %   03/27/21 1926  84 17 (!) 129/48 96 %   03/27/21 1925 97.5 °F (36.4 °C) 84 16 (!) 129/48 100 %   03/27/21 1924  83 16  93 %   03/27/21 1923  84 21  98 %   03/27/21 1922  86 17  98 %   03/27/21 1921  87 18  100 %   03/27/21 1920  90 20  99 %   03/27/21 1919  85 20  100 %   03/27/21 1918  93 17  98 %   03/27/21 1917  98 16  99 %   03/27/21 1916  83 17  96 %   03/27/21 1915  73 18     03/27/21 1914  82 18  98 %   03/27/21 1913  83 19  98 %   03/27/21 1912  87 15  99 %   03/27/21 1911  87 20  100 %   03/27/21 1910  87 17  99 %   03/27/21 1909  82 18  97 %   03/27/21 1908  81 19  98 %   03/27/21 1907  83 20  100 %   03/27/21 1906  83 20  99 %   03/27/21 1905  84 19  97 %   03/27/21 1904  86 17  97 %   03/27/21 1903  87 17  97 %   03/27/21 1902  88 18  96 %   03/27/21 1901  88 21  97 %   03/27/21 1900  86 16  96 %   03/27/21 1859  89 16  97 %   03/27/21 1858  89 19  98 %   03/27/21 1857  89 19  98 %   03/27/21 1856  87 20  97 %   03/27/21 1855  83 20 (!) 133/49 100 %   03/27/21 1854  79 15  97 %   03/27/21 1853  78 18  97 %   03/27/21 1852  79 15  96 %   03/27/21 1851  79 16  97 %   03/27/21 1850  83 18  97 %   03/27/21 1849  83 17  99 %   03/27/21 1848  77 14  97 %   03/27/21 1847  79 19  99 %   03/27/21 1846  81 15  96 %   03/27/21 1845  81 15  95 %   03/27/21 1844  80 17  96 %   03/27/21 1843  80 16  99 %   03/27/21 1842  80 17  96 %   03/27/21 1841  83 19  98 %   03/27/21 1840  81 16  96 %   03/27/21 1839  82 18  95 %   03/27/21 1838  83 17  96 %   03/27/21 1837  83 23  99 %   03/27/21 1836  86 19  96 %   03/27/21 1835  89 20  98 %   03/27/21 1834  92 16  98 %   03/27/21 1833  88 23  99 %   03/27/21 1832  85 16  95 %   03/27/21 1831  84 17  96 %   03/27/21 1830  82 20  97 %   03/27/21 1829  83 19  97 %   03/27/21 1828  81 19  98 %   03/27/21 1827  81 16  98 %   03/27/21 1826  84 20  96 %   03/27/21 1825  85 24  93 %   03/27/21 1824  78 21  99 %   03/27/21 1823  78 17  95 %   03/27/21 1822  77 17  96 %   03/27/21 1821  77 17  96 %   03/27/21 1820  77 15  96 %   03/27/21 1819  79 17  96 %   03/27/21 1818  82 15  97 %   03/27/21 1817  82 21  97 %   03/27/21 1816  80 15  95 %   03/27/21 1815  79 18  97 %   03/27/21 1814  77 13 (!) 125/59 97 %   03/27/21 1813  76 17  96 %   03/27/21 1812  78 20  95 %   03/27/21 1811  81 17  96 %   03/27/21 1810  79 19  95 %   03/27/21 1809  83 17  97 %   03/27/21 1808  86 24  98 %   03/27/21 1807  85 28  98 %   03/27/21 1806  84 17  90 %   03/27/21 1805  82 20  96 %   03/27/21 1804  84 17  95 %   03/27/21 1803  87 19  98 %   03/27/21 1802  85 16 (!) 121/98 95 %   03/27/21 1801  82 17  96 %   03/27/21 1800  78 23 (!) 132/56 100 %   03/27/21 1759  78 21  97 %   03/27/21 1758  79 19  99 %   03/27/21 1757  75 18  92 %   03/27/21 1756  76 15  91 %   03/27/21 1755  81 20  97 %   03/27/21 1754  88 24  96 %   03/27/21 1753  72 19  99 %   03/27/21 1752  78 21  97 %   03/27/21 1751  75 23  96 %   03/27/21 1750  78 16  96 %   03/27/21 1749  80 19  97 %   03/27/21 1748  83 18  96 %   03/27/21 1747  87 23  94 %   03/27/21 1746  87 16  (!) 87 %   03/27/21 1745  87 20  90 %   03/27/21 1744  84 30  90 %   03/27/21 1743  84 18  (!) 74 %   03/27/21 1742  85 25  (!) 81 %   03/27/21 1741  85 28     03/27/21 1740  88 (!) 31  (!) 74 %   03/27/21 1739  90 20  (!) 72 %   03/27/21 1738  86 27  (!) 76 %   03/27/21 1737  86 16  (!) 78 %   03/27/21 1736  84 21     03/27/21 1735  81 19  100 %   03/27/21 1734  86 21  90 %   03/27/21 1733  86 25  (!) 82 %   03/27/21 1732  85 22  (!) 88 %   03/27/21 1731  83 17  (!) 88 %   03/27/21 1730  82 23  93 %   03/27/21 1729  84 18  92 %   03/27/21 1728  83 29  (!) 89 %   03/27/21 1727  81 23  96 %   03/27/21 1726  82 23     03/27/21 1725  78 (!) 31  (!) 70 %   03/27/21 1724  76 19     03/27/21 1723  73 16     03/27/21 1722  75 15     03/27/21 1721  76 21     03/27/21 1720  74 19     03/27/21 1719  71 20     03/27/21 1718  70 23  93 %   03/27/21 1717  70 23  93 %   03/27/21 1716  69 22  96 %   03/27/21 1715  67 19  95 %   03/27/21 1714  66 18  95 %   03/27/21 1713  69 17  98 %   03/27/21 1712  69 17  97 %   03/27/21 1711  69 23  100 %   03/27/21 1710  68 19  98 %   03/27/21 1709  69 16     03/27/21 1708  70 18  95 %   03/27/21 1707  72 16  95 %   03/27/21 1706  74 19     03/27/21 1705  73 17 (!) 125/58  03/27/21 1704  69 16     03/27/21 1616  70 22     03/27/21 1615  74 20     03/27/21 1614  70 17     03/27/21 1613  71 26     03/27/21 1612  68 19     03/27/21 1611  70 15     03/27/21 1610  73 30     03/27/21 1609  70 14     03/27/21 1608  72 17  (!) 69 %   03/27/21 1607  75 17     03/27/21 1606  75 16     03/27/21 1605  81 14     03/27/21 1604  69 23     03/27/21 1603  69 13     03/27/21 1602  73 18     03/27/21 1601  69 18     03/27/21 1600 97.4 °F (36.3 °C) 72 15 (!) 129/52    03/27/21 1419  64      03/27/21 1239  84 21     03/27/21 1238  84 20     03/27/21 1237  86 18     03/27/21 1236  83 18     03/27/21 1235  74 12     03/27/21 1234  78 15     03/27/21 1233  80 16     03/27/21 1232  77 24     03/27/21 1231  75 14     03/27/21 1230  76 12     03/27/21 1229  79 18     03/27/21 1228  84 16     03/27/21 1227  85 21     03/27/21 1226  81 16     03/27/21 1225  77 23     03/27/21 1224  73 19     03/27/21 1223  76 19     03/27/21 1222  73 17     03/27/21 1221  73 20     03/27/21 1220  74 22     03/27/21 1219  78 24     03/27/21 1218  82 23     03/27/21 1217  78 14     03/27/21 1216  80 18     03/27/21 1215  82 19     03/27/21 1214  79 20     03/27/21 1213  74 12     03/27/21 1212  66 13     03/27/21 1211  67 14     03/27/21 1210  69 16     03/27/21 1209  69 20     03/27/21 1208  67 16     03/27/21 1207  68 18     03/27/21 1206  64 19     03/27/21 1205  68 22 (!) 156/67    03/27/21 1204  62 16     03/27/21 1203  (!) 55 15     03/27/21 1202  (!) 54 16 (!) 166/55    03/27/21 1201  (!) 56 16     03/27/21 1200  (!) 45 20 (!) 176/53    03/27/21 1159  76 19     03/27/21 1158  83 15     03/27/21 1157  83 20     03/27/21 1156  82 18     03/27/21 1155  77 27     03/27/21 1154  69 23     03/27/21 1153  74 23     03/27/21 1152  74 23 (!) 83/39    21 1151  72 16     21 1111 97.4 °F (36.3 °C) 77 24 (!) 73/33    21 0722 97.3 °F (36.3 °C) 60 23 (!) 120/50 96 %   21 0717  66      21 0610  70  102/78 95 %      Temp (24hrs), Av.4 °F (36.3 °C), Min:97.3 °F (36.3 °C), Max:97.5 °F (36.4 °C)     O2 Flow Rate (L/min): 2 l/min   O2 Device: Room air    Date 21 - 21 - 2159   Shift 8018-0297 7463-1444 24 Hour Total 2124-9639 3134-7135 24 Hour Total   INTAKE   I.V.(mL/kg/hr) 4544. 7(4.6) 0 4544. 7        Phenylephrine Volume 993.1  993.1        Volume (0.9% sodium chloride infusion) 372.9 0 372.9        Volume (0.9% sodium chloride with KCl 20 mEq/L infusion) 3168.8  3168.8        Volume (meropenem (MERREM) 500 mg in sterile water (preservative free) 10 mL IV syringe) 0  0        Volume (vancomycin (VANCOCIN) 1,250 mg in 0.9% sodium chloride 250 mL (VIAL-MATE)) 0  0        Volume (meropenem (MERREM) 500 mg in sterile water (preservative free) 10 mL IV syringe) 10  10        Volume (vancomycin (VANCOCIN) 1,250 mg in 0.9% sodium chloride 250 mL (VIAL-MATE)) 0  0      NG/GT  50 50        Water Flush Volume (mL) (PEG/Gastrostomy Tube 21)  50 50      Shift Total(mL/kg) 4544. 7(54.9) 50(0.6) 4594. 7(55.5)      OUTPUT   Urine(mL/kg/hr)           Urine Occurrence(s)  1 x 1 x      Stool 1700 1550 3250        Output (ml) (Ileostomy 21 Left Abdomen) 1700 1550 3250      Shift Total(mL/kg) 1700(20.5) 1550(18.7) 3250(39.3)      NET 2844.7 -1500 1344.7      Weight (kg) 82.8 82.8 82.8 82.8 82.8 82.8       General: No acute distress. Alert. Cooperative. Head: Normocephalic. Periorbital edema. Neck: Supple. Respiratory: CTAB. No w/r/r/c.  Cardiovascular: RRR. Normal S1,S2. No m/r/g.   GI: + bowel sounds. Nontender. No rebound tenderness or guarding. Nondistended. PEG tube with surrounding yeast. Ileostomy bag in place.  Laparoscopic incision open with packing and dressing C/D/I. Extremities:  2+ LE edema. Distal pulses present. PICC line R arm no erythema. Data Review:     CBC:  Recent Labs     03/27/21  0428 03/26/21  0356   WBC 14.9* 18.0*   HGB 9.9* 9.6*   HCT 31.7* 31.9*    246     Metabolic Panel:  Recent Labs     03/27/21  0428 03/26/21  1739 03/26/21  0356    142 142   K 4.1 3.7 3.6   * 105 100   CO2 33* 34* 37*   BUN 18 16 19   CREA 1.06* 1.18* 1.12*   GLU 71 136* 108*   CA 7.3* 6.9* 6.9*   MG 2.3 2.4 2.8*   PHOS 2.1* 2.1* 3.1     Micro:  Results     Procedure Component Value Units Date/Time    CULTURE, Anita Ward STAIN [[id number]]  (Abnormal) Collected: 03/25/21 1430    Order Status: Completed Specimen: Abdomen Updated: 03/27/21 1409     Special Requests: NO SPECIAL REQUESTS        GRAM STAIN RARE WBCS SEEN         NO ORGANISMS SEEN        Culture result:       LIGHT POSSIBLE ALPHA STREPTOCOCCUS                  LIGHT POSSIBLE ENTEROCOCCUS SPECIES          CULTURE, BLOOD [[id number]] Collected: 03/24/21 0038    Order Status: Completed Specimen: Blood Updated: 03/28/21 0015     Special Requests: NO SPECIAL REQUESTS        Culture result: NO GROWTH 4 DAYS       CULTURE, BLOOD [[id number]]     Order Status: Canceled Specimen: Blood     CULTURE, BLOOD [[id number]]     Order Status: Canceled Specimen: Blood     URINE CULTURE HOLD SAMPLE [[id number]] Collected: 03/23/21 2150    Order Status: Completed Specimen: Urine from Serum Updated: 03/23/21 2248     Urine culture hold       Urine on hold in Microbiology dept for 2 days. If unpreserved urine is submitted, it cannot be used for addtional testing after 24 hours, recollection will be required.           CULTURE, URINE [[id number]] Collected: 03/23/21 2150    Order Status: Completed Specimen: Urine from Clean catch Updated: 03/25/21 1128     Special Requests: NO SPECIAL REQUESTS        Shannon Count --        [id number]  COLONIES/mL       Culture result: AEROCOCCUS SANGUINICOLA, KNOWN TO BE SUSCEPTIBLE TO PENCILLIN, CEFOTAXIME, MEROPENEM, VANCOMYCIN, LINEZOLID AND RIFAMPIN         Imaging:  Ct Head Wo Cont    Result Date: 3/23/2021  EXAM: CT HEAD WO CONT INDICATION: AMS COMPARISON: None. CONTRAST: None. TECHNIQUE: Unenhanced CT of the head was performed using 5 mm images. Brain and bone windows were generated. Coronal and sagittal reformats. CT dose reduction was achieved through use of a standardized protocol tailored for this examination and automatic exposure control for dose modulation. FINDINGS: The ventricles and sulci are normal in size, shape and configuration. . There is no significant white matter disease. There is no intracranial hemorrhage, extra-axial collection, or mass effect. The basilar cisterns are open. No CT evidence of acute infarct. The bone windows demonstrate no abnormalities. The visualized portions of the paranasal sinuses and mastoid air cells are clear. No acute findings. Ct Abd Pelv Wo Cont    Result Date: 3/24/2021  INDICATION: Severe sepsis, r/o intraabd source COMPARISON: February 3, 2021 TECHNIQUE: Noncontrast thin axial images were obtained through the abdomen and pelvis. Coronal and sagittal reconstructions were generated. CT dose reduction was achieved through use of a standardized protocol tailored for this examination and automatic exposure control for dose modulation. FINDINGS: LUNG BASES: Moderate to large left pleural effusion with left lower lobe atelectasis. Small right pleural effusion. LIVER: No mass or biliary dilatation. GALLBLADDER: Surgically absent. SPLEEN: No enlargement or lesion. PANCREAS: No mass or ductal dilatation. ADRENALS: No mass. KIDNEYS: No nephrolithiasis or hydronephrosis. GI TRACT:  Left lower quadrant ileostomy. Gastrostomy tube present within the stomach. Dilated fluid-filled small bowel loops throughout the central abdomen. PERITONEUM: Small amount of ascites.  There are locules of gas in the ascites in the midline of the central abdomen, extending to 8 large anterior abdominal wall defect at the midline likely related to recent surgery. IV contrast was not administered. APPENDIX: Not visualized. RETROPERITONEUM: Atherosclerosis without aneurysm. LYMPH NODES:  None enlarged. ADDITIONAL COMMENTS: Anasarca. URINARY BLADDER: Unremarkable. REPRODUCTIVE ORGANS: Unremarkable. LYMPH NODES:  None enlarged. FREE FLUID:  Small amount. BONES: No destructive bone lesion. ADDITIONAL COMMENTS: Anasarca. 1. Interval placement of gastrostomy tube, and left lower quadrant ileostomy, likely accounting for locules of gas within ascites in the central abdomen communicating with a large anterior abdominal wall defect. No definite drainable abscess allowing for lack of IV contrast material. If any more recent CT imaging from [facility] given recent surgery and hospital stay there, may be helpful for direct comparison. 2. Anasarca. Moderate to large left pleural effusion and basilar atelectasis. Trace right pleural effusion. 3. Dilated fluid-filled small bowel loops throughout the central abdomen with wall thickening, may represent ileus or partial obstruction. Again any postoperative comparison examinations would be helpful. Xr Chest Port    Result Date: 3/23/2021  INDICATION: Eval for Infiltrate EXAM:  AP CHEST RADIOGRAPH COMPARISON: February 4, 2021 FINDINGS: AP portable view of the chest demonstrates a normal cardiomediastinal silhouette. Slight increase in small-to-moderate left pleural effusion and basilar atelectasis. No pulmonary edema, consolidation, or pneumothorax. The osseous structures are unremarkable. Slight increase in left small-to-moderate pleural effusion and basilar atelectasis.     .  Medications reviewed  Current Facility-Administered Medications   Medication Dose Route Frequency    0.9% sodium chloride infusion  125 mL/hr IntraVENous CONTINUOUS    meropenem (MERREM) 500 mg in sterile water (preservative free) 10 mL IV syringe  0.5 g IntraVENous Q6H    vancomycin (VANCOCIN) 1,250 mg in 0.9% sodium chloride 250 mL (VIAL-MATE)  1,250 mg IntraVENous Q24H    midodrine (PROAMATINE) tablet 5 mg  5 mg Oral BID WITH MEALS    collagenase (SANTYL) 250 unit/gram ointment   Topical DAILY    potassium chloride SR (KLOR-CON 10) tablet 40 mEq  40 mEq Oral BID    loperamide (IMODIUM) capsule 4 mg  4 mg Oral AC&HS    colestipoL (COLESTID) tablet 4 g  4 g Oral QID    pantoprazole (PROTONIX) 40 mg in 0.9% sodium chloride 10 mL injection  40 mg IntraVENous Q12H    sodium chloride (NS) flush 5-40 mL  5-40 mL IntraVENous Q8H    sodium chloride (NS) flush 5-40 mL  5-40 mL IntraVENous PRN    acetaminophen (TYLENOL) tablet 650 mg  650 mg Oral Q6H PRN    Or    acetaminophen (TYLENOL) suppository 650 mg  650 mg Rectal Q6H PRN    prochlorperazine (COMPAZINE) injection 10 mg  10 mg IntraVENous Q6H PRN    insulin lispro (HUMALOG) injection   SubCUTAneous AC&HS    glucose chewable tablet 16 g  4 Tab Oral PRN    dextrose (D50W) injection syrg 12.5-25 g  12.5-25 g IntraVENous PRN    glucagon (GLUCAGEN) injection 1 mg  1 mg IntraMUSCular PRN    enoxaparin (LOVENOX) injection 80 mg  1 mg/kg SubCUTAneous Q12H    albuterol-ipratropium (DUO-NEB) 2.5 MG-0.5 MG/3 ML  3 mL Nebulization Q4H PRN    diphenoxylate-atropine (LOMOTIL) tablet 2 Tab  2 Tab Oral QID    psyllium husk-aspartame (METAMUCIL FIBER) packet 1 Packet  1 Packet Oral TID WITH MEALS    nystatin (MYCOSTATIN) 100,000 unit/gram powder   Topical BID    PHENYLephrine (ROGER-SYNEPHRINE) 30 mg in 0.9% sodium chloride 250 mL infusion   mcg/min IntraVENous TITRATE    sodium chloride (NS) flush 5-10 mL  5-10 mL IntraVENous PRN         Signed:   Gumaro Bain DO   [facility], [facility]

## 2021-03-26 NOTE — PROGRESS NOTES
Physician Progress Note      PATIENT:               Rajani Fish  CSN #:                  [id number]  :                       1955  ADMIT DATE:       3/23/2021 9:17 PM  DISCH DATE:  RESPONDING  PROVIDER #:        Etienne GALLO DO          QUERY TEXT:    Good Afternoon  This patient admitted for Sepsis with Septic shock. A wound care RN was consulted and on  has identified multiple pressure injuries besides the sacral wound. WOUNDS PRESENT ON ADMISSION. The medical record reflects the following:  Risk Factors: Metastatic adenocarcinoma, s/p laparotomy and s/p ileostomy and PEg placement 2021 Decreased moblity  Clinical Indicators:  ---Per Wound care RN, pt has Right ischium stage 2 pressure injury POA, and a left ischium unstageable pressure injury POA, Sacrum pressure injury  unstageable POA  Treatment: Wound care RN consulted, Min. friction, minimize layers, Manage moisture with the incontinence wipes, Turn q 2 hours, Sacrum ulcer with mepilex dressing applied    Thank you,  Lorena Castlilo, [place], [place], [address], [address] [place] [address]  Options provided:  Yes, Agree with the wound care RN findings, and the patient also has Right Ischium Stage 2 Pressure injury POA, and Left ischium Unstageable Pressure injury POA: Yes, I agree with the wound care RN findings, and this patient is also be treated for Right ischium Stage 2 pressure injury and a Left ischium unstageable pressure injury POA  -- No, I disagree with the wound care RN findings, and this patient is only being treated for a Sacrum ulcer as already documented. -- Other - I will add my own diagnosis  -- Disagree - Not applicable / Not valid  -- Disagree - Clinically unable to determine / Unknown  -- Refer to Clinical Documentation Reviewer    PROVIDER RESPONSE TEXT:    The diagnosis of sacral ulcers was confirmed not present on admission.     Query created by: Kian Smallwood on 3/26/2021 2:40 PM      QUERY TEXTBre Gutierrez Afternoon Family Resident Team:    This patient admitted for Sepsis with Septic Shock. The medical record notes \"AMS  likely due to Sepsis\"    If possible,  the AMS needs to be further specified and if possible, please document in the progress notes and discharge summary if you are evaluating and / or treating any of the following: The medical record reflects the following:  Risk Factors: Septic shock,  metastatic colon cancer, PICC placed, recent laparotomy s/p ileostomy and PEG placement. Clinical Indicators: H&P notes \"AMS\" is likely due to Sepsis. CT of head normal. 03/25--Progress notes that AMS is now resolved. Treatment: CT head, treatment of the Sepsis, and shock,  IV abx, IVF, IV abx vanco and meropenem,  IV pressors, blood cx, Neuro assessment    Thank you  Chente Bain, [address] [place] [address], [address] [place] [address], [address] [place] [address], [facility]  [phone]  Options provided:  -- Metabolic encephalopathy  -- Septic encephalopathy  -- Wernicke?s encephalopathy  -- Delirium due to, Please specify cause. -- Delirium  -- Other - I will add my own diagnosis  -- Disagree - Not applicable / Not valid  -- Disagree - Clinically unable to determine / Unknown  -- Refer to Clinical Documentation Reviewer    PROVIDER RESPONSE TEXT:    This patient has metabolic encephalopathy.     Query created by: Roberto Siu on 3/26/2021 2:47 PM      Electronically signed by:  Hussain Degroot 3/26/2021 3:59 PM

## 2021-03-26 NOTE — WOUND CARE
Wound / Ostomy Consult: Follow Up Visit. Chart reviewed. Consulted for multiple pressure injuries POA and high output ileostomy. Spoke with patients nurse at bedside and worked with staff to change and get to chair. Patient is resting on a Marcello bed with COBY pump added to mattress. Patient is alert and oriented x 4; requires assistance to move side to side in bed. Assessment/Treatment:  LLQ ileostomy - retracted stoma with mucocutaneous skin separation; watery output with pieces of food (eggs from breakfast); output comes out from beneath skin level, skin fold also tips stoma downward - leaking due to extremely high ouptut - surrounding skin intact; used Marathon to surrounding skin; one piece high output ileostomy appliance with eakins seal to back placed and connected to gravity drainage bag. Midline abdominal incision - full thickness incisional wound, undermining noted, fibrinous yellow slough with otherwise pink base; no odor or purulence; packed with saline moistened gauze/composite dressing to secure. Sacrum - ~ 3 x 3 x 0.1 cm, Unstageable PI POA; 100% non-viable tissue; yellow to grey, no surrounding inflammation, scant serous drainage;to far right on buttock is an ~ 1.2 x 1.2 x 0.1 cm ulceration also noted which was included in sacral wound documentation POA as skin in area is loose and mobile over sacral area. Mepilex sacral dressing placed when patient stood to move to chair. Left ischium - Unstageable PI POA; ~ 5 x 4 x 0.1 cm; moist yellow 80%/red20%, no surrounding redness. Right ischium - Stage 2 PI POA, pink/red, clean. Right dorsal foot - 9 x 9 unroofed blister with moist red/pink wound bed, thin layer of translucent film covering - deep partial thickness injury with pitting edema in feet/legs with serous weeping through dressing/sock  - Maxsorb AG applied and wrapped. No open area found on left foot / leg today. No injuries or redness to heels - legs and feet with pitting edema. Patient sits up on side of bed with feet dependent; re-educated patient on need to keep legs elevated as much as possible to help with edema. She verbalized understanding. Foam dressings to dorsal hands - did not remove. Wound/Ostomy Recommendations:  Begin Santyl to sacral, right buttock and left ischial wounds daily starting tomorrow. Maxsorb Ag to right foot daily. High output ostomy appliance for ileostomy. Skin Care Recommendations:  1. Minimize friction/shear: minimize layers of linen/pads under patient. 2. Off load pressure/reposition:  turn and reposition approximately every 2 hours; float heels / use off loading heel boots; waffle cushion for sitting. 3. Manage Moisture - keep skin folds dry; incontinence skin care with incontinence wipes; high output ostomy - trying high output appliance. 4. Continue to monitor nutrition, pain, and skin risk scale, and skin assessment. Plan:  Spoke with family practice MD regarding findings and proposed orders for treatment. We will continue to reassess routinely and as needed.   Jazmín Rogel, [address] [place] [address], Wound / Ostomy Department  Wound Healing Office [phone]

## 2021-03-26 NOTE — PROGRESS NOTES
Assessment / Plan:   Feels well  Ostomy will have large volume output need to continue to manage with fiber. Lomotil etc  Would consider replacing half the volume of output with water flushes through the g tube to help prevent dehydration  Tolerating diet - no pain  Pt wants to go home  No surgical intervention - follow up with surgeon at [address] [place] [address] see as needed over the weekend    Nilson Fuentes MD  [facility]  [phone]        General Surgery Daily Progress Note      Patient: Anderson Moon MRN: [id number]  SSN: [id number]    YOB: 1955  Age: 61 y.o.   Sex: female          Subjective:   Patient feels well, tolerating diet      Current Facility-Administered Medications   Medication Dose Route Frequency    0.9% sodium chloride with KCl 20 mEq/L infusion   IntraVENous CONTINUOUS    potassium chloride SR (KLOR-CON 10) tablet 40 mEq  40 mEq Oral BID    loperamide (IMODIUM) capsule 4 mg  4 mg Oral AC&HS    colestipoL (COLESTID) tablet 4 g  4 g Oral QID    pantoprazole (PROTONIX) 40 mg in 0.9% sodium chloride 10 mL injection  40 mg IntraVENous Q12H    meropenem (MERREM) 500 mg in sterile water (preservative free) 10 mL IV syringe  0.5 g IntraVENous Q8H    sodium chloride (NS) flush 5-40 mL  5-40 mL IntraVENous Q8H    sodium chloride (NS) flush 5-40 mL  5-40 mL IntraVENous PRN    acetaminophen (TYLENOL) tablet 650 mg  650 mg Oral Q6H PRN    Or    acetaminophen (TYLENOL) suppository 650 mg  650 mg Rectal Q6H PRN    prochlorperazine (COMPAZINE) injection 10 mg  10 mg IntraVENous Q6H PRN    insulin lispro (HUMALOG) injection   SubCUTAneous AC&HS    glucose chewable tablet 16 g  4 Tab Oral PRN    dextrose (D50W) injection syrg 12.5-25 g  12.5-25 g IntraVENous PRN    glucagon (GLUCAGEN) injection 1 mg  1 mg IntraMUSCular PRN    enoxaparin (LOVENOX) injection 80 mg  1 mg/kg SubCUTAneous Q12H    vancomycin (VANCOCIN) 1,250 mg in 0.9% sodium chloride 250 mL (VIAL-MATE) 1,250 mg IntraVENous Q24H    albuterol-ipratropium (DUO-NEB) 2.5 MG-0.5 MG/3 ML  3 mL Nebulization Q4H PRN    diphenoxylate-atropine (LOMOTIL) tablet 2 Tab  2 Tab Oral QID    psyllium husk-aspartame (METAMUCIL FIBER) packet 1 Packet  1 Packet Oral TID WITH MEALS    nystatin (MYCOSTATIN) 100,000 unit/gram powder   Topical BID    PHENYLephrine (ROGER-SYNEPHRINE) 30 mg in 0.9% sodium chloride 250 mL infusion   mcg/min IntraVENous TITRATE    sodium chloride (NS) flush 5-10 mL  5-10 mL IntraVENous PRN        Objective:   03/26 0701 - 03/26 1900  In: 3834.8 [I.V.:3834.8]  Out: 425 [Urine:200]  03/24 1901 - 03/26 0700  In: 840 [P.O.:830; I.V.:10]  Out: 4850 [Urine:200]  Patient Vitals for the past 8 hrs:   BP Temp Pulse Resp SpO2   03/26/21 0729 (!) 134/50 98.7 °F (37.1 °C) 70 14 92 %   03/26/21 0700   68     03/26/21 0325 (!) 106/40 97.5 °F (36.4 °C) 68 14 90 %       Physical Exam:  General: Alert, cooperative, no distress, appears stated age. Neck:  Supple, symmetrical, trachea midline  Lungs: Aerating well, no distress  Heart:  Regular rate and rhythm  Abdomen: Soft,non tender. Bowel sounds normal. Ostomy productive, somewhat thicker output  Extremities: Extremities normal, atraumatic, no cyanosis or edema. Skin:  Skin color, texture, turgor normal. No rashes or lesions, skin tear BUE    Labs:   Recent Labs     03/26/21  0356   WBC 18.0*   HGB 9.6*   HCT 31.9*        Recent Labs     03/26/21  0356 03/25/21  0011 03/25/21  0011      < > 142   K 3.6   < > 2.3*      < > 96*   CO2 37*   < > 43*   *   < > 72   BUN 19   < > 19   CREA 1.12*   < > 1.26*   CA 6.9*   < > 7.0*   MG 2.8*   < > 1.6   PHOS 3.1   < > 2.7   ALB  --   --  2.0*   TBILI  --   --  0.5   ALT  --   --  10*    < > = values in this interval not displayed.        ·     Principal Problem:    Severe sepsis ([facility] (3/23/2021)    Active Problems:    Diabetes mellitus with complication ([facility] (67/8/5020)      Overview: Retinopathy      Microalbuminuria      Toe amputation x2      Depression (7/24/2017)      Bilateral pulmonary embolism (HCC) (2/4/2021)      Disorientation (3/24/2021)      Stage 3b chronic kidney disease (3/24/2021)      Sacral wound (3/24/2021)      Hypokalemia (3/24/2021)      Shortness of breath (3/24/2021)      Prolonged Q-T interval on ECG (3/24/2021)        Problem List Items Addressed This Visit     Hyperlipidemia    Relevant Medications    colestipoL (COLESTID) tablet 4 g    Diabetes mellitus with complication (HCC)    Relevant Medications    SITagliptin (Januvia) 100 mg tablet    Essential hypertension    Relevant Medications    enoxaparin (Lovenox) 80 mg/0.8 mL injection    Type 2 diabetes mellitus with hyperglycemia, with long-term current use of insulin (HCC)    Relevant Medications    SITagliptin (Januvia) 100 mg tablet    Depression    Diabetic peripheral neuropathy (HCC)    Relevant Medications    SITagliptin (Januvia) 100 mg tablet    enoxaparin (Lovenox) 80 mg/0.8 mL injection    Bilateral pulmonary embolism (HCC)    Relevant Medications    enoxaparin (Lovenox) 80 mg/0.8 mL injection    Disorientation    Stage 3b chronic kidney disease    Sacral wound    Hypokalemia    Prolonged Q-T interval on ECG    Relevant Medications    enoxaparin (Lovenox) 80 mg/0.8 mL injection      Other Visit Diagnoses     Sepsis with encephalopathy without septic shock, due to unspecified organism ([facility]    -  Primary    Relevant Medications    enoxaparin (Lovenox) 80 mg/0.8 mL injection    nystatin (MYCOSTATIN) powder    Leukocytosis, unspecified type

## 2021-03-26 NOTE — PROGRESS NOTES
[address] NP  [phone]           GI PROGRESS NOTE        NAME: Carlton Huerta   :  1955   MRN:  [id number]       Subjective:   \"My stools are like sawdust.\"        Objective:   NAD      VITALS:   Last 24hrs VS reviewed since prior progress note. Most recent are:  Visit Vitals  BP (!) 127/46 (BP 1 Location: Left upper arm, BP Patient Position: Sitting)   Pulse 66   Temp 98 °F (36.7 °C)   Resp 24   Ht 5' 2\" (1.575 m)   Wt 80.3 kg (177 lb 0.5 oz)   SpO2 96%   BMI 32.38 kg/m²       Intake/Output Summary (Last 24 hours) at 3/26/2021 1605  Last data filed at 3/26/2021 1458  Gross per 24 hour   Intake 4434.79 ml   Output 2900 ml   Net 1534.79 ml       PHYSICAL EXAM:  General: Alert, in no acute distress    HEENT: Anicteric sclerae. Lungs:            CTA Bilaterally. Heart:  Regular  rhythm,    Abdomen: Soft, Non distended, Tender near surgical wound.  (+)Bowel sounds, no HSM  Extremities: No c/c/e  Neurologic:  CN 2-12 gi, Alert and oriented X 3. No acute neurological distress   Psych:   Good insight. Not anxious nor agitated.     Lab Data Reviewed:   Recent Labs     21  0356 21  0011   WBC 18.0* 16.7*   HGB 9.6* 9.7*   HCT 31.9* 31.1*    363     Recent Labs     21  0356 21  1938 21  1126    142 141   K 3.6 3.6 2.9*    101 100   CO2 37* 38* 37*   BUN 19 17 17   CREA 1.12* 1.14* 1.09*   * 101* 85   PHOS 3.1  --  1.9*   CA 6.9* 7.1* 6.9*     Recent Labs     21  0011 21  0454 21  2150   AP 85  --  96   TP 6.0*  --  7.2   ALB 2.0* 1.7* 2.0*   GLOB 4.0  --  5.2*   LPSE  --   --  19*       ________________________________________________________________________  Patient Active Problem List   Diagnosis Code    Hyperlipidemia E78.5    Posttraumatic stress disorder F43.10    Diabetic foot ulcer (HCC) E11.621, L97.509    Glaucoma, narrow-angle H40.20X0    Diabetes mellitus with complication ([facility] E11.8    Family hx of colon cancer Z80.0    Essential hypertension I10    Persistent proteinuria associated with type 2 diabetes mellitus (HCC) E11.29, R80.9    Type 2 diabetes mellitus with hyperglycemia, with long-term current use of insulin (HCC) E11.65, Z79.4    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 [id number]    Depression F32.9    Diverticulitis K57.92    Posterior vitreous detachment of right eye H43.811    Stable proliferative diabetic retinopathy of both eyes associated with type 2 diabetes mellitus ([facility] D96.9414    Diabetic peripheral neuropathy (HCC) E11.42    Hypoglycemia E16.2    SBO (small bowel obstruction) ([facility] K56.609    Abdominal pain R10.9    Bilateral pulmonary embolism (HCC) I26.99    Colitis K52.9    Aortic atherosclerosis (HCC) I70.0    Peritoneal carcinomatosis (HCC) C78.6, C80.1    Severe sepsis ([facility]) A41.9, R65.20    Disorientation R41.0    Stage 3b chronic kidney disease N18.32    Sacral wound S31.000A    Hypokalemia E87.6    Shortness of breath R06.02    Prolonged Q-T interval on ECG R94.31         Assessment and Plan:  Dehydration, Electrolyte abnormalities, high ileostomy output. Improving     - Continue colestipol  - BID PPI  - Continue psyllium, Imodium, Lomotil  - Discussed in detail that she needs to avoid sugary drinks, artifical sweeteners, and milk. - Continue IV hydration  - Monitor labs and replete electrolytes  - Strict I and O     Will have on call provider follow her over the weekend.         Signed By: Thi Rodgers NP     3/26/2021  4:05 PM

## 2021-03-27 NOTE — ROUTINE PROCESS
Central line Type: PICC  Central Line Insert Date: 03/24/21  Reason Central Line Placed: long term IV abx  Central Line Dressing Date: 03/24/21  Biopatch in place? Yes No: yes  Tubing labeled and appropriate? Yes No: yes  Alcohol caps on all open ports?  Yes No: yes  Last CHG bath (time&date): 03/27/21 at 1100  Reviewed with provider and central line must stay in for the following reasons:

## 2021-03-27 NOTE — ROUTINE PROCESS
Bedside shift change report given to [facility] (oncoming nurse) by Jesusita Leary (offgoing nurse). Report included the following information SBAR, Kardex, ED Summary, Procedure Summary, Intake/Output, MAR, Accordion, Recent Results, Med Rec Status and Cardiac Rhythm NSR.

## 2021-03-27 NOTE — ROUTINE PROCESS
Ileostomy bag and wafer changed again. Midline abdominal dressing soaked, dressing changed again, now clean dry and intact.

## 2021-03-27 NOTE — PROGRESS NOTES
[facility] Progress Note           Mita Bell DO    [phone] Office  [phone]  Fax    3/27/2021      Assessment & Plan:   · Low-grade fever and leukocytosis. Fever has resolved. WBC beginning to trend down. Etiology remains unclear. Midline surgical incision reportedly recently infected however does not appear to be acutely infected upon exam.  Preliminary cultures from this wound are growing alpha strep and a second organism. Continue current empiric antibiotic coverage. Other wounds are not infected. General surgery following. No plans for intervention and they are recommending follow-up with [facility] surgery. BSI related to PICC line in differential however blood cultures NGSF. Abdominal exam benign. Low suspicion for pneumonia. UA bland. Very low suspicion for C. Difficile. Will need to consider noninfectious causes of leukocytosis? May be reactive from recent extensive abdominal surgery/postoperative wound infection/and colon cancer/carcinomatosis. Consider whole-body tagged WBC scan Monday. Continue vancomycin and meropenem. Unfortunately antibiotic de-escalation options limited due to multiple antibiotic allergies and QTc prolongation. Tentative plan will be to narrow antibiotics to a p.o. regimen at discharge which will cover organisms isolated from abdominal wound  · Metastatic gastric/colon adenocarcinoma. Status post laparotomy and ileostomy with PEG tube placement. Followed by [facility] oncology. Not felt to be a candidate for chemotherapy or radiation at this time  · Intermittent hypoxia with left-sided small to moderate pleural effusion. Hypoxia thought to be due to atelectasis per pulmonary. No plans for thoracentesis at this time   · Encephalopathy. TME. Resolving  · Diabetes mellitus. Hemoglobin A1c 7.9  · Penicillin and cephalexin allergies. Currently tolerating meropenem  · CKD. Stable          Subjective:     No complaints.   Wants to go home    Objective:     Vitals:   Visit Vitals  BP (!) 73/33 (BP 1 Location: Left upper arm, BP Patient Position: At rest)   Pulse 77   Temp 97.4 °F (36.3 °C)   Resp 24   Ht 5' 2\" (1.575 m)   Wt 182 lb 8.7 oz (82.8 kg)   SpO2 96%   BMI 33.39 kg/m²        Tmax:  Temp (24hrs), Av.9 °F (36.6 °C), Min:97.3 °F (36.3 °C), Max:98.5 °F (36.9 °C)      Exam:   Patient is intubated:  no    Physical Examination:   General:  Alert, cooperative, no distress   Head:  Normocephalic, atraumatic. Eyes:  Conjunctivae clear   Neck: Supple       Lungs:   No distress. Chest wall:     Heart:  Regular rate and rhythm   Abdomen:   Soft, non-tender, non-distended. PEG tube in place. Ostomy with liquid yellow drainage. Midline abdominal wound dressed and packed   Extremities: Moves all. RUE PICC   Skin:  No rash   Neurologic: CNII-XII intact. Normal strength     Labs:        No lab exists for component: ITNL   No results for input(s): CPK, CKMB, TROIQ in the last 72 hours. Recent Labs     21  0428 21  1739 21  0356 21  0011 21  0011    142 142   < > 142   K 4.1 3.7 3.6   < > 2.3*   * 105 100   < > 96*   CO2 33* 34* 37*   < > 43*   BUN 18 16 19   < > 19   CREA 1.06* 1.18* 1.12*   < > 1.26*   GLU 71 136* 108*   < > 72   PHOS 2.1* 2.1* 3.1   < > 2.7   MG 2.3 2.4 2.8*   < > 1.6   ALB  --   --   --   --  2.0*   WBC 14.9*  --  18.0*  --  16.7*   HGB 9.9*  --  9.6*  --  9.7*   HCT 31.7*  --  31.9*  --  31.1*     --  310  --  363    < > = values in this interval not displayed. No results for input(s): INR, PTP, APTT, INREXT, INREXT in the last 72 hours.   Needs: urine analysis, urine sodium, protein and creatinine  Lab Results   Component Value Date/Time    Creatinine, urine 108.6 2020 10:43 AM         Cultures:     No results found for: REED  Lab Results   Component Value Date/Time    Culture result: LIGHT POSSIBLE ALPHA STREPTOCOCCUS (A) 2021 02:30 PM    Culture result: CHECKING FOR POSSIBLE  OTHER ORGANISM   03/25/2021 02:30 PM    Culture result: NO GROWTH 3 DAYS 03/24/2021 12:38 AM       Radiology:     Medications       Current Facility-Administered Medications   Medication Dose Route Frequency Last Admin   • 0.9% sodium chloride infusion  125 mL/hr IntraVENous CONTINUOUS 125 mL/hr at 03/27/21 1137   • collagenase (SANTYL) 250 unit/gram ointment   Topical DAILY     • potassium chloride SR (KLOR-CON 10) tablet 40 mEq  40 mEq Oral BID 40 mEq at 03/26/21 1726   • loperamide (IMODIUM) capsule 4 mg  4 mg Oral AC&HS 4 mg at 03/27/21 0700   • colestipoL (COLESTID) tablet 4 g  4 g Oral QID 4 g at 03/27/21 0540   • pantoprazole (PROTONIX) 40 mg in 0.9% sodium chloride 10 mL injection  40 mg IntraVENous Q12H 40 mg at 03/27/21 0540   • meropenem (MERREM) 500 mg in sterile water (preservative free) 10 mL IV syringe  0.5 g IntraVENous Q8H 500 mg at 03/27/21 1142   • sodium chloride (NS) flush 5-40 mL  5-40 mL IntraVENous Q8H 10 mL at 03/27/21 0547   • sodium chloride (NS) flush 5-40 mL  5-40 mL IntraVENous PRN     • acetaminophen (TYLENOL) tablet 650 mg  650 mg Oral Q6H  mg at 03/24/21 0104    Or   • acetaminophen (TYLENOL) suppository 650 mg  650 mg Rectal Q6H PRN     • prochlorperazine (COMPAZINE) injection 10 mg  10 mg IntraVENous Q6H PRN     • insulin lispro (HUMALOG) injection   SubCUTAneous AC&HS Stopped at 03/26/21 2200   • glucose chewable tablet 16 g  4 Tab Oral PRN     • dextrose (D50W) injection syrg 12.5-25 g  12.5-25 g IntraVENous PRN     • glucagon (GLUCAGEN) injection 1 mg  1 mg IntraMUSCular PRN     • enoxaparin (LOVENOX) injection 80 mg  1 mg/kg SubCUTAneous Q12H 80 mg at 03/27/21 0540   • vancomycin (VANCOCIN) 1,250 mg in 0.9% sodium chloride 250 mL (VIAL-MATE)  1,250 mg IntraVENous Q24H 1,250 mg at 03/26/21 2348   • albuterol-ipratropium (DUO-NEB) 2.5 MG-0.5 MG/3 ML  3 mL Nebulization Q4H PRN 3 mL at 03/24/21 0195   • diphenoxylate-atropine (LOMOTIL) tablet 2  Tab  2 Tab Oral QID 2 Tab at 03/26/21 2218    psyllium husk-aspartame (METAMUCIL FIBER) packet 1 Packet  1 Packet Oral TID WITH MEALS 1 Packet at 03/26/21 1457    nystatin (MYCOSTATIN) 100,000 unit/gram powder   Topical BID Given at 03/26/21 1730    PHENYLephrine (ROGER-SYNEPHRINE) 30 mg in 0.9% sodium chloride 250 mL infusion   mcg/min IntraVENous TITRATE 65 mcg/min at 03/27/21 0117    sodium chloride (NS) flush 5-10 mL  5-10 mL IntraVENous PRN 10 mL at 03/27/21 0110           [facility]      Graham Howe,

## 2021-03-27 NOTE — ROUTINE PROCESS
1140 - RN discussed with MD increase in output. Unabletokeep good sealonileostomy bag and unable to keep track of output. BP is now low (77/40). Starting maintenance fluids of NS at 125/hour. Will continue to monitor.      1150- BP 83/39    1200 - BP now 166/55

## 2021-03-27 NOTE — PROGRESS NOTES
[facility] Pharm[facility] Dosing Services: Antimicrobial Stewardship Progress Note  Consult for antibiotic dosing of Vancomycin by Dr Wing Vilchis dose change per renal policy  Pharmacist reviewed antibiotic appropriateness for 60 yo female for indication of Sepsis/Intra-abdominal vs picc line  Day of Therapy: 4    Ht Readings from Last 1 Encounters:   03/24/21 157.5 cm (62\")        Wt Readings from Last 1 Encounters:   03/27/21 82.8 kg (182 lb 8.7 oz)      Vancomycin therapy:  Current maintenance dose: 1250(mg) every 24 hours   Dose calculated to approximate a therapeutic trough of 15-20 mcg/mL. Last trough level: level on 3/25 was therapeutic  Plan for level / Adjustment in Therapy:continue same  Dose administration notes:   Doses given appropriately as scheduled    Date Dose & Interval Measured (mcg/mL) Extrapolated (mcg/mL)   ? 3/25/2021  1250 mg IV q24hr? ?20.6 ?15.85   ? ? ? ?   ? ? ? ? Non-Kinetic Antimicrobial Dosing Regimen:   Current Regimen:  Merrem 500 mg IV q8hr  Recommendation: Change Merrem to 500 mg IV q6hr   Dose administration notes:   Doses given appropriately as scheduled    Other Antimicrobial   (not dosed by pharmacist) none   Cultures 3/25: Abdomen: alpha strep pending  3/24: Blood: NG x3 day pending  3/23: Urine: aerococcus sanguinicola known to be sens to pcn, vanc, merrem final   Serum Creatinine Lab Results   Component Value Date/Time    Creatinine 1.06 (H) 03/27/2021 04:28 AM         Creatinine Clearance Estimated Creatinine Clearance: 53.5 mL/min (A) (based on SCr of 1.06 mg/dL (H)).      Temp Temp: 97.4 °F (36.3 °C)       WBC Lab Results   Component Value Date/Time    WBC 14.9 (H) 03/27/2021 04:28 AM        H/H Lab Results   Component Value Date/Time    HGB 9.9 (L) 03/27/2021 04:28 AM        Platelets    Lab Results   Component Value Date/Time    PLATELET 366 57/33/2739 04:28 AM          Pharmacist Najma Causey Contact information: [phone]

## 2021-03-27 NOTE — PROGRESS NOTES
1930 Hour: received patient on Phenylephrine drip at 65 mcg/min/32.5 mls per central line DL PICC. Blood pressure monitored q1-2 hours. MAP vayring. NS .9% with 20meq KCL infusing at 125ml/hr. 0000 Hour: Patient assisted back to bed with three nursing staff. Gross generalized edema. BLE oozing fluid. Ileostomy draining moderate amount yellow thick to thin liquid. Emptied frequently. See CC I&O documentation. Ostomy bag attached to \"foss\" drainage bag. Seems to be working well  0400 Hour:  AM Labs drawn. VSS.  0600 Hour:  Oral meds taken well with apple sauce or [place].    0700 Hour: Central line Type:Dl PICC  Central Line Insert Date:3/24/21 POA  Reason Central Line Placed: Poor venous access. Central Line Dressing Date: 03/24/21  Biopatch in place? Yes No: yes  Tubing labeled and appropriate? Yes No: yes  Alcohol caps on all open ports? Yes No: yes  Last CHG bath (time&date):03/26/21 11:25 Hour. Reviewed with provider and central line must stay in for the following reasons:  Bedside and Verbal shift change report given to USAMA Menezes (oncoming nurse) by Carissa Rivero (offgoing nurse). Report included the following information SBAR, Kardex, Intake/Output, MAR, Recent Results and Cardiac Rhythm Sinus rhythm.

## 2021-03-27 NOTE — PROGRESS NOTES
1900 Shift change:     Bedside and Verbal shift change report given to Luisa Holter (oncoming nurse) by Eduar Figueredo  (offgoing nurse). Report included the following information SBAR, Kardex, Intake/Output, MAR and Recent Results. Shift Summary: This patient was assisted with Intentional Toileting every 2 hours during this shift. Documentation of ambulation and output reflected on Flowsheet. 2030: R foot dressing saturated, dressing changed. Central line Type: PICC  Central Line Insert Date: 03/24/2021  Reason Central Line Placed: long term IV  Central Line Dressing Date: 03/24/2021  Biopatch in place? Yes No: yes  Tubing labeled and appropriate? Yes No: yes  Alcohol caps on all open ports? Yes No: yes  Last CHG bath (time&date): 03/27/2027 1100  Reviewed with provider and central line must stay in for the following reasons:      0730 Shift change:     Bedside and Verbal shift change report given to April JULIA RN (oncoming nurse) by Troy Darling (offgoing nurse). Report included the following information SBAR, Kardex, Intake/Output, MAR, and Recent Results.

## 2021-03-27 NOTE — ROUTINE PROCESS
Pt R foot dressing, mid abdominal wound, and ostomy bag changed. ICU at bedside evaluating pt. RN had been titrating pt down on mariana drip successfully, so plan for pt to stay step down and continue mariana drip. No levo at this time. Will continue to monitor.

## 2021-03-27 NOTE — ROUTINE PROCESS
1600 - Pt requesting to go outside to be wheeled around court yard with family member. RN educated pt she is on mariana drip and it is not safe to go outside without RN assistance. Also Pt needs continuous monitoring due to mariana drip in place. This RN had asked MD yesterday and they stated it was unsafe. RN re-enforced information. 36 - MD (Dr. Cole Montelongo) at bedside. MD stated pt is very upset and really would like to go outside. MD approached RN and asked if she herself as the MD could take pt outside with pt daughter. RN asked if pt can go outside with mariana drip. RN stated she could not leave floor due to taking care of other patients and being charge nurse. MD stated she was going to be with the patient and daughter and take them to 4th floor balcony for a little while and it would be okay. MD placed pt in wheelchair and wheeled her down mistry with family member holding IV pole following behind. Tele called nurse to ask why they were off leads. Informed tele that MD was taking patient outside herself and would bring patient back. 1648 - Pt returned to room by MD and family, PCT ([place]) was at bedside when they returned and helped pt back to bed and on the monitor. PCT reports pt hair smelled like cigarette smoke. RN has not seen any cigarettes in room today. Will continue to monitor.

## 2021-03-27 NOTE — ROUTINE PROCESS
RN changed R foot dressing with macro, 4x4 gauze, abd pad, and kerlex. Drainage was yellow and saturated bed. Dressing now clean, dry and intact. Midline abdominal wound changed. Packed with moistened gauze, 4x4 gauze on top and abd pad. Was soiled from stool. Now clean, dry and intact. Changed ileostomy bag, now clean, dry and intact. Sacral dressings changed. New mepellex applied to coccyx area and L and R posterior thigh. Dressings now clean, dry and intact. Zinc applied to perineal area. Very red and patient reports pain. Will continue to monitor .

## 2021-03-27 NOTE — PROGRESS NOTES
Gastrointestinal Progress Note    3/27/2021    Admit Date: 3/23/2021    Subjective:   Diarrhea; has eliminated dairy, HFCS, alcsugars, grasses,and failed loperamid, colestid metamucil. On antibiotics that destroy microbiome.      Current Facility-Administered Medications   Medication Dose Route Frequency    0.9% sodium chloride infusion  125 mL/hr IntraVENous CONTINUOUS    meropenem (MERREM) 500 mg in sterile water (preservative free) 10 mL IV syringe  0.5 g IntraVENous Q6H    vancomycin (VANCOCIN) 1,250 mg in 0.9% sodium chloride 250 mL (VIAL-MATE)  1,250 mg IntraVENous Q24H    midodrine (PROAMATINE) tablet 5 mg  5 mg Oral BID WITH MEALS    albumin human 5% (BUMINATE) solution 25 g  25 g IntraVENous ONCE    collagenase (SANTYL) 250 unit/gram ointment   Topical DAILY    potassium chloride SR (KLOR-CON 10) tablet 40 mEq  40 mEq Oral BID    loperamide (IMODIUM) capsule 4 mg  4 mg Oral AC&HS    colestipoL (COLESTID) tablet 4 g  4 g Oral QID    pantoprazole (PROTONIX) 40 mg in 0.9% sodium chloride 10 mL injection  40 mg IntraVENous Q12H    sodium chloride (NS) flush 5-40 mL  5-40 mL IntraVENous Q8H    sodium chloride (NS) flush 5-40 mL  5-40 mL IntraVENous PRN    acetaminophen (TYLENOL) tablet 650 mg  650 mg Oral Q6H PRN    Or    acetaminophen (TYLENOL) suppository 650 mg  650 mg Rectal Q6H PRN    prochlorperazine (COMPAZINE) injection 10 mg  10 mg IntraVENous Q6H PRN    insulin lispro (HUMALOG) injection   SubCUTAneous AC&HS    glucose chewable tablet 16 g  4 Tab Oral PRN    dextrose (D50W) injection syrg 12.5-25 g  12.5-25 g IntraVENous PRN    glucagon (GLUCAGEN) injection 1 mg  1 mg IntraMUSCular PRN    enoxaparin (LOVENOX) injection 80 mg  1 mg/kg SubCUTAneous Q12H    albuterol-ipratropium (DUO-NEB) 2.5 MG-0.5 MG/3 ML  3 mL Nebulization Q4H PRN    diphenoxylate-atropine (LOMOTIL) tablet 2 Tab  2 Tab Oral QID    psyllium husk-aspartame (METAMUCIL FIBER) packet 1 Packet  1 Packet Oral TID WITH MEALS    nystatin (MYCOSTATIN) 100,000 unit/gram powder   Topical BID    PHENYLephrine (ROGER-SYNEPHRINE) 30 mg in 0.9% sodium chloride 250 mL infusion   mcg/min IntraVENous TITRATE    sodium chloride (NS) flush 5-10 mL  5-10 mL IntraVENous PRN        Objective:     Blood pressure (!) 129/52, pulse 72, temperature 97.4 °F (36.3 °C), resp. rate 15, height 5' 2\" (1.575 m), weight 82.8 kg (182 lb 8.7 oz), SpO2 96 %.    03/27 0701 - 03/27 1900  In: 4544.7 [I.V.:4544.7]  Out: 1700     03/25 1901 - 03/27 0700  In: 6779.8 [P.O.:2665; I.V.:4114.8]  Out: 5725 [Urine:925]    EXAM:  GENERAL: no distress    Data Review    Recent Results (from the past 24 hour(s))   GLUCOSE, POC    Collection Time: 03/26/21  9:07 PM   Result Value Ref Range    Glucose (POC) 123 (H) 65 - 100 mg/dL    Performed by Guilherme Valle    CBC WITH AUTOMATED DIFF    Collection Time: 03/27/21  4:28 AM   Result Value Ref Range    WBC 14.9 (H) 3.6 - 11.0 K/uL    RBC 3.20 (L) 3.80 - 5.20 M/uL    HGB 9.9 (L) 11.5 - 16.0 g/dL    HCT 31.7 (L) 35.0 - 47.0 %    MCV 99.1 (H) 80.0 - 99.0 FL    MCH 30.9 26.0 - 34.0 PG    MCHC 31.2 30.0 - 36.5 g/dL    RDW 14.9 (H) 11.5 - 14.5 %    PLATELET 936 292 - 681 K/uL    MPV 10.2 8.9 - 12.9 FL    NRBC 0.0 0  WBC    ABSOLUTE NRBC 0.00 0.00 - 0.01 K/uL    NEUTROPHILS 74 32 - 75 %    LYMPHOCYTES 17 12 - 49 %    MONOCYTES 6 5 - 13 %    EOSINOPHILS 1 0 - 7 %    BASOPHILS 1 0 - 1 %    IMMATURE GRANULOCYTES 1 (H) 0.0 - 0.5 %    ABS. NEUTROPHILS 11.2 (H) 1.8 - 8.0 K/UL    ABS. LYMPHOCYTES 2.6 0.8 - 3.5 K/UL    ABS. MONOCYTES 0.9 0.0 - 1.0 K/UL    ABS. EOSINOPHILS 0.1 0.0 - 0.4 K/UL    ABS. BASOPHILS 0.1 0.0 - 0.1 K/UL    ABS. IMM.  GRANS. 0.1 (H) 0.00 - 0.04 K/UL    DF AUTOMATED     PHOSPHORUS    Collection Time: 03/27/21  4:28 AM   Result Value Ref Range    Phosphorus 2.1 (L) 2.6 - 4.7 MG/DL   METABOLIC PANEL, BASIC    Collection Time: 03/27/21  4:28 AM   Result Value Ref Range    Sodium 144 136 - 145 mmol/L    Potassium 4.1 3.5 - 5.1 mmol/L    Chloride 109 (H) 97 - 108 mmol/L    CO2 33 (H) 21 - 32 mmol/L    Anion gap 2 (L) 5 - 15 mmol/L    Glucose 71 65 - 100 mg/dL    BUN 18 6 - 20 MG/DL    Creatinine 1.06 (H) 0.55 - 1.02 MG/DL    BUN/Creatinine ratio 17 12 - 20      GFR est AA >60 >60 ml/min/1.73m2    GFR est non-AA 52 (L) >60 ml/min/1.73m2    Calcium 7.3 (L) 8.5 - 10.1 MG/DL   MAGNESIUM    Collection Time: 03/27/21  4:28 AM   Result Value Ref Range    Magnesium 2.3 1.6 - 2.4 mg/dL   GLUCOSE, POC    Collection Time: 03/27/21  7:27 AM   Result Value Ref Range    Glucose (POC) 104 (H) 65 - 100 mg/dL    Performed by Jorge Ontiveros (PCT)    GLUCOSE, POC    Collection Time: 03/27/21 11:09 AM   Result Value Ref Range    Glucose (POC) 107 (H) 65 - 100 mg/dL    Performed by Jorge Ontiveros (PCT)    GLUCOSE, POC    Collection Time: 03/27/21  4:15 PM   Result Value Ref Range    Glucose (POC) 139 (H) 65 - 100 mg/dL    Performed by Jorge Ontiveros (PCT)        Assessment:     Principal Problem:    Severe sepsis ([facility] [address] (3/23/2021)    Active Problems:    Diabetes mellitus with complication ([facility] [address] (81/9/0689)      Overview: Retinopathy      Microalbuminuria      Toe amputation x2      Depression (7/24/2017)      Bilateral pulmonary embolism ([facility] [address] (2/4/2021)      Disorientation (3/24/2021)      Stage 3b chronic kidney disease (3/24/2021)      Sacral wound (3/24/2021)      Hypokalemia (3/24/2021)      Shortness of breath (3/24/2021)      Prolonged Q-T interval on ECG (3/24/2021)        Plan: Will review ct for anatomy problem. Add probiotic if on formulary.

## 2021-03-27 NOTE — PROGRESS NOTES
Patient seen to discuss plan of care:     BP (!) 156/67   Pulse 84   Temp 97.4 °F (36.3 °C)   Resp 21   Ht 5' 2\" (1.575 m)   Wt 182 lb 8.7 oz (82.8 kg)   SpO2 96%   BMI 33.39 kg/m²     MAP 88, patient upset but cooperative, she was having breakfast when I saw her. Patient is frustrated with need for pressors and keep reminding that her BP is chronically low. We discussed that thus far she is on Pramod for pressure support, but we may need to switch over to Levophed vs starting PO meds such as Midodrine, and that I will visit with his primary surgeon and cardiology to see if she is a candidate for Midodrine. During our conversation patient became very emotional, however, understanding of the plan and asking to discuss the plan with specialists prior to starting another pressor, as she really hopes she can be discharged. Darryle Forget, MD    ADDENDUM:     1:18 PM    Discussed patient with primary surgeon Dr Tyson Vera at [facility]:     - Typical to have large output from the stoma, however, need to replace with same amount of IVF, and monitor I&Os strictly as outpatient as well. - Typical to have low BP and especially with this patient who is chronically hypotensive Dr Tyson Vera recommends to treat if needed, consider PO medications such as Midodrine, if patient remains asymptomatic it's okay to just replace fluids aggressively. - VAC wound dressing prior to discharge. - Consider DC of IV abx until cultures are speciated, however, agrees patient can stay until then. - Follow up with Dr Tyson Vera next week. - Advised to strongly recommend the patient to come to [facility] if needs to be admitted to avoid gaps in communication and provide comprehensive care from all services involved. 2:08 PM    Discussed with Dr Tom Hines (cardiology) - can start midodrine 5 BID, however, need to keep in mind oncotic pressure and high output from stoma, midodrine may be not that helpful.      BP and MAP are good, will titrate mariana and start midodrine now. Will continue to monitor patient. Franklin Guerrero MD     2:49 PM    D/w Dr Aditi Cardona, may consider discharge on IV abx vs await for cultures speciation. Will keep patient until cultures result, monitor vitals. Franklin Guerrero MD    4:44 PM    D/p/w patient and available family as well as Dr Alban Roldan. Everyone is on the same page: will await on final cultures and speciation, wean off pressors while on Midodrine. Hopefully discharge by Monday to keep appointment with onc surgery. Wound care as above. Consider another dose of Albumin. Patient works with PT daily, per pt's request and OK from charge nurse per floor protocol patient is OK to get outside for sometime. All drips with her.      Franklin Guerrero MD

## 2021-03-27 NOTE — CONSULTS
Reason for Consult: Hypotension      HPI: Karely Gonzalez is a 61 y.o. female with past medical history significant for metastatic adenocarcinoma status post ileostomy and PEG tube placement, diabetes, hypertension who was initially presented with confusion and was found to have sepsis. Since admission she has been hypotensive and was on IV fluid replacement as well as vasopressors. She is difficult to wean off of the vasopressors due to persistent hypotension. She has been having significant stomal drainage. She is on broad-spectrum antibiotics. I been asked to assist in blood pressure management so that she can be weaned off of the pressors and patient would like to be discharged and go home. Currently has no other symptoms of chest pain, shortness of breath. Lab results were personally reviewed. Demonstrated elevated white count at 14.9, hemoglobin is 9.9, platelet count 179,647, creatinine 1.0. And albumin 2 days ago was 2.0. Plan:    1. Hypotension: This is noncardiogenic in nature. Likely multifactorial including sepsis, stomal drainage, hypoalbuminemia, anemia playing a big role in hypotension. She could be started on Midodrin 5 mg p.o. twice daily and gradually taper off of the vasopressors. Recommend redosing albumin. Echocardiogram 1 month ago was normal EF.    ATTENTION:   This medical record was transcribed using an electronic medical records/speech recognition system. Although proofread, it may and can contain electronic, spelling and other errors. Corrections may be executed at a later time. Please feel free to contact us for any clarifications as needed.       Past Medical History:   Diagnosis Date    Amputated toe of right foot ([facility]     due to dmII    Diabetes ([facility]     Glaucoma     Bilateral    Hypertension     Peripheral autonomic neuropathy due to diabetes mellitus ([address]     Peritoneal carcinomatosis ([facility] 2/5/2021    Psychiatric disorder     PTSD; 9/11/2003 judith at [employer] PTSD (post-traumatic stress disorder)             Past Surgical History:   Procedure Laterality Date    FLEXIBLE SIGMOIDOSCOPY N/A 2021    SIGMOIDOSCOPY FLEXIBLE performed by Patience Pace MD at [place] [address] N/A 2/3/2021    [address] performed by Patience Pace MD at [address] [facility] HX AMPUTATION Right     Right  big toe  and right second toe amputated     HX CARPAL TUNNEL RELEASE Right     HX CATARACT REMOVAL Right     HX  SECTION      HX CHOLECYSTECTOMY      HX KNEE ARTHROSCOPY Right     right knee     HX OTHER SURGICAL      right groin cysts removed     HX TRABECULECTOMY Bilateral              Family History   Problem Relation Age of Onset    Heart Disease Mother     Hypertension Mother     Cancer Mother         cancer    Diabetes Father     Cancer Brother         cancer    Diabetes Brother     Diabetes Maternal Grandmother     Diabetes Paternal Grandmother     Hypertension Paternal Grandmother     Diabetes Paternal Grandfather            Social History     Socioeconomic History    Marital status:      Spouse name: Not on file    Number of children: Not on file    Years of education: Not on file    Highest education level: Not on file   Occupational History    Not on file   Social Needs    Financial resource strain: Not on file    Food insecurity     Worry: Not on file     Inability: Not on file    Transportation needs     Medical: Not on file     Non-medical: Not on file   Tobacco Use    Smoking status: Current Every Day Smoker     Packs/day: 1.00     Years: 47.00     Pack years: 47.00     Types: Cigarettes    Smokeless tobacco: Never Used   Substance and Sexual Activity    Alcohol use: No    Drug use: No    Sexual activity: Not Currently   Lifestyle    Physical activity     Days per week: Not on file     Minutes per session: Not on file    Stress: Not on file   Relationships    Social connections     Talks on phone: Not on file     Gets together: Not on file     Attends Alevism service: Not on file     Active member of club or organization: Not on file     Attends meetings of clubs or organizations: Not on file     Relationship status: Not on file    Intimate partner violence     Fear of current or ex partner: Not on file     Emotionally abused: Not on file     Physically abused: Not on file     Forced sexual activity: Not on file   Other Topics Concern    Not on file   Social History Narrative    Not on file         Allergies   Allergen Reactions    Keflex [Cephalexin] Hives    Pcn [Penicillins] Hives    Tanzeum [Albiglutide] Nausea Only    Vioxx [Rofecoxib] Nausea Only            Current Facility-Administered Medications   Medication Dose Route Frequency Provider Last Rate Last Admin    0.9% sodium chloride infusion  125 mL/hr IntraVENous CONTINUOUS Marissa Lui  mL/hr at 03/27/21 1137 125 mL/hr at 03/27/21 1137    NOREPINephrine (LEVOPHED) 8 mg in 5% dextrose 250mL (32 mcg/mL) infusion  0.5-16 mcg/min IntraVENous TITRATE Sade Benoit MD        meropenem (MERREM) 500 mg in sterile water (preservative free) 10 mL IV syringe  0.5 g IntraVENous Q6H Nelson Vincent MD        vancomycin (VANCOCIN) 1,250 mg in 0.9% sodium chloride 250 mL (VIAL-MATE)  1,250 mg IntraVENous Q24H Nelson Vincent MD        collagenase (SANTYL) 250 unit/gram ointment   Topical DAILY Bernard Jack MD        potassium chloride SR (KLOR-CON 10) tablet 40 mEq  40 mEq Oral BID Bernard Jack MD   40 mEq at 03/27/21 1148    loperamide (IMODIUM) capsule 4 mg  4 mg Oral AC&HS Bernard Jack MD   4 mg at 03/27/21 1148    colestipoL (COLESTID) tablet 4 g  4 g Oral QID Marquis Chao NP   4 g at 03/27/21 0540    pantoprazole (PROTONIX) 40 mg in 0.9% sodium chloride 10 mL injection  40 mg IntraVENous Q12H Marquis Chao NP   40 mg at 03/27/21 0540    sodium chloride (NS) flush 5-40 mL  5-40 mL IntraVENous Q8H Jatin Green MD   10 mL at 03/27/21 0547    sodium chloride (NS) flush 5-40 mL  5-40 mL IntraVENous PRN Jatin Green MD        acetaminophen (TYLENOL) tablet 650 mg  650 mg Oral Q6H PRN Jatin Green MD   650 mg at 03/24/21 0104    Or    acetaminophen (TYLENOL) suppository 650 mg  650 mg Rectal Q6H PRN Jatin Green MD        prochlorperazine (COMPAZINE) injection 10 mg  10 mg IntraVENous Q6H PRN Grazyna Hastings DO        insulin lispro (HUMALOG) injection   SubCUTAneous AC&HS Grazyna Hastings DO   Stopped at 03/26/21 2200    glucose chewable tablet 16 g  4 Tab Oral PRN Grazyna Hastings DO        dextrose (D50W) injection syrg 12.5-25 g  12.5-25 g IntraVENous PRN Grazyna Hastings DO        glucagon (GLUCAGEN) injection 1 mg  1 mg IntraMUSCular PRN Grazyna Hastings DO        enoxaparin (LOVENOX) injection 80 mg  1 mg/kg SubCUTAneous Q12H Grazyna Hastings DO   80 mg at 03/27/21 0540    albuterol-ipratropium (DUO-NEB) 2.5 MG-0.5 MG/3 ML  3 mL Nebulization Q4H PRN Jatin Green MD   3 mL at 03/24/21 0345    diphenoxylate-atropine (LOMOTIL) tablet 2 Tab  2 Tab Oral QID Jatin Green MD   2 Tab at 03/27/21 1148    psyllium husk-aspartame (METAMUCIL FIBER) packet 1 Packet  1 Packet Oral TID WITH MEALS Jatin Green MD   1 Packet at 03/27/21 1150    nystatin (MYCOSTATIN) 100,000 unit/gram powder   Topical BID Jatin Green MD   Given at 03/27/21 1149    PHENYLephrine (ROGER-SYNEPHRINE) 30 mg in 0.9% sodium chloride 250 mL infusion   mcg/min IntraVENous TITRATE Alex Teague DO 25 mL/hr at 03/27/21 1305 50 mcg/min at 03/27/21 1305    sodium chloride (NS) flush 5-10 mL  5-10 mL IntraVENous PRN Magy Brown MD   10 mL at 03/27/21 0110        ROS:  12 point review of systems was performed.  All negative except for HPI     Physical Exam:  Visit Vitals  BP (!) 156/67   Pulse 84   Temp 97.4 °F (36.3 °C)   Resp 21   Ht 5' 2\" (1.575 m)   Wt 182 lb 8.7 oz (82.8 kg)   SpO2 96%   BMI 33.39 kg/m²       Gen:  Well-developed, well-nourished, in no acute distress  HEENT:  Pink conjunctivae, PERRL, hearing intact to voice, moist mucous membranes  Neck:  Supple, without masses, thyroid non-tender  Resp:  No accessory muscle use, clear breath sounds without wheezes rales or rhonchi  Card:  No murmurs, normal S1, S2 without thrills, bruits or peripheral edema  Abd:  Colostomy. Lymph:  No cervical or inguinal adenopathy. B/l 3+ Pitting edema. Musc:  No cyanosis or clubbing  Skin:  No rashes or ulcers, skin turgor is good  Neuro:  Cranial nerves are grossly intact, no focal motor weakness, follows commands appropriately  Psych:  Good insight, oriented to person, place and time, alert     Labs:     Lab Results   Component Value Date/Time    WBC 14.9 (H) 03/27/2021 04:28 AM    HGB 9.9 (L) 03/27/2021 04:28 AM    HCT 31.7 (L) 03/27/2021 04:28 AM    PLATELET 670 82/19/5020 04:28 AM    MCV 99.1 (H) 03/27/2021 04:28 AM     Lab Results   Component Value Date/Time    Hemoglobin A1c 7.9 (H) 11/06/2020 12:00 AM    Hemoglobin A1c 9.0 (H) 02/06/2020 10:43 AM    Hemoglobin A1c 7.5 (H) 09/23/2019 11:03 AM    Glucose 71 03/27/2021 04:28 AM    Glucose (POC) 107 (H) 03/27/2021 11:09 AM    Microalb/Creat ratio (ug/mg creat.) 156 (H) 02/06/2020 10:43 AM    LDL,Direct 80 08/09/2016 12:36 PM    LDL, calculated 125 (H) 11/06/2020 12:00 AM    LDL, calculated 57 05/15/2019 02:56 PM    Creatinine 1.06 (H) 03/27/2021 04:28 AM      Lab Results   Component Value Date/Time    Cholesterol, total 192 11/06/2020 12:00 AM    HDL Cholesterol 37 (L) 11/06/2020 12:00 AM    LDL,Direct 80 08/09/2016 12:36 PM    LDL, calculated 125 (H) 11/06/2020 12:00 AM    LDL, calculated 57 05/15/2019 02:56 PM    Triglyceride 170 (H) 11/06/2020 12:00 AM     Lab Results   Component Value Date/Time    ALT (SGPT) 10 (L) 03/25/2021 12:11 AM    Alk.  phosphatase 85 03/25/2021 12:11 AM Bilirubin, total 0.5 03/25/2021 12:11 AM    Albumin 2.0 (L) 03/25/2021 12:11 AM    Protein, total 6.0 (L) 03/25/2021 12:11 AM    Ammonia 20 03/24/2021 04:54 AM    PLATELET 436 57/16/6746 04:28 AM     No results found for: INR, INREXT, PTMR, PTP, PT1, PT2, INREXT   Lab Results   Component Value Date/Time    GFR est non-AA 52 (L) 03/27/2021 04:28 AM    GFR est AA >60 03/27/2021 04:28 AM    Creatinine 1.06 (H) 03/27/2021 04:28 AM    BUN 18 03/27/2021 04:28 AM    Sodium 144 03/27/2021 04:28 AM    Potassium 4.1 03/27/2021 04:28 AM    Chloride 109 (H) 03/27/2021 04:28 AM    CO2 33 (H) 03/27/2021 04:28 AM    Magnesium 2.3 03/27/2021 04:28 AM    Phosphorus 2.1 (L) 03/27/2021 04:28 AM     No results found for: PSA, Marimar Cami, [id number], [id number]  Lab Results   Component Value Date/Time    TSH 1.760 01/29/2021 11:44 AM    T4, Free 1.35 02/06/2020 10:43 AM      Lab Results   Component Value Date/Time    Glucose 71 03/27/2021 04:28 AM    Glucose (POC) 107 (H) 03/27/2021 11:09 AM      Lab Results   Component Value Date/Time    Troponin-I, Qt. <0.05 03/24/2021 04:54 AM      Lab Results   Component Value Date/Time    NT pro- (H) 02/02/2021 09:13 PM    PROBNP 776 (H) 02/02/2021 01:25 PM    PROBNP 956 (H) 01/29/2021 11:44 AM      Lab Results   Component Value Date/Time    Sodium 144 03/27/2021 04:28 AM    Potassium 4.1 03/27/2021 04:28 AM    Chloride 109 (H) 03/27/2021 04:28 AM    CO2 33 (H) 03/27/2021 04:28 AM    Anion gap 2 (L) 03/27/2021 04:28 AM    Glucose 71 03/27/2021 04:28 AM    BUN 18 03/27/2021 04:28 AM    Creatinine 1.06 (H) 03/27/2021 04:28 AM    BUN/Creatinine ratio 17 03/27/2021 04:28 AM    GFR est AA >60 03/27/2021 04:28 AM    GFR est non-AA 52 (L) 03/27/2021 04:28 AM    Calcium 7.3 (L) 03/27/2021 04:28 AM      Lab Results   Component Value Date/Time    Sodium 144 03/27/2021 04:28 AM    Potassium 4.1 03/27/2021 04:28 AM    Chloride 109 (H) 03/27/2021 04:28 AM    CO2 33 (H) 03/27/2021 04:28 AM    Anion gap 2 (L) 03/27/2021 04:28 AM    Glucose 71 03/27/2021 04:28 AM    BUN 18 03/27/2021 04:28 AM    Creatinine 1.06 (H) 03/27/2021 04:28 AM    BUN/Creatinine ratio 17 03/27/2021 04:28 AM    GFR est AA >60 03/27/2021 04:28 AM    GFR est non-AA 52 (L) 03/27/2021 04:28 AM    Calcium 7.3 (L) 03/27/2021 04:28 AM    Bilirubin, total 0.5 03/25/2021 12:11 AM    ALT (SGPT) 10 (L) 03/25/2021 12:11 AM    Alk. phosphatase 85 03/25/2021 12:11 AM    Protein, total 6.0 (L) 03/25/2021 12:11 AM    Albumin 2.0 (L) 03/25/2021 12:11 AM    Globulin 4.0 03/25/2021 12:11 AM    A-G Ratio 0.5 (L) 03/25/2021 12:11 AM      Lab Results   Component Value Date/Time    Hemoglobin A1c 7.9 (H) 11/06/2020 12:00 AM    Hemoglobin A1c (POC) 9.3 07/11/2017 10:44 AM         No results for input(s): CPK, CKMB, TROIQ in the last 72 hours.     No lab exists for component: CKQMB, CPKMB        Problem List:     Problem List  Date Reviewed: 11/17/2020          Codes Class Noted    Disorientation ICD-10-CM: R41.0  ICD-9-CM: 780.99  3/24/2021        Stage 3b chronic kidney disease ICD-10-CM: N18.32  ICD-9-CM: 585.3  3/24/2021        Sacral wound ICD-10-CM: S31.000A  ICD-9-CM: 959.19  3/24/2021        Hypokalemia ICD-10-CM: E87.6  ICD-9-CM: 276.8  3/24/2021        Shortness of breath ICD-10-CM: R06.02  ICD-9-CM: 786.05  3/24/2021        Prolonged Q-T interval on ECG ICD-10-CM: R94.31  ICD-9-CM: 794.31  3/24/2021        * (Principal) Severe sepsis ([address] ICD-10-CM: A41.9, R65.20  ICD-9-CM: 038.9, 995.92  3/23/2021        Peritoneal carcinomatosis ([address] ICD-10-CM: C78.6, C80.1  ICD-9-CM: 197.6, 199.1  2/5/2021        SBO (small bowel obstruction) ([address] ICD-10-CM: Z09.402  ICD-9-CM: 560.9  2/4/2021        Abdominal pain ICD-10-CM: R10.9  ICD-9-CM: 789.00  2/4/2021        Bilateral pulmonary embolism ([address] ICD-10-CM: I26.99  ICD-9-CM: 415.19  2/4/2021        Colitis ICD-10-CM: K52.9  ICD-9-CM: 558.9  2/4/2021        Aortic atherosclerosis ([address] ICD-10-CM: I70.0  ICD-9-CM: 440.0  2/4/2021        Hypoglycemia ICD-10-CM: E16.2  ICD-9-CM: 251.2  5/21/2019        Posterior vitreous detachment of right eye ICD-10-CM: H43.811  ICD-9-CM: 379.21  2/1/2018        Stable proliferative diabetic retinopathy of both eyes associated with type 2 diabetes mellitus ([facility] ICD-10-CM: A17.6093  ICD-9-CM: 250.50, 362.02  2/1/2018        Diverticulitis ICD-10-CM: K57.92  ICD-9-CM: 562.11  12/10/2017        Depression ICD-10-CM: F32.9  ICD-9-CM: 015  7/24/2017        Type 2 diabetes mellitus with hyperglycemia, with long-term current use of insulin ([facility]) ICD-10-CM: E11.65, Z79.4  ICD-9-CM: 250.00, 790.29, V58.67  11/29/2016        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 [id number]: [id number]  ICD-9-CM: 250.60, V58.67  11/29/2016        Persistent proteinuria associated with type 2 diabetes mellitus ([facility] ICD-10-CM: E11.29, R80.9  ICD-9-CM: 250.40, 791.0  4/12/2016        Essential hypertension ICD-10-CM: I10  ICD-9-CM: 401.9  10/9/2015        Diabetes mellitus with complication ([facility] OKU-91-CJ: E11.8  ICD-9-CM: 250.90  12/8/2014    Overview Signed 12/8/2014  9:55 PM by Génesis Rehman MD     Retinopathy  Microalbuminuria  Toe amputation x2             Family hx of colon cancer ICD-10-CM: Z80.0  ICD-9-CM: V16.0  12/8/2014        Glaucoma, narrow-angle ICD-10-CM: H40.20X0  ICD-9-CM: 365.20, 365.70  9/18/2014        Diabetic foot ulcer ([facility] ICD-10-CM: E11.621, L97.509  ICD-9-CM: 250.80, 707.15  9/3/2014        Posttraumatic stress disorder ICD-10-CM: F43.10  ICD-9-CM: 309.81  6/20/2014        Diabetic peripheral neuropathy ([facility] ICD-10-CM: E11.42  ICD-9-CM: 250.60, 357.2  4/4/2014        Hyperlipidemia ICD-10-CM: E78.5  ICD-9-CM: 272.4  3/5/2014                Manfred Santillan MD, [facility]

## 2021-03-27 NOTE — ROUTINE PROCESS
Central line Type: PICC  Central Line Insert Date: 03/24/21  Reason Central Line Placed: long term IV abx  Central Line Dressing Date: 03/24/21  Biopatch in place? Yes No: yes  Tubing labeled and appropriate? Yes No: yes  Alcohol caps on all open ports?  Yes No: yes  Last CHG bath (time&date): 03/26/21 at 1100  Reviewed with provider and central line must stay in for the following reasons:

## 2021-03-28 NOTE — PROGRESS NOTES
Updated notes from physician and orders regarding expectation of wound vac at home sent through [employer] to add to [facility] information. Will need Resumption of HH orders with specific wound vac and wound care orders in the chart to send to the [address] [place] [address] Aj Waterman as well.

## 2021-03-28 NOTE — PROGRESS NOTES
Attempted to complete patients home oxygen evaluation. Patient eating lunch, requested that I come back at a later time.

## 2021-03-28 NOTE — PROGRESS NOTES
Cardiology Daily Progress Note      Anderson Moon is a 61 y.o. female with past medical history significant for metastatic adenocarcinoma status post ileostomy and PEG tube placement, diabetes, hypertension admitted with confusion and sepsis. She had significant hypotension requiring vasopressors. Now blood pressure is improving. She is now on midodrine. Wants to go home.           Visit Vitals  BP (!) 122/44   Pulse 80   Temp 97.4 °F (36.3 °C)   Resp 19   Ht 5' 2\" (1.575 m)   Wt 165 lb 5.5 oz (75 kg)   SpO2 98%   BMI 30.24 kg/m²       Intake/Output Summary (Last 24 hours) at 3/28/2021 1309  Last data filed at 3/28/2021 1247  Gross per 24 hour   Intake 1230.84 ml   Output 4750 ml   Net -3519.16 ml     General appearance - alert, well appearing, and in no distress  Mental status - affect appropriate to mood  Eyes - sclera anicteric, moist mucous membranes  Neck - supple, no significant adenopathy  Chest - clear to auscultation, no wheezes, rales or rhonchi      Current Facility-Administered Medications   Medication Dose Route Frequency    midodrine (PROAMATINE) tablet 5 mg  5 mg Oral TID WITH MEALS    0.9% sodium chloride infusion  125 mL/hr IntraVENous CONTINUOUS    meropenem (MERREM) 500 mg in sterile water (preservative free) 10 mL IV syringe  0.5 g IntraVENous Q6H    vancomycin (VANCOCIN) 1,250 mg in 0.9% sodium chloride 250 mL (VIAL-MATE)  1,250 mg IntraVENous Q24H    collagenase (SANTYL) 250 unit/gram ointment   Topical DAILY    potassium chloride SR (KLOR-CON 10) tablet 40 mEq  40 mEq Oral BID    loperamide (IMODIUM) capsule 4 mg  4 mg Oral AC&HS    colestipoL (COLESTID) tablet 4 g  4 g Oral QID    pantoprazole (PROTONIX) 40 mg in 0.9% sodium chloride 10 mL injection  40 mg IntraVENous Q12H    sodium chloride (NS) flush 5-40 mL  5-40 mL IntraVENous Q8H    sodium chloride (NS) flush 5-40 mL  5-40 mL IntraVENous PRN    acetaminophen (TYLENOL) tablet 650 mg  650 mg Oral Q6H PRN    Or    acetaminophen (TYLENOL) suppository 650 mg  650 mg Rectal Q6H PRN   • prochlorperazine (COMPAZINE) injection 10 mg  10 mg IntraVENous Q6H PRN   • insulin lispro (HUMALOG) injection   SubCUTAneous AC&HS   • glucose chewable tablet 16 g  4 Tab Oral PRN   • dextrose (D50W) injection syrg 12.5-25 g  12.5-25 g IntraVENous PRN   • glucagon (GLUCAGEN) injection 1 mg  1 mg IntraMUSCular PRN   • enoxaparin (LOVENOX) injection 80 mg  1 mg/kg SubCUTAneous Q12H   • albuterol-ipratropium (DUO-NEB) 2.5 MG-0.5 MG/3 ML  3 mL Nebulization Q4H PRN   • diphenoxylate-atropine (LOMOTIL) tablet 2 Tab  2 Tab Oral QID   • psyllium husk-aspartame (METAMUCIL FIBER) packet 1 Packet  1 Packet Oral TID WITH MEALS   • nystatin (MYCOSTATIN) 100,000 unit/gram powder   Topical BID   • PHENYLephrine (ROGER-SYNEPHRINE) 30 mg in 0.9% sodium chloride 250 mL infusion   mcg/min IntraVENous TITRATE   • sodium chloride (NS) flush 5-10 mL  5-10 mL IntraVENous PRN        No specialty comments available.    Assessment:    -Hypotension/noncardiogenic      Plan:     -Continue replacement with albumin.  Wean down vasopressors.  Increase midodrine to 5 mg 3 times daily and if needed go to 10 mg 3 times daily.  Echo LVEF is normal.  She can be discharged from cardiac standpoint when she is off of vasopressors.    I will sign off.  Please call if any questions.           ___________________________________________________    Manfred Oates MD, FACC

## 2021-03-28 NOTE — DISCHARGE INSTRUCTIONS
HOME DISCHARGE INSTRUCTIONS    Fredi Mendez / [id number] : 1955    Admission date: 3/23/2021 Discharge date: 3/28/2021     Please bring this form with you to show your care provider at your follow-up appointment. Primary care provider:  Tiesha Calle MD    Discharging provider:  Alexsander Linares MD  - Family Medicine Resident  Dr. Nery Lofton - Attending, Family Medicine     You have been admitted to the hospital with the following diagnoses:    ACUTE DIAGNOSES:  Severe sepsis ([address] [A41.9, R65.20]  . . . . . . . . . . . . . . . . . . . . . . . . . . . . . . . . . . . . . . . . . . . . . . . . . . . . . . . . . . . . . . . . . . . . . . . Isaias García FOLLOW-UP CARE RECOMMENDATIONS:    You are well enough to be discharged from the hospital. However, because you were staying in a hospital, you are at greater risk of having been exposed to the coronavirus. PLEASE stay inside and quarantine for 14 days to prevent further spread of the coronavirus.     Appointments  Follow-up Information     Follow up With Specialties Details Why Contact Info    Lata Greer MD Family Medicine Call on 2021 For follow up of visit at 9.30 am [address] [place] [address]  [phone]      Dr Arianne Cruz (surgery)   Go on 3/30/2021 For follow up           MEDICATION CHANGES  - START Linezolid 600mg twice daily for 5 days - first dose 3/28 morning  - START Midodrine 5mg three times daily - first dose 3/28 evening   - START Klor Con twice daily - first dose 3/28 evening  - START Colestipol 4g four times daily - first dose 3/28 evening    Please follow up with your PCP regarding:  - surgical site infection  - low blood pressure  - BMP results   - ostomy output, now on stool bulking medications  - low potassium and magnesium, high bicarbonate     Please follow up with General Surgeon regarding:  - Open surgical incision, need for wound vac  - Infection of surgical site  - ostomy output, now on stool bulking medications    Please follow up with Oncologist regarding:  - colon cancer  - anemia     Follow-up tests needed: Blood pressure check. Recheck potassium, magnesium, bicarbonate. Further testing per surgery and oncology    Pending test results: At the time of your discharge the following test results are still pending:   Please make sure you review these results with your outpatient follow-up provider(s). Specific symptoms to watch for: chest pain, shortness of breath, fever, chills, nausea, vomiting, diarrhea, change in mentation, falling, weakness, bleeding. DIET/what to eat:  Diabetic Diet    ACTIVITY:  PT/OT per Home Health    Wound care: Per home health wound care. Needs wound vac. Equipment needed:  none    What to do if new or unexpected symptoms occur? If you experience any of the above symptoms (or should other concerns or questions arise after discharge) please call your primary care physician. Return to the emergency room if you cannot get hold of your doctor. · It is very important that you keep your follow-up appointment(s). · Please bring discharge papers, medication list (and/or medication bottles) to your follow-up appointments for review by your outpatient provider(s). · Please check the list of medications and be sure it includes every medication (even non-prescription medications) that your provider wants you to take. · It is important that you take the medication exactly as they are prescribed. · Keep your medication in the bottles provided by the pharmacist and keep a list of the medication names, dosages, and times to be taken in your wallet. · Do not take other medications without consulting your doctor.    · If you have any questions about your medications or other instructions, please talk to your nurse or care provider before you leave the hospital.     Information obtained by:     I understand that if any problems occur once I am at home I am to contact my physician. These instructions were explained to me and I had the opportunity to ask questions. I understand and acknowledge receipt of the instructions indicated above.                                                                                                                                                Physician's or R.N.'s Signature                                                                  Date/Time                                                                                                                                              Patient or Representative Signature                                                          Date/Time

## 2021-03-28 NOTE — PROGRESS NOTES
Spiritual Care Assessment/Progress Note  [address]      NAME: Louie Edwards      MRN: [id number]  AGE: 61 y.o.  SEX: female  Latter-day Affiliation: Islam   Language: English     3/28/2021     Total Time (in minutes): 10     Spiritual Assessment begun in [facility] [address] through conversation with:         [x]Patient        [] Family    [] Friend(s)        Reason for Consult: Initial/Spiritual assessment, patient floor     Spiritual beliefs: (Please include comment if needed)     [x] Identifies with a aftab tradition: Hoahaoism        [] Supported by a aftab community:            [] Claims no spiritual orientation:           [] Seeking spiritual identity:                [] Adheres to an individual form of spirituality:           [] Not able to assess:                           Identified resources for coping:      [x] Prayer                               [] Music                  [] Guided Imagery     [x] Family/friends                 [] Pet visits     [] Devotional reading                         [] Unknown     [] Other:                                               Interventions offered during this visit: (See comments for more details)    Patient Interventions: Affirmation of emotions/emotional suffering, Catharsis/review of pertinent events in supportive environment, Coping skills reviewed/reinforced, Iconic (affirming the presence of God/Higher Power), Prayer (actual)           Plan of Care:     [] Support spiritual and/or cultural needs    [] Support AMD and/or advance care planning process      [] Support grieving process   [] Coordinate Rites and/or Rituals    [] Coordination with community clergy   [] No spiritual needs identified at this time   [] Detailed Plan of Care below (See Comments)  [] Make referral to Music Therapy  [] Make referral to Pet Therapy     [] Make referral to Addiction services  [] Make referral to [facility]  [] Make referral to Spiritual Care Partner  [] No future visits requested        [x] Follow up upon further referrals     Comments:  visit for initial spiritual assessment. Requested by staff for  consult. Patient sitting in chair at bedside, good eye contact. Says she is feeling a little better. Provided spiritual presence and listening as she spoke of her present thoughts, feelings, and concerns. Patient did not wish to speak much of her health or reasons for hospitalization other than to say that things seem to be moving in the right direction. Expressed feelings of frustration saying her main concern was that she wanted to return home and hopes to be well enough to discharge from the hospital soon. Says she would like to go home today as she misses being with her family. Lives with her daughter and her two grandsons (ages 9, 6) and misses them terribly. Spoke briefly of her aftab. Says she was a 's daughter and has always believed in and relied on prayer. She requested prayer and a prayer was offered. Informed her of availability of  and pastoral care services. She appeared comforted as a result of this prayer and visit and expressed gratitude for this visit. Rev.  Nicole Costa MDiv, [facility], [facility]   paging service: [phone])

## 2021-03-28 NOTE — PROGRESS NOTES
Gastrointestinal Progress Note    3/28/2021    Admit Date: 3/23/2021    Subjective:   Diarrhea still; reviewd and not oral intake causing, doubt drugs, but ct shows ?  Evolving sbo so that or carcinomatosis likely source which will be difficult to control without surgery or npo/tpn and I willnot start tpn today    Current Facility-Administered Medications   Medication Dose Route Frequency    midodrine (PROAMATINE) tablet 5 mg  5 mg Oral TID WITH MEALS    0.9% sodium chloride infusion  125 mL/hr IntraVENous CONTINUOUS    meropenem (MERREM) 500 mg in sterile water (preservative free) 10 mL IV syringe  0.5 g IntraVENous Q6H    vancomycin (VANCOCIN) 1,250 mg in 0.9% sodium chloride 250 mL (VIAL-MATE)  1,250 mg IntraVENous Q24H    collagenase (SANTYL) 250 unit/gram ointment   Topical DAILY    potassium chloride SR (KLOR-CON 10) tablet 40 mEq  40 mEq Oral BID    loperamide (IMODIUM) capsule 4 mg  4 mg Oral AC&HS    colestipoL (COLESTID) tablet 4 g  4 g Oral QID    pantoprazole (PROTONIX) 40 mg in 0.9% sodium chloride 10 mL injection  40 mg IntraVENous Q12H    sodium chloride (NS) flush 5-40 mL  5-40 mL IntraVENous Q8H    sodium chloride (NS) flush 5-40 mL  5-40 mL IntraVENous PRN    acetaminophen (TYLENOL) tablet 650 mg  650 mg Oral Q6H PRN    Or    acetaminophen (TYLENOL) suppository 650 mg  650 mg Rectal Q6H PRN    prochlorperazine (COMPAZINE) injection 10 mg  10 mg IntraVENous Q6H PRN    insulin lispro (HUMALOG) injection   SubCUTAneous AC&HS    glucose chewable tablet 16 g  4 Tab Oral PRN    dextrose (D50W) injection syrg 12.5-25 g  12.5-25 g IntraVENous PRN    glucagon (GLUCAGEN) injection 1 mg  1 mg IntraMUSCular PRN    enoxaparin (LOVENOX) injection 80 mg  1 mg/kg SubCUTAneous Q12H    albuterol-ipratropium (DUO-NEB) 2.5 MG-0.5 MG/3 ML  3 mL Nebulization Q4H PRN    diphenoxylate-atropine (LOMOTIL) tablet 2 Tab  2 Tab Oral QID    psyllium husk-aspartame (METAMUCIL FIBER) packet 1 Packet  1 Packet Oral TID WITH MEALS    nystatin (MYCOSTATIN) 100,000 unit/gram powder   Topical BID    PHENYLephrine (ROGER-SYNEPHRINE) 30 mg in 0.9% sodium chloride 250 mL infusion   mcg/min IntraVENous TITRATE    sodium chloride (NS) flush 5-10 mL  5-10 mL IntraVENous PRN        Objective:     Blood pressure (!) 132/57, pulse 76, temperature 97.4 °F (36.3 °C), resp. rate 21, height 5' 2\" (1.575 m), weight 75 kg (165 lb 5.5 oz), SpO2 98 %.    03/28 0701 - 03/28 1900  In: 530 [P.O.:530]  Out: 1500 [Urine:500]    03/26 1901 - 03/28 0700  In: 6394.7 [P.O.:1520; I.V.:4824.7]  Out: 5200 [Urine:225]    EXAM:  GENERAL: no distress    Data Review    Recent Results (from the past 24 hour(s))   GLUCOSE, POC    Collection Time: 03/27/21  4:15 PM   Result Value Ref Range    Glucose (POC) 139 (H) 65 - 100 mg/dL    Performed by Bridger Tierney (PCT)    GLUCOSE, POC    Collection Time: 03/27/21  9:21 PM   Result Value Ref Range    Glucose (POC) 192 (H) 65 - 100 mg/dL    Performed by Erin Davis    CBC WITH AUTOMATED DIFF    Collection Time: 03/28/21  4:49 AM   Result Value Ref Range    WBC 10.1 3.6 - 11.0 K/uL    RBC 2.89 (L) 3.80 - 5.20 M/uL    HGB 8.8 (L) 11.5 - 16.0 g/dL    HCT 29.0 (L) 35.0 - 47.0 %    .3 (H) 80.0 - 99.0 FL    MCH 30.4 26.0 - 34.0 PG    MCHC 30.3 30.0 - 36.5 g/dL    RDW 14.7 (H) 11.5 - 14.5 %    PLATELET 400 521 - 341 K/uL    MPV 10.6 8.9 - 12.9 FL    NRBC 0.0 0  WBC    ABSOLUTE NRBC 0.00 0.00 - 0.01 K/uL    NEUTROPHILS 73 32 - 75 %    LYMPHOCYTES 17 12 - 49 %    MONOCYTES 6 5 - 13 %    EOSINOPHILS 2 0 - 7 %    BASOPHILS 1 0 - 1 %    IMMATURE GRANULOCYTES 1 (H) 0.0 - 0.5 %    ABS. NEUTROPHILS 7.5 1.8 - 8.0 K/UL    ABS. LYMPHOCYTES 1.7 0.8 - 3.5 K/UL    ABS. MONOCYTES 0.6 0.0 - 1.0 K/UL    ABS. EOSINOPHILS 0.2 0.0 - 0.4 K/UL    ABS. BASOPHILS 0.1 0.0 - 0.1 K/UL    ABS. IMM.  GRANS. 0.1 (H) 0.00 - 0.04 K/UL    DF AUTOMATED     METABOLIC PANEL, BASIC    Collection Time: 03/28/21  4:49 AM   Result Value Ref Range    Sodium 146 (H) 136 - 145 mmol/L    Potassium 4.2 3.5 - 5.1 mmol/L    Chloride 112 (H) 97 - 108 mmol/L    CO2 30 21 - 32 mmol/L    Anion gap 4 (L) 5 - 15 mmol/L    Glucose 125 (H) 65 - 100 mg/dL    BUN 16 6 - 20 MG/DL    Creatinine 1.09 (H) 0.55 - 1.02 MG/DL    BUN/Creatinine ratio 15 12 - 20      GFR est AA >60 >60 ml/min/1.73m2    GFR est non-AA 51 (L) >60 ml/min/1.73m2    Calcium 7.3 (L) 8.5 - 10.1 MG/DL   MAGNESIUM    Collection Time: 03/28/21  4:49 AM   Result Value Ref Range    Magnesium 2.1 1.6 - 2.4 mg/dL   PHOSPHORUS    Collection Time: 03/28/21  4:49 AM   Result Value Ref Range    Phosphorus 3.3 2.6 - 4.7 MG/DL   HEPATIC FUNCTION PANEL    Collection Time: 03/28/21  4:49 AM   Result Value Ref Range    Protein, total 5.6 (L) 6.4 - 8.2 g/dL    Albumin 2.1 (L) 3.5 - 5.0 g/dL    Globulin 3.5 2.0 - 4.0 g/dL    A-G Ratio 0.6 (L) 1.1 - 2.2      Bilirubin, total 0.4 0.2 - 1.0 MG/DL    Bilirubin, direct 0.1 0.0 - 0.2 MG/DL    Alk.  phosphatase 75 45 - 117 U/L    AST (SGOT) 17 15 - 37 U/L    ALT (SGPT) 11 (L) 12 - 78 U/L   GLUCOSE, POC    Collection Time: 03/28/21  7:28 AM   Result Value Ref Range    Glucose (POC) 103 (H) 65 - 100 mg/dL    Performed by Bibiana Siegel (PCT)    GLUCOSE, POC    Collection Time: 03/28/21 11:28 AM   Result Value Ref Range    Glucose (POC) 131 (H) 65 - 100 mg/dL    Performed by Bibiana Siegel (PCT)        Assessment:     Principal Problem:    Severe sepsis ([facility] (3/23/2021)    Active Problems:    Diabetes mellitus with complication ([facility] (33/4/0506)      Overview: Retinopathy      Microalbuminuria      Toe amputation x2      Depression (7/24/2017)      Bilateral pulmonary embolism ([address] (2/4/2021)      Disorientation (3/24/2021)      Stage 3b chronic kidney disease (3/24/2021)      Sacral wound (3/24/2021)      Hypokalemia (3/24/2021)      Shortness of breath (3/24/2021)      Prolonged Q-T interval on ECG (3/24/2021)        Plan:      or Ramses to see tomorrow

## 2021-03-28 NOTE — DISCHARGE SUMMARY
[address] [place] [address]   Office [phone]  Fax [phone]       Discharge / Transfer / Off-Service Note     Name: Sharda Ortiz MRN: [id number]  Sex: Female   YOB: 1955  Age: 61 y.o. PCP: Desmond Mcfarlane MD     Date of admission: 3/23/2021  Date of discharge/transfer: 3/28/2021    Attending physician at admission: Vanessa Cavazos MD     Attending physician at discharge/transfer: Dr. Erika Hoskins    Resident physician at discharge/transfer: Chepe Davis DO     Consultants during hospitalization  IP CONSULT TO [address] [facility] TO PULMONOLOGY  IP CONSULT TO INFECTIOUS DISEASES  IP CONSULT TO [place] [address] TO [address] [facility]     Admission diagnoses   Severe sepsis ([facility] [A41.9, R65.20]    Recommended follow-up after discharge  1. PCP: Desmond Mcfarlane MD     MEDICATION CHANGES  - START Linezolid 600mg twice daily for 5 days - first dose 3/29 morning  - START Midodrine 5mg three times daily - first dose 3/28 evening   - START Klor Con twice daily - first dose 3/28 evening  - START Colestipol 4g four times daily - first dose 3/28 evening    Please follow up with your PCP regarding:  - surgical site infection  - low blood pressure  - BMP results   - ostomy output, now on stool bulking medications  - low potassium and magnesium, high bicarbonate     Please follow up with General Surgeon regarding:  - Open surgical incision, need for wound vac  - Infection of surgical site  - ostomy output, now on stool bulking medications    Please follow up with Oncologist regarding:  - colon cancer  - anemia       History of Present Illness  Per admitting provider,     Sharda Ortiz is a 61 y.o. female with PMHx of metastatic adenocarcinoma s/p ileostomy & PEG, T2DM, Neuropathy, HTN who presented via EMS from home due to increased confusion since 4pm on 3/23.  Daughter at bedside reports patient alert and oriented at baseline, however now confused and unable to follow commands. Previously experienced episode of HA and confusion on 3/22.      Of note per chart review, patient recently admitted to [facility] 3/2-3/8/31. Underwent exploratory laparotomy with placement of PEG tube and creation of loop ileostomy by Dr. Deja Gruber. Required STICU care for postop hypotension.       Dtr states that Pt has had some intermittent SOB with some hypoxia into the 80's and non productive cough for the past week but other wise Pt has not been complaining of any symptoms including fever, chills, abdominal pain, n/v/d, dysuria, hematuria, dizziness, HA, body aches.  Has been eating and drinking well and voiding without issue. Dtr states that she has been holding on tube feeds now and has been receiving 2L of LR for dehydration at home. Christine Swenson has had a PICC line in place in the Rt arm for ~4 week but dtr has not noticed any external signs of surrounding infection.   She has had ~3-4L output form ileostomy daily.  Seen last week at [facility] for possible infection around the G tube site and was prescribed nystatin for yeast infection.      She continues with the Lovenox BID for known bilateral PE's and she does not have a home O2 requirement.  Her blood sugars have been fluctuating the past few days and she has not needed to take the detemir in the last 2 days due to low POC glucose readings. Pt is followed by hemeonc at [facility] and was told she was not a candidate for chemo or radiation at this time. Christine Swenson has also established with palliative. Jake Munoz is a 61 y.o. female with PMHx of metastatic adenocarcinoma s/p ileostomy & PEG, T2DM, Neuropathy, HTN who presented with confusion and was admitted for hypokalemia and septic shock 2/2 to surgical site wound infection. During her stay, potassium was continuously repleted due to increased ostomy output, and eventually resolved with bulking of stools. AMS resolved with correction of potassium.  Wound cultures indicate a enterococcus and alpha strep infection of the open laparotomy incision from prior surgery on 3/2. She received vancomycin and meropenem. Hypotension was managed with midodrine, albumin, and neosyn. On day of discharge neosyn was discontinued and antibiotics were transitioned to oral meds. Septic Shock: Leukocytosis and hypotension improved. Likely 2/2 laparotomy incision infection, wound culture with enterococcus and alpha strep. CT a/p ileus and locules of free air 2/2 small facial dehiscence per sugery. S/p Vanc and Meropenem (5 days). Received albumin and neosyn for blood pressure maintenance during stay. - Linezolid 600mg BID for 5 days (start on 3/29 AM)   - Continue midodrine 5mg TID   - Per GI - colestipol TID, protonix BID, and psyllium/Imodium/Lomotil to bulk up stools  - Per Surgery (Dr. Karen Arellano) - wound vac, unable to place, follow up with gen surg at 3/30 appointment to obtain  - Per Nutrition - continue consistent carb diet with protein supplementation and avoid sugary foods to prevent inc ostomy output     Hypokalemia: On admission 2.1. Secondary to high output from ostomy bag. Improved with bulking stools  - stool bulking per above medications  - Klor Con 40mEq BID     Hypotension: Required neosyn gtt, albumin doses x2, and midodrine. Echo 55-60% (G1DD) in 2/2021.  - Continue Midodrine 5mg TID - can increase to 10mg TID  - Consider albumin infusion outpatient  - Monitor BP closely at home and at follow up     SOB: Resolved. Covid neg.  CXR slight inc L sm-mod pleural effusion & basilar atelectasis. Pulmonology consulted, hypoxia likely d/t atelectasis secondary to dec diaphragmatic excursion, recommended against thoracentesis. - Continue albuterol inhaler as needed     Metabolic alkalosis w/ resp acidosis: Improved. 2/2 gastric losses with ileostomy.    - bulk up stools per above     AMS: Resolved. Likely 2/2 severe hypokalemia and sepsis.  Imaging and lab work up unremarkable.     Elevated Cr in CKD3: Improved. Likely 2/2 IVVD. POA Cr 1.72 (BL 1.1-1.3). -Consider rechecking outpatient    Metastatic gastric/colon adenocarcinoma: S/p laparotomy, Ileostomy and PEG placement on 3/2/2021) Open incision w/ packing. PICC line in place for 2L LR daily at home. F/w Surg Onc Dr. Arianne Cruz ([facility]). - Follow up with Dr. Arianne Cruz on 3/30 to address open incision and for wound vac placement     Bilateral pulmonary emboli: Diagnosed 2/3/2021  - Continue lovenox 80mg BID      Bilateral Sacral ulcers  - Resume home wound care  - Collagenase ointment applied to wounds prn     Prolonged QTc: POA QTc 513  -Avoid QTc prolonging agents     T2DM: A1c 7.9 (11/2020).    - Resume Januvia 100mg daily and SSI Detremir      HTN: Hypotensive during stay secondary to septic shock. BP normotensive outpatient without antihypertensives. - Monitor BP closely at home      Wheezing: Resolved. 0.5PPD smoker x 40 years. - Continue Albuterol inhaler 2 puff q4h prn     PTSD  - Buspar 10mg BID prn      Severe aortic atherosclerosis: Severe sclerosis of the abdominal aorta and common iliac and femoral arteries noted on CT abd/pelv 1/21.    -Follow up outpatient vascular     HLD: Lipid panel 11/2020 w/ Tchol 192, HDL 37, , tri 170  - No longer taking statin    Physical exam at discharge:    Vitals Reviewed. Visit Vitals  BP (!) 119/48   Pulse 80   Temp 97.7 °F (36.5 °C)   Resp 23   Ht 5' 2\" (1.575 m)   Wt 165 lb 5.5 oz (75 kg)   SpO2 96%   BMI 30.24 kg/m²        General: No acute distress. Alert. Cooperative. Head: Normocephalic. Periorbital edema. Neck: Supple. Respiratory: CTAB. No w/r/r/c.  Cardiovascular: RRR. Normal S1,S2. No m/r/g.   GI: + bowel sounds. Nontender. No rebound tenderness or guarding. Nondistended. PEG tube with surrounding yeast. Ileostomy bag in place. Laparoscopic incision open with packing and dressing C/D/I. Extremities:  2+ LE edema. Distal pulses present. PICC line R arm no erythema.     Condition at discharge: Stable    Labs  Recent Labs     03/28/21  1212 03/28/21  0449 03/27/21  0428 03/26/21  0356   WBC  --  10.1 14.9* 18.0*   HGB 8.5* 8.8* 9.9* 9.6*   HCT 27.6* 29.0* 31.7* 31.9*   PLT  --  261 313 310     Recent Labs     03/28/21  0449 03/27/21  0428 03/26/21  1739   * 144 142   K 4.2 4.1 3.7   * 109* 105   CO2 30 33* 34*   BUN 16 18 16   CREA 1.09* 1.06* 1.18*   * 71 136*   CA 7.3* 7.3* 6.9*   MG 2.1 2.3 2.4   PHOS 3.3 2.1* 2.1*     Recent Labs     03/28/21  0449   ALT 11*   AP 75   TBILI 0.4   TP 5.6*   ALB 2.1*   GLOB 3.5     Recent Labs     03/28/21  1559 03/28/21  1128 03/28/21  0728 03/27/21  2121 03/27/21  1615   GLUCPOC 133* 131* 103* 192* 139*       Microbiology  Results     Procedure Component Value Units Date/Time    CULTURE, WOUND Edwyna Maikol STAIN [[id number]]  (Abnormal)  (Susceptibility) Collected: 03/25/21 1430    Order Status: Completed Specimen: Abdomen Updated: 03/28/21 1454     Special Requests: NO SPECIAL REQUESTS        GRAM STAIN RARE WBCS SEEN         NO ORGANISMS SEEN        Culture result:       LIGHT STREPTOCOCCUS MITIS Evita Duffy                  LIGHT ENTEROCOCCUS FAECALIS          Susceptibility      Streptococcus mitis     ALL     Ampicillin ($) Susceptible     Cefotaxime Susceptible     Ceftriaxone ($) Susceptible     Clindamycin ($) Susceptible     Penicillin G ($$) Susceptible     Vancomycin ($) Susceptible                Susceptibility      Enterococcus faecalis     ALL     Ampicillin ($) Susceptible     Daptomycin ($$$$$) Susceptible     Linezolid ($$$$$) Susceptible     Vancomycin ($) Susceptible                    CULTURE, BLOOD [[id number]] Collected: 03/24/21 0038    Order Status: Completed Specimen: Blood Updated: 03/28/21 0015     Special Requests: NO SPECIAL REQUESTS        Culture result: NO GROWTH 4 DAYS       CULTURE, BLOOD [[id number]]     Order Status: Canceled Specimen: Blood     CULTURE, BLOOD [[id number]]     Order Status: Canceled Specimen: Blood URINE CULTURE HOLD SAMPLE [[id number]] Collected: 03/23/21 2150    Order Status: Completed Specimen: Urine from Serum Updated: 03/23/21 2248     Urine culture hold       Urine on hold in Microbiology dept for 2 days. If unpreserved urine is submitted, it cannot be used for addtional testing after 24 hours, recollection will be required. CULTURE, URINE [[id number]] Collected: 03/23/21 2150    Order Status: Completed Specimen: Urine from Clean catch Updated: 03/25/21 1128     Special Requests: NO SPECIAL REQUESTS        [place] Count --        [id number]  COLONIES/mL       Culture result: AEROCOCCUS SANGUINICOLA, KNOWN TO BE SUSCEPTIBLE TO PENCILLIN, CEFOTAXIME, MEROPENEM, VANCOMYCIN, LINEZOLID AND RIFAMPIN          Imaging  Ct Head Wo Cont    Result Date: 3/23/2021  No acute findings. Ct Abd Pelv Wo Cont    Result Date: 3/24/2021  1. Interval placement of gastrostomy tube, and left lower quadrant ileostomy, likely accounting for locules of gas within ascites in the central abdomen communicating with a large anterior abdominal wall defect. No definite drainable abscess allowing for lack of IV contrast material. If any more recent CT imaging from [facility] given recent surgery and hospital stay there, may be helpful for direct comparison. 2. Anasarca. Moderate to large left pleural effusion and basilar atelectasis. Trace right pleural effusion. 3. Dilated fluid-filled small bowel loops throughout the central abdomen with wall thickening, may represent ileus or partial obstruction. Again any postoperative comparison examinations would be helpful. Xr Chest Port    Result Date: 3/23/2021  Slight increase in left small-to-moderate pleural effusion and basilar atelectasis.        Chronic diagnoses   Problem List as of 3/28/2021 Date Reviewed: 11/17/2020          Codes Class Noted - Resolved    Disorientation ICD-10-CM: R41.0  ICD-9-CM: 780.99  3/24/2021 - Present        Stage 3b chronic kidney disease ICD-10-CM: T88.40  ICD-9-CM: 585.3  3/24/2021 - Present        Sacral wound ICD-10-CM: S31.000A  ICD-9-CM: 959.19  3/24/2021 - Present        Hypokalemia ICD-10-CM: E87.6  ICD-9-CM: 276.8  3/24/2021 - Present        Shortness of breath ICD-10-CM: R06.02  ICD-9-CM: 786.05  3/24/2021 - Present        Prolonged Q-T interval on ECG ICD-10-CM: R94.31  ICD-9-CM: 794.31  3/24/2021 - Present        * (Principal) Severe sepsis ([facility] ICD-10-CM: A41.9, R65.20  ICD-9-CM: 038.9, 995.92  3/23/2021 - Present        Peritoneal carcinomatosis (HCC) ICD-10-CM: C78.6, C80.1  ICD-9-CM: 197.6, 199.1  2/5/2021 - Present        SBO (small bowel obstruction) ([facility] ICD-10-CM: J89.349  ICD-9-CM: 560.9  2/4/2021 - Present        Abdominal pain ICD-10-CM: R10.9  ICD-9-CM: 789.00  2/4/2021 - Present        Bilateral pulmonary embolism ([facility] ICD-10-CM: I26.99  ICD-9-CM: 415.19  2/4/2021 - Present        Colitis ICD-10-CM: K52.9  ICD-9-CM: 558.9  2/4/2021 - Present        Aortic atherosclerosis ([facility] ICD-10-CM: I70.0  ICD-9-CM: 440.0  2/4/2021 - Present        Hypoglycemia ICD-10-CM: E16.2  ICD-9-CM: 251.2  5/21/2019 - Present        Posterior vitreous detachment of right eye ICD-10-CM: H43.811  ICD-9-CM: 379.21  2/1/2018 - Present        Stable proliferative diabetic retinopathy of both eyes associated with type 2 diabetes mellitus ([facility] ICD-10-CM: K86.9601  ICD-9-CM: 250.50, 362.02  2/1/2018 - Present        Diverticulitis ICD-10-CM: K57.92  ICD-9-CM: 562.11  12/10/2017 - Present        Depression ICD-10-CM: F32.9  ICD-9-CM: 212  7/24/2017 - Present        Type 2 diabetes mellitus with hyperglycemia, with long-term current use of insulin ([facility] ICD-10-CM: E11.65, Z79.4  ICD-9-CM: 250.00, 790.29, V58.67  11/29/2016 - Present        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 [place]-55-LZ: [id number]  ICD-9-CM: 250.60, V58.67  11/29/2016 - Present        Persistent proteinuria associated with type 2 diabetes mellitus ([facility] ICD-10-CM: E11.29, R80.9  ICD-9-CM: 250.40, 791.0  4/12/2016 - Present        Diabetes mellitus with complication ([place] [address].) GNJ-57-BW: E11.8  ICD-9-CM: 250.90  12/8/2014 - Present    Overview Signed 12/8/2014  9:55 PM by Cezar Richard MD     Retinopathy  Microalbuminuria  Toe amputation x2             Family hx of colon cancer ICD-10-CM: Z80.0  ICD-9-CM: V16.0  12/8/2014 - Present        Glaucoma, narrow-angle ICD-10-CM: H40.20X0  ICD-9-CM: 365.20, 365.70  9/18/2014 - Present        Diabetic foot ulcer ([facility] ICD-10-CM: E11.621, L97.509  ICD-9-CM: 250.80, 707.15  9/3/2014 - Present        Posttraumatic stress disorder ICD-10-CM: F43.10  ICD-9-CM: 309.81  6/20/2014 - Present        Diabetic peripheral neuropathy ([facility] ICD-10-CM: E11.42  ICD-9-CM: 250.60, 357.2  4/4/2014 - Present        Hyperlipidemia ICD-10-CM: E78.5  ICD-9-CM: 272.4  3/5/2014 - Present        RESOLVED: Severe aortic stenosis ICD-10-CM: I35.0  ICD-9-CM: 424.1  2/4/2021 - 2/4/2021        RESOLVED: Essential hypertension ICD-10-CM: I10  ICD-9-CM: 401.9  10/9/2015 - 3/28/2021        RESOLVED: BMI 33.0-33.9,adult ICD-10-CM: P84.96  ICD-9-CM: V85.33  7/3/2015 - 7/6/2017              Discharge/Transfer Medications  Current Discharge Medication List      START taking these medications    Details   potassium chloride SR (K-TAB) 20 mEq tablet Take 2 Tabs by mouth two (2) times a day for 30 days. Qty: 120 Tab, Refills: 0      midodrine (PROAMATINE) 5 mg tablet Take 1 Tab by mouth three (3) times daily (with meals) for 30 days. Qty: 90 Tab, Refills: 0      colestipoL (COLESTID) 1 gram tablet Take 4 Tabs by mouth four (4) times daily for 30 days. Qty: 480 Tab, Refills: 0      collagenase (SANTYL) 250 unit/gram ointment Apply  to affected area daily. Qty: 15 g, Refills: 0      linezolid (ZYVOX) 600 mg tablet Take 1 Tab by mouth two (2) times a day for 5 days.   Qty: 10 Tab, Refills: 0         CONTINUE these medications which have NOT CHANGED    Details   SITagliptin (Januvia) 100 mg tablet Take 100 mg by mouth every evening. enoxaparin (Lovenox) 80 mg/0.8 mL injection 80 mg by SubCUTAneous route every twelve (12) hours. nystatin (MYCOSTATIN) powder Apply  to affected area two (2) times a day. To j-peg site       albuterol (ProAir HFA) 90 mcg/actuation inhaler INHALE 2 PUFFS BY MOUTH EVERY 4 HOURS AS NEEDED FOR WHEEZE  Qty: 8.5 Inhaler, Refills: 4    Comments: DX Code Needed  . Associated Diagnoses: Wheezing; Bronchitis      diphenoxylate-atropine (LomotiL) 2.5-0.025 mg per tablet Take 2 Tabs by mouth Before breakfast, lunch, dinner and at bedtime. loperamide (IMMODIUM) 2 mg tablet Take 4 mg by mouth Before breakfast, lunch, dinner and at bedtime. psyllium (METAMUCIL) packet Take 1 Packet by mouth Before breakfast, lunch, dinner and at bedtime. insulin detemir U-100 (LEVEMIR FLEXTOUCH) 100 unit/mL (3 mL) inpn by SubCUTAneous route daily. Patient has been using on a sliding scale due to low blood glucose readings recently.  Dose of 2 units on 3/22/21         STOP taking these medications       Blood-Glucose Meter,Continuous (Dexcom G6 ) misc Comments:   Reason for Stopping:         Blood-Glucose Sensor (Dexcom G6 Sensor) nishi Comments:   Reason for Stopping:         Blood-Glucose Transmitter (Dexcom G6 Transmitter) nishi Comments:   Reason for Stopping:         senna-docusate (PERICOLACE) 8.6-50 mg per tablet Comments:   Reason for Stopping:         ondansetron (ZOFRAN ODT) 4 mg disintegrating tablet Comments:   Reason for Stopping:         busPIRone (BUSPAR) 10 mg tablet Comments:   Reason for Stopping:         furosemide (LASIX) 20 mg tablet Comments:   Reason for Stopping:         ondansetron (ZOFRAN ODT) 4 mg disintegrating tablet Comments:   Reason for Stopping:         atorvastatin (LIPITOR) 40 mg tablet Comments:   Reason for Stopping:         aspirin delayed-release 81 mg tablet Comments:   Reason for Stopping:                Diet: Diabetic diet.     Activity:  As tolerated    Disposition: Home with resumption of home health    Discharge instructions to patient/family  Please seek medical attention for any new or worsening symptoms particularly fever, chest pain, shortness of breath, abdominal pain, nausea, vomiting    Follow up plans/appointments  Follow-up Information     Follow up With Specialties Details Why Contact Info    Edu Farias MD Family Medicine Schedule an appointment as soon as possible for a visit For follow up of visit [facility] [address],   Family Medicine Resident       For Billing    Chief Complaint   Patient presents with    Altered mental status       Hospital Problems  Date Reviewed: 11/17/2020          Codes Class Noted POA    Disorientation ICD-10-CM: R41.0  ICD-9-CM: 780.99  3/24/2021 Unknown        Stage 3b chronic kidney disease ICD-10-CM: N18.32  ICD-9-CM: 585.3  3/24/2021 Unknown        Sacral wound ICD-10-CM: S31.000A  ICD-9-CM: 959.19  3/24/2021 Unknown        Hypokalemia ICD-10-CM: E87.6  ICD-9-CM: 276.8  3/24/2021 Unknown        Shortness of breath ICD-10-CM: R06.02  ICD-9-CM: 786.05  3/24/2021 Unknown        Prolonged Q-T interval on ECG ICD-10-CM: R94.31  ICD-9-CM: 794.31  3/24/2021 Unknown        * (Principal) Severe sepsis ([facility] ICD-10-CM: A41.9, R65.20  ICD-9-CM: 038.9, 995.92  3/23/2021 Unknown        Bilateral pulmonary embolism ([facility] ICD-10-CM: I26.99  ICD-9-CM: 415.19  2/4/2021 Yes        Depression ICD-10-CM: F32.9  ICD-9-CM: 771  7/24/2017 Yes        Diabetes mellitus with complication ([facility] DPS-43-WV: E11.8  ICD-9-CM: 250.90  12/8/2014 Yes    Overview Signed 12/8/2014  9:55 PM by Rubio Cisneros MD     Retinopathy  Microalbuminuria  Toe amputation x2

## 2021-03-28 NOTE — PROGRESS NOTES
03/28/21 1430 03/28/21 1432 03/28/21 1434   RT Walking Oximetry   Stage Resting (Room Air) During Walk (Room Air) During Walk (Room Air)   SpO2 97 % 95 % 92 %   HR 84 bpm 106 bpm 114 bpm   Rate of Dyspnea 0 0 0   Symptoms   (None noted)   (None noted)   (None noted)   O2 Device None (Room air) None (Room air) None (Room air)   O2 Flow Rate (L/min) 0 l/min 0 l/min 0 l/min   FIO2 (%) 21 % 21 % 21 %   Walk/Assistive Device Walker Walker Walker      03/28/21 1436   RT Walking Oximetry   Stage Resting (Room Air)   SpO2 96 %   HR 92 bpm   Rate of Dyspnea 0   Symptoms   (None noted)   O2 Device None (Room air)   O2 Flow Rate (L/min) 0 l/min   FIO2 (%) 21 %   Walk/Assistive Device Walker     Patients home oxygen assessment completed with RN. Patient had difficulty with exertion especially when moving from the chair to stand with the walker. Patients oxygen remained stable during the walk, she will not need oxygen for home use. Patient left resting in chair 96%.

## 2021-03-28 NOTE — ROUTINE PROCESS
This patient was assisted with Intentional Toileting every 2 hours during this shift.  Documentation of ambulation and output reflected on Flowsheet.

## 2021-03-28 NOTE — PROGRESS NOTES
1144 Bedside and Verbal shift change report given to Charis Kauffman RN (oncoming nurse) by Latrice Casarez RN (offgoing nurse). Report included the following information SBAR and Kardex. This patient was assisted with Intentional Toileting every 2 hours during this shift. Documentation of ambulation and output reflected on Flowsheet. 1220 BP maintaining map above 65 Pramod on 5mcg/hr, called to Dr Duane Marsh ok to stop Pramod.    1408 Patient map 60 to 95 with Bps called to Dr Duane Marsh, no orders noted. 1800  Changed ostomy, abdominal incision, and right foot dressing, peg site cleaned and nystatin applie, PICC line dressing change for discharge. 31 67 62 Patient and daughter reviewed discharge instructions. Verbalized understanding, no questions. Left with belongings. Unable to [facility] discharge, paper copy signed and copy placed in chart.

## 2021-03-29 NOTE — PROGRESS NOTES
Resumption of care order, AVS and new wound care orders have been sent to [facility] in [employer]. I called [facility] and spoke to Lisa Fernandoisabelemery. She stated that she had spoken with the pt's daughter this morning. She said that her daughter was under the impression that the patient needed a wound vac. I called attending to clarify. Patient does not need a wound vac and will be following up with surgeon tomorrow. No other issues or concerns at this time.  BREANNA Lopez

## 2021-03-29 NOTE — PROGRESS NOTES
Patient contacted regarding Les Kahn. Discussed COVID-19 related testing which was not done at this time. Test results were not done. Care Transition Nurse contacted the patient by telephone to perform post discharge assessment. Call within 2 business days of discharge: Yes Verified name and  with patient as identifiers. Provided introduction to self, and explanation of the CTN/ACM role, and reason for call due to risk factors for infection and/or exposure to COVID-19. Symptoms reviewed with patient who verbalized the following symptoms: no worsening symptoms Due to no new or worsening symptoms encounter was not routed to provider for escalation. Discussed follow-up appointments. If no appointment was previously scheduled, appointment scheduling offered:  [facility] follow up appointment(s):  
Future Appointments Date Time Provider Carolyn Castillo 2021  9:30 AM Justino Treadwell MD [facility] follow up appointment(s): none Advance Care Planning:  
Does patient have an Advance Directive:  patient declined education. Patient has following risk factors of: sepsis and immunocompromised. CTN reviewed discharge instructions, medical action plan and red flags such as increased shortness of breath, increasing fever and signs of decompensation with patient who verbalized understanding. Discussed exposure protocols and quarantine with CDC Guidelines What to do if you are sick with coronavirus disease .  Patient was given an opportunity for questions and concerns. The patient agrees to contact the [facility] exposure line [phone], [facility] [address]  ([phone] and PCP office for questions related to their healthcare. CTN provided contact information for future needs. Reviewed and educated patient on any new and changed medications related to discharge diagnosis Was patient discharged with a pulse oximeter?  no Patient/family/caregiver given information for [employer] and agrees to enroll no 
14 day call based on severity of symptoms and risk factors.

## 2021-03-31 NOTE — TELEPHONE ENCOUNTER
----- Message from Loreto Braxton sent at 3/30/2021  3:00 PM EDT -----  Regarding: Dr. Priya Sawyer Message/Vendor Calls    Caller's first and last name: Gilles Tian with [facility]      Reason for call: Nurse was calling to notify the dr that pt is home from the hospital and they have reopened her for home health today, 03/30/2021. Callback required yes/no and why: No; Notification      Best contact number(s): [phone]      Details to clarify the request: Nurse was calling to notify the dr that pt is home from the hospital and they have reopened her for home health today, 03/30/2021.       Loreto Braxton

## 2021-04-01 NOTE — TELEPHONE ENCOUNTER
[phone]    Andrea Mcmahon called to inform Dr. Edwige French that the patient has declined to participate in physical therapy.
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
## 2021-04-16 NOTE — TELEPHONE ENCOUNTER
[facility] calling, states patient was just admitted there, seeking pt records. Asked for what specifically was needed to send. Caller states the entire record. Called for advice as we can't send entire record. Per nurse, call to Dr. Ludmila Fofana on this.     Dr. Ludmila Fofana took call

## 2021-04-16 NOTE — PROGRESS NOTES
[address] Medicine Residency Attending Addendum: 
Dr. Kimo Davis MD,  the patient and I were not physically present during this encounter. The resident and I are concurrently monitoring the patient care through appropriate telecommunication technology. I discussed the findings, assessment and plan with the resident and agree with the resident's findings and plan as documented in the resident's note.    
 
Lisa Elena MD
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
## 2021-04-23 NOTE — HOSPICE
Hospice Liaison Nurse: [facility] Update: 
Martha Berkowitz 8- 
9:00: Daughter, Adi Vega, sent me a text that patient had pulled out her G-tube again and discussing replacing it surgically. 9:15: Called CM to get update. Dtr is bedside, discussing plan with MD. There was an issue with rapid COVID last night (was not done correctly). Rapid COVID was just done again and should result within 4 hours from now. Medicaid transport service was cancelled this am, and next time available is this afternoon at 4:00; give or take 3 hours. 9:20: Spoke to Adi Vega. She said that the G-tube was re-inserted, but placement has not been verified for use. Discussed goals of care. Dtr said she does not want to continue with feedings. Dtr feels that with the G-tube in place, pt has less drainage leaking from the site. Reviewed types of wound care. Plan: G-Tube will remain in place until pt arrives at [facility]. Adi Vega is open to [facility] team advising on best skin care treatment and will follow guidance. If G-tube remains in place, and if pt improves where feeding is discussed, G-Tube placement will need to be confirmed prior to use for meds or feeding. [facility]  can be reached at [phone] Cliff Perea: [phone] Ursula Pratt RN, [facility] Hospice Nurse Liaison [phone] Mobile [phone] Office

## 2021-04-23 NOTE — HOSPICE
1435 Pt arrived at the [facility]. Pt is alert to self. Speech appropriate at times. Able to answer yes/no questions appropriately. Lungs are clear but diminished. Pt is on RA. No cough noted. No bowel sounds present. Pt has a G tube that has emma red blood coming from outer edges, a Ileostomy that is draining brownish stool. a 4 inch packed open area on her abd. Dressing removed. Area is approx 1/2 -1  Inch deep. Pink in color. Pt has a dressing on her sacrum. No assessed at this time. Edema is +4 pitting from groin to calves. Feet very pale in color and cool to touch. No PPP. Pt has numerous skin tears on extremities. Skin is very fragile and thin. 1525  Pt pulling at her tubes. Pt medicated with Dilaudid and Lorazepam.   
1540  Pt grimacing with wound care. Pt medicated with Dilaudid and Lorazepam.   
8092  Dr Samson Taylor in to visit. Dtr arrived. Family open to comfort care. Pt resting comfortably. 1630  Pt snoring. 1730  Pt asleep. 1830  Dtr at the bedside. Rn updated her on pt's status. 1900  Report given. NAME OF PATIENT:  Hugh Kitchen LEVEL OF CARE:  GIP 
 
REASON FOR GIP:  
Pain, despite numerous changes in medications, Terminal agitation, despite changes to medications, Medication adjustment that must be monitored 24/7 and Stabilizing treatment that cannot take place at home *PATIENT REMAINS ELIGIBLE FOR GIP LEVEL OF CARE AS EVIDENCED BY: (MUST BE ADDRESSED OF PATIENT GIP)  Pt receiving IV Dilaudid and Lorazepam for comfort. Pt has pain with wound care. Numerous extensive wound care.  
 
REASON FOR RESPITE:  n/a 
 
 
O2 SAFETY:  RA 
 
FALL INTERVENTIONS PROVIDED:  
Implemented/recommended use of non-skid footwear, Implemented/recommended use of fall risk identification flag to all team members, Implemented/recommended assistive devices and encouraged their use, Implemented/recommended resources for alarm system (personal alarm, bed alarm, call bell, etc.) , Implemented/recommended environmental changes (remove hazards, lower bed, improve lighting, etc.) and Implemented/recommended increased supervision/assistance INTERDISPLINARY COMMUNICATION/COLLABORATION: 
Physician, MSW, Macho and RN, CNA 
 
NEW MEDICATION INITIATION DOCUMENTATION:  Pt on Comfort meds. See Mar. 
 
Reason medication is being initiated:  n/a 
 
MD / Provider name consulted re: change in status / initiation of new medication:  n/a New Symptom(s):  n/a New Order(s):  n/a Name of the person notified of the changes:  n/a Name of person being taught:  n/a Instructions given:  n/a Side Effects taught:  n/a Response to teaching:  n/a 
 
 
COMFORTABLE DYING MEASURE: 
Is Patient/family satisfied with symptom level?  yes DISCHARGE PLAN:  Pt will remain at the [facility] for EOL care. If she should stabilize pt will return home to her family and continue to be followed by Home Hospice.

## 2021-04-23 NOTE — PROGRESS NOTES
Problem: Falls - Risk of 
Goal: *Absence of Falls Description: Document Livier Cristina Fall Risk and appropriate interventions in the flowsheet. Outcome: Progressing Towards Goal 
Note: Fall Risk Interventions: 
  
 
Mentation Interventions: Bed/chair exit alarm, Door open when patient unattended, Reorient patient Medication Interventions: Bed/chair exit alarm Elimination Interventions: Bed/chair exit alarm Problem: Patient Education: Go to Patient Education Activity Goal: Patient/Family Education Outcome: Progressing Towards Goal 
  
Problem: Pressure Injury - Risk of 
Goal: *Prevention of pressure injury Description: Document Leo Scale and appropriate interventions in the flowsheet. Outcome: Progressing Towards Goal 
Note: Pressure Injury Interventions: 
Sensory Interventions: Assess changes in LOC, Check visual cues for pain Moisture Interventions: Maintain skin hydration (lotion/cream) Activity Interventions: Pressure redistribution bed/mattress(bed type) Mobility Interventions: Float heels, Pressure redistribution bed/mattress (bed type) Nutrition Interventions: Offer support with meals,snacks and hydration Friction and Shear Interventions: Lift sheet, HOB 30 degrees or less Problem: Patient Education: Go to Patient Education Activity Goal: Patient/Family Education Outcome: Progressing Towards Goal 
  
Problem: Dyspnea Due to End of Life Goal: Demonstrate understanding of and ability to manage respiratory symptoms at end of life Outcome: Progressing Towards Goal 
  
Problem: Imminent death Goal: Collaborate with patient/family/caregiver/interdisciplinary team to minimize and manage end of life symptoms Outcome: Progressing Towards Goal
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
## 2021-04-24 NOTE — PROGRESS NOTES
1900 - Report received 1930 - Shift Assessment: Patient minimally responsive to touch, no non-verbal S/S of pain or discomfort noted Lungs clear but diminished in the bases, ileostomy to left lower abdomen with brown liquid stool, foss patent and to gravity, multiple foam dressings to BUE, feet, heels back and sacrum. 2012 - Patient turned and repoisoned to her left side, with help of CNA, rectal discharge, tania and foss care completed no non-verbal signs of pain with turning. 2120 - Patient resting with her eyes closed, no non-verbal S/S of pain or discomfort noted 2200 - Patient resting quietly 2300 - Patient resting quietly, turned and repositioned to her back, mouth care completed. 0000 - Patient resting quietly with S/S of distress. 2299 - Patient turned and repositioned to her left side. 0140 - Patient resting quietly with eyes closed with no S/S of pain or discomfort, will continue to monitor. 0230 - Resting with neutral expression 0330 - Patient resting quietly with no discomfort noted. 56 - Patient resting quietly in bed with eyes open and hands folded, neutral expression, did not respond to questions, no distress noted turned and repositioned to her back 1809 - Patient resting with no S/S of discomfort 
 
0550 - Patient alert, verbal, wanting to get out of bed, agitated, had pulled mesh off of PICC line, denied pain with no non-verbal S/S of pain, medicated with IV prn Lorazepam, foss and ileostomy emptied, dressing changed to sacrum. 7984 -  Patient resting with eyes closed, turned and repositioned on her left side, no S/S of pain or agitation 
 
0700 - Report given to oncoming nurse NAME OF PATIENT:  Quentin Marilu LEVEL OF CARE:  GIP 
 
REASON FOR GIP:  
Pain, despite numerous changes in medications, Terminal agitation, despite changes to medications and Medication adjustment that must be monitored 24/7 PATIENT REMAINS ELIGIBLE FOR GIP LEVEL OF CARE AS EVIDENCED BY: (MUST BE ADDRESSED OF PATIENT GIP) Frequent nursing assessments and need for IV medications for symptom management REASON FOR RESPITE: NA 
 
 
O2 SAFETY: NA 
 
 
FALL INTERVENTIONS PROVIDED: NA 
 
 
INTERDISPLINARY COMMUNICATION/COLLABORATION: NA 
 
 
NEW MEDICATION INITIATION DOCUMENTATION: NA 
 
 
Reason medication is being initiated:  DIXIE 
 
MD / Provider name consulted re: Richy Pino in status / initiation of new medication:  NA New Symptom(s):  NA New Order(s):  NA 
 
Name of the person notified of the changes: NA 
 
Name of person being taught:  NA Instructions given:  NA Side Effects taught:  NA 
 
Response to teaching:  NA 
 
 
COMFORTABLE DYING MEASURE: 
Is Patient/family satisfied with symptom level? Yes DISCHARGE PLAN:  Home with hospice if symptom management acheived

## 2021-04-24 NOTE — PROGRESS NOTES
Problem: Falls - Risk of 
Goal: *Absence of Falls Description: Document Doni Philip Fall Risk and appropriate interventions in the flowsheet. Outcome: Progressing Towards Goal 
Note: Fall Risk Interventions: 
  
 
Mentation Interventions: Bed/chair exit alarm, Door open when patient unattended, Adequate sleep, hydration, pain control Medication Interventions: Bed/chair exit alarm Elimination Interventions: Bed/chair exit alarm Problem: Pressure Injury - Risk of 
Goal: *Prevention of pressure injury Description: Document Leo Scale and appropriate interventions in the flowsheet. Outcome: Progressing Towards Goal 
Note: Pressure Injury Interventions: 
Sensory Interventions: Assess changes in LOC, Check visual cues for pain, Float heels, Keep linens dry and wrinkle-free, Minimize linen layers, Monitor skin under medical devices, Pressure redistribution bed/mattress (bed type), Turn and reposition approx. every two hours (pillows and wedges if needed) Moisture Interventions: Absorbent underpads, Apply protective barrier, creams and emollients, Internal/External urinary devices, Maintain skin hydration (lotion/cream), Minimize layers Activity Interventions: Pressure redistribution bed/mattress(bed type) Mobility Interventions: Float heels, HOB 30 degrees or less, Pressure redistribution bed/mattress (bed type), Turn and reposition approx. every two hours(pillow and wedges) Nutrition Interventions: Document food/fluid/supplement intake, Offer support with meals,snacks and hydration Friction and Shear Interventions: Apply protective barrier, creams and emollients, HOB 30 degrees or less, Lift sheet Problem: Imminent death Goal: Collaborate with patient/family/caregiver/interdisciplinary team to minimize and manage end of life symptoms Outcome: Progressing Towards Goal
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
## 2021-04-24 NOTE — HOSPICE
[facility] Good Help to Those in Need 
[phone] Inpatient Nursing Admission Patient Name: Erica Mena YOB: 1955 Age: 61 y.o. Date of Hospice Admission: 4/23/2021 Hospice Attending Elected by Patient: Leandro Yang MD 
Primary Care Physician: Lynette Sotomayor MD 
Admitting RN: Adriana Jones RN : Sergio Newell Level of Care (GIP/Routine/Respite): GIP Facility of Care: [facility] Patient Room: 16/01 HOSPICE SUMMARY  
ER Visits/ Hospitalizations in past year: Patient has had 5 Hospitalizations Hospice Diagnosis: Metastatic Colon Cancer Onset Date of Hospice Diagnosis:  
Summary of Disease Progression Leading to Hospice Diagnosis: Palliative Care Initial Inpatient Note - 4/20/2021:  60 yo F with medical history 
significant for Peritoneal carcinomatosis ISO colon cancer s/p ex-lap, g-tube placement and creation of loop ileostomy for MBO 3/2/21, HTN, T2DM who presented with acute onset of altered mental status found to have metabolic alkalosis ISO increased ostomy output and poorly healed/ dehisced wounds. Per surgical oncology team, they have no further 
surgical options to offer. I contact pt's oncologist, Dr Tomasa Wahl, and she indicates she does not have any treatment 
options for her, either, and that home hospice enrollment would be very appropriate (especially considering pt was too 
weak/sick to get treatment when she last saw her and she has gotten worse since then by all by all accounts). Author: Autumn Lancaster NP, Consuelo Roger, Palliative care [facility] Diagnoses RELATED to the terminal prognosis:  
Sepsis, AMS, multiple wounds Other Diagnoses:  
Patient Active Problem List  
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  
 Posterior vitreous detachment of right eye H43.811  Stable proliferative diabetic retinopathy of both eyes associated with type 2 diabetes mellitus ([address] C14.6449  Diabetic peripheral neuropathy (HCC) E11.42  
 Hypoglycemia E16.2  SBO (small bowel obstruction) ([facility] [address] K56.609  Abdominal pain R10.9  Bilateral pulmonary embolism (HCC) I26.99  
 Colitis K52.9  Aortic atherosclerosis (HCC) I70.0  Peritoneal carcinomatosis ([facility] [address] C78.6  Severe sepsis (HCC) A41.9, R65.20  Disorientation R41.0  Stage 3b chronic kidney disease (HCC) N18.32  
 Sacral wound S31.000A  Hypokalemia E87.6  Shortness of breath R06.02  
 Prolonged Q-T interval on ECG R94.31 Rationale for a prognosis of life expectancy of 6 months or less if the disease follows its normal course (Disease Specific History): Radha Sandoval is a 61 y.o. who was admitted to [facility]. The patient's principle diagnosis of Metastatic Colon Cancer has resulted in increased pain, multiple non-healing wounds and dyspnea. Functionally, the patient's Palliative Performance Scale has declined over a period of AMS with 2 weeks and is estimated at 20%. Objective information that support this patients limited prognosis includes: HGB 8.5  Low   11.5 - 16.0 g/dL Final  
HCT 27.6  Low Albumin 2.1 The patient/family chose comfort measures with the support of Hospice. Patient meets for GIP LOC as evidenced by Increased pain and agitation, need for extensive wound care and frequent nursing assessments and IV medications for symptom management.  
Prognosis estimated based on 04/24/21 clinical assessment is:  
[] Few to Many Hours [] Hours to Days  
[] Few to Many Days [x] Days to Weeks  
[] Few to Many Weeks  
[] Weeks to Months  
[] Few to Many Months ASSESSMENT Patient self-reports:  []  Yes    [x] No 
 
SYMPTOMS: Pain and agitation ? SIGNS/PHYSICAL FINDINGS: Increased confusion and sleeping, decrease in po intake, sips to bites increased pain with periods of agiation FAST for all dementia:   
 
Learning Assessment: 
Patient Is patient willing/able to learn? Unable to assess due to confusion What is the highest level of education completed? Unkown Learning preference (written material, demonstration, visual)? Unable to assess due to confusion Learning barriers (ESOL, [place], poor vision)? Unable to assess Caregiver Is caregiver willing to learn care for patient? Unknown What is the highest level of education completed? Unknown Learning preference (written material, demonstration, visual)? Unknown Learning barriers (ESOL, [place], poor vision)? Unknown CLINICAL INFORMATION Wt Readings from Last 3 Encounters:  
03/28/21 75 kg (165 lb 5.5 oz) 02/05/21 77.6 kg (171 lb)  
01/29/21 77.6 kg (171 lb) Ht Readings from Last 3 Encounters:  
03/24/21 5' 2\" (1.575 m) 02/05/21 5' 2\" (1.575 m) 01/29/21 5' 2\" (1.575 m) There is no height or weight on file to calculate BMI. Visit Vitals BP (!) 114/52 (BP 1 Location: Left arm, BP Patient Position: Supine) Pulse 79 Temp 96.8 °F (36 °C) Resp 12 SpO2 (!) 87% LAB VALUES No results found for this visit on 04/23/21 (from the past 12 hour(s)). No results found for this visit on 04/23/21 (from the past 6 hour(s)). Lab Results Component Value Date/Time Protein, total 5.6 (L) 03/28/2021 04:49 AM  
 Albumin 2.1 (L) 03/28/2021 04:49 AM  
 
 
Currently this patient has: 
[] Supplemental O2 [] Peripheral IV  [x] PICC    [] PORT [x] Ramirez Catheter [] NG Tube   [] PEG Tube [x] Ostomy   
[] AICD: Has ICD been deactivated?   [] Yes [x] No:______ PLAN 1. Admit to GIP level of care for Pain and agitation 2. Dilaudid 0.5 mg IV q 15 minutes prn pain, lorazepam 0.5 mg q 15 minutes prn anxiety, haldol 2 mg IV q 4 hours prn agitation, N&V 3. PRN medications in place for breakthrough symptom management: 
Dilaudid 0.5 mg IV q 15 minutes prn pain, lorazepam 0.5 mg q 15 minutes prn anxiety, haldol 2 mg IV q 4 hours prn agitation, N&V 4. PRN medications in place for breakthrough symptom management 5. Infection control and prevention as needed Ramirez care per facility protocol  For infection prevention, provide wound care daily and prn to promote comfort 6. Provide support/education to caregiver/family: Daughter - Carmen Rios 7. Monitor closely for changes in symptoms 8. Provide support and frequent rounds for patient comfort and safety 9. Maintain skin integrity as tolerated for hospice patient, turning and repositioning for comfort 10. Provide  and  for patient and family support 11. Continue to discuss discharge plan for any changes Hospice Team Frequency Orders: 
Skilled Nurse - Daily x 14 days with 5 PRN visits for symptom control. MSW  1 x weekly with 5 visits PRN family support and need for volunteer services.   1 x weekly with 5 visits PRN spiritual support. CNA  daily x 14 days ADVANCE CARE PLANNING (Complete in ACP Flow Sheet) Code Status: DNR Durable DNR: [x]  Yes  []  No 
Code Status Discussed/Confirmed: Yes Preference for Other Life Sustaining Treatment Discussed/Confirmed: Yes Hospitalization Preference:  Yes Advance Care Planning 12/10/2017 Patient's Healthcare Decision Maker is: Legal Next of Kin Secondary Decision Maker Name Venancio Ziegler Secondary Decision Maker Phone Number [phone] Secondary Decision Maker Relationship to Patient Adult child Confirm Advance Directive None  Service: [] Yes  []  No      [x] Unknown Appropriate for Pinning Ceremony:  [] Yes     [x] No 
Christianity: [address] [facility]  Home: [place] DISCHARGE PLANNING 1. Discharge Plan: Home with hospice if symptom management achieved 2. Patient/Family teaching: No family present at time of admission 3. Response to patient/family teaching: No family present at time of admission SOCIAL/EMOTIONAL/SPIRITUAL NEEDS Spiritual Issues Identified: None Psych/ Social/ Emotional Issues Identified:None Caregiver Support: 
[x] Provided information on End of Life Care  
[] Material Provided: Gone From My Sight or [facility]  
 
CARE COORDINATION Dr. Filippo Smith contacted, discharge to hospice order received Dr. Filippo Smith contacted, agrees to serve as attending provider for hospice and provided verbal certification of terminal illness with life expectancy of 6 months or less. Orders for hospice admission, medications and plan of treatment received. Medication reconciliation completed. MEDS: See medication list below DME: Per [facility] Supplies: Per [facility] IDT communication to include MD, SN, SW, CH and support team 
 
ALLERGIES AND MEDICATIONS Allergies: Allergies Allergen Reactions  Keflex [Cephalexin] Hives  Pcn [Penicillins] Hives  Tanzeum [Albiglutide] Nausea Only  Vioxx [Rofecoxib] Nausea Only Current Facility-Administered Medications Medication Dose Route Frequency  bisacodyL (DULCOLAX) suppository 10 mg  10 mg Rectal DAILY PRN  
 acetaminophen (TYLENOL) suppository 650 mg  650 mg Rectal Q6H PRN  
 glycopyrrolate (PF) (ROBINUL) injection 0.2 mg  0.2 mg IntraVENous Q4H PRN  
 HYDROmorphone (DILAUDID) injection 0.5 mg  0.5 mg IntraVENous Q15MIN PRN  
 LORazepam (ATIVAN) injection 0.5 mg  0.5 mg IntraVENous Q15MIN PRN  
 haloperidol lactate (HALDOL) injection 2 mg  2 mg IntraVENous Q4H PRN

## 2021-04-24 NOTE — PROGRESS NOTES
0700 Bedside, Verbal and Written shift change report given to Chase Jules RN  (oncoming nurse) by Patti Rendon (offgoing nurse). Report included the following information SBAR, Kardex, Intake/Output and MAR  
0728 patient lying in bed with eyes closed. No signs of distress noted at this time. Will continue to monitor. 0825 patient lying in bed with eyes closed. No signs of distress noted. Will continue to monitor. 56 daughter at bedside. Patient open eyes to verbal/tactile stimuli. 1005 Patient states sacrum was hurting. Patient repositioned for comfort. . Ostomy bag emptied. 175ml output. Informed patient of PRN medication for pain/aggitation. Instructed patient to notify nurse if needs arise. Daughter at bedside. 1100 patient ostomy leaking. Abdominal dressings soiled. Wet to dry dressing changed. New ostomy dressing placed. Sacral dressing soiled. Patient cleaned new dressing applied. PRN pain meds administered as patient noted with facial grimacing during dressing change. Patient c/o sacral pain. Patient repositioned for comfort. Will continue to monitor. 1142 patient resting in bed with eyes closed. No signs of distress noted. Will continue to monitor. Patients daughter Chris Valera can be reached at [phone].  
[phone] patient lying in bed with eyes closed. No signs of distress noted. Will continue to monitor. 1400 Patient noted with periods of apnea. Audible secretions noted. No s/s of distress noted. Will continue to monitor. 1455 patient repositioned for comfort. Patient states \"theres nothing you can do for me\" when patient asked to verbalize needs. Ostomy bag emptied. Will continue to monitor. 1 Dr. Claudia Dey rounding on patient. 0 patients son in law at bedside. Update provided. 1700 patient daughter at bedside. Update provided. Daughter will call for needs. 1830 daughter at bedside. NAME OF PATIENT:  Janki Live LEVEL OF CARE:  GIP 
 
REASON FOR GIP:  
Pain, despite numerous changes in medications and Terminal agitation, despite changes to medications *PATIENT REMAINS ELIGIBLE FOR GIP LEVEL OF CARE AS EVIDENCED BY: (MUST BE ADDRESSED OF PATIENT GIP) O2 SAFETY: 
n/a 
 
FALL INTERVENTIONS PROVIDED:  
Implemented/recommended resources for alarm system (personal alarm, bed alarm, call bell, etc.)  and Implemented/recommended environmental changes (remove hazards, lower bed, improve lighting, etc.) INTERDISPLINARY COMMUNICATION/COLLABORATION: 
Physician, MSW, [place] and RN, CNA 
 
NEW MEDICATION INITIATION DOCUMENTATION: 
n/a Reason medication is being initiated:  n/a 
 
MD / Provider name consulted re: change in status / initiation of new medication:  n/a New Symptom(s):  n/a New Order(s):  n/a Name of the person notified of the changes:  n/a Name of person being taught:  n/a Instructions given:  n/a Side Effects taught:  n/a Response to teaching:  n/a 
 
 
COMFORTABLE DYING MEASURE: 
Is Patient/family satisfied with symptom level?  yes DISCHARGE PLAN:  EOL.

## 2021-04-25 NOTE — PROGRESS NOTES
Problem: Falls - Risk of 
Goal: *Absence of Falls Description: Document Isaac Oro Fall Risk and appropriate interventions in the flowsheet. Outcome: Progressing Towards Goal 
Note: Fall Risk Interventions: 
  
 
Mentation Interventions: Adequate sleep, hydration, pain control, Bed/chair exit alarm, Door open when patient unattended, Family/sitter at bedside, More frequent rounding, Reorient patient, Room close to nurse's station, Update white board Medication Interventions: Bed/chair exit alarm Elimination Interventions: Bed/chair exit alarm, Call light in reach Problem: Pressure Injury - Risk of 
Goal: *Prevention of pressure injury Description: Document Leo Scale and appropriate interventions in the flowsheet. Outcome: Progressing Towards Goal 
Note: Pressure Injury Interventions: 
Sensory Interventions: Assess changes in LOC, Assess need for specialty bed, Avoid rigorous massage over bony prominences, Check visual cues for pain, Float heels, Keep linens dry and wrinkle-free, Maintain/enhance activity level, Minimize linen layers, Pad between skin to skin, Pressure redistribution bed/mattress (bed type), Turn and reposition approx. every two hours (pillows and wedges if needed) Moisture Interventions: Absorbent underpads, Apply protective barrier, creams and emollients, Assess need for specialty bed, Contain wound drainage, Internal/External urinary devices, Limit adult briefs, Maintain skin hydration (lotion/cream), Minimize layers, Moisture barrier Activity Interventions: Pressure redistribution bed/mattress(bed type) Mobility Interventions: Float heels, HOB 30 degrees or less, Pressure redistribution bed/mattress (bed type), Turn and reposition approx. every two hours(pillow and wedges) Nutrition Interventions: Document food/fluid/supplement intake Friction and Shear Interventions: Apply protective barrier, creams and emollients, HOB 30 degrees or less, Lift sheet, Minimize layers

## 2021-04-25 NOTE — HOSPICE
NAME OF PATIENT:  Quentin Michel LEVEL OF CARE:  [facility] 
 
REASON FOR GIP:  
Pain, despite numerous changes in medications, Terminal agitation, despite changes to medications, Medication adjustment that must be monitored 24/7 and Stabilizing treatment that cannot take place at home *PATIENT REMAINS ELIGIBLE FOR [facility] LEVEL OF CARE AS EVIDENCED BY: (MUST BE ADDRESSED OF PATIENT GIP) REASON FOR RESPITE: 
NA 
 
O2 SAFETY: 
NA 
 
FALL INTERVENTIONS PROVIDED:  
Implemented/recommended use of non-skid footwear, Implemented/recommended use of fall risk identification flag to all team members, Implemented/recommended assistive devices and encouraged their use, Implemented/recommended resources for alarm system (personal alarm, bed alarm, call bell, etc.) , Implemented/recommended environmental changes (remove hazards, lower bed, improve lighting, etc.) and Implemented/recommended increased supervision/assistance INTERDISPLINARY COMMUNICATION/COLLABORATION: 
Physician, MSW, [place] and RN, CNA 
 
NEW MEDICATION INITIATION DOCUMENTATION: 
NA 
 
Reason medication is being initiated:  DIXIE 
 
MD / Provider name consulted re: change in status / initiation of new medication:  NA New Symptom(s):  NA New Order(s):  NA 
 
Name of the person notified of the changes:  NA 
 
Name of person being taught:  NA Instructions given:  NA Side Effects taught:  NA 
 
Response to teaching:  NA 
 
 
COMFORTABLE DYING MEASURE: 
Is Patient/family satisfied with symptom level?  yes DISCHARGE PLAN:  Return home with family when symptoms can be managed in the home,followed by Home Hospice. 19:00 Shift report received from [id number] 
19:30 Patient lying in bed alert with confusion, denies pain or discomfort. Lung sounds diminished, respirations even and unlabored. Multiple wounds, dressings dry and intact. Ramirez draining lucia cloudy urine, ileostomy draining liquid brown stool. Daughter remains at bedside.  
20:30 Patient lying in bed watching TV, denies pain or discomfort, requested a drink of water and blanket. Will cont to monitor. 21:30 Patient lying in bed eyes closed, no signs of pain or distress noted, appears comfortable. 22:30 Patient turnedand resposition for comfort, denies pain or discomfort, resting comfortably. 23:30 Patient awake watching TV, no c/o pain or discomfort, requesting a drink of water,appears comfortable. Will cont to monitor. 22:30 Patient resting quietly, no facial grimacing or frowning noted, appears comfortable. 23:30 Patient lying in bed eyes closed, no signs and symptoms of pain or agitation noted, resting quietly. 00:45 Patient turned and repositioned for comfort, restless, confused and agitated, pulling on foss catheter and ileostomy, states \" I'm ready to get out of here and go to my house\" unable to redirect, c/o pain in lower back. Medicated with PRN Lorazepam and Dilaudid to manage symptoms. Will cont to monitor. 02:00 Patient lying in bed eyes closed, no facial grimacing, moaning or agitation noted,medications effective. 03:00 Patient resting quietly, no signs of pain or discomfort noted, appears comfortable. 04:00 Patient resting quietly,no signs of pain or distress noted, medications managing symptoms. 05:00 Patient turned and repositioned for comfort, no signs of pain or agitation noted, pt drowsy answers questions, denies pain or discomfort. 06:00 Patient lying in bed eyes closed appears to be sleeping, 300 ml liquid stool from ostomy, dressings dry and intact,resting quietly. 07:00 Shift report given to oncoming Nurse.

## 2021-04-25 NOTE — HOSPICE
0700-Received report from [facility] and assumed care of pt. Pt is GIP LOC with primary diagnosis of Metastatic Colon Cancer. 0730-RN assessment completed. Pt drowsy, but responsive to care, able to verbalize. Pt annoyed this morning with care, reassured pt and given therapeutic communication. Pt  Has multiple mepilex coverings on entire body. Sacral mepilex changed and wound packed, wet to dry with foam dressing on top. Left abdominal/flank wound cleansed, packed wet to dry, and foam/padded dressing applied on top. Wet pads removed from under skin and replaced. Mepilex on right heel replaced, other dressings dry and intact. 0800-Pt tolerated care but had some moaning and grimacing. PRN Dilaudid and Ativan given for visual symptoms of pain and agitation. Pt stiff and UE drawn inward. Pt repositioned in bed and covered with blankets. Pt asked for some water, pt given water and was able to use straw. 0845-Pt resting in bed, eyes closed. Body relaxed, neutral facial expression. IV medications effective at this time. 0915-Pt daughter Gretel King at bedside,updated on night care and current status. Daughter very sweet and appreciative. DTG stated some family would be visiting today and pt may become agitated with some. Advised DTG we would closely monitor for symptoms. Pt resting in bed, body relaxed. 1000-Pt resting in bed, no distress noted. Pt calm and relaxed, eyes closed. Daughter at bedside. 1100-Pt resting in bed, daughter remains at bedside. 1200-Pt niece at bedside, brought pt a drink from [employer]. Pt drowsy, stated she had no pain or discomfort. 1300-Pt resting in bed. Offered to turn and reposition pt, but she refused at this time stating she was comfortable. 1400-Pt very drowsy, confused. Pt has 2 women visitors at this time. Pt stated she needed to go to the bathroom. Educated pt on her foss and colostomy. Pt appeared confused and a little embarrassed in front of her company. Reassured pt and visitors. 1500-Pt lethargic, but will open eyes and answer basic questions. Dr. Luz Triplett examined pt at bedside, no changes at this time. 1600-Pt resting, eyes closed and family at bedside. Pt opened eyes and complained of pain on backside. PRN Dilaudid given via PICC. 1630-Pt resting, stated no to pain. Medications effective. 1700-Pt repositioned to left side. Right abdomen site leaking, dressing changed, site packed. Pt tolerated care. Ostomy bag and urine bag emptied. 1800-Pt with eyes closed, opened them briefly. 1900-report given to oncoming RN. NAME OF PATIENT:  Sylvia Delvalle LEVEL OF CARE:  GIP 
 
REASON FOR GIP:  
Terminal agitation, despite changes to medications, Medication adjustment that must be monitored 24/7 and Stabilizing treatment that cannot take place at home *PATIENT REMAINS ELIGIBLE FOR GIP LEVEL OF CARE AS EVIDENCED BY: Pt is requiring frequent nursing assessments for symptom management and wound care that cannot be managed in the home setting at this time. REASON FOR RESPITE: 
NA 
 
O2 SAFETY: 
NA 
 
FALL INTERVENTIONS PROVIDED:  
Implemented/recommended use of non-skid footwear, Implemented/recommended resources for alarm system (personal alarm, bed alarm, call bell, etc.) , Implemented/recommended environmental changes (remove hazards, lower bed, improve lighting, etc.) and Implemented/recommended increased supervision/assistance INTERDISPLINARY COMMUNICATION/COLLABORATION: 
Physician, MSW, [place] and RN, CNA 
 
NEW MEDICATION INITIATION DOCUMENTATION: 
Documentation completed in Clinical Note in [address] [facility] Reason medication is being initiated:  No new medication at this time MD / Provider name consulted re: change in status / initiation of new medication:  Dr. Cong Cool New Symptom(s):  Continued agitation and pain New Order(s):  No new orders at this time Name of the person notified of the changes:   Isaias Linares Name of person being taught:  Dr. Isaias Linares spoke with daughter Ankita Leary over the phone Instructions given:  NA Side Effects taught:  NA 
 
Response to teaching:  Acceptance COMFORTABLE DYING MEASURE: 
Is Patient/family satisfied with symptom level? Yes DISCHARGE PLAN:  Pt will remain at [facility] under GIP status until symptoms are controlled or pt expires.   If pt is able to be managed at home, pt will be discharge home under the care of her daughter Ankita Leary and home hospice team.

## 2021-04-25 NOTE — PROGRESS NOTES
Problem: Pressure Injury - Risk of 
Goal: *Prevention of pressure injury Description: Document Leo Scale and appropriate interventions in the flowsheet. Outcome: Progressing Towards Goal 
Note: Pressure Injury Interventions: 
Sensory Interventions: Assess changes in LOC, Avoid rigorous massage over bony prominences, Check visual cues for pain, Float heels, Keep linens dry and wrinkle-free, Minimize linen layers Moisture Interventions: Absorbent underpads, Apply protective barrier, creams and emollients, Contain wound drainage, Internal/External fecal devices, Internal/External urinary devices, Minimize layers Activity Interventions: Pressure redistribution bed/mattress(bed type) Mobility Interventions: Float heels, HOB 30 degrees or less, Pressure redistribution bed/mattress (bed type) Nutrition Interventions: Document food/fluid/supplement intake Friction and Shear Interventions: Apply protective barrier, creams and emollients, Foam dressings/transparent film/skin sealants, Minimize layers, Transferring/repositioning devices Problem: Dyspnea Due to End of Life Goal: Demonstrate understanding of and ability to manage respiratory symptoms at end of life Outcome: Progressing Towards Goal

## 2021-04-25 NOTE — HOSPICE
NAME OF PATIENT:  Estevan Minor LEVEL OF CARE: GIP 
 
REASON FOR GIP:  
Pain, despite numerous changes in medications, Nausea and vomiting, despite changes to medications, Terminal agitation, despite changes to medications, Medication adjustment that must be monitored 24/7 and Stabilizing treatment that cannot take place at home *PATIENT REMAINS ELIGIBLE FOR GIP LEVEL OF CARE AS EVIDENCED BY: (MUST BE ADDRESSED OF PATIENT GIP) REASON FOR RESPITE: 
NA 
 
O2 SAFETY: 
NA 
 
FALL INTERVENTIONS PROVIDED:  
Implemented/recommended use of non-skid footwear, Implemented/recommended use of fall risk identification flag to all team members, Implemented/recommended assistive devices and encouraged their use, Implemented/recommended resources for alarm system (personal alarm, bed alarm, call bell, etc.) , Implemented/recommended environmental changes (remove hazards, lower bed, improve lighting, etc.) and Implemented/recommended increased supervision/assistance INTERDISPLINARY COMMUNICATION/COLLABORATION: 
Physician, MSW, [place] and RN, CNA 
 
NEW MEDICATION INITIATION DOCUMENTATION: 
NA 
 
Reason medication is being initiated:  NA 
 
MD / Provider name consulted re: change in status / initiation of new medication:NA New Symptom(s):  NA New Order(s):  NA 
 
Name of the person notified of the changes:  NA 
 
Name of person being taught:  NA Instructions given:  NA Side Effects taught:  NA 
 
Response to teaching:  NA 
 
 
COMFORTABLE DYING MEASURE: 
Is Patient/family satisfied with symptom level?  yes DISCHARGE PLAN:  Return home with family when symptoms can be managed in the home followed by Home Hospice. 19:00 Shift report received from [facility] RN 
19:30 Patient sitting up in bed, alert and oriented with confusion. Lung sounds clear, respirations unlabored, Abd wound dressing intact, ileostomy lt lower abd to drain bag with liquid brown stool.  Multiple wounds to feet,heels,back, sacrum and rai upper extremities, foam dressings intact. Ramirez cath patent draining lucia urine. Daughter at bedside. 20:30 Patient restless and agitated, turned and repositioned, resisting care pushing at staff, pulling off gown, pulling at PICC line and ileostomy. Refused to have daughter apply abd binder. Tony cont to monitor. 21:00 Patient remains agitated and restless, facial grimacing and frowning noted, medicated with PRN Dilaudid and Lorazepam for symptoms. 22:00 Patient resting quietly, no signs of pain or agitation noted, medications managing symptoms, turned and repositioned for comfort. 23:00 Patient resting no facial grimacing, frowning or agitation noted, medication effective. 00:30 Patient lying in bed eyes closed, appears to be sleeping. No signs of pain or agitation noted, appears comfortable. 01:30 Patient turned and repositioned for comfort opened eyes no restlessness or agitation noted, medication effective. 02:30 Patient sleeping soundly, no facial grimacing, frowning or agitation noted, resting quietly. 03:45 Patient turned and repositioned, mouth care provided, no signs of pain or discomfort noted, appears comfortable. 05:00 Patient turned and repositioned, mouth care provided, pulled off abdomen wound dressing, wet to dry dressing applied  refused to have abd binder applied. 06:00 Patient lying in bed alert with confusion, no facial grimacing or distress noted, appears comfortable. 500 ml of liquid brown stool from ostomy. 07:00 Shift report given to oncoming Nurse.
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
## 2021-04-26 NOTE — PROGRESS NOTES
Problem: Falls - Risk of 
Goal: *Absence of Falls Description: Document Petey Schimke Fall Risk and appropriate interventions in the flowsheet. Outcome: Progressing Towards Goal 
Note: Fall Risk Interventions: 
  
 
Mentation Interventions: Adequate sleep, hydration, pain control, Bed/chair exit alarm, Door open when patient unattended, Familiar objects from home, Family/sitter at bedside, More frequent rounding, Reorient patient, Room close to nurse's station, Update white board Medication Interventions: Bed/chair exit alarm Elimination Interventions: Bed/chair exit alarm, Call light in reach Problem: Pressure Injury - Risk of 
Goal: *Prevention of pressure injury Description: Document Leo Scale and appropriate interventions in the flowsheet. Outcome: Progressing Towards Goal 
Note: Pressure Injury Interventions: 
Sensory Interventions: Assess changes in LOC, Assess need for specialty bed, Avoid rigorous massage over bony prominences, Check visual cues for pain, Float heels, Keep linens dry and wrinkle-free, Maintain/enhance activity level, Minimize linen layers, Pressure redistribution bed/mattress (bed type), Turn and reposition approx. every two hours (pillows and wedges if needed) Moisture Interventions: Absorbent underpads, Apply protective barrier, creams and emollients, Assess need for specialty bed, Check for incontinence Q2 hours and as needed, Contain wound drainage, Internal/External urinary devices, Limit adult briefs, Maintain skin hydration (lotion/cream), Minimize layers, Moisture barrier Activity Interventions: Assess need for specialty bed, Pressure redistribution bed/mattress(bed type) Mobility Interventions: Assess need for specialty bed, Float heels, HOB 30 degrees or less, Pressure redistribution bed/mattress (bed type) Nutrition Interventions: Document food/fluid/supplement intake Friction and Shear Interventions: Apply protective barrier, creams and emollients, Foam dressings/transparent film/skin sealants, HOB 30 degrees or less, Lift sheet, Minimize layers

## 2021-04-26 NOTE — HOSPICE
0700 got report 0715 assessed patient resting reported no pain, S1, S2 heart sounds, shallow diminished respiratory, hyperactive bowel sounds. 3080 Patient resting in bed, eyes closed. 0900 Patient called out for pain medication, gave Hydromorphone 0.5 mg 
0930 Patient is resting in bed with daughter at bedside. 1030  Patient is sleeping with daughter at her bedside. 1130 Patient is awake reports no pain both of her daughters are at bedside. 1200 patient complaining of bottom hurting, repositioned and gave sublingual Dilaudid. Patient did not tolerate well she took 1 mg and refused the rest. She also stated that the positional change relieved the pain and she was not in any pain at this time. 1230 Patient stated that she is not in any pain at this time. 0130 Patient is resting with family at bedside. 1430 Patient is sitting in bed with family at bedside repositioned her but her facial expression indicates pain. She said she was in a little pain I pulled . 5 mg Dilaudid IV. Did not give Dilaudid orally because patient did not tolerate when tried to administer earlier today. 1500 Patient is sleeping with no facial grimacing, brows not furrowed. 1600 Patient resting with family at bedside. 1715 Changed padding and cleaned sacrum dressing site. Applied protective barrier cream. Reposition patient in bed and turned to the right side. 1830 Patient is sleeping with daughter at bedside she is turned on her left side. 1900 report given. NAME OF PATIENT:  Manny Shira LEVEL OF CARE:  GIP 
 
REASON FOR GIP:  
Pain, despite numerous changes in medications, Terminal agitation, despite changes to medications, Medication adjustment that must be monitored 24/7 and Stabilizing treatment that cannot take place at home *PATIENT REMAINS ELIGIBLE FOR GIP LEVEL OF CARE AS EVIDENCED BY: (MUST BE ADDRESSED OF PATIENT GIP) Attempted change medication today to sublingual patient cannot tolerate.  Changed medication back to IV. Patient needs frequent doses to control pain and agitation. SAFETY: 
 
 
FALL INTERVENTIONS PROVIDED:  
Implemented/recommended use of non-skid footwear, Implemented/recommended use of fall risk identification flag to all team members, Implemented/recommended assistive devices and encouraged their use, Implemented/recommended resources for alarm system (personal alarm, bed alarm, call bell, etc.) , Implemented/recommended environmental changes (remove hazards, lower bed, improve lighting, etc.) and Implemented/recommended increased supervision/assistance INTERDISPLINARY COMMUNICATION/COLLABORATION: 
Physician, Tyree Morrow and RN, KATHLEEN 
 
NEW MEDICATION INITIATION DOCUMENTATION: 
No new medication initiated. Reason medication is being initiated: MD / Provider name consulted re: change in status / initiation of new medication:   
New Symptom(s):   
 
New Order(s):   
 
Name of the person notified of the changes:  
 
Name of person being taught:  
 
Instructions given:   
 
Side Effects taught:   
 
Response to teaching:   
 
 
COMFORTABLE DYING MEASURE: 
Is Patient/family satisfied with symptom level?  yes DISCHARGE PLAN: Patient will remain at [facility] for end of life care if she stabilizes will return home with home hospice care.

## 2021-04-26 NOTE — HOSPICE
[facility] to Those in Need 
[phone] Social Work Admission Note Patient Name: Deepti Clement YOB: 1955 Age: 61 y.o. Date of Visit: 21 Facility of Care: [facility] Patient Room:  Hospice Attending: Dimitri Schlatter, MD 
Hospice Diagnosis: Metastatic Colon Cancer Level of Care:  
 [x]  GIP []  Respite 
 []  Routine NARRATIVE  
LCSW met with patient, niece and daughter Merritt Coulter. Niece lives with Merritt Coulter and her  and therefore has been involved with patient's care and aware of her disease and symptoms. Merritt Coulter lives in [place] but is from [place]. Patient has been living with Merritt Coulter and her family as well as other daughter, Mario Billings. Daughters have been providing care to patient however she became increasingly agitated and unable to manage at home. Patient does have LTC Medicaid and daughters had tried to get aide assistance in the home but were unsuccessful. Merritt Coulter had questions about patient's finances and if Medicaid would seek any of her money or assets. LCSW reassured her that this would not be the case and encouraged her to go ahead and pay for final arrangements. Daughter, Mario Billings is on some of patient's accounts and they plan to close them after paying for final arrangements. Patient has very little funds, but they want to insure that her  planning is taken care of. Merritt Coulter seems to be coping well at this time and is aware of patient's decline. As the medical team also documented, patient is withdrawn today and presents with a flat affect. Patient responded to questions asked, but seems irritable and does not make eye contact. LCSW let Merritt Coulter know that full team will round together tomorrow around 10am should she have further questions. Merritt Coulter confirmed that if patient stabilizes she is welcome back to her home. We discussed possible FAP for routine care if a few additional days were needed to set up home and arrange for care.   
 
ADVANCE CARE PLANNING Code Status: DNR Durable DNR: _ Yes  _ No 
Advance Care Planning 12/10/2017 Patient's Healthcare Decision Maker is: Legal Next of Kin Secondary Decision Maker Name Keira Ding Secondary Decision Maker Phone Number [phone] Secondary Decision Maker Relationship to Patient Adult child Confirm Advance Directive None Relationship Status: 
[]  Single    
[]       
[]     
[]  Domestic Partner    
[]  / 
[]  Common Law 
[]   
[x]  Unknown If in a relationship, name of partner/spouse: 
Duration of relationship: 
 
Gnosticism: [address] [facility]  Home: [address] Provided: Supportive counseling Social Work Initial Assessment Gender: 
female Race/Ethnicity: (sabrina all that apply) []  American [place] [facility] or [place] Native 
[]   
[]  Black or [place] American 
[]   or  
[]   or [place] 
[x]  Ivette Oro 
[]  Unknown 
  
 Service:   
[]  Yes  
[]  No      
[x]  Unknown Appropriate for Pinning Ceremony:  
[]  Yes     
[]  No 
Is patient using VA benefits? []  Yes     
[]  No 
  
Primary Language: English 
[]   Needed 
[]   utilized during visit Ability to express thoughts/needs/feelings 
[]  Expressed thoughts/feelings/needs without difficulty [x]  Requires extra time and cuing 
[]  Speech limited single words 
[]  Uses only gestures (eye, blinking eye or head movement/pointing) []  Unable to express thoughts/feelings/needs (speech unintelligible or inappropriate) []  Unresponsive Notes:  
  
Mental Status: 
[]  Alert-oriented to:   
 []  Person   
 []  Place   
 []  Time 
[]  Comatose-responds to:  
 []   Verbal stimuli  
 []  Tactile stimuli  
 []  Painful stimuli 
[]  Forgetful 
[]  Disoriented/Confused 
[x]  Lethargic [x]  Agitated 
[]  Other (specify):   
Notes:  
  
Patients description of Illness/Current Health Status:   
[]  Patient unable to discuss [x]  Patient unwilling to discuss 
[]  (Specify) Knowledge/Understanding of Disease Process Patient:  
 [x]  Demonstrates knowledge/understanding of disease process [x]  Demonstrates knowledge/understanding of treatment plan 
 []  Demonstrates knowledge/understanding of prognosis []  Demonstrates acceptance of prognosis []  Demonstrates knowledge/understanding of resuscitation status 
 []  Other (specify) Caregiver: 
 [x]  Demonstrates knowledge/understanding of disease process [x]  Demonstrates knowledge/understanding of treatment plan 
 [x]  Demonstrates knowledge/understanding of prognosis [x]  Demonstrates acceptance of prognosis []  Demonstrates knowledge/understanding of resuscitation status 
 []  Other (specify) Notes:  
  
Patients living arrangement/care setting: 
Use the PRIOR COLUMN when the PATIENTS current health status necessitated a change in his/her primary residence. Prior Current Response  
           []             []    Patients own home/residence []             []    Home of family member/friend []             []    Boarding home  
           []             []    Assisted living facility/care home center [x]             []    Hospital/Acute care facility []             []    Skilled nursing facility []             []    Long term care facility/Nursing home  
           []             [x]    Hospice in Patient Primary Caregiver: 
[]  No Primary Caregiver Name of Primary Caregiver: Kendra Carreon Relationship or Primary Caregiver:  
 []  Spouse/Significant other     
 [x]  Natural Child      
 []  Step child     
 []  Sibling 
 []  Parent 
 []  Friend/Neighbor 
 []  Community/Mandaen Volunteer 
 []  Paid help 
 []  Other (specify):___________ Notes:   
  
Family members/Significant others: 
Name: Dean Virgen Relationship: daughter Phone Number: [phone] Actively involved in care?   [x]  Yes []  No 
 
Name: 
Relationship: 
Phone Number: Actively involved in care? []  Yes  []  No 
 
Name: 
Relationship: 
Phone Number: Actively involved in care? []  Yes  []  No 
 
Social support systems: (select ONE best description) [x]  Excellent social support system which includes three or more family members or friends 
[]  Good social support system which includes two or less members or friends 
[]  [address] support which includes one family member or friend 
[]  Poor social support; no family members or friends; basically ALONE Notes:  
  
Emotional Status: (sabrina all that apply) Patient Caregiver Response  
              []                [x]    Mood/Affect stable and appropriate    
              []                []    Angry  
              []                []    Anxious []                []    Apprehensive []                []    Avoidant  
              []                []    Clinging  
              []                []    Depressed  
              []                []    Distraught  
              []                []    Elated []                []    Euphoric  
              []                []    Fearful  
              [x]                []    Flat Affect  
              []                []    Helpless []                []    Hostile []                []    Impulsive []                []    Irritable  
              []                []    Labile  
              []                []    Manic  
              []                []    Restlessness []                []    Sad  
              []                []    Suspicious []                []    Tearful  
              [x]                []    Withdrawn Notes:  
 
Coping Skills (strengths/weakness):  
 Patient: Coping Skills (strength/weakness):  
 Family/caregiver (strength/weakness): 
  
[place] of care (sabrina all that apply):    
[x]  No burden evident  
[]  Family must administer medications  
[]  Illness causing financial strain  
[]  Family/Support feels overwhelmed  
[]  Family/Support sleep disturbed with patients care  
[]  Patients care causes extra physical stress  of death 
[]  Illness causes changes in family lifestyle 
[]  Illness impacting family/support employment 
[]  Family experiencing increased time demands 
[]  Patients behavior endangers family 
[]  Denial of patients illness 
[]  Concern over outcome of illness/fear 
[]  Patients behavior embarrassing to family Notes:  
  
Risk Factors: (sabrina all that apply):   
[x]  No burden evident  
[]  Alcohol abuse 
[]  Financial resources inadequate to meet basic needs (food/house/etc) []  Financial resources inadequate to meet health care needs (supplies/equipment/medications) 
[]  Food/nutrition resources inadequate 
[]  Home environment unsafe/inadequate for home care 
[]  Homicidal risk 
[]  Lives alone or without concerned relatives 
[]  Multiple medications/complex schedule 
[]  Physical limitations increase likelihood of falls 
[]  Plan of care/treatments complicated 
[]  Substance use/abuse 
[]  Suicidal risk 
[]  Visual impairment threatens safety/ability to perform self-care 
[]  Other (specify): 
  
Abuse/Neglect (actual/potential risks): 
[]  No signs of abuse/neglect 
[]  History of abuse/neglect                 []  [id number]          []  Sexual 
[]  History of domestic violence 
[]  Lacks adequate physical care 
[]  Lacks emotional nurturing/support 
[]  Lacks appropriate stimulation/cognitive experiences 
[]  Left alone inappropriately 
[]  Lacks necessary supervision 
[]  Inadequate or delayed medical care 
[]  Unsafe environment (i.e guns/drug use/history of violence in the home/etc.) []  Bruising or other physical signs of injury present 
[]  Other (specify): 
Notes:  
[]  Refer to child/adult protective services Current Sources of Stress (in Addition to Current Illness):  
[x]  None reported 
[]  Bills/Debt   
[]  Career/Job change   
[]   (short term)   
[]   (long term)   
[]  Death of a child (recent)   
[]  Death of a parent (recent)  
[]  Death of a spouse (recent)  
[]  Employment status changed  
[]  Family discord   
[]  Financial loss/Inadequate inther (specify):come 
[]  Job loss 
[]  Legal issues unresolved 
[]  Lifestyle change 
[]  Marital discord 
[]  Marriage within the last year 
[]  Paperwork (insurance/legal/etc) overwhelming 
[]  Separation/Divorce 
[]  Other (specify): 
Notes:  
  
Current Community Resources Being Utilized 1. [facility] care Interventions/Plan of Care 1. Assess social and emotional factors related to coping with end of life issues 2. Community resource planning/referral  
3. Relocation to different care setting if/when symptoms stabilize Discharge Planning 1. Should patient stabilize, will dc home with daughters and aide MSW Assessment Completed by: Aggie Flores 04/26/21 Time In: 100 Time Out: 125

## 2021-04-26 NOTE — PROGRESS NOTES
Problem: Falls - Risk of 
Goal: *Absence of Falls Description: Document Wayne Gonzalez Fall Risk and appropriate interventions in the flowsheet. Outcome: Progressing Towards Goal 
Note: Fall Risk Interventions: 
  
 
Mentation Interventions: Adequate sleep, hydration, pain control Medication Interventions: Bed/chair exit alarm Elimination Interventions: Bed/chair exit alarm Problem: Patient Education: Go to Patient Education Activity Goal: Patient/Family Education Outcome: Progressing Towards Goal 
  
Problem: Pressure Injury - Risk of 
Goal: *Prevention of pressure injury Description: Document Leo Scale and appropriate interventions in the flowsheet. Outcome: Progressing Towards Goal 
Note: Pressure Injury Interventions: 
Sensory Interventions: Assess changes in LOC Moisture Interventions: Absorbent underpads Activity Interventions: Assess need for specialty bed Mobility Interventions: Assess need for specialty bed Nutrition Interventions: Document food/fluid/supplement intake Friction and Shear Interventions: Apply protective barrier, creams and emollients Problem: Patient Education: Go to Patient Education Activity Goal: Patient/Family Education Outcome: Progressing Towards Goal 
  
Problem: Dyspnea Due to End of Life Goal: Demonstrate understanding of and ability to manage respiratory symptoms at end of life Outcome: Progressing Towards Goal 
  
Problem: Imminent death Goal: Collaborate with patient/family/caregiver/interdisciplinary team to minimize and manage end of life symptoms Outcome: Progressing Towards Goal

## 2021-04-27 NOTE — HOSPICE
0700 Got report from night nurse. [id number] Patient is sleeping supine in bed, irregular heartbeat, diminished lung sounds. 5975 Patient sleeping. 0830 Patient is awake seems to be in pain and is agitated gave 0.5 mg of Dilaudid, and 0.5 mg of Ativan. 0930 Patient is sleeping with daughter at bedside patient is showing no signs of pain. 1030 Patient sleeping with daughter at bedside, no evidence of pain shallow respirations. 1130 Patient sleeping with daughter at bedside turned to left. 1230 Patient sleeping daughter by the bedside. 1322 Patient sleeping. 1400 patient awake denied pain, administered scheduled doses of morphine and ativan. Repositioned in bed. 
1500 patient resting daugther and son in law at bedside. Patient stated she was in no pain. 1600 patient sleeping with family by the bedside, no facial grimacing or evidence of pain. 1700 patient sleeping in bed. 
1800 patient is sleeping with family at bedside scheduled medications given. 1900 Report given NAME OF PATIENT:  Estevan Minor LEVEL OF CARE:  GIP 
 
REASON FOR GIP:  
Pain, despite numerous changes in medications, Terminal agitation, despite changes to medications, Medication adjustment that must be monitored 24/7 and Stabilizing treatment that cannot take place at home *PATIENT REMAINS ELIGIBLE FOR GIP LEVEL OF CARE AS EVIDENCED BY: (MUST BE ADDRESSED OF PATIENT GIP) continues terminal agitation despite medications, and pain control by IV medications. FALL INTERVENTIONS PROVIDED:  
Implemented/recommended use of non-skid footwear, Implemented/recommended use of fall risk identification flag to all team members, Implemented/recommended resources for alarm system (personal alarm, bed alarm, call bell, etc.) , Implemented/recommended environmental changes (remove hazards, lower bed, improve lighting, etc.) and Implemented/recommended increased supervision/assistance INTERDISPLINARY COMMUNICATION/COLLABORATION: Physician, Rocco Mueller and RN, CNA COMFORTABLE DYING MEASURE: 
Is Patient/family satisfied with symptom level?  yes DISCHARGE PLAN:  Patient will remain at [facility] until she passes.

## 2021-04-27 NOTE — HOSPICE
Initial spiritual care visit with the patient, daughter and son-in-law. The patient was lying in bed sleeping and did not respond to the  . The patient's daughter completed the spiritual assessment. The patient is Alevism. She has not been connected with a Moravian in a long time . The daughter asked end-of-life questions and asked about prayer for the patient when she dies. The  assured her of 24/7  support and also offered a prayer with the patient, daughter and son-in-law. No other spiritual needs were expressed. The daughter would like the  to continue visiting for spiritual support and prayer as the patient reaches the end of her life.

## 2021-04-27 NOTE — PROGRESS NOTES
5926  Pt medicated with scheduled Iv meds. Pt wanting to go home. Rn explained to her that she was not able to tolerate Sl meds yest and needed IV to help keep her comfortable. Affect flat. No needs at this time. Niece at the bedside.
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
## 2021-04-27 NOTE — [facility] IDG CHAPLAIN NOTES
Patient: Travon Mondragon    Date: 04/27/21  Time: 12:15 PM    [facility]  Notes  Completed IDG rounds with the RN, Clinical Manager, Medical Director and LCSW. The patient was lying in bed and did not respond to the  . The patient's daughter and son-in-law were present for the visit. Interventions: Completed spiritual assessment, offered grief support. Offered prayer.     Goal for next week:  Continue to provide pastoral presence and prayer for support and comfortm    Signed by: Robin Leslie

## 2021-04-27 NOTE — HOSPICE
1900:  Report received from [place], [address]:  Check on pt. Denied any problems. Visitor at bedside. 2015:  Kumar Hair RN reported that pt choking  on a [place] oliver, but was able to get it down with applesauce. 2100:  Pt assessed. Sitting straight up in bed. Declined to turn. Denied pain. 2200:  Bandages, ostomy leaking. Appliance changed and dressing changed around peg tube and abdomen. Continues to deny pain. Repositioned. Dressing intact to sacrum. 2300:  Watching TV. Denies pain. 0000: Watching TV. Denies pain. 0100: Watching TV. Denies pain. 0200: Watching TV. Denies pain. 0259:  Pt had legs out of bed and turned sideway. Had foss cath tubing in hand and pulling. Took much effort to pry hand off of tube. Hitting and kicking at nurses. Said she had to get up and go to the bathroom. Ripped off gown. When asked if in pain said \" yeah. \"  PRN doses of 0.5mg of Dilaudid and 0.5mg of lorazepam given SL. Able to reposition pt after meds started to work. 5193:  Pt calmer, but when asked if still anxious said, \"Yeah. \"  Lorazepam 0.5mg given IV. 7415:  Resting with eyes closed. Snoring. Med effective. 0515:  Continues to rest with eyes closed. Meds effective, 
0625:  Repositioned. Briefly opened eyes. Nonverbal.  Blank stare. 9702:  Report given to oncoming nurse. NAME OF PATIENT:  Noa Rodriguez LEVEL OF CARE:  [facility] 
 
REASON FOR GIP:  
Pain, despite numerous changes in medications, Terminal agitation, despite changes to medications, Medication adjustment that must be monitored 24/7 and Stabilizing treatment that cannot take place at home *PATIENT REMAINS ELIGIBLE FOR [facility] LEVEL OF CARE AS EVIDENCED BY:  Requiring IV meds PRN for symptom management. Requires frequent SN assessment and treatment of symptoms REASON FOR RESPITE: N/A 
 
 
O2 SAFETY: N/A 
 
FALL INTERVENTIONS PROVIDED:  
Implemented/recommended resources for alarm system (personal alarm, bed alarm, call benny, etc.) INTERDISPLINARY COMMUNICATION/COLLABORATION: 
Physician, MSW, [place] and RN, CNA 
 
NEW MEDICATION INITIATION DOCUMENTATION: N/A Reason medication is being initiated:  N/A 
 
MD / Provider name consulted re: change in status / initiation of new medication:  N/A New Symptom(s):  N/A New Order(s):  N/A Name of the person notified of the changes:  N/A Name of person being taught:  N/A Instructions given:  N/A Side Effects taught:  N/A Response to teaching:  N/A 
 
 
COMFORTABLE DYING MEASURE: 
Is Patient/family satisfied with symptom level?  yes DISCHARGE PLAN:  Home with home hospice if symptoms stabilize

## 2021-04-27 NOTE — PROGRESS NOTES
Problem: Falls - Risk of 
Goal: *Absence of Falls Description: Document Nicolette Warren Fall Risk and appropriate interventions in the flowsheet. Outcome: Progressing Towards Goal 
Note: Fall Risk Interventions: 
  
 
Mentation Interventions: Adequate sleep, hydration, pain control, Bed/chair exit alarm, Door open when patient unattended Medication Interventions: Bed/chair exit alarm Elimination Interventions: Bed/chair exit alarm, Call light in reach Problem: Pressure Injury - Risk of 
Goal: *Prevention of pressure injury Description: Document Leo Scale and appropriate interventions in the flowsheet. Outcome: Progressing Towards Goal 
Note: Pressure Injury Interventions: 
Sensory Interventions: Assess changes in LOC, Keep linens dry and wrinkle-free, Minimize linen layers Moisture Interventions: Absorbent underpads, Apply protective barrier, creams and emollients, Internal/External fecal devices, Internal/External urinary devices Activity Interventions: Pressure redistribution bed/mattress(bed type) Mobility Interventions: Float heels, Pressure redistribution bed/mattress (bed type) Nutrition Interventions: Document food/fluid/supplement intake Friction and Shear Interventions: Apply protective barrier, creams and emollients, Minimize layers Problem: Dyspnea Due to End of Life Goal: Demonstrate understanding of and ability to manage respiratory symptoms at end of life Outcome: Progressing Towards Goal

## 2021-04-27 NOTE — HSPC IDG MASTER NOTE
[address] [facility] Date: 04/27/21 Time: 2:04 PM 
 
___________________ Patient: Michelle Yu Coverage Information: 
   Payor: [facility] MEDICAID Plan: REEMA ALVES [facility] Subscriber ID: [id number] Phone Number: MRN: [id number] CCN:  
[place] Claim No. :  
 
Hospice Election Date:  
Current Benefit Period: Benefit Period 1 Start Date: 4/23/2021 End Date: 7/21/2021 Medical Director:  
Hospice Attending Provider: Lenore Guevara MD 
[address] [place] [id number] Phone: [phone] Fax: [phone] Level of Care: General Inpatient Care 
 
 
___________________ Diagnoses: 
Diagnoses of Metastatic colon cancer in [facility]), Restlessness and agitation, Nonverbal signs of pain, Hospice care patient, Lethargy, Generalized abdominal pain, Peritoneal carcinomatosis ([address] Neoplastic (malignant) related fatigue, Neoplasm related pain, and Shortness of breath were pertinent to this visit. Current Medications: 
 
Current Facility-Administered Medications:  
  HYDROmorphone (DILAUDID) injection 0.5 mg, 0.5 mg, IntraVENous, Q4H, Gloria Garibay MD 
  LORazepam (ATIVAN) injection 0.5 mg, 0.5 mg, IntraVENous, Q4H, Gloria Garibay MD 
  HYDROmorphone (DILAUDID) injection 1 mg, 1 mg, IntraVENous, Q15MIN PRN, Gloria Garibay MD 
  LORazepam (ATIVAN) injection 1 mg, 1 mg, IntraVENous, Q15MIN PRN, Gloria Garibay MD 
  haloperidol lactate (HALDOL) injection 1 mg, 1 mg, IntraVENous, Q1H PRN, Raul Daly MD 
  bisacodyL (DULCOLAX) suppository 10 mg, 10 mg, Rectal, DAILY PRN, Beverly Morrison MD 
  acetaminophen (TYLENOL) suppository 650 mg, 650 mg, Rectal, H7Q PRN, Beverly Morrison MD 
  glycopyrrolate (PF) (ROBINUL) injection 0.2 mg, 0.2 mg, IntraVENous, B6B PRN, Lenore Guevara MD 
 
Orders: 
Orders Placed This Encounter  DIET NPO May have bites and sips for comfort as tolereated   Standing Status: Standing Number of Occurrences:   1  
 NURSING-MISCELLANEOUS: Admit to [facility] level of care for Pain management, agitation and extensive wound care Admit to [facility] level of care; Sn visit daily x 14 days with 5 visits PRN for symptom control; MSW 1 x weekly with 5 visits PRN family support a... Admit to [facility] level of care; Sn visit daily x 14 days with 5 visits PRN for symptom control; MSW 1 x weekly with 5 visits PRN family support and need for volunteer services,   1 x weekly and 5 visits PRN spiritual support; CNA daily x 14 days. Standing Status:   Standing Number of Occurrences:   1 Order Specific Question:   Description of Order: Answer:   Admit to [facility] level of care for Pain management, agitation and extensive wound care  NURSING-MISCELLANEOUS: (see comments) 1. NO admission labs, x-rays or other diagnostic tests, unless pertinent to symptom control . 2. Discontinue ALL prior medications. CONTINUOUS 1. NO admission labs, x-rays or other diagnostic tests, unless pertinent to symptom control . 2. Discontinue ALL prior medications. Standing Status:   Standing Number of Occurrences:   1 Order Specific Question:   Description of Order: Answer:   (see comments)  COMFORT MEASURES ONLY Standing Status:   Standing Number of Occurrences:   1 Nadine Swan 53 Standing Status:   Standing Number of Occurrences:   [id number]  
 NOTIFY PROVIDER: SPECIFY NOTIFY PROVIDER: FOR PAIN, DYSPNEA, AGITATION, OTHER DISTRESS OR NOT RESPONDING TO ORDERED INTERVENTIONS CONTINUOUS Routine Standing Status:   Standing Number of Occurrences:   1 Order Specific Question:   Please describe the test or procedure you would like to order. Answer:   NOTIFY PROVIDER: FOR PAIN, DYSPNEA, AGITATION, OTHER DISTRESS OR NOT RESPONDING TO ORDERED INTERVENTIONS  
 NURSING-MISCELLANEOUS: BITES AND SIPS FOR COMFORT CONTINUOUS Standing Status:   Standing   Number of Occurrences:   1 Order Specific Question:   Description of Order: Answer:   BITES AND SIPS FOR COMFORT  
 ORAL CARE Keep mouth moisturized with sponge sticks/toothettes and tap water. Vaseline to lips and nares as needed. Standing Status:   Standing Number of Occurrences:   1  
 BEDREST, COMPLETE Standing Status:   Standing Number of Occurrences:   1  TURN & POSITION  
  TURN & POSITION EVERY 6 HOURS - PATIENT MAY REFUSE Standing Status:   Standing Number of Occurrences:   1  
 NURSING-MISCELLANEOUS: Wound orders Dressing changes as needed. Cleanse wounds, Pack abd wound with wet gauze, cover with dressing. CONTINUOUS Dressing changes as needed. Cleanse wounds, Pack abd wound with wet gauze, cover with dressing. Standing Status:   Standing Number of Occurrences:   1 Order Specific Question:   Description of Order: Answer:   Wound orders  DO NOT RESUSCITATE Standing Status:   Standing Number of Occurrences:   1  
 OXYGEN CANNULA Liters per minute: 2; Indications for O2 therapy: OTHER PRN Routine Oxygen as needed. Adjust for comfort. Discontinue if not contributing to patient comfort. Standing Status:   Standing Number of Occurrences:   [id number] Order Specific Question:   Liters per minute: Answer:   2 Order Specific Question:   Indications for O2 therapy Answer:   OTHER  
 FALL PRECAUTIONS Standing Status:   Standing Number of Occurrences:   1  
 bisacodyL (DULCOLAX) suppository 10 mg  
 DISCONTD: LORazepam (ATIVAN) injection 1 mg  acetaminophen (TYLENOL) suppository 650 mg  
 glycopyrrolate (PF) (ROBINUL) injection 0.2 mg  
 DISCONTD: HYDROmorphone (DILAUDID) injection 0.5 mg  
 DISCONTD: LORazepam (ATIVAN) injection 0.5 mg  
 DISCONTD: haloperidol lactate (HALDOL) injection 2 mg  haloperidol lactate (HALDOL) injection 1 mg  DISCONTD: HYDROmorphone (DILAUDID) 1 mg/mL oral solution 2 mg  DISCONTD: LORazepam (INTENSOL) 2 mg/mL oral concentrate 0.5 mg  
 HYDROmorphone (DILAUDID) injection 0.5 mg  
 LORazepam (ATIVAN) injection 0.5 mg  
 HYDROmorphone (DILAUDID) injection 1 mg  LORazepam (ATIVAN) injection 1 mg  INITIAL PHYSICIAN ORDER: HOSPICE Level Of Care: General Inpatient; Reason for Admission: Admit to [facility] level of care for uncontrolled pain, agitation, and extensive wounds. Standing Status:   Standing Number of Occurrences:   1 Order Specific Question:   Status Answer:   Hospice Order Specific Question:   Level Of Care Answer:   General Inpatient Order Specific Question:   Reason for Admission Answer:   Admit to [facility] level of care for uncontrolled pain, agitation, and extensive wounds. Order Specific Question:   Admitting Physician Viet Irving [[id number]] Order Specific Question:   Attending Physician Viet Irving [[id number]] Order Specific Question:   Discharge Plan: Answer:   Home with Home Hospice Allergies: Allergies Allergen Reactions  Keflex [Cephalexin] Hives  Pcn [Penicillins] Hives  Tanzeum [Albiglutide] Nausea Only  Vioxx [Rofecoxib] Nausea Only Care Plan: 
Multidisciplinary Problems (Active) Problem: Dyspnea Due to End of Life Dates: Start: 04/23/21 Disciplines: Interdisciplinary Goal: Demonstrate understanding of and ability to manage respiratory symptoms at end of life Dates: Start: 04/23/21 Disciplines: Interdisciplinary Intervention: Assess for signs and symptoms of dyspnea Dates: Start: 04/23/21 Intervention: Instruct on causes/symptoms of dyspnea Dates: Start: 04/23/21 Intervention: Instruct patient/caregiver/family on strategies to effectively manage dyspnea Dates: Start: 04/23/21 Problem: Emotional Support Needs Dates: Start: 04/27/21  Disciplines: Interdisciplinary Goal: Patient/family is receiving emotional support Dates: Start: 04/27/21 Description: Patient and family will receive emotional support during admission at [facility] Disciplines: Interdisciplinary Intervention: Provide emotional support Dates: Start: 04/27/21 Description: LCSW providing bedside support to patient, daughter an son in law Problem: Falls - Risk of   
 Dates: Start: 04/23/21 Disciplines: Interdisciplinary Goal: *Absence of Falls Dates: Start: 04/23/21 Description: Document Ag Collier Fall Risk and appropriate interventions in the flowsheet. Disciplines: Interdisciplinary Problem: Imminent death Dates: Start: 04/23/21 Disciplines: Interdisciplinary Goal: Collaborate with patient/family/caregiver/interdisciplinary team to minimize and manage end of life symptoms Dates: Start: 04/23/21 Disciplines: Interdisciplinary Intervention: Instruct on end of life issues Dates: Start: 04/23/21 Description: Instruct patient/caregiver on end of life issues. Intervention: Plan for death Dates: Start: 04/23/21 Description: Assess patient/caregiver emotional readiness and plan for death and assist as needed. Problem: Patient Education: Go to Patient Education Activity Dates: Start: 04/23/21 Disciplines: Interdisciplinary Goal: Patient/Family Education Dates: Start: 04/23/21 Disciplines: Interdisciplinary Problem: Patient Education: Go to Patient Education Activity Dates: Start: 04/23/21 Disciplines: Interdisciplinary Goal: Patient/Family Education Dates: Start: 04/23/21 Disciplines: Interdisciplinary Problem: Pressure Injury - Risk of   
 Dates: Start: 04/23/21 Disciplines: Interdisciplinary Goal: *Prevention of pressure injury Dates: Start: 04/23/21 Description: Document Leo Scale and appropriate interventions in the flowsheet. Disciplines: Interdisciplinary Problem: Spiritual Evaluation Dates: Start: 04/27/21 Description: The patient does not have a Islam to which she is connected. Disciplines: Pastoral Care Goal: Identify beliefs/practices that support hospice experience Dates: Start: 04/27/21 Description: The patient's daughter's goal is for the patient to be spiritually nourished as she reaches the end of her life. Disciplines: Pastoral Care Intervention: Provide spiritual support Dates: Start: 04/27/21 Description: Provide pastoral presence and prayer for spiritual nourishment. Care Plan Problems/Goals Progressing Towards Goal (7) *Absence of Falls (Falls - Risk of) Disciplines:  Interdisciplinary Expected end:  - Outcome: Progressing Towards Goal By Fredderick Mix on 04/27/21 1312 Patient/Family Education (Patient Education: Go to Patient Education Activity) Disciplines:  Interdisciplinary Expected end:  - Outcome: Progressing Towards Goal By Fredderick Mix on 04/27/21 1312 *Prevention of pressure injury (Pressure Injury - Risk of) Disciplines:  Interdisciplinary Expected end:  - Outcome: Progressing Towards Goal By Fredderick Mix on 04/27/21 1312 Patient/Family Education (Patient Education: Go to Patient Education Activity) Disciplines:  Interdisciplinary Expected end:  - Outcome: Progressing Towards Goal By Fredderick Mix on 04/27/21 1312 Demonstrate understanding of and ability to manage respiratory symptoms at end of life (Dyspnea Due to End of Life) Disciplines:  Interdisciplinary Expected end:  - Outcome: Progressing Towards Goal By Fredderick Mix on 04/27/21 1312  Collaborate with patient/family/caregiver/interdisciplinary team to minimize and manage end of life symptoms (Imminent death) Disciplines:  Interdisciplinary Expected end:  - Outcome: Progressing Towards Goal By Tasneem Cameron on 04/27/21 1312 Patient/family is receiving emotional support (Emotional Support Needs) Disciplines:  Interdisciplinary Expected end:  - Outcome: Progressing Towards Goal By Tasneem Cameron on 04/27/21 1312 No Outcome (1) Identify beliefs/practices that support hospice experience (Spiritual Evaluation) Disciplines:  Pastoral Care Expected end:  -   
  
 
  
  
  
  
  
 
 
___________________ Care Team Notes IDG- Clinical note- 4/27- pt is currently GIP at [facility] related to pain, agitation, and resp distress. Pt is requiring frequent med changes and nursing and medical interventions. S/Sx have changed over the past 24 hours. Her breathing has changed and she is now using her excessory muscles. Pt needing frequent med changes. Will continue to monitor for needs and changes. POC/IDG Notes [facility] IDG  Notes by Ynes York at 04/27/21 1214  Version 1 of 1 Author: René Camp Service: Hospice and Palliative Care Author Type: Pastoral Care Filed: 04/27/21 1217 Date of Service: 04/27/21 1214 Status: Signed : René Camp [facility]) Patient: Tonia Cancer Date: 04/27/21 Time: 12:15 PM 
 
[facility]  Notes Completed IDG rounds with the RN, Clinical Manager, Medical Director and LCSW. The patient was lying in bed and did not respond to the  . The patient's daughter and son-in-law were present for the visit. Interventions: Completed spiritual assessment, offered grief support. Offered prayer. Goal for next week:  Continue to provide pastoral presence and prayer for support and comfortm Signed by: Edwardo COOPER [facility] IDG  Notes by Phoenix Tavarez at 04/27/21 1146  Version 1 of 1  Author: Phoenix Tavarez Service: Hospice and Palliative Care Author Type:  Filed: 04/27/21 0358 Date of Service: 04/27/21 1146 Status: Signed : Lennon Caller () LCSW participated in [address] rounds with MD, RN, RN manager and . Patient continues to decline and is having significant difficulty swallowing. Daughter and son in law at bedside, received update from team.  and LCSW met with family after rounds to offer additional support. They state they have what they need at this time and will reach out if needed. Emiliana Acuna, MSW, LCSW Hospice Social Worker 
[phone] Care Team Present:  
 
Dr Mechelle Sharpe, BREANNA Kraft, Nurse Leader CHARLEE Booker LPN

## 2021-04-27 NOTE — PROGRESS NOTES
LCSW met with patient, daughter and son in law at bedside to check in and offer additional support today. They expressed appreciation for check and are coping well today. LCSW will remain available for ongoing assistance.     BREANNA Johnston, [facility]   [phone]

## 2021-04-27 NOTE — HSPC IDG SOCIAL WORKER NOTES
LCSW participated in [address] [facility] rounds with MD, RN, RN manager and . Patient continues to decline and is having significant difficulty swallowing. Daughter and son in law at bedside, received update from team.  and LCSW met with family after rounds to offer additional support. They state they have what they need at this time and will reach out if needed.     BREANNA Rodriguez, [facility]   [phone]

## 2021-04-27 NOTE — PROGRESS NOTES
Problem: Falls - Risk of  Goal: *Absence of Falls  Description: Document Petey Moreno Fall Risk and appropriate interventions in the flowsheet. Outcome: Progressing Towards Goal  Note: Fall Risk Interventions:       Mentation Interventions: Adequate sleep, hydration, pain control    Medication Interventions: Bed/chair exit alarm    Elimination Interventions: Bed/chair exit alarm              Problem: Patient Education: Go to Patient Education Activity  Goal: Patient/Family Education  Outcome: Progressing Towards Goal     Problem: Pressure Injury - Risk of  Goal: *Prevention of pressure injury  Description: Document Leo Scale and appropriate interventions in the flowsheet.   Outcome: Progressing Towards Goal  Note: Pressure Injury Interventions:  Sensory Interventions: Assess changes in LOC    Moisture Interventions: Absorbent underpads    Activity Interventions: Pressure redistribution bed/mattress(bed type)    Mobility Interventions: Float heels, Pressure redistribution bed/mattress (bed type)    Nutrition Interventions: Document food/fluid/supplement intake    Friction and Shear Interventions: Apply protective barrier, creams and emollients                Problem: Patient Education: Go to Patient Education Activity  Goal: Patient/Family Education  Outcome: Progressing Towards Goal     Problem: Dyspnea Due to End of Life  Goal: Demonstrate understanding of and ability to manage respiratory symptoms at end of life  Outcome: Progressing Towards Goal     Problem: Imminent death  Goal: Collaborate with patient/family/caregiver/interdisciplinary team to minimize and manage end of life symptoms  Outcome: Progressing Towards Goal     Problem: Emotional Support Needs  Goal: Patient/family is receiving emotional support  Description: Patient and family will receive emotional support during admission at [facility]  Outcome: Progressing Towards Goal

## 2021-04-28 NOTE — PROGRESS NOTES
1900 - Report received    1940 - Shift Assessment: Patient resting with eyes closed, no non-verbal S/S of pain or discomfort, respirations shallow with periods of apnea, skin pale, lungs diminished in the bases, MARICHUY PICC intact, ileostomy to drainage bag with liquid green stool, foss patent and to gravity, scattered foam dressings to BUE, heels and sacrum, PEG tube in place and clamped, family at bedside. 2040 - Patient resting quietly with eyes closed, no S/S of distress, family has gone home. Mouth care completed. 2120 - Ieostomy bag leaking and changed, PEG tube dressing changed, patient turned and repositioned and dressing to sacrum changed. 2147 - Patient medicated with scheduled IV Dilaudid and Lorazepam.    2245 - Patient resting with her eyes closed, no S/S of pain or discomfort noted, will continue to monitor. 2344 - Patient resting with her eyes open, but they do not track, shallow respirations with periods of apnea, but no distress noted. 6871 - Patient turned and repositioned, mouth care completed with lip moisturizer applied, no non-verbal S/S of pain or discomfort noted. Will continue to monitor. 3306 - Patient resting with her eyes open, staring and  does not track, continues with shallow respirations with periods of apnea, no discomfort noted    0156 - Medicated with scheduled IV Dilaudid and Lorazepam.     0240 - Patient resting quietly with no S/S of distress. 9530 - Patient resting with no discomfort noted. 4661 - Patient turned and repositioned, mouth care completed, lip moisturizer applied, patient non-verbal, does hold the railing when turned, no non-verbal S/S of pain or discomfort, Foss and ileostomy emptied. 8045 - Patient medicated with scheduled IV Dilaudid and Lorazepam.    0630 - Patient resting quietly with eyes closed, no non-verbal S/S of pain or discomfort noted    0700 - Report given to oncoming nurse.        NAME OF PATIENT:  Luci Miriam [facility]    LEVEL OF CARE:  GIP    REASON FOR GIP:   Pain, despite numerous changes in medications, Terminal agitation, despite changes to medications and Medication adjustment that must be monitored 24/7    PATIENT REMAINS ELIGIBLE FOR GIP LEVEL OF CARE AS EVIDENCED BY: (MUST BE ADDRESSED OF PATIENT GIP)  Need for frequent nursing assessments and IV medications for symptom management. REASON FOR RESPITE:  NA      O2 SAFETY:  NA      FALL INTERVENTIONS PROVIDED:   Implemented/recommended use of fall risk identification flag to all team members, Implemented/recommended resources for alarm system (personal alarm, bed alarm, call bell, etc.)  and Implemented/recommended increased supervision/assistance    INTERDISPLINARY COMMUNICATION/COLLABORATION:  NA      NEW MEDICATION INITIATION DOCUMENTATION:  NA      Reason medication is being initiated:  NA    MD / Provider name consulted re: change in status / initiation of new medication:  NA    New Symptom(s):  NA    New Order(s):  NA    Name of the person notified of the changes:  NA    Name of person being taught:  NA    Instructions given:  NA    Side Effects taught: NA    Response to teaching:  NA      COMFORTABLE DYING MEASURE:  Is Patient/family satisfied with symptom level? Yes     DISCHARGE PLAN:  Home with hospice if symptom mangement achieved.

## 2021-04-28 NOTE — HOSPICE
NAME OF PATIENT:  Jennifer Phillips    LEVEL OF CARE:  [facility]    REASON FOR GIP:   Pain, despite numerous changes in medications, Terminal agitation, despite changes to medications and Medication adjustment that must be monitored 24/7    *PATIENT REMAINS ELIGIBLE FOR [facility] LEVEL OF CARE AS EVIDENCED BY: (MUST BE ADDRESSED OF PATIENT GIP)  Patient requires IV medications and continuous monitoring to manage symptoms of pain and agitation. REASON FOR RESPITE:  NA    O2 SAFETY: NA    FALL INTERVENTIONS PROVIDED:   Implemented/recommended use of non-skid footwear, Implemented/recommended use of fall risk identification flag to all team members, Implemented/recommended assistive devices and encouraged their use, Implemented/recommended resources for alarm system (personal alarm, bed alarm, call bell, etc.) , Implemented/recommended environmental changes (remove hazards, lower bed, improve lighting, etc.) and Implemented/recommended increased supervision/assistance    INTERDISPLINARY COMMUNICATION/COLLABORATION:  Physician, MSW, [place] and RN, CNA    NEW MEDICATION INITIATION DOCUMENTATION:  NA    Reason medication is being initiated:  NA    MD / Provider name consulted re: change in status / initiation of new medication:  NA    New Symptom(s):  NA    New Order(s):  NA    Name of the person notified of the changes:  NA    Name of person being taught:  NA    Instructions given:  NA    Side Effects taught:  NA    Response to teaching:  NA    COMFORTABLE DYING MEASURE:  Is Patient/family satisfied with symptom level? Yes    DISCHARGE PLAN:  End of Life or Home Hospice with daughter in the event patient's condition stabilizes. 0700  Report received from Rooney Schanz, [address]  Patient sleeping, no evidence of pain or agitation noted. 0825  Patient sleeping, appears comfortable. Breathing unlabored, periods of apnea, yellowed/ashen skin coloring. 0925  VS, Simple assessment.   Note:  RR 5 with frequent 20 second periods of apnea.  Insp and Exp wheezing noted. Respirations shallow, unlabored. Patient moved head and arms independently during assessment, otherwise unresponsive. Eyes partially open, but not tracking. No evidence of pain or discomfort noted. 1025  Scheduled Dilaudid and Lorazepam administered. Patient opened her eyes and moved her hands in response to my voice, then returned to sleep. 1100  Reassessed patient following med administration. Patient sleeping, no evidence of pain or agitation. [place] with Dr. Jagjit Sanders. Daughter and niece at bedside. Patient's eyes open, but unable to communicate. Appears comfortable. No new orders. 1330  Patient sleeping. No evidence of pain or agitation noted. 1430  Patient resting comfortably in bed with eyes open. Daughter at bedside. 1445  Scheduled medications administered. 1545  Patient sleeping with daughter at bedside. No evidence of pain or agitation. 1645  Patient sleeping. Appears comfortable. 1745  Scheduled meds administered. Bed bath, gown and linen changed, dressing changes. 1830  Patient sleeping. No evidence of discomfort. Daughter at bedside.   1900  Report given to [facility], RN

## 2021-04-28 NOTE — TELEPHONE ENCOUNTER
Home health orders have dropped off and placed in the providers folder in the black box up front. No

## 2021-04-28 NOTE — PROGRESS NOTES
[address] [place] [address] [facility] Help to Those in Need 
[phone] Patient Name: Sylvia Fontenot YOB: 1955 Date of Provider Hospice Visit: 04/28/21 Level of Care:   [x] General Inpatient (GIP)    [] Routine   [] Respite Current Location of Care: 
[] [facility] [] [facility] [] [facility] [] [facility] [] [facility] IF [facility], patient referred from: 
[] [facility] [] [facility] [] [facility] [] [facility] [] Home [x] Other: [facility] Date of Original Hospice Admission: 4/23/21 Hospice Medical Director at time of admission: Erasmo Eugene Hospice Diagnosis: Metastatic colon cancer Diagnoses RELATED to the terminal prognosis: Peritoneal carcinomatosis, status post loop ileostomy, G-tube, wound dehiscence, multiple wounds involving sacrum, feet, ankles, abdomen Other Diagnoses: Valdo Martin Sylvia Fontenot is a 61y.o. year old who was admitted to [address] [place] [address]. Patient is a 82-year-old female with recent diagnosis of metastatic colon cancer. This was diagnosed in February 2021. Unfortunately had extensive peritoneal carcinomatosis and underwent surgery at [facility] to include loop ileostomy, G-tube with ex lap. Unfortunately she has had significant complications with high-volume ostomy output, wound dehiscence, G-tube drainage. At home, she was having 4 L of ostomy output and attempting to do 2 L of lactated Ringer's daily. She has had multiple hospitalizations to include [facility] from 3/2-3/8 and then was at [facility] from 3/23-3/28. This consult then came from [facility] where she was readmitted with altered mental status, multiple wounds and overall failure to thrive. After discussion with the family, they have elected to transition to comfort with the support of hospice. IV fluids, IV antibiotics, attempting any type of tube feedings and/or TPN as well as IV fluid have been stopped and patient sent to the community hospice house for further management of her symptoms.  
 
Talked with patient's daughter at the bedside. Patient has been having agitation and confusion. Been using a combination of Haldol, Ativan, BuSpar at times. She does states she is fairly sensitive to the Ativan. It appears they have been using some as needed fentanyl for pain but patient typically will deny pain despite nonverbal signs of pain. The patient's principle diagnosis has resulted in wound dehiscence, altered mental status Refer to LCD Functionally, the patient's Karnofsky and/or Palliative Performance Scale has declined over a period of weeks and is estimated at 10-20 the patient is dependent on the following ADLs: All Objective information that support this patients limited prognosis includes:  
CT scan from February IMPRESSION 
  
1. Bilateral pulmonary emboli. 2. Large volume ascites with diffuse distention of small bowel loops and 
nodularity of the peritoneum compatible with peritoneal carcinomatosis and small 
bowel obstruction. 3. Bilateral pleural effusions The patient/family chose comfort measures with the support of Hospice. HOSPICE DIAGNOSES Active Symptoms: 1. Restlessness with agitation 2. Multiple wounds with suspected pain-nonverbal  
3. Metastatic colon cancer with wound dehiscence, G-tube drainage, high ileostomy output 4. Hospice care patient 5. Lethargy/decreased responsiveness 6. Labored breathing 7. Non verbal pain PLAN 1. Continue GIP level of care as pt remains symptomatic, needing close monitoring and frequent medication administration. 2. Continue scheduled Dilaudid IV 0.5mg every 4 hours & 1mg IV every 15mts as needed. 3. Ativan 0.5mg IV scheduled every 4 hours and 1mg IV every 15mts as needed. 4. Use other comfort meds as needed. 5. Spent time talking with daughter Clif Ott and vannesa at bedside and reviewed pt's current medical condition, symptoms, expected course and outcomes and prognosis and current plan of care .  They are understanding of plan of care and agreeable to it. 6. Continue basic wound care to all areas with wet-to-dry dressings in the open wound in the abdomen. 7.  and SW to support family needs. 8. Disposition: Likely will pass here based on current state but if she stabilizes family would be interested in resuming home hospice care. 9. Hospice Plan of care was reviewed in detail and agree with current plan of care Prognosis estimated based on 04/28/21 clinical assessment is:  
[x] Hours to Days   
[] Days to Weeks   
[] Other: 
 
Communicated plan of care with: Hospice Case Manager; Hospice IDT; Care Team 
 
 GOALS OF CARE Patient/Medical POA stated Goal of Care: Hospice care 
 
[x] I have reviewed and/or updated ACP information in the Advance Care Planning Navigator. This information is available in the [facility] [address] link in the patient's chart header. Primary Decision [facility] Agent):   Primary Decision MakerReacorinne Sachin - Child - [phone] Secondary Decision Maker: Isabelle Espinosa - Daughter - [phone] Resuscitation Status: DNR If DNR is there a Durable DNR on file? : [x] Yes [] No (If no, complete Durable DNR) HISTORY History obtained from: Chart, bedside nurse CHIEF COMPLAINT: N/A The patient is:  
[] Verbal 
[x] Nonverbal 
[x] Unresponsive HPI/SUBJECTIVE:  
4/23: Patient actually received Dilaudid and Ativan prior to my seeing her. She is sleeping at the time of my evaluation but according to the bedside nurses, she was answering some basic questions but remained confused. 4/24: patient opens her eyes briefly when I enter the room but is unable to talk to me. She is very lethargic. She has received 1 prn each dilaudid and ativan since midnight. 
 
4/25: She is more awake today and able to participate in basic conversation. Visiting with her niece Levonne Kussmaul. Currently denying symptoms. Taking sips but not food today. Has about 800 cc GI output in collection bag. Overnight she received 1 prn each of dilaudid and ativan. This morning she has received 1 prn each of ativan and dilaudid. 4/26patient is awake but very resistant to talking. Patient is very withdrawn with a flat affect. Daughter was at the bedside 4/27; pt is lethargic, unresponsive, daughter and son in law at bedside. Pt has required several prn doses of ativan and dilaudid overnight for increased restlessness and agitation. Now settled and resting well. Appears ashen, pale. Breathing seems little labored. Withdrawn, flat affect when she is awake. Urine output decreased and dark concentrated. No oral intake. Ileostomy bag leaking around opening. Wounds all over covered with dressing.  
 
4/28; pt is minimally responsive, but much more comfortable, did not get any prn meds. REVIEW OF SYSTEMS The following systems were: [] reviewed  [x] unable to be reviewed Adult Non-Verbal Pain Assessment Score: 5 Face 
[] 0   No particular expression or smile 
[x] 1   Occasional grimace, tearing, frowning, wrinkled forehead 
[] 2   Frequent grimace, tearing, frowning, wrinkled forehead Activity (movement) [x] 0   Lying quietly, normal position 
[] 1   Seeking attention through movement or slow, cautious movement 
[] 2   Restless, excessive activity and/or withdrawal reflexes Guarding 
[x] 0   Lying quietly, no positioning of hands over areas of body 
[] 1   Splinting areas of the body, tense 
[] 2   Rigid, stiff Physiology (vital signs) 
[] 0   Stable vital signs [] 1   Change in any of the following: SBP > 20mm Hg; HR > 20/minute [x] 2   Change in any of the following: SBP > 30mm Hg; HR > 25/minute Respiratory 
[] 0   Baseline RR/SpO2, compliant with ventilator 
[] 1   RR > 10 above baseline, or 5% drop SpO2, mild asynchrony with ventilator 
[x] 2   RR > 20 above baseline, or 10% drop SpO2, asynchrony with ventilator FUNCTIONAL ASSESSMENT Palliative Performance Scale (PPS): 20 PSYCHOSOCIAL/SPIRITUAL ASSESSMENT Active Problems: * No active hospital problems. * 
 
Past Medical History:  
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  
 Hypertension  Peripheral autonomic neuropathy due to diabetes mellitus ([facility]  Peritoneal carcinomatosis ([facility] 2021  Psychiatric disorder PTSD; 2003 [address] [place] [address] [address] [facility]  PTSD (post-traumatic stress disorder) Past Surgical History:  
Procedure Laterality Date  FLEXIBLE SIGMOIDOSCOPY N/A 2021 SIGMOIDOSCOPY FLEXIBLE performed by Sasha Landeros MD at [facility] ENDOSCOPY  Rodriguezpat Gabriel Sugey N/A 2/3/2021 SIGMOIDOSCOPY FLEXIBLE performed by Sasha Landeros MD at [facility] ENDOSCOPY  
 HX AMPUTATION Right Right  big toe  and right second toe amputated   HX CARPAL TUNNEL RELEASE Right  HX CATARACT REMOVAL Right  HX  SECTION    
 HX CHOLECYSTECTOMY  HX KNEE ARTHROSCOPY Right   
 right knee   HX OTHER SURGICAL    
 right groin cysts removed   HX TRABECULECTOMY Bilateral   
  
Social History Tobacco Use  Smoking status: Current Every Day Smoker Packs/day: 1.00 Years: 47.00 Pack years: 47.00 Types: Cigarettes  Smokeless tobacco: Never Used Substance Use Topics  Alcohol use: No  
 
Family History Problem Relation Age of Onset  Heart Disease Mother  Hypertension Mother  Cancer Mother   
     cancer  Diabetes Father  Cancer Brother   
     cancer  Diabetes Brother  Diabetes Maternal Grandmother  Diabetes Paternal Grandmother  Hypertension Paternal Grandmother  Diabetes Paternal Grandfather Allergies Allergen Reactions  Keflex [Cephalexin] Hives  Pcn [Penicillins] Hives  Tanzeum [Albiglutide] Nausea Only  Vioxx [Rofecoxib] Nausea Only Current Facility-Administered Medications Medication Dose Route Frequency  HYDROmorphone (DILAUDID) injection 0.5 mg  0.5 mg IntraVENous Q4H  
 LORazepam (ATIVAN) injection 0.5 mg  0.5 mg IntraVENous Q4H  
 HYDROmorphone (DILAUDID) injection 1 mg  1 mg IntraVENous Q15MIN PRN  
 LORazepam (ATIVAN) injection 1 mg  1 mg IntraVENous Q15MIN PRN  
 haloperidol lactate (HALDOL) injection 1 mg  1 mg IntraVENous Q1H PRN  
 bisacodyL (DULCOLAX) suppository 10 mg  10 mg Rectal DAILY PRN  
 acetaminophen (TYLENOL) suppository 650 mg  650 mg Rectal Q6H PRN  
 glycopyrrolate (PF) (ROBINUL) injection 0.2 mg  0.2 mg IntraVENous Q4H PRN PHYSICAL EXAM  
 
Wt Readings from Last 3 Encounters:  
03/28/21 75 kg (165 lb 5.5 oz) 02/05/21 77.6 kg (171 lb)  
01/29/21 77.6 kg (171 lb) Visit Vitals /62 Pulse 86 Temp 98.2 °F (36.8 °C) Resp (!) 5 SpO2 (!) 76% Supplemental O2  [] Yes  [x] NO Last bowel movement:  
 
Currently this patient has: 
[] Peripheral IV [x] PICC  [] PORT [] ICD [x] Ramirez Catheter [] NG Tube   [] PEG Tube   
[] Rectal Tube [] Drain 
[] Other:  
 
Constitutional: Ill-appearing, ashen, pale, lethargic, appears comfortable Eyes: closed ENMT: Dry 
Cardiovascular: Regular rate and rhythm Respiratory: Diminished at the bases, no significant wheezing Gastrointestinal: Soft, NT, G-tube in place, ileostomy is in place-green stool, drainage around G-tube, packing in place in abdominal wound Musculoskeletal: Muscle wasting Skin: Multiple areas of wounds that are covered on both feet, ankles, sacral 
Neurologic:lethargic, minimally responsive Psychiatric: Flat affect, withdrawn Other:  
 
 
Pertinent Lab and or Imaging Tests: 
Lab Results Component Value Date/Time  Sodium 146 (H) 03/28/2021 04:49 AM  
 Potassium 4.2 03/28/2021 04:49 AM  
 Chloride 112 (H) 03/28/2021 04:49 AM  
 CO2 30 03/28/2021 04:49 AM  
 Anion gap 4 (L) 03/28/2021 04:49 AM  
 Glucose 125 (H) 03/28/2021 04:49 AM  
 BUN 16 03/28/2021 04:49 AM  
 Creatinine 1.09 (H) 03/28/2021 04:49 AM  
 BUN/Creatinine ratio 15 03/28/2021 04:49 AM  
 GFR est AA >60 03/28/2021 04:49 AM  
 GFR est non-AA 51 (L) 03/28/2021 04:49 AM  
 Calcium 7.3 (L) 03/28/2021 04:49 AM  
 
Lab Results Component Value Date/Time Protein, total 5.6 (L) 03/28/2021 04:49 AM  
 Albumin 2.1 (L) 03/28/2021 04:49 AM  
 
   
 
Total time: 35mts Counseling / coordination time: 20mts > 50% counseling / coordination?:

## 2021-04-28 NOTE — PROGRESS NOTES
Problem: Falls - Risk of  Goal: *Absence of Falls  Description: Document Isaac Oro Fall Risk and appropriate interventions in the flowsheet. Outcome: Progressing Towards Goal  Note: Fall Risk Interventions:       Mentation Interventions: Adequate sleep, hydration, pain control, Bed/chair exit alarm, Door open when patient unattended    Medication Interventions: Bed/chair exit alarm    Elimination Interventions: Bed/chair exit alarm              Problem: Pressure Injury - Risk of  Goal: *Prevention of pressure injury  Description: Document Leo Scale and appropriate interventions in the flowsheet. Outcome: Progressing Towards Goal  Note: Pressure Injury Interventions:  Sensory Interventions: Assess changes in LOC, Check visual cues for pain, Float heels, Keep linens dry and wrinkle-free, Minimize linen layers, Monitor skin under medical devices, Pressure redistribution bed/mattress (bed type), Turn and reposition approx. every two hours (pillows and wedges if needed), Use 30-degree side-lying position    Moisture Interventions: Absorbent underpads, Apply protective barrier, creams and emollients, Contain wound drainage, Internal/External fecal devices, Internal/External urinary devices, Maintain skin hydration (lotion/cream)    Activity Interventions: Pressure redistribution bed/mattress(bed type)    Mobility Interventions: Float heels, HOB 30 degrees or less, Pressure redistribution bed/mattress (bed type), Turn and reposition approx.  every two hours(pillow and wedges)    Nutrition Interventions: Document food/fluid/supplement intake    Friction and Shear Interventions: Apply protective barrier, creams and emollients, HOB 30 degrees or less, Lift sheet                Problem: Imminent death  Goal: Collaborate with patient/family/caregiver/interdisciplinary team to minimize and manage end of life symptoms  Outcome: Progressing Towards Goal

## 2021-04-28 NOTE — PROGRESS NOTES
Problem: Falls - Risk of  Goal: *Absence of Falls  Description: Document Lear Shaker Fall Risk and appropriate interventions in the flowsheet. Outcome: Progressing Towards Goal  Note: Fall Risk Interventions:       Mentation Interventions: Adequate sleep, hydration, pain control, Bed/chair exit alarm, Door open when patient unattended, Reorient patient    Medication Interventions: Bed/chair exit alarm    Elimination Interventions: Bed/chair exit alarm              Problem: Pressure Injury - Risk of  Goal: *Prevention of pressure injury  Description: Document Leo Scale and appropriate interventions in the flowsheet.   Outcome: Progressing Towards Goal  Note: Pressure Injury Interventions:  Sensory Interventions: Assess changes in LOC, Float heels, Keep linens dry and wrinkle-free, Minimize linen layers, Pressure redistribution bed/mattress (bed type)    Moisture Interventions: Absorbent underpads, Apply protective barrier, creams and emollients, Contain wound drainage, Internal/External fecal devices, Internal/External urinary devices, Maintain skin hydration (lotion/cream), Minimize layers    Activity Interventions: Pressure redistribution bed/mattress(bed type)    Mobility Interventions: Float heels, HOB 30 degrees or less, Pressure redistribution bed/mattress (bed type)    Nutrition Interventions: Document food/fluid/supplement intake    Friction and Shear Interventions: Apply protective barrier, creams and emollients, HOB 30 degrees or less, Lift sheet

## 2021-04-29 NOTE — HOSPICE
0700 Report received from [facility]. 0730 Patient resting in bed quietly, respirations are even and shallow, neutral facial expression noted. Assessment performed, see flow sheet. 1874 Patient turned and repositioned in bed, during turn patient became restless and groaning, gave PRN IV dilaudid and IV lorazepam, will continue to monitor. 5091 Patient no longer restless or groaning, PRN medications effective. Daughter at the bedside, given update on patient's night/morning. 3071 Patient passed away, daughter at the bedside. No heart sounds or respiratory effort noted. Pupils are fixed and dilated. Patient pronounced at 2831. Daughter at the bedside grieving appropriately, MD notified. 4074 Post mortem care completed,  here to pick patient up.

## 2021-04-29 NOTE — HOSPICE
NAME OF PATIENT:  Jennifer Phillips    LEVEL OF CARE:  [facility]    REASON FOR GIP:   Pain, despite numerous changes in medications, Terminal agitation, despite changes to medications, Medication adjustment that must be monitored 24/7 and Stabilizing treatment that cannot take place at home    *PATIENT REMAINS ELIGIBLE FOR [facility] LEVEL OF CARE AS EVIDENCED BY: (MUST BE ADDRESSED OF PATIENT GIP)      REASON FOR RESPITE:  NA    O2 SAFETY:  NA    FALL INTERVENTIONS PROVIDED:   Implemented/recommended use of non-skid footwear, Implemented/recommended use of fall risk identification flag to all team members, Implemented/recommended assistive devices and encouraged their use, Implemented/recommended resources for alarm system (personal alarm, bed alarm, call bell, etc.) , Implemented/recommended environmental changes (remove hazards, lower bed, improve lighting, etc.) and Implemented/recommended increased supervision/assistance    INTERDISPLINARY COMMUNICATION/COLLABORATION:  Physician, MSW, Macho and RN, CNA    NEW MEDICATION INITIATION DOCUMENTATION:  NA    Reason medication is being initiated:  DIXIE    MD / Provider name consulted re: change in status / initiation of new medication:  NA    New Symptom(s):  NA    New Order(s):  NA    Name of the person notified of the changes:  NA    Name of person being taught:  NA    Instructions given:  NA    Side Effects taught:  NA    Response to teaching:  NA      COMFORTABLE DYING MEASURE:  Is Patient/family satisfied with symptom level?  yes    DISCHARGE PLAN:  Return home with family when symptoms can be managed in the home followed by Home Hospice. 19:00 Shift report received from [facility] [address]  19:30 Patient lying in bed eyes closed, unresponsive to verbal or tactile stimulus. Respirations shallow with periods of apnea, lung sounds diminished. No signs or symptoms of pain or agitation.  Multiple wounds and skin tears dressings dry and intact, foss draining lucia urine, ileostomy draining liquid green stool. 20:30 Patient repositioned for comfort, opened eyes remains non verbal, no signs of pain or agitation noted. Will cont to monitor. 21:30 Patient resting quietly, no signs or symptoms of pain or agitation noted, medicated with scheduled Dilaudid and Lorazepam, remains unresponsive, respirations shallow with periods of apnea. 22:30 Patient lying in bed eyes closed, unresponsive to verbal and tactile stimulus, respirations shallow with periods of apnea, no facial grimacing or frowning noted, medication managing symptoms. 23:30 Patient resting quietly, no signs of pain or distress noted, appears comfortable. Will cont to monitor. 00:45 Patient lying in bed eyes closed, no signs of pain or agitation noted, medications effective. 02:00 Patient medicated with scheduled Dilaudid and Lorazepam to manage symptoms, no signs of pain or agitation noted, turned and repositioned for comfort, mouth care provided. Will cont to monitor. 03:00 Patient opening eyes and moving head to verbal stimulus, moving arms up in air, pulling at ostomy and g-tube dressing, green drainage leaking around ostomy. Complete bed bath and linen change provided by Ann Hensley CNA. Ostomy and G-tube dressing changed, remaining multiple foam dressings intact. 04:00 Patient lying in bed resting quietly, no signs of pain or agitation noted, respirations shallow with periods of apnea, medications managing symptoms. 05:00 Patient resting, no signs of pain or agitation noted, medications effective. 06:00 Patient resting quietly, no facial grimacing, frowning or agitation noted, turned and repositioned for comfort, mouth care provided. 07:00 Shift report given to oncoming Nurse.

## 2021-04-29 NOTE — PROGRESS NOTES
Problem: Pressure Injury - Risk of  Goal: *Prevention of pressure injury  Description: Document Leo Scale and appropriate interventions in the flowsheet. Outcome: Progressing Towards Goal  Note: Pressure Injury Interventions:  Sensory Interventions: Assess changes in LOC, Avoid rigorous massage over bony prominences, Check visual cues for pain, Float heels, Keep linens dry and wrinkle-free, Maintain/enhance activity level, Minimize linen layers, Pressure redistribution bed/mattress (bed type), Turn and reposition approx. every two hours (pillows and wedges if needed)    Moisture Interventions: Absorbent underpads, Check for incontinence Q2 hours and as needed, Contain wound drainage, Internal/External urinary devices, Limit adult briefs, Maintain skin hydration (lotion/cream), Minimize layers, Moisture barrier    Activity Interventions: Pressure redistribution bed/mattress(bed type)    Mobility Interventions: Assess need for specialty bed, Float heels, HOB 30 degrees or less, Pressure redistribution bed/mattress (bed type), Turn and reposition approx.  every two hours(pillow and wedges)    Nutrition Interventions: Document food/fluid/supplement intake    Friction and Shear Interventions: Apply protective barrier, creams and emollients, Foam dressings/transparent film/skin sealants, HOB 30 degrees or less, Lift sheet, Minimize layers                Problem: Imminent death  Goal: Collaborate with patient/family/caregiver/interdisciplinary team to minimize and manage end of life symptoms  Outcome: Progressing Towards Goal     Problem: Emotional Support Needs  Goal: Patient/family is receiving emotional support  Description: Patient and family will receive emotional support during admission at [facility]  Outcome: Progressing Towards Goal

## 2021-04-30 NOTE — HOSPICE
[address] Bereavement/Condolence Call: This MSW called pts daughter, Bradford Plasencia to offer condolences and support. MSW offered [address] information for ongoing support. Bradford Rued requested foundation telephone number to set up donations in lieu of flowers. LCSW provided number and expressed appreciation for this gesture.     Raz Lua, MSW, [address]    [phone]

## 2023-10-05 NOTE — PROGRESS NOTES
I reviewed with the resident the medical history and the resident's findings on the physical examination. I discussed with the resident the patient's diagnosis and concur with the plan. Rosas

## (undated) DEVICE — CATH IV AUTOGRD BC BLU 22GA 25 -- INSYTE

## (undated) DEVICE — SOLIDIFIER MEDC 1200ML -- CONVERT TO 356117

## (undated) DEVICE — 1200 GUARD II KIT W/5MM TUBE W/O VAC TUBE: Brand: GUARDIAN

## (undated) DEVICE — SYR 3ML LL TIP 1/10ML GRAD --

## (undated) DEVICE — ADULT SPO2 SENSOR: Brand: NELLCOR

## (undated) DEVICE — KIT COLON W/ 1.1OZ LUB AND 2 END

## (undated) DEVICE — NDL FLTR TIP 5 MIC 18GX1.5IN --

## (undated) DEVICE — Device

## (undated) DEVICE — SET ADMIN 16ML TBNG L100IN 2 Y INJ SITE IV PIGGY BK DISP

## (undated) DEVICE — BAG BELONG PT PERS CLEAR HANDL

## (undated) DEVICE — ELECTRODE,RADIOTRANSLUCENT,FOAM,3PK: Brand: MEDLINE

## (undated) DEVICE — CONTAINER SPEC 20 ML LID NEUT BUFF FORMALIN 10 % POLYPR STS

## (undated) DEVICE — SYR 5ML 1/5 GRAD LL NSAF LF --

## (undated) DEVICE — CUFF RMFG BP INF SZ 11 DISP -- LAWSON OEM ITEM 238915

## (undated) DEVICE — BAG SPEC BIOHZRD 10 X 10 IN --

## (undated) DEVICE — CANN NASAL O2 CAPNOGRAPHY AD -- FILTERLINE

## (undated) DEVICE — (D)SENSOR RMFG 02 PULS OXMTR -- DISC BY MFR USE ITEM 133445

## (undated) DEVICE — BASIN EMSIS 16OZ GRAPHITE PLAS KID SHP MOLD GRAD FOR ORAL

## (undated) DEVICE — NDL PRT INJ NSAF BLNT 18GX1.5 --

## (undated) DEVICE — FORCEPS BX L240CM JAW DIA2.8MM L CAP W/ NDL MIC MESH TOOTH